# Patient Record
Sex: MALE | Race: WHITE | Employment: OTHER | URBAN - METROPOLITAN AREA
[De-identification: names, ages, dates, MRNs, and addresses within clinical notes are randomized per-mention and may not be internally consistent; named-entity substitution may affect disease eponyms.]

---

## 2017-03-14 ENCOUNTER — APPOINTMENT (OUTPATIENT)
Dept: CT IMAGING | Facility: CLINIC | Age: 77
End: 2017-03-14
Attending: EMERGENCY MEDICINE
Payer: MEDICARE

## 2017-03-14 ENCOUNTER — HOSPITAL ENCOUNTER (EMERGENCY)
Facility: CLINIC | Age: 77
Discharge: HOME | End: 2017-03-14
Attending: EMERGENCY MEDICINE | Admitting: EMERGENCY MEDICINE
Payer: MEDICARE

## 2017-03-14 VITALS
HEIGHT: 70 IN | BODY MASS INDEX: 23.62 KG/M2 | DIASTOLIC BLOOD PRESSURE: 78 MMHG | RESPIRATION RATE: 16 BRPM | HEART RATE: 66 BPM | TEMPERATURE: 98.6 F | OXYGEN SATURATION: 97 % | SYSTOLIC BLOOD PRESSURE: 127 MMHG | WEIGHT: 165 LBS

## 2017-03-14 DIAGNOSIS — K63.89 EPIPLOIC APPENDAGITIS: ICD-10-CM

## 2017-03-14 LAB
ALBUMIN SERPL-MCNC: 4.1 G/DL (ref 3.4–5)
ALBUMIN UR-MCNC: NEGATIVE MG/DL
ALP SERPL-CCNC: 39 U/L (ref 40–150)
ALT SERPL W P-5'-P-CCNC: 25 U/L (ref 0–70)
ANION GAP SERPL CALCULATED.3IONS-SCNC: 9 MMOL/L (ref 3–14)
APPEARANCE UR: CLEAR
AST SERPL W P-5'-P-CCNC: 18 U/L (ref 0–45)
BASOPHILS # BLD AUTO: 0 10E9/L (ref 0–0.2)
BASOPHILS NFR BLD AUTO: 0.2 %
BILIRUB SERPL-MCNC: 0.4 MG/DL (ref 0.2–1.3)
BILIRUB UR QL STRIP: NEGATIVE
BUN SERPL-MCNC: 14 MG/DL (ref 7–30)
CALCIUM SERPL-MCNC: 8.7 MG/DL (ref 8.5–10.1)
CHLORIDE SERPL-SCNC: 110 MMOL/L (ref 94–109)
CO2 SERPL-SCNC: 27 MMOL/L (ref 20–32)
COLOR UR AUTO: YELLOW
CREAT SERPL-MCNC: 1.11 MG/DL (ref 0.66–1.25)
DIFFERENTIAL METHOD BLD: ABNORMAL
EOSINOPHIL # BLD AUTO: 0.4 10E9/L (ref 0–0.7)
EOSINOPHIL NFR BLD AUTO: 5.5 %
ERYTHROCYTE [DISTWIDTH] IN BLOOD BY AUTOMATED COUNT: 15 % (ref 10–15)
GFR SERPL CREATININE-BSD FRML MDRD: 64 ML/MIN/1.7M2
GLUCOSE SERPL-MCNC: 89 MG/DL (ref 70–99)
GLUCOSE UR STRIP-MCNC: NEGATIVE MG/DL
HCT VFR BLD AUTO: 42 % (ref 40–53)
HGB BLD-MCNC: 14 G/DL (ref 13.3–17.7)
HGB UR QL STRIP: ABNORMAL
IMM GRANULOCYTES # BLD: 0 10E9/L (ref 0–0.4)
IMM GRANULOCYTES NFR BLD: 0.6 %
KETONES UR STRIP-MCNC: NEGATIVE MG/DL
LEUKOCYTE ESTERASE UR QL STRIP: NEGATIVE
LYMPHOCYTES # BLD AUTO: 1.9 10E9/L (ref 0.8–5.3)
LYMPHOCYTES NFR BLD AUTO: 28.2 %
MCH RBC QN AUTO: 32.3 PG (ref 26.5–33)
MCHC RBC AUTO-ENTMCNC: 33.3 G/DL (ref 31.5–36.5)
MCV RBC AUTO: 97 FL (ref 78–100)
MONOCYTES # BLD AUTO: 0.6 10E9/L (ref 0–1.3)
MONOCYTES NFR BLD AUTO: 8.5 %
NEUTROPHILS # BLD AUTO: 3.8 10E9/L (ref 1.6–8.3)
NEUTROPHILS NFR BLD AUTO: 57 %
NITRATE UR QL: NEGATIVE
NRBC # BLD AUTO: 0 10*3/UL
NRBC BLD AUTO-RTO: 0 /100
PH UR STRIP: 6 PH (ref 5–7)
PLATELET # BLD AUTO: 191 10E9/L (ref 150–450)
POTASSIUM SERPL-SCNC: 3.9 MMOL/L (ref 3.4–5.3)
PROT SERPL-MCNC: 7.4 G/DL (ref 6.8–8.8)
RBC # BLD AUTO: 4.34 10E12/L (ref 4.4–5.9)
RBC #/AREA URNS AUTO: NORMAL /HPF (ref 0–2)
SODIUM SERPL-SCNC: 146 MMOL/L (ref 133–144)
SP GR UR STRIP: 1.01 (ref 1–1.03)
URN SPEC COLLECT METH UR: ABNORMAL
UROBILINOGEN UR STRIP-ACNC: 0.2 EU/DL (ref 0.2–1)
WBC # BLD AUTO: 6.6 10E9/L (ref 4–11)
WBC #/AREA URNS AUTO: NORMAL /HPF (ref 0–2)

## 2017-03-14 PROCEDURE — 99285 EMERGENCY DEPT VISIT HI MDM: CPT | Mod: 25

## 2017-03-14 PROCEDURE — 80053 COMPREHEN METABOLIC PANEL: CPT | Performed by: EMERGENCY MEDICINE

## 2017-03-14 PROCEDURE — 25500064 ZZH RX 255 OP 636: Performed by: EMERGENCY MEDICINE

## 2017-03-14 PROCEDURE — 74177 CT ABD & PELVIS W/CONTRAST: CPT

## 2017-03-14 PROCEDURE — 85025 COMPLETE CBC W/AUTO DIFF WBC: CPT | Performed by: EMERGENCY MEDICINE

## 2017-03-14 PROCEDURE — 25000125 ZZHC RX 250: Performed by: EMERGENCY MEDICINE

## 2017-03-14 PROCEDURE — 81001 URINALYSIS AUTO W/SCOPE: CPT | Performed by: EMERGENCY MEDICINE

## 2017-03-14 RX ORDER — IOPAMIDOL 755 MG/ML
83 INJECTION, SOLUTION INTRAVASCULAR ONCE
Status: COMPLETED | OUTPATIENT
Start: 2017-03-14 | End: 2017-03-14

## 2017-03-14 RX ADMIN — SODIUM CHLORIDE 65 ML: 9 INJECTION, SOLUTION INTRAVENOUS at 20:40

## 2017-03-14 RX ADMIN — IOPAMIDOL 83 ML: 755 INJECTION, SOLUTION INTRAVENOUS at 20:41

## 2017-03-14 ASSESSMENT — ENCOUNTER SYMPTOMS
DIARRHEA: 0
ABDOMINAL PAIN: 1
NAUSEA: 0

## 2017-03-14 NOTE — ED AVS SNAPSHOT
Emergency Department    [address] [place] [address]    Phone:  [phone]    Fax:  [phone]                                       Sunil Lyle   MRN: [id number]    Department:   Emergency Department   Date of Visit:  3/14/2017           After Visit Summary Signature Page     I have received my discharge instructions, and my questions have been answered. I have discussed any challenges I see with this plan with the nurse or doctor.    ..........................................................................................................................................  Patient/Patient Representative Signature      ..........................................................................................................................................  Patient Representative Print Name and Relationship to Patient    ..................................................               ................................................  Date                                            Time    ..........................................................................................................................................  Reviewed by Signature/Title    ...................................................              ..............................................  Date                                                            Time

## 2017-03-14 NOTE — ED AVS SNAPSHOT
"  Emergency Department    [address] [facility] [address]    Phone:  [phone]    Fax:  [phone]                                       Sunil Lyle   MRN: [id number]    Department:   Emergency Department   Date of Visit:  3/14/2017           Patient Information     Date Of Birth          1940        Your diagnoses for this visit were:     Epiploic appendagitis        You were seen by Surya Moreno DO.      Follow-up Information     Follow up with Sourav Saavedra MD In 2 days.    Specialty:  Surgery    Contact information:    [facility]  [address]  [facility] [id number] [phone]          Follow up with  Emergency Department.    Specialty:  EMERGENCY MEDICINE    Why:  If symptoms worsen    Contact information:    [address] [facility] [address] [phone]        Discharge Instructions          * ABDOMINAL PAIN.    Based on your visit today, the exact cause of your abdominal (stomach) pain is not certain. However, you do have some of the early signs of appendicitis. Early in an appendix infection the symptoms can be similar to a simple \"stomach ache\" or \"stomach flu\". Therefore, the diagnosis can be hard to make. Since an appendix infection is a serious condition, it is important to know if this is the cause of your symptoms. The CT shows today that it is normal. You do have epiploic appendagitis. This is typically a self resolving condition that does not require surgery. Please return, though, if you have any increasing in pain, fever or concern.  WAITING for more time to pass and repeating the exam is the best way to find out whether you have appendicitis. Within the next 12-24 hours the cause of your stomach pain should become clear. It is important for you to watch for any new symptoms or worsening of your condition. (See below).  HOME CARE:  1. Rest until your next exam. No strenuous activities.  2. Eat a diet low in " fiber (called a low-residue diet). Foods allowed include refined breads, white rice, fruit and vegetable juices without pulp, tender meats. These foods will pass more easily through the intestine.  3. Avoid whole-grain foods, whole fruits and vegetables, meats, seeds and nuts, fried or fatty foods, dairy, alcohol and spicy foods until your symptoms go away.  In some cases, you may be asked not to eat or drink anything until you are re-examined.  4. Return for another exam exactly as directed.  FOLLOW UP with your doctor or this facility as directed.  RETURN PROMPTLY before your next appointment or contact your doctor if any of the following occur:    Pain gets worse or moves to the right lower abdomen    New or worsening vomiting or diarrhea    Swelling of the abdomen    Unable to pass stool for more than three days    New fever over 100.4  F (38  C), or rising fever    Blood in vomit or bowel movements (dark red or black color)    Weakness, dizziness or fainting    8085-7684 [place] [address]. All rights reserved. This information is not intended as a substitute for professional medical care. Always follow your healthcare professional's instructions.      24 Hour Appointment Hotline       To make an appointment at any [facility], call [phone] ([phone]). If you don't have a family doctor or clinic, we will help you find one. [facility] clinics are conveniently located to serve the needs of you and your family.             Review of your medicines      Our records show that you are taking the medicines listed below. If these are incorrect, please call your family doctor or clinic.        Dose / Directions Last dose taken    aspirin 81 MG tablet        1 TABLET DAILY   Refills:  0        cholecalciferol 1000 UNIT tablet   Commonly known as:  vitamin D        2 TABLETS DAILY   Refills:  0        fenofibrate 160 MG tablet   Dose:  160 mg   Quantity:  90 tablet         Take 1 tablet (160 mg) by mouth daily   Refills:  3        FLOMAX 0.4 MG capsule   Dose:  0.4 mg   Generic drug:  tamsulosin        Take 0.4 mg by mouth daily.   Refills:  0        metoprolol 25 MG 24 hr tablet   Commonly known as:  TOPROL-XL   Dose:  25 mg   Quantity:  90 tablet        Take 1 tablet (25 mg) by mouth daily   Refills:  3        Multi-vitamin Tabs tablet   Generic drug:  multivitamin, therapeutic with minerals        one daily   Refills:  0        simvastatin 80 MG tablet   Commonly known as:  ZOCOR   Dose:  80 mg   Quantity:  90 tablet        Take 1 tablet (80 mg) by mouth At Bedtime   Refills:  3                Procedures and tests performed during your visit     *UA reflex to Microscopic (ED Lab POCT Only 3-11)    CBC with platelets differential    CT Abdomen Pelvis w Contrast    Comprehensive metabolic panel    Urine Microscopic      Orders Needing Specimen Collection     None      Pending Results     No orders found from 3/12/2017 to 3/15/2017.            Pending Culture Results     No orders found from 3/12/2017 to 3/15/2017.             Test Results from your hospital stay     3/14/2017  8:14 PM - Interface, [employer] Results      Component Results     Component Value Ref Range & Units Status    WBC 6.6 4.0 - 11.0 10e9/L Final    RBC Count 4.34 (L) 4.4 - 5.9 10e12/L Final    Hemoglobin 14.0 13.3 - 17.7 g/dL Final    Hematocrit 42.0 40.0 - 53.0 % Final    MCV 97 78 - 100 fl Final    MCH 32.3 26.5 - 33.0 pg Final    MCHC 33.3 31.5 - 36.5 g/dL Final    RDW 15.0 10.0 - 15.0 % Final    Platelet Count 191 150 - 450 10e9/L Final    Diff Method Automated Method  Final    % Neutrophils 57.0 % Final    % Lymphocytes 28.2 % Final    % Monocytes 8.5 % Final    % Eosinophils 5.5 % Final    % Basophils 0.2 % Final    % Immature Granulocytes 0.6 % Final    Nucleated RBCs 0 0 /100 Final    Absolute Neutrophil 3.8 1.6 - 8.3 10e9/L Final    Absolute Lymphocytes 1.9 0.8 - 5.3 10e9/L Final    Absolute Monocytes  0.6 0.0 - 1.3 10e9/L Final    Absolute Eosinophils 0.4 0.0 - 0.7 10e9/L Final    Absolute Basophils 0.0 0.0 - 0.2 10e9/L Final    Abs Immature Granulocytes 0.0 0 - 0.4 10e9/L Final    Absolute Nucleated RBC 0.0  Final         3/14/2017  8:33 PM - Interface, [employer] Results      Component Results     Component Value Ref Range & Units Status    Sodium 146 (H) 133 - 144 mmol/L Final    Potassium 3.9 3.4 - 5.3 mmol/L Final    Chloride 110 (H) 94 - 109 mmol/L Final    Carbon Dioxide 27 20 - 32 mmol/L Final    Anion Gap 9 3 - 14 mmol/L Final    Glucose 89 70 - 99 mg/dL Final    Urea Nitrogen 14 7 - 30 mg/dL Final    Creatinine 1.11 0.66 - 1.25 mg/dL Final    GFR Estimate 64 >60 mL/min/1.7m2 Final    Non  GFR Calc    GFR Estimate If Black 78 >60 mL/min/1.7m2 Final    African American GFR Calc    Calcium 8.7 8.5 - 10.1 mg/dL Final    Bilirubin Total 0.4 0.2 - 1.3 mg/dL Final    Albumin 4.1 3.4 - 5.0 g/dL Final    Protein Total 7.4 6.8 - 8.8 g/dL Final    Alkaline Phosphatase 39 (L) 40 - 150 U/L Final    ALT 25 0 - 70 U/L Final    AST 18 0 - 45 U/L Final         3/14/2017  9:14 PM - Interface, [employer] [facility]      Narrative     CT ABDOMEN AND PELVIS WITH CONTRAST 3/14/2017 8:47 PM     HISTORY: RLQ pain for 3 days.     COMPARISON: CT abdomen and pelvis 5/4/2007.    TECHNIQUE: Axial images from the lung bases to the symphysis are  performed with additional coronal reformatted images. 83 mL of Isovue  370 are given intravenously.  Radiation dose for this scan was reduced  using automated exposure control, adjustment of the mA and/or kV  according to patient size, or iterative reconstruction technique.    FINDINGS:     The lung bases are clear. Small hiatal hernia.    Abdomen: Tiny left hepatic lobe liver cysts are stable. Liver is  otherwise unremarkable. The spleen, gallbladder, pancreas and adrenal  glands are within normal limits. A prominent cyst is present within  each kidney but is a simple cyst measuring  water density. No  hydronephrosis or renal calculi. There are no enlarged abdominal lymph  nodes. No bowel obstruction or diverticulitis. Fluid scattered  throughout loops of small bowel with a short segment of small bowel in  the right lower quadrant on images 43 through 49 demonstrating mild  wall thickening and enhancement and adjacent mesenteric inflammation  probably indicating an infectious or inflammatory enteritis. An  adjacent area of fat attenuation surrounded by inflammation is noted  on series 3, image 19 near the affected bowel loop suggesting  additional epiploic appendagitis. Appendix is normal. No  diverticulitis.    Pelvis: The bladder and rectum are unremarkable. Prostate gland is  enlarged. No pelvic lymph node enlargement or free pelvic fluid. Bone  window examination demonstrates mild degenerative spine changes.  Degenerative bilateral hip changes are also present.        Impression     IMPRESSION: Epiploic appendagitis but possible adjacent infectious or  inflammatory enteritis involving the distal ileum. No bowel  obstruction. Appendix is normal.    KRISTEN COLON MD         3/14/2017  9:04 PM - Interface, [employer] Results      Component Results     Component Value Ref Range & Units Status    Color Urine Yellow  Final    Appearance Urine Clear  Final    Glucose Urine Negative NEG mg/dL Final    Bilirubin Urine Negative NEG Final    Ketones Urine Negative NEG mg/dL Final    Specific Gravity Urine 1.015 1.003 - 1.035 Final    Blood Urine Trace (A) NEG Final    pH Urine 6.0 5.0 - 7.0 pH Final    Protein Albumin Urine Negative NEG mg/dL Final    Urobilinogen Urine 0.2 0.2 - 1.0 EU/dL Final    Nitrite Urine Negative NEG Final    Leukocyte Esterase Urine Negative NEG Final    Source Midstream Urine  Final         3/14/2017  9:05 PM - Interface, [employer] Results      Component Results     Component Value Ref Range & Units Status    WBC Urine O - 2 0 - 2 /HPF Final    RBC Urine O - 2 0 - 2 /HPF Final                 Clinical Quality Measure: Blood Pressure Screening     Your blood pressure was checked while you were in the emergency department today. The last reading we obtained was  BP: 127/78 . Please read the guidelines below about what these numbers mean and what you should do about them.  If your systolic blood pressure (the top number) is less than 120 and your diastolic blood pressure (the bottom number) is less than 80, then your blood pressure is normal. There is nothing more that you need to do about it.  If your systolic blood pressure (the top number) is 120-139 or your diastolic blood pressure (the bottom number) is 80-89, your blood pressure may be higher than it should be. You should have your blood pressure rechecked within a year by a primary care provider.  If your systolic blood pressure (the top number) is 140 or greater or your diastolic blood pressure (the bottom number) is 90 or greater, you may have high blood pressure. High blood pressure is treatable, but if left untreated over time it can put you at risk for heart attack, stroke, or kidney failure. You should have your blood pressure rechecked by a primary care provider within the next 4 weeks.  If your provider in the emergency department today gave you specific instructions to follow-up with your doctor or provider even sooner than that, you should follow that instruction and not wait for up to 4 weeks for your follow-up visit.        Thank you for choosing [facility]       Thank you for choosing [facility] for your care. Our goal is always to provide you with excellent care. Hearing back from our patients is one way we can continue to improve our services. Please take a few minutes to complete the written survey that you may receive in the mail after you visit with us. Thank you!        [facility] Information     [employer] gives you secure access to your electronic health record. If you see a primary care provider, you can also send messages to  your care team and make appointments. If you have questions, please call your primary care clinic.  If you do not have a primary care provider, please call [phone] and they will assist you.        Care EveryWhere ID     This is your Care EveryWhere ID. This could be used by other organizations to access your [facility] medical records  [id number]        After Visit Summary       This is your record. Keep this with you and show to your community pharmacist(s) and doctor(s) at your next visit.

## 2017-03-15 NOTE — ED PROVIDER NOTES
"  History     Chief Complaint:  Right lower quadrant abdominal pain    HPI   Sunil Lyle is a 76 year old male, with a history of diverticulitis 25-30 years ago, who presents with abdominal pain located in the right lower quadrant. The pain began about 2 days ago, and worsened on the day of the onset with [employer]. Yesterday, he ate light. Today he ate a BLT for lunch and chicken noodle soup at 1800. He reports periodic diarrhea, but nothing very pertinent today. He has not had any abdominal surgeries. Nausea is denied by the patient. No testicular pain.    Allergies:  Bupropion HCl  Chantix  Penicillins     Medications:    Metoprolol  Zocor  Fenofibrate  Flomax  Vitamin D  Aspirin  Multivitamin     Past Medical History:    CKD  CAD  Emphysema  GERD  Hyperlipidemia  Sebaceus cyst  HTN    Past Surgical History:    T & A  Angioplasty, RCA stent  Colonoscopy    Family History:    MI  Diabetes  Breast cancer    Social History:  Marital Status:   Presents to the ED alone  Tobacco Use: current 0.50 pack/day smoker  Alcohol Use: Yes  PCP: Poli Alberto     Review of Systems   Gastrointestinal: Positive for abdominal pain. Negative for diarrhea and nausea.   All other systems reviewed and are negative.      Physical Exam   First Vitals:  BP: 125/72  Pulse: 65  Temp: 98.9  F (37.2  C)  Resp: 16  Height: 177.8 cm (5' 10\")  Weight: 74.8 kg (165 lb)  SpO2: 98 %     Physical Exam  General:  Sitting on bed. Comfortable appearing.   HENT:  No obvious trauma to head  Right Ear:  External ear normal.   Left Ear:  External ear normal.   Nose:  Nose normal.   Eyes:  Conjunctivae and EOM are normal. Pupils are equal, round, and reactive.   Neck: Normal range of motion. Neck supple. No tracheal deviation present.   CV:  Normal heart sounds. No murmur heard.  Pulm/Chest: Effort normal and breath sounds normal.   Abd: Soft. No distension. There is no tenderness. There is no rigidity, no rebound and no guarding.   M/S: " Normal range of motion.   Neuro: Alert. GCS 15.  Skin: Skin is warm and dry. No rash noted. Not diaphoretic.   Psych: Normal mood and affect. Behavior is normal.       Emergency Department Course     Imaging:  CT Abdomen and Pelvis, with contrast, per radiology:   IMPRESSION: Epiploic appendagitis but possible adjacent infectious or inflammatory enteritis involving the distal ileum. No bowel obstruction. Appendix is normal.    Radiographic findings were communicated with the patient who voiced understanding of the findings.    Laboratory:  UA: Clear yellow urine, trace blood, otherwise WNL  UA, Microscopic: WBC 0-2, RBC 0-2   CBC:  WBC 6.6, HGB 14.0,   CMP:  (H), chloride 110 (H), alkphos 39 (L) otherwise WNL (Creatinine 1.11)    Emergency Department Course:  Nursing notes and vitals reviewed.  I performed an exam of the patient as documented above.  The above workup was undertaken.  2116: I rechecked the patient and discussed results.  Findings and plan explained to the Patient. Patient discharged home, status improved, with instructions regarding supportive care, medications, and reasons to return as well as the importance of close follow-up was reviewed.    Impression & Plan      Medical Decision Making:  This very pleasant 76 year old male presents with right lower quadrant pain. He has had this for the past 3-4 days. The patient did eat today, but had a little bit less appetite. Labs are checked and found unremarkable. He is afebrile and without leukocytosis. CT scan was performed which shows epiploic appendagitis but there is no evidence of appendicitis. Being that he had symptoms for several days now, if this were even early appendicitis I would expect that he would still have leukocytosis and fever at this time. CT does definitively show that it is not appendicitis. I reviewed all of this with the patient. We discussed that the epiploic appendagitis should resolve on its own. We reviewed the  risks and benefits of different pain medications to help control the pain. After this patient desires only OTC meds. I recommended he follow up with a general surgeon for reevaluation in a few days if any concern and to return if he has any worsening pain, fever, loose stools. The patient has no urinary symptoms to suggest kidney stone. There is no hydronephrosis, seen on the contrasted CT. Patient denies any testicular pain to suggest torsion.    The treatment plan was discussed with the patient and they expressed understanding of this plan and consented to the plan.  In addition, the patient will return to the emergency department if their symptoms persist, worsen, if new symptoms arise or if there is any concern as other pathology may be present that is not evident at this time. They also understand the importance of close follow up in the clinic and if unable to do so will return to the emergency department for a reevaluation. All questions were answered.      Diagnosis:    ICD-10-CM    1. Epiploic appendagitis K52.9        Disposition:  Discharged to home      Letha GOODWIN, am serving as a scribe on 3/14/2017 at 7:58 PM to personally document services performed by Dr. Surya Moreno based on my observations and the provider's statements to me.    EMERGENCY DEPARTMENT       Surya Moreno,   03/14/17 [id number]

## 2017-03-15 NOTE — ED NOTES
Abdominal pain started Sunday, Hx of diverticulitis . No fever, no diarrhea.  Last BM 03/14/17  Small, looser than normal

## 2017-03-15 NOTE — DISCHARGE INSTRUCTIONS
"   * ABDOMINAL PAIN.    Based on your visit today, the exact cause of your abdominal (stomach) pain is not certain. However, you do have some of the early signs of appendicitis. Early in an appendix infection the symptoms can be similar to a simple \"stomach ache\" or \"stomach flu\". Therefore, the diagnosis can be hard to make. Since an appendix infection is a serious condition, it is important to know if this is the cause of your symptoms. The CT shows today that it is normal. You do have epiploic appendagitis. This is typically a self resolving condition that does not require surgery. Please return, though, if you have any increasing in pain, fever or concern.  WAITING for more time to pass and repeating the exam is the best way to find out whether you have appendicitis. Within the next 12-24 hours the cause of your stomach pain should become clear. It is important for you to watch for any new symptoms or worsening of your condition. (See below).  HOME CARE:  1. Rest until your next exam. No strenuous activities.  2. Eat a diet low in fiber (called a low-residue diet). Foods allowed include refined breads, white rice, fruit and vegetable juices without pulp, tender meats. These foods will pass more easily through the intestine.  3. Avoid whole-grain foods, whole fruits and vegetables, meats, seeds and nuts, fried or fatty foods, dairy, alcohol and spicy foods until your symptoms go away.  In some cases, you may be asked not to eat or drink anything until you are re-examined.  4. Return for another exam exactly as directed.  FOLLOW UP with your doctor or this facility as directed.  RETURN PROMPTLY before your next appointment or contact your doctor if any of the following occur:    Pain gets worse or moves to the right lower abdomen    New or worsening vomiting or diarrhea    Swelling of the abdomen    Unable to pass stool for more than three days    New fever over 100.4  F (38  C), or rising fever    Blood in vomit " or bowel movements (dark red or black color)    Weakness, dizziness or fainting    [id number] [employer], [address] [place] [address], [place] [address]. All rights reserved. This information is not intended as a substitute for professional medical care. Always follow your healthcare professional's instructions.

## 2017-07-07 DIAGNOSIS — I10 ESSENTIAL HYPERTENSION, BENIGN: ICD-10-CM

## 2017-07-07 RX ORDER — METOPROLOL SUCCINATE 25 MG/1
25 TABLET, EXTENDED RELEASE ORAL DAILY
Qty: 30 TABLET | Refills: 0 | Status: SHIPPED | OUTPATIENT
Start: 2017-07-07 | End: 2017-08-30

## 2017-07-14 ENCOUNTER — HOSPITAL ENCOUNTER (OUTPATIENT)
Dept: CARDIOLOGY | Facility: CLINIC | Age: 77
Discharge: HOME | End: 2017-07-14
Attending: INTERNAL MEDICINE | Admitting: INTERNAL MEDICINE
Payer: MEDICARE

## 2017-07-14 DIAGNOSIS — E78.5 HYPERLIPIDEMIA LDL GOAL <70: ICD-10-CM

## 2017-07-14 DIAGNOSIS — I25.10 CORONARY ARTERY DISEASE INVOLVING NATIVE CORONARY ARTERY OF NATIVE HEART WITHOUT ANGINA PECTORIS: ICD-10-CM

## 2017-07-14 DIAGNOSIS — I10 ESSENTIAL HYPERTENSION, BENIGN: ICD-10-CM

## 2017-07-14 LAB
ALT SERPL W P-5'-P-CCNC: <5 U/L (ref 5–30)
ANION GAP SERPL CALCULATED.3IONS-SCNC: 13.1 MMOL/L (ref 6–17)
BUN SERPL-MCNC: 12 MG/DL (ref 7–30)
CALCIUM SERPL-MCNC: 9.2 MG/DL (ref 8.5–10.5)
CHLORIDE SERPL-SCNC: 105 MMOL/L (ref 98–107)
CHOLEST SERPL-MCNC: 160 MG/DL
CO2 SERPL-SCNC: 27 MMOL/L (ref 23–29)
CREAT SERPL-MCNC: 1.21 MG/DL (ref 0.7–1.3)
GFR SERPL CREATININE-BSD FRML MDRD: 58 ML/MIN/1.7M2
GLUCOSE SERPL-MCNC: 107 MG/DL (ref 70–105)
HDLC SERPL-MCNC: 45 MG/DL
LDLC SERPL CALC-MCNC: 87 MG/DL
NONHDLC SERPL-MCNC: 115 MG/DL
POTASSIUM SERPL-SCNC: 4.1 MMOL/L (ref 3.5–5.1)
SODIUM SERPL-SCNC: 141 MMOL/L (ref 136–145)
TRIGL SERPL-MCNC: 138 MG/DL

## 2017-07-14 PROCEDURE — 93321 DOPPLER ECHO F-UP/LMTD STD: CPT | Mod: 26 | Performed by: INTERNAL MEDICINE

## 2017-07-14 PROCEDURE — 80061 LIPID PANEL: CPT | Performed by: INTERNAL MEDICINE

## 2017-07-14 PROCEDURE — 36415 COLL VENOUS BLD VENIPUNCTURE: CPT | Performed by: INTERNAL MEDICINE

## 2017-07-14 PROCEDURE — 93325 DOPPLER ECHO COLOR FLOW MAPG: CPT | Mod: 26 | Performed by: INTERNAL MEDICINE

## 2017-07-14 PROCEDURE — 93350 STRESS TTE ONLY: CPT | Mod: 26 | Performed by: INTERNAL MEDICINE

## 2017-07-14 PROCEDURE — 93325 DOPPLER ECHO COLOR FLOW MAPG: CPT | Mod: TC

## 2017-07-14 PROCEDURE — 84460 ALANINE AMINO (ALT) (SGPT): CPT | Performed by: INTERNAL MEDICINE

## 2017-07-14 PROCEDURE — 93016 CV STRESS TEST SUPVJ ONLY: CPT | Performed by: INTERNAL MEDICINE

## 2017-07-14 PROCEDURE — 93018 CV STRESS TEST I&R ONLY: CPT | Performed by: INTERNAL MEDICINE

## 2017-07-14 PROCEDURE — 80048 BASIC METABOLIC PNL TOTAL CA: CPT | Performed by: INTERNAL MEDICINE

## 2017-07-20 ENCOUNTER — OFFICE VISIT (OUTPATIENT)
Dept: CARDIOLOGY | Facility: CLINIC | Age: 77
End: 2017-07-20
Attending: INTERNAL MEDICINE
Payer: COMMERCIAL

## 2017-07-20 VITALS
WEIGHT: 161 LBS | SYSTOLIC BLOOD PRESSURE: 120 MMHG | BODY MASS INDEX: 25.27 KG/M2 | DIASTOLIC BLOOD PRESSURE: 72 MMHG | HEIGHT: 67 IN | HEART RATE: 68 BPM

## 2017-07-20 DIAGNOSIS — E78.5 HYPERLIPIDEMIA LDL GOAL <70: ICD-10-CM

## 2017-07-20 DIAGNOSIS — I25.10 CORONARY ARTERY DISEASE INVOLVING NATIVE CORONARY ARTERY OF NATIVE HEART WITHOUT ANGINA PECTORIS: ICD-10-CM

## 2017-07-20 DIAGNOSIS — I10 ESSENTIAL HYPERTENSION, BENIGN: Primary | ICD-10-CM

## 2017-07-20 PROCEDURE — 99213 OFFICE O/P EST LOW 20 MIN: CPT | Performed by: NURSE PRACTITIONER

## 2017-07-20 NOTE — MR AVS SNAPSHOT
After Visit Summary   7/20/2017    Sunil Lyle    MRN: [id number]           Patient Information     Date Of Birth          1940        Visit Information        Provider Department      7/20/2017 3:50 PM Krysta Truong APRN CNP [facility]        Today's Diagnoses     Essential hypertension, benign    -  1    Coronary artery disease involving native coronary artery of native heart without angina pectoris        Hyperlipidemia LDL goal <70           Follow-ups after your visit        Additional Services     Follow-Up with Cardiologist                 Future tests that were ordered for you today     Open Future Orders        Priority Expected Expires Ordered    Lipid Profile Routine 7/20/2018 8/24/2018 7/20/2017    ALT Routine 7/20/2018 8/24/2018 7/20/2017    Follow-Up with Cardiologist Routine 7/20/2018 12/2/2018 7/20/2017            Who to contact     If you have questions or need follow up information about today's clinic visit or your schedule please contact [facility] directly at [phone].  Normal or non-critical lab and imaging results will be communicated to you by "[employer]hart, letter or phone within 4 business days after the clinic has received the results. If you do not hear from us within 7 days, please contact the clinic through "[employer]hart or phone. If you have a critical or abnormal lab result, we will notify you by phone as soon as possible.  Submit refill requests through [email or web address] or call your pharmacy and they will forward the refill request to us. Please allow 3 business days for your refill to be completed.          Additional Information About Your Visit        MyChart Information     [email or web address] gives you secure access to your electronic health record. If you see a primary care provider, you can also send messages to your care team and make appointments. If you have questions, please call your  "primary care clinic.  If you do not have a primary care provider, please call [phone] and they will assist you.        Care EveryWhere ID     This is your Care EveryWhere ID. This could be used by other organizations to access your [facility] medical records  [id number]        Your Vitals Were     Pulse Height BMI (Body Mass Index)             68 1.702 m (5' 7\") 25.22 kg/m2          Blood Pressure from Last 3 Encounters:   07/20/17 120/72   03/14/17 127/78   06/23/16 114/62    Weight from Last 3 Encounters:   07/20/17 73 kg (161 lb)   03/14/17 74.8 kg (165 lb)   06/23/16 76.4 kg (168 lb 6.4 oz)              We Performed the Following     Follow-Up with Cardiac Advanced Practice Provider        Primary Care Provider Office Phone # Fax #    Poli Alberto [phone]       [address] [place] [address] [place] [id number]        Equal Access to Services     AZAEL SAMANO : Hadii aad ku hadasho Soomaali, waaxda luqadaha, qaybta kaalmada adeegyada, waxay idiin hayaan leeanna melo . So [facility] [phone].    ATENCIÓN: Si habla español, tiene a pendleton disposición servicios gratuitos de asistencia lingüística. Llame al [phone].    We comply with applicable federal civil rights laws and [place] laws. We do not discriminate on the basis of race, color, national origin, age, disability sex, sexual orientation or gender identity.            Thank you!     Thank you for choosing [facility]  for your care. Our goal is always to provide you with excellent care. Hearing back from our patients is one way we can continue to improve our services. Please take a few minutes to complete the written survey that you may receive in the mail after your visit with us. Thank you!             Your Updated Medication List - Protect others around you: Learn how to safely use, store and throw away your medicines at www.disposemymeds.org.          This list " is accurate as of: 7/20/17  4:20 PM.  Always use your most recent med list.                   Brand Name Dispense Instructions for use Diagnosis    aspirin 81 MG tablet      1 TABLET DAILY        cholecalciferol 1000 UNIT tablet    vitamin D     2 TABLETS DAILY        fenofibrate 160 MG tablet     90 tablet    Take 1 tablet (160 mg) by mouth daily    Hyperlipidemia LDL goal <70       FLOMAX 0.4 MG capsule   Generic drug:  tamsulosin      Take 0.4 mg by mouth daily.        metoprolol 25 MG 24 hr tablet    TOPROL-XL    30 tablet    Take 1 tablet (25 mg) by mouth daily    Essential hypertension, benign       Multi-vitamin Tabs tablet   Generic drug:  multivitamin, therapeutic with minerals      one daily        simvastatin 80 MG tablet    ZOCOR    90 tablet    Take 1 tablet (80 mg) by mouth At Bedtime    Hyperlipidemia LDL goal <70

## 2017-07-20 NOTE — LETTER
7/20/2017    Poli Alberto MD  [address]   [facility] [address]    RE: Sunil Lyle       Dear Colleague,    I had the pleasure of seeing Sunil Lyle in the [facility].    HPI and Plan:   See dictation  4:16 PM  [id number]    No orders of the defined types were placed in this encounter.      No orders of the defined types were placed in this encounter.      There are no discontinued medications.      Encounter Diagnosis   Name Primary?     Coronary artery disease involving native coronary artery of native heart without angina pectoris        CURRENT MEDICATIONS:  Current Outpatient Prescriptions   Medication Sig Dispense Refill     metoprolol (TOPROL-XL) 25 MG 24 hr tablet Take 1 tablet (25 mg) by mouth daily 30 tablet 0     simvastatin (ZOCOR) 80 MG tablet Take 1 tablet (80 mg) by mouth At Bedtime 90 tablet 3     tamsulosin (FLOMAX) 0.4 MG 24 hr capsule Take 0.4 mg by mouth daily.       VITAMIN D 1000 UNIT OR TABS 2 TABLETS DAILY       ASPIRIN 81 MG OR TABS 1 TABLET DAILY       MULTI-VITAMIN OR TABS one daily       fenofibrate 160 MG tablet Take 1 tablet (160 mg) by mouth daily 90 tablet 3       ALLERGIES     Allergies   Allergen Reactions     Bupropion Hcl      vivid dreams     Chantix [Varenicline] Other (See Comments)     CNS side effects     Penicillins Anaphylaxis       PAST MEDICAL HISTORY:  Past Medical History:   Diagnosis Date     Allergic state      CAD (coronary artery disease) 5/22/2014     GERD (gastroesophageal reflux disease)      Hypercholesterolaemia      Hyperlipidaemia      Other affections of shoulder region, not elsewhere classified     impingement syndrome of right shoulder     Sebaceous cyst     right shoulder area     Tachycardia        PAST SURGICAL HISTORY:  Past Surgical History:   Procedure Laterality Date     COLONOSCOPY       HEART CATH, ANGIOPLASTY  october 2001    RCA stent     TONSILLECTOMY & ADENOIDECTOMY    "    VASCULAR SURGERY         FAMILY HISTORY:  Family History   Problem Relation Age of Onset     Breast Cancer Mother       at age 64     HEART DISEASE Father      heart attack     DIABETES Father        SOCIAL HISTORY:  Social History     Social History     Marital status:      Spouse name: N/A     Number of children: N/A     Years of education: N/A     Occupational History     Sales/Computer Training Retired     Social History Main Topics     Smoking status: Current Every Day Smoker     Packs/day: 0.50     Types: Cigarettes     Smokeless tobacco: Never Used      Comment: off and on since age 17- smokes about 5 cigs/day plus vapor cigarettes      Alcohol use 2.0 oz/week     4 Shots of liquor per week      Comment: varies     Drug use: No     Sexual activity: Not Asked     Other Topics Concern      Service Not Asked     [place] 2658-4657     Blood Transfusions No     Caffeine Concern Yes     Occupational Exposure No     Hobby Hazards No     Sleep Concern No     Stress Concern No     Weight Concern No     Special Diet No     Back Care No     Exercise No     Seat Belt Yes     Social History Narrative    ** Merged History Encounter **            Review of Systems:  Skin:  Negative       Eyes:  Positive for glasses    ENT:  Positive for hearing loss dentures  Respiratory:  Positive for cough;wheezing;dyspnea on exertion intermittently    Cardiovascular:  Negative lightheadedness;dizziness    Gastroenterology: Negative      Genitourinary:  Negative   Flomax user  Musculoskeletal:  Positive for joint pain (finger only on left hand)    Neurologic:  Negative      Psychiatric:  Negative      Heme/Lymph/Imm:  Negative      Endocrine:  Negative        Physical Exam:  Vitals: /72  Pulse 68  Ht 1.702 m (5' 7\")  Wt 73 kg (161 lb)  BMI 25.22 kg/m2    Constitutional:  cooperative, alert and oriented, well developed, well nourished, in no acute distress        Skin:  warm and dry to the touch, no " apparent skin lesions or masses noted        Head:  normocephalic, no masses or lesions        Eyes:  pupils equal and round, conjunctivae and lids unremarkable, sclera white, no xanthalasma, EOMS intact, no nystagmus        ENT:  no pallor or cyanosis, dentition good        Neck:  carotid pulses are full and equal bilaterally;JVP normal;no carotid bruit        Chest:  clear to auscultation          Cardiac: regular rhythm;normal S1 and S2;no S3 or S4 occasional premature beats                Abdomen:  abdomen soft;BS normoactive        Vascular: pulses full and equal                                        Extremities and Back:  no deformities, clubbing, cyanosis, erythema observed;no edema              Neurological:  affect appropriate, oriented to time, person and place;no gross motor deficits              CC  Poli Medina MD   [facility]  [address]  [place] [address]                  CHIEF COMPLAINT:  Annual follow up.     HISTORY OF PRESENT ILLNESS:  The patient is a very pleasant 77-year-old gentleman who follows with Dr. Medina.  He has a history of coronary artery disease, hypertension, dyslipidemia and tobacco abuse.  He underwent stenting to the right coronary artery in 2001.  He had minimal disease elsewhere.  He has done well since that time.  He saw Dr. Medina last in June 2016.  He underwent an exercise stress echocardiogram on the 14th of this month which was negative for ischemia.  We did fasting lipids which showed a total cholesterol of 160, HDL 45, LDL 87, triglycerides 138.  His chemistry showed a sodium of 141, potassium 4.1, BUN 12, creatinine 1.21, GFR 58.      The patient tells me over the last year he has done well.  He has had no complaints of chest pain, shortness of breath, lightheadedness, dizziness, palpitations, orthopnea or paroxysmal nocturnal dyspnea.  He admits that he continues to live an unhealthy lifestyle.  He drinks alcohol, smokes cigarettes and  eats a lot of fried, unhealthy foods.  He does not anticipate any change in his lifestyle any time soon but is rather happy to go on this way.  He tells me he is feeling well and enjoys life.     PHYSICAL EXAM:  He is a well-developed, well-nourished male who appears his stated age.  He is cooperative and appropriate.  Vital signs are stable.  Neurologically, he is intact.  HEENT:  Normocephalic/atraumatic without tenderness or involuntary movements.  Face appears symmetric.  Visual fields are full.  EOMs intact.  Conjunctivae clear.  Oral mucosa is pink and moist.  Neck is supple with full range of motion.  Trachea appears midline.  No JVD, no carotid bruit.  Cardiac exam:  S1 and S2 with regular rate and rhythm.  Occasional premature beat.  Lungs:  Chest expansion appears symmetric.  Breath sounds are clear.  Abdomen is soft.  Bowel sounds present.  Extremities warm and dry without edema, cyanosis or clubbing.        IMPRESSION AND PLAN:    1.  History of coronary artery disease with right coronary artery stenting in 2001.  The patient remains stable.  He is without angina or anginal equivalent symptoms.  He had a normal stress test.  He is appropriately on beta blocker, aspirin and statin therapy.  Lifestyle modification was discussed, but my suspicion is he will continue as he is.  2.  History of hypertension.  Blood pressure adequately controlled on current medication regimen.   3.  History of dyslipidemia.  Cholesterol is controlled, however, his LDL could be closer to 70 given his history of coronary artery disease.  Last year, LDL was at 55.  He admits that for a short period of time, he stopped taking his statin therapy prior to having his labs drawn but is again back on it.  I discussed with him the importance of continuing with the simvastatin.  He is agreeable to doing so.  Will recheck his numbers again in a year.      Thank you for allowing me to participate in the care of this patient.          HEATH  YARITZA ALMENDAREZ CNP    D:  07/20/2017 17:19 T:  07/23/2017 12:23  Document:  [id number] LW\AD    Thank you for allowing me to participate in the care of your patient.    Sincerely,     YARITZA Rogers CNP     [facility]

## 2017-07-20 NOTE — PROGRESS NOTES
HPI and Plan:   See dictation  4:16 PM  [id number]    No orders of the defined types were placed in this encounter.      No orders of the defined types were placed in this encounter.      There are no discontinued medications.      Encounter Diagnosis   Name Primary?     Coronary artery disease involving native coronary artery of native heart without angina pectoris        CURRENT MEDICATIONS:  Current Outpatient Prescriptions   Medication Sig Dispense Refill     metoprolol (TOPROL-XL) 25 MG 24 hr tablet Take 1 tablet (25 mg) by mouth daily 30 tablet 0     simvastatin (ZOCOR) 80 MG tablet Take 1 tablet (80 mg) by mouth At Bedtime 90 tablet 3     tamsulosin (FLOMAX) 0.4 MG 24 hr capsule Take 0.4 mg by mouth daily.       VITAMIN D 1000 UNIT OR TABS 2 TABLETS DAILY       ASPIRIN 81 MG OR TABS 1 TABLET DAILY       MULTI-VITAMIN OR TABS one daily       fenofibrate 160 MG tablet Take 1 tablet (160 mg) by mouth daily 90 tablet 3       ALLERGIES     Allergies   Allergen Reactions     Bupropion Hcl      vivid dreams     Chantix [Varenicline] Other (See Comments)     CNS side effects     Penicillins Anaphylaxis       PAST MEDICAL HISTORY:  Past Medical History:   Diagnosis Date     Allergic state      CAD (coronary artery disease) 2014     GERD (gastroesophageal reflux disease)      Hypercholesterolaemia      Hyperlipidaemia      Other affections of shoulder region, not elsewhere classified     impingement syndrome of right shoulder     Sebaceous cyst     right shoulder area     Tachycardia        PAST SURGICAL HISTORY:  Past Surgical History:   Procedure Laterality Date     COLONOSCOPY       HEART CATH, ANGIOPLASTY  2001    RCA stent     TONSILLECTOMY & ADENOIDECTOMY       VASCULAR SURGERY         FAMILY HISTORY:  Family History   Problem Relation Age of Onset     Breast Cancer Mother       at age 64     HEART DISEASE Father      heart attack     DIABETES Father        SOCIAL HISTORY:  Social History     Social  "History     Marital status:      Spouse name: N/A     Number of children: N/A     Years of education: N/A     Occupational History     Sales/Computer Training Retired     Social History Main Topics     Smoking status: Current Every Day Smoker     Packs/day: 0.50     Types: Cigarettes     Smokeless tobacco: Never Used      Comment: off and on since age 17- smokes about 5 cigs/day plus vapor cigarettes      Alcohol use 2.0 oz/week     4 Shots of liquor per week      Comment: varies     Drug use: No     Sexual activity: Not Asked     Other Topics Concern      Service Not Asked     [place] 1732-0564     Blood Transfusions No     Caffeine Concern Yes     Occupational Exposure No     Hobby Hazards No     Sleep Concern No     Stress Concern No     Weight Concern No     Special Diet No     Back Care No     Exercise No     Seat Belt Yes     Social History Narrative    ** Merged History Encounter **            Review of Systems:  Skin:  Negative       Eyes:  Positive for glasses    ENT:  Positive for hearing loss dentures  Respiratory:  Positive for cough;wheezing;dyspnea on exertion intermittently    Cardiovascular:  Negative lightheadedness;dizziness    Gastroenterology: Negative      Genitourinary:  Negative   Flomax user  Musculoskeletal:  Positive for joint pain (finger only on left hand)    Neurologic:  Negative      Psychiatric:  Negative      Heme/Lymph/Imm:  Negative      Endocrine:  Negative        Physical Exam:  Vitals: /72  Pulse 68  Ht 1.702 m (5' 7\")  Wt 73 kg (161 lb)  BMI 25.22 kg/m2    Constitutional:  cooperative, alert and oriented, well developed, well nourished, in no acute distress        Skin:  warm and dry to the touch, no apparent skin lesions or masses noted        Head:  normocephalic, no masses or lesions        Eyes:  pupils equal and round, conjunctivae and lids unremarkable, sclera white, no xanthalasma, EOMS intact, no nystagmus        ENT:  no pallor or cyanosis, " dentition good        Neck:  carotid pulses are full and equal bilaterally;JVP normal;no carotid bruit        Chest:  clear to auscultation          Cardiac: regular rhythm;normal S1 and S2;no S3 or S4 occasional premature beats                Abdomen:  abdomen soft;BS normoactive        Vascular: pulses full and equal                                        Extremities and Back:  no deformities, clubbing, cyanosis, erythema observed;no edema              Neurological:  affect appropriate, oriented to time, person and place;no gross motor deficits              CC  Poli Medina MD   [facility]  [address]  [place] [address]

## 2017-07-24 NOTE — PROGRESS NOTES
CHIEF COMPLAINT:  Annual follow up.     HISTORY OF PRESENT ILLNESS:  The patient is a very pleasant 77-year-old gentleman who follows with Dr. Medina.  He has a history of coronary artery disease, hypertension, dyslipidemia and tobacco abuse.  He underwent stenting to the right coronary artery in 2001.  He had minimal disease elsewhere.  He has done well since that time.  He saw Dr. Medina last in June 2016.  He underwent an exercise stress echocardiogram on the 14th of this month which was negative for ischemia.  We did fasting lipids which showed a total cholesterol of 160, HDL 45, LDL 87, triglycerides 138.  His chemistry showed a sodium of 141, potassium 4.1, BUN 12, creatinine 1.21, GFR 58.      The patient tells me over the last year he has done well.  He has had no complaints of chest pain, shortness of breath, lightheadedness, dizziness, palpitations, orthopnea or paroxysmal nocturnal dyspnea.  He admits that he continues to live an unhealthy lifestyle.  He drinks alcohol, smokes cigarettes and eats a lot of fried, unhealthy foods.  He does not anticipate any change in his lifestyle any time soon but is rather happy to go on this way.  He tells me he is feeling well and enjoys life.     PHYSICAL EXAM:  He is a well-developed, well-nourished male who appears his stated age.  He is cooperative and appropriate.  Vital signs are stable.  Neurologically, he is intact.  HEENT:  Normocephalic/atraumatic without tenderness or involuntary movements.  Face appears symmetric.  Visual fields are full.  EOMs intact.  Conjunctivae clear.  Oral mucosa is pink and moist.  Neck is supple with full range of motion.  Trachea appears midline.  No JVD, no carotid bruit.  Cardiac exam:  S1 and S2 with regular rate and rhythm.  Occasional premature beat.  Lungs:  Chest expansion appears symmetric.  Breath sounds are clear.  Abdomen is soft.  Bowel sounds present.  Extremities warm and dry without edema, cyanosis or clubbing.         IMPRESSION AND PLAN:    1.  History of coronary artery disease with right coronary artery stenting in 2001.  The patient remains stable.  He is without angina or anginal equivalent symptoms.  He had a normal stress test.  He is appropriately on beta blocker, aspirin and statin therapy.  Lifestyle modification was discussed, but my suspicion is he will continue as he is.  2.  History of hypertension.  Blood pressure adequately controlled on current medication regimen.   3.  History of dyslipidemia.  Cholesterol is controlled, however, his LDL could be closer to 70 given his history of coronary artery disease.  Last year, LDL was at 55.  He admits that for a short period of time, he stopped taking his statin therapy prior to having his labs drawn but is again back on it.  I discussed with him the importance of continuing with the simvastatin.  He is agreeable to doing so.  Will recheck his numbers again in a year.      Thank you for allowing me to participate in the care of this patient.          YARITZA CEDILLO CNP    D:  07/20/2017 17:19 T:  07/23/2017 12:23  Document:  [id number] LW\AD

## 2017-08-30 DIAGNOSIS — I10 ESSENTIAL HYPERTENSION, BENIGN: ICD-10-CM

## 2017-08-30 DIAGNOSIS — E78.5 HYPERLIPIDEMIA LDL GOAL <70: ICD-10-CM

## 2017-08-30 RX ORDER — METOPROLOL SUCCINATE 25 MG/1
25 TABLET, EXTENDED RELEASE ORAL DAILY
Qty: 90 TABLET | Refills: 3 | Status: SHIPPED | OUTPATIENT
Start: 2017-08-30 | End: 2018-06-27

## 2017-08-30 RX ORDER — SIMVASTATIN 80 MG
80 TABLET ORAL AT BEDTIME
Qty: 90 TABLET | Refills: 3 | Status: SHIPPED | OUTPATIENT
Start: 2017-08-30 | End: 2018-06-27

## 2017-08-30 RX ORDER — FENOFIBRATE 160 MG/1
160 TABLET ORAL DAILY
Qty: 90 TABLET | Refills: 3 | Status: SHIPPED | OUTPATIENT
Start: 2017-08-30 | End: 2018-06-27

## 2017-11-17 ENCOUNTER — HOSPITAL ENCOUNTER (OUTPATIENT)
Dept: CT IMAGING | Facility: CLINIC | Age: 77
Discharge: HOME | End: 2017-11-17
Attending: INTERNAL MEDICINE | Admitting: INTERNAL MEDICINE
Payer: MEDICARE

## 2017-11-17 DIAGNOSIS — R06.00 DYSPNEA, UNSPECIFIED: ICD-10-CM

## 2017-11-17 DIAGNOSIS — R05.9 COUGH: ICD-10-CM

## 2017-11-17 LAB
CREAT BLD-MCNC: 1.3 MG/DL (ref 0.66–1.25)
GFR SERPL CREATININE-BSD FRML MDRD: 53 ML/MIN/1.7M2

## 2017-11-17 PROCEDURE — 25000128 H RX IP 250 OP 636: Performed by: INTERNAL MEDICINE

## 2017-11-17 PROCEDURE — 82565 ASSAY OF CREATININE: CPT

## 2017-11-17 PROCEDURE — 25000125 ZZHC RX 250: Performed by: INTERNAL MEDICINE

## 2017-11-17 PROCEDURE — 71260 CT THORAX DX C+: CPT

## 2017-11-17 RX ORDER — IOPAMIDOL 755 MG/ML
75 INJECTION, SOLUTION INTRAVASCULAR ONCE
Status: COMPLETED | OUTPATIENT
Start: 2017-11-17 | End: 2017-11-17

## 2017-11-17 RX ADMIN — SODIUM CHLORIDE 70 ML: 9 INJECTION, SOLUTION INTRAVENOUS at 10:51

## 2017-11-17 RX ADMIN — IOPAMIDOL 75 ML: 755 INJECTION, SOLUTION INTRAVENOUS at 10:51

## 2018-01-05 ENCOUNTER — TELEPHONE (OUTPATIENT)
Dept: CARDIOLOGY | Facility: CLINIC | Age: 78
End: 2018-01-05

## 2018-01-05 NOTE — TELEPHONE ENCOUNTER
Received call from pt wanting to know information about his stent from 2001. Pt explained that he does not have a stent information card and needs specific info for an upcoming MRI. This writer called cath lab and explained the situation, they took down pt's info and will call back when they can. Left voicemail for pt explaining that we are working on getting the information, but would like to know when the MRI is scheduled. Awaiting call back from pt.

## 2018-06-27 DIAGNOSIS — I10 ESSENTIAL HYPERTENSION, BENIGN: ICD-10-CM

## 2018-06-27 DIAGNOSIS — E78.5 HYPERLIPIDEMIA LDL GOAL <70: ICD-10-CM

## 2018-06-27 RX ORDER — SIMVASTATIN 80 MG
80 TABLET ORAL AT BEDTIME
Qty: 90 TABLET | Refills: 0 | Status: SHIPPED | OUTPATIENT
Start: 2018-06-27 | End: 2019-04-29

## 2018-06-27 RX ORDER — METOPROLOL SUCCINATE 25 MG/1
25 TABLET, EXTENDED RELEASE ORAL DAILY
Qty: 90 TABLET | Refills: 0 | Status: SHIPPED | OUTPATIENT
Start: 2018-06-27 | End: 2018-08-29

## 2018-06-27 RX ORDER — SIMVASTATIN 80 MG
80 TABLET ORAL AT BEDTIME
Qty: 90 TABLET | Refills: 3 | Status: SHIPPED | OUTPATIENT
Start: 2018-06-27 | End: 2018-06-27

## 2018-06-27 RX ORDER — FENOFIBRATE 160 MG/1
160 TABLET ORAL DAILY
Qty: 90 TABLET | Refills: 0 | Status: SHIPPED | OUTPATIENT
Start: 2018-06-27 | End: 2018-08-29

## 2018-08-29 DIAGNOSIS — I10 ESSENTIAL HYPERTENSION, BENIGN: ICD-10-CM

## 2018-08-29 DIAGNOSIS — E78.5 HYPERLIPIDEMIA LDL GOAL <70: ICD-10-CM

## 2018-08-29 RX ORDER — FENOFIBRATE 160 MG/1
160 TABLET ORAL DAILY
Qty: 90 TABLET | Refills: 0 | Status: SHIPPED | OUTPATIENT
Start: 2018-08-29 | End: 2019-04-29

## 2018-08-29 RX ORDER — METOPROLOL SUCCINATE 25 MG/1
25 TABLET, EXTENDED RELEASE ORAL DAILY
Qty: 90 TABLET | Refills: 0 | Status: SHIPPED | OUTPATIENT
Start: 2018-08-29 | End: 2019-04-29

## 2018-10-30 ENCOUNTER — OFFICE VISIT (OUTPATIENT)
Dept: CARDIOLOGY | Facility: CLINIC | Age: 78
End: 2018-10-30
Payer: COMMERCIAL

## 2018-10-30 VITALS
BODY MASS INDEX: 27.31 KG/M2 | DIASTOLIC BLOOD PRESSURE: 64 MMHG | HEART RATE: 68 BPM | HEIGHT: 67 IN | SYSTOLIC BLOOD PRESSURE: 110 MMHG | WEIGHT: 174 LBS

## 2018-10-30 DIAGNOSIS — E78.5 HYPERLIPIDEMIA LDL GOAL <70: ICD-10-CM

## 2018-10-30 DIAGNOSIS — I10 ESSENTIAL HYPERTENSION, BENIGN: ICD-10-CM

## 2018-10-30 DIAGNOSIS — I25.10 CORONARY ARTERY DISEASE INVOLVING NATIVE CORONARY ARTERY OF NATIVE HEART WITHOUT ANGINA PECTORIS: Primary | ICD-10-CM

## 2018-10-30 PROCEDURE — 99214 OFFICE O/P EST MOD 30 MIN: CPT | Performed by: INTERNAL MEDICINE

## 2018-10-30 NOTE — LETTER
10/30/2018    Poli Alberto MD  [address]   [facility] [address]    RE: Sunil Lyle       Dear Colleague,    I had the pleasure of seeing Sunil Lyle in the [facility].    HPI and Plan:   This 78-year-old gentleman is seen in followup of his coronary artery disease, hypertension history, dyslipidemia and tobacco use.  In 2001 he had stenting of a severe right coronary artery lesion.  He had minimal disease elsewhere.     He states this year he remained stable.  One year ago he was convinced to stop smoking and has been abstinent for a year now.  This came after he was told he developed COPD.  He has a modest activity level and has no symptoms when he is doing this. He states he feels better when he does regular brisk walking.  He has stopped using his treadmill as much, and notes now that he can go as far before he stops because of shortness of breath when he does use his treadmill.  It is unclear if this is just deconditioning or for some other reason.    He is without resting shortness of breath, orthopnea, PND, edema,, arrhythmia, chest pain, or other ischemic symptoms.  He denies myalgias or muscle weakness, claudication, focal neurologic symptoms, or syncope or near-syncope.    Last stress study in 2014 showed no evidence of ischemia and he had normal ejection fraction and wall motion.     Today blood pressure is well controlled at 110/64 cardiac exam is within normal limits. Peripheral vascular exam is normal.  Lungs are clear although there is some diminished air movement bilaterally.    Laboratory studies are reviewed from care everywhere done with Dr. Alberto.  Earlier this year he was at goal with an LDL of 65, triglycerides 147, non-HDL cholesterol 94 and HDL 43.  Electrolytes were normal.  Creatinine was 1.3 which is around what his baseline is been since at least 2014.        IMPRESSION/PLAN       1-coronary artery disease .   Currently without ischemic heart failure or arrhythmia symptoms.  Normal ejection fraction at last evaluation.I will have him continue his current regimen.       2-dyslipidemia. At goal.     On statin therapy    3-tobacco abuse history.  No abstinent one year.  I have encouraged this excellent change in his habits.    4-history of hypertension.  Currently well controlled.    5-dyspnea on exertion at a lower level than he used to be.  I think this is more likely deconditioning.  I have asked him to go back onto his treadmill more regularly and work the next 2 months on improving his exertional tolerance.  If it does improve I will have him continue exercising.  However if there is no improvement with an adequate intervention on exercise program, or gets worse, we will need to do a stress study to make sure this is not an ischemic symptom    Otherwise he will continue his current regimen.  He will follow-up in a year.        Orders Placed This Encounter   Procedures     Follow-Up with Cardiac Advanced Practice Provider       No orders of the defined types were placed in this encounter.      There are no discontinued medications.      Encounter Diagnoses   Name Primary?     Coronary artery disease involving native coronary artery of native heart without angina pectoris Yes     Essential hypertension, benign      Hyperlipidemia LDL goal <70        CURRENT MEDICATIONS:  Current Outpatient Prescriptions   Medication Sig Dispense Refill     ASPIRIN 81 MG OR TABS 1 TABLET DAILY       fenofibrate 160 MG tablet Take 1 tablet (160 mg) by mouth daily 90 tablet 0     metoprolol succinate (TOPROL-XL) 25 MG 24 hr tablet Take 1 tablet (25 mg) by mouth daily 90 tablet 0     MULTI-VITAMIN OR TABS one daily       simvastatin (ZOCOR) 80 MG tablet Take 1 tablet (80 mg) by mouth At Bedtime 90 tablet 0     tamsulosin (FLOMAX) 0.4 MG 24 hr capsule Take 0.4 mg by mouth daily.       VITAMIN D 1000 UNIT OR TABS 2 TABLETS DAILY          ALLERGIES     Allergies   Allergen Reactions     Bupropion Hcl      vivid dreams     Chantix [Varenicline] Other (See Comments)     CNS side effects     Penicillins Anaphylaxis       PAST MEDICAL HISTORY:  Past Medical History:   Diagnosis Date     Allergic state      CAD (coronary artery disease) 2014     GERD (gastroesophageal reflux disease)      Hypercholesterolaemia      Hyperlipidaemia      Other affections of shoulder region, not elsewhere classified     impingement syndrome of right shoulder     Sebaceous cyst     right shoulder area     Tachycardia        PAST SURGICAL HISTORY:  Past Surgical History:   Procedure Laterality Date     COLONOSCOPY       HEART CATH, ANGIOPLASTY  2001    RCA stent     TONSILLECTOMY & ADENOIDECTOMY       VASCULAR SURGERY         FAMILY HISTORY:  Family History   Problem Relation Age of Onset     Breast Cancer Mother       at age 64     HEART DISEASE Father      heart attack     Diabetes Father        SOCIAL HISTORY:  Social History     Social History     Marital status:      Spouse name: N/A     Number of children: N/A     Years of education: N/A     Occupational History     Sales/Computer Training Retired     Social History Main Topics     Smoking status: Former Smoker     Packs/day: 0.50     Years: 33.50     Types: Cigarettes     Start date:      Quit date: 2017     Smokeless tobacco: Never Used      Comment: off and on since age 17- smokes about 5 cigs/day plus vapor cigarettes      Alcohol use 2.0 oz/week     4 Shots of liquor per week      Comment: varies     Drug use: No     Sexual activity: Not Asked     Other Topics Concern      Service Not Asked     [place] [id number]     Blood Transfusions No     Caffeine Concern Yes     Occupational Exposure No     Hobby Hazards No     Sleep Concern No     Stress Concern No     Weight Concern No     Special Diet No     Back Care No     Exercise No     Seat Belt Yes     Social History  "Narrative    ** Merged History Encounter **            Review of Systems:  Skin:  Negative       Eyes:  Positive for glasses    ENT:  Positive for hearing loss dentures  Respiratory:  Positive for wheezing;dyspnea on exertion SOB - established care with pulmonology   Cardiovascular:  Negative for;chest pain;edema lightheadedness;dizziness;palpitations rare chest pains; lightheaded every few days  Gastroenterology: Negative heartburn    Genitourinary:  Negative   Flomax user  Musculoskeletal:  Positive for joint pain (finger only on left hand)    Neurologic:  Negative      Psychiatric:  Negative      Heme/Lymph/Imm:  Negative      Endocrine:  Negative        Physical Exam:  Vitals: /64  Pulse 68  Ht 1.702 m (5' 7\")  Wt 78.9 kg (174 lb)  BMI 27.25 kg/m2    Constitutional:  cooperative, alert and oriented, well developed, well nourished, in no acute distress        Skin:  warm and dry to the touch, no apparent skin lesions or masses noted          Head:  normocephalic, no masses or lesions        Eyes:  pupils equal and round, conjunctivae and lids unremarkable, sclera white, no xanthalasma, EOMS intact, no nystagmus        Lymph:      ENT:  no pallor or cyanosis, dentition good        Neck:  carotid pulses are full and equal bilaterally;JVP normal;no carotid bruit        Respiratory:  clear to auscultation;normal respiratory excursion diminished breath sounds bilaterally       Cardiac: regular rhythm;normal S1 and S2;no S3 or S4;no murmurs, gallops or rubs detected occasional premature beats              pulses full and equal                                        GI:  abdomen soft;BS normoactive;no bruits obese      Extremities and Muscular Skeletal:  no deformities, clubbing, cyanosis, erythema observed;no edema              Neurological:  no gross motor deficits        Psych:  affect appropriate, oriented to time, person and place          Thank you for allowing me to participate in the care of your " patient.    Sincerely,     Poli Medina MD     [facility]

## 2018-10-30 NOTE — PROGRESS NOTES
HPI and Plan:   This 78-year-old gentleman is seen in followup of his coronary artery disease, hypertension history, dyslipidemia and tobacco use.  In 2001 he had stenting of a severe right coronary artery lesion.  He had minimal disease elsewhere.     He states this year he remained stable.  One year ago he was convinced to stop smoking and has been abstinent for a year now.  This came after he was told he developed COPD.  He has a modest activity level and has no symptoms when he is doing this. He states he feels better when he does regular brisk walking.  He has stopped using his treadmill as much, and notes now that he can go as far before he stops because of shortness of breath when he does use his treadmill.  It is unclear if this is just deconditioning or for some other reason.    He is without resting shortness of breath, orthopnea, PND, edema,, arrhythmia, chest pain, or other ischemic symptoms.  He denies myalgias or muscle weakness, claudication, focal neurologic symptoms, or syncope or near-syncope.    Last stress study in 2014 showed no evidence of ischemia and he had normal ejection fraction and wall motion.     Today blood pressure is well controlled at 110/64 cardiac exam is within normal limits. Peripheral vascular exam is normal.  Lungs are clear although there is some diminished air movement bilaterally.    Laboratory studies are reviewed from care everywhere done with Dr. Alberto.  Earlier this year he was at goal with an LDL of 65, triglycerides 147, non-HDL cholesterol 94 and HDL 43.  Electrolytes were normal.  Creatinine was 1.3 which is around what his baseline is been since at least 2014.        IMPRESSION/PLAN       1-coronary artery disease .  Currently without ischemic heart failure or arrhythmia symptoms.  Normal ejection fraction at last evaluation.I will have him continue his current regimen.       2-dyslipidemia. At goal.    On statin therapy    3-tobacco abuse history.  No abstinent  one year.  I have encouraged this excellent change in his habits.    4-history of hypertension.  Currently well controlled.    5-dyspnea on exertion at a lower level than he used to be.  I think this is more likely deconditioning.  I have asked him to go back onto his treadmill more regularly and work the next 2 months on improving his exertional tolerance.  If it does improve I will have him continue exercising.  However if there is no improvement with an adequate intervention on exercise program, or gets worse, we will need to do a stress study to make sure this is not an ischemic symptom    Otherwise he will continue his current regimen.  He will follow-up in a year.        Orders Placed This Encounter   Procedures     Follow-Up with Cardiac Advanced Practice Provider       No orders of the defined types were placed in this encounter.      There are no discontinued medications.      Encounter Diagnoses   Name Primary?     Coronary artery disease involving native coronary artery of native heart without angina pectoris Yes     Essential hypertension, benign      Hyperlipidemia LDL goal <70        CURRENT MEDICATIONS:  Current Outpatient Prescriptions   Medication Sig Dispense Refill     ASPIRIN 81 MG OR TABS 1 TABLET DAILY       fenofibrate 160 MG tablet Take 1 tablet (160 mg) by mouth daily 90 tablet 0     metoprolol succinate (TOPROL-XL) 25 MG 24 hr tablet Take 1 tablet (25 mg) by mouth daily 90 tablet 0     MULTI-VITAMIN OR TABS one daily       simvastatin (ZOCOR) 80 MG tablet Take 1 tablet (80 mg) by mouth At Bedtime 90 tablet 0     tamsulosin (FLOMAX) 0.4 MG 24 hr capsule Take 0.4 mg by mouth daily.       VITAMIN D 1000 UNIT OR TABS 2 TABLETS DAILY         ALLERGIES     Allergies   Allergen Reactions     Bupropion Hcl      vivid dreams     Chantix [Varenicline] Other (See Comments)     CNS side effects     Penicillins Anaphylaxis       PAST MEDICAL HISTORY:  Past Medical History:   Diagnosis Date     Allergic  state      CAD (coronary artery disease) 2014     GERD (gastroesophageal reflux disease)      Hypercholesterolaemia      Hyperlipidaemia      Other affections of shoulder region, not elsewhere classified     impingement syndrome of right shoulder     Sebaceous cyst     right shoulder area     Tachycardia        PAST SURGICAL HISTORY:  Past Surgical History:   Procedure Laterality Date     COLONOSCOPY       HEART CATH, ANGIOPLASTY  2001    RCA stent     TONSILLECTOMY & ADENOIDECTOMY       VASCULAR SURGERY         FAMILY HISTORY:  Family History   Problem Relation Age of Onset     Breast Cancer Mother       at age 64     HEART DISEASE Father      heart attack     Diabetes Father        SOCIAL HISTORY:  Social History     Social History     Marital status:      Spouse name: N/A     Number of children: N/A     Years of education: N/A     Occupational History     Sales/Computer Training Retired     Social History Main Topics     Smoking status: Former Smoker     Packs/day: 0.50     Years: 33.50     Types: Cigarettes     Start date:      Quit date: 2017     Smokeless tobacco: Never Used      Comment: off and on since age 17- smokes about 5 cigs/day plus vapor cigarettes      Alcohol use 2.0 oz/week     4 Shots of liquor per week      Comment: varies     Drug use: No     Sexual activity: Not Asked     Other Topics Concern      Service Not Asked     [place] [id number]     Blood Transfusions No     Caffeine Concern Yes     Occupational Exposure No     Hobby Hazards No     Sleep Concern No     Stress Concern No     Weight Concern No     Special Diet No     Back Care No     Exercise No     Seat Belt Yes     Social History Narrative    ** Merged History Encounter **            Review of Systems:  Skin:  Negative       Eyes:  Positive for glasses    ENT:  Positive for hearing loss dentures  Respiratory:  Positive for wheezing;dyspnea on exertion SOB - established care with pulmonology  "  Cardiovascular:  Negative for;chest pain;edema lightheadedness;dizziness;palpitations rare chest pains; lightheaded every few days  Gastroenterology: Negative heartburn    Genitourinary:  Negative   Flomax user  Musculoskeletal:  Positive for joint pain (finger only on left hand)    Neurologic:  Negative      Psychiatric:  Negative      Heme/Lymph/Imm:  Negative      Endocrine:  Negative        Physical Exam:  Vitals: /64  Pulse 68  Ht 1.702 m (5' 7\")  Wt 78.9 kg (174 lb)  BMI 27.25 kg/m2    Constitutional:  cooperative, alert and oriented, well developed, well nourished, in no acute distress        Skin:  warm and dry to the touch, no apparent skin lesions or masses noted          Head:  normocephalic, no masses or lesions        Eyes:  pupils equal and round, conjunctivae and lids unremarkable, sclera white, no xanthalasma, EOMS intact, no nystagmus        Lymph:      ENT:  no pallor or cyanosis, dentition good        Neck:  carotid pulses are full and equal bilaterally;JVP normal;no carotid bruit        Respiratory:  clear to auscultation;normal respiratory excursion diminished breath sounds bilaterally       Cardiac: regular rhythm;normal S1 and S2;no S3 or S4;no murmurs, gallops or rubs detected occasional premature beats              pulses full and equal                                        GI:  abdomen soft;BS normoactive;no bruits obese      Extremities and Muscular Skeletal:  no deformities, clubbing, cyanosis, erythema observed;no edema              Neurological:  no gross motor deficits        Psych:  affect appropriate, oriented to time, person and place        CC  Poli Alberto MD  [address] [place] [place], [place] [id number]              "

## 2018-10-30 NOTE — MR AVS SNAPSHOT
After Visit Summary   10/30/2018    Sunil Lyle    MRN: [id number]           Patient Information     Date Of Birth          1940        Visit Information        Provider Department      10/30/2018 4:15 PM Poli Medina MD [facility]        Today's Diagnoses     Coronary artery disease involving native coronary artery of native heart without angina pectoris    -  1    Essential hypertension, benign        Hyperlipidemia LDL goal <70           Follow-ups after your visit        Additional Services     Follow-Up with Cardiac Advanced Practice Provider                 Future tests that were ordered for you today     Open Future Orders        Priority Expected Expires Ordered    Follow-Up with Cardiac Advanced Practice Provider Routine 10/30/2019 10/31/2019 10/30/2018            Who to contact     If you have questions or need follow up information about today's clinic visit or your schedule please contact [facility]   [facility] directly at [phone].  Normal or non-critical lab and imaging results will be communicated to you by MyChart, letter or phone within 4 business days after the clinic has received the results. If you do not hear from us within 7 days, please contact the clinic through [employer] or phone. If you have a critical or abnormal lab result, we will notify you by phone as soon as possible.  Submit refill requests through [employer] or call your pharmacy and they will forward the refill request to us. Please allow 3 business days for your refill to be completed.          Additional Information About Your Visit        MyChart Information     [employer] gives you secure access to your electronic health record. If you see a primary care provider, you can also send messages to your care team and make appointments. If you have questions, please call your primary care clinic.  If you do not have a primary care  "provider, please call [phone] and they will assist you.        Care EveryWhere ID     This is your Care EveryWhere ID. This could be used by other organizations to access your [place] medical records  [id number]        Your Vitals Were     Pulse Height BMI (Body Mass Index)             68 1.702 m (5' 7\") 27.25 kg/m2          Blood Pressure from Last 3 Encounters:   10/30/18 110/64   07/20/17 120/72   03/14/17 127/78    Weight from Last 3 Encounters:   10/30/18 78.9 kg (174 lb)   07/20/17 73 kg (161 lb)   03/14/17 74.8 kg (165 lb)               Primary Care Provider Office Phone # Fax #    Poli Alberto [phone]       [address] [place] [address] [place] [address]        Equal Access to Services     [facility]: Hadii aad ku hadasho Soomaali, waaxda luqadaha, qaybta kaalmada adeegyada, waxay idiin hayaan leeanna melo . So [facility] [phone].    ATENCIÓN: Si habla español, tiene a pendleton disposición servicios gratuitos de asistencia lingüística. Joseph al [phone].    We comply with applicable federal civil rights laws and [place] laws. We do not discriminate on the basis of race, color, national origin, age, disability, sex, sexual orientation, or gender identity.            Thank you!     Thank you for choosing [facility]  for your care. Our goal is always to provide you with excellent care. Hearing back from our patients is one way we can continue to improve our services. Please take a few minutes to complete the written survey that you may receive in the mail after your visit with us. Thank you!             Your Updated Medication List - Protect others around you: Learn how to safely use, store and throw away your medicines at www.disposemymeds.org.          This list is accurate as of 10/30/18  4:32 PM.  Always use your most recent med list.                   Brand Name Dispense Instructions for use Diagnosis    " aspirin 81 MG tablet      1 TABLET DAILY        cholecalciferol 1000 UNIT tablet    vitamin D3     2 TABLETS DAILY        fenofibrate 160 MG tablet     90 tablet    Take 1 tablet (160 mg) by mouth daily    Hyperlipidemia LDL goal <70       FLOMAX 0.4 MG capsule   Generic drug:  tamsulosin      Take 0.4 mg by mouth daily.        metoprolol succinate 25 MG 24 hr tablet    TOPROL-XL    90 tablet    Take 1 tablet (25 mg) by mouth daily    Essential hypertension, benign       Multi-vitamin Tabs tablet   Generic drug:  multivitamin, therapeutic with minerals      one daily        simvastatin 80 MG tablet    ZOCOR    90 tablet    Take 1 tablet (80 mg) by mouth At Bedtime    Hyperlipidemia LDL goal <70

## 2019-04-29 DIAGNOSIS — I10 ESSENTIAL HYPERTENSION, BENIGN: ICD-10-CM

## 2019-04-29 DIAGNOSIS — E78.5 HYPERLIPIDEMIA LDL GOAL <70: ICD-10-CM

## 2019-04-29 RX ORDER — FENOFIBRATE 160 MG/1
160 TABLET ORAL DAILY
Qty: 90 TABLET | Refills: 3 | Status: SHIPPED | OUTPATIENT
Start: 2019-04-29 | End: 2020-01-21

## 2019-04-29 RX ORDER — SIMVASTATIN 80 MG
80 TABLET ORAL AT BEDTIME
Qty: 90 TABLET | Refills: 3 | Status: SHIPPED | OUTPATIENT
Start: 2019-04-29 | End: 2020-01-21

## 2019-04-29 RX ORDER — METOPROLOL SUCCINATE 25 MG/1
25 TABLET, EXTENDED RELEASE ORAL DAILY
Qty: 90 TABLET | Refills: 3 | Status: SHIPPED | OUTPATIENT
Start: 2019-04-29 | End: 2020-07-20

## 2019-05-03 ENCOUNTER — HEALTH MAINTENANCE LETTER (OUTPATIENT)
Age: 79
End: 2019-05-03

## 2019-10-03 ENCOUNTER — HEALTH MAINTENANCE LETTER (OUTPATIENT)
Age: 79
End: 2019-10-03

## 2019-12-02 ENCOUNTER — OFFICE VISIT (OUTPATIENT)
Dept: CARDIOLOGY | Facility: CLINIC | Age: 79
End: 2019-12-02
Payer: MEDICARE

## 2019-12-02 VITALS
OXYGEN SATURATION: 96 % | DIASTOLIC BLOOD PRESSURE: 62 MMHG | HEART RATE: 67 BPM | HEIGHT: 67 IN | WEIGHT: 172.4 LBS | SYSTOLIC BLOOD PRESSURE: 128 MMHG | BODY MASS INDEX: 27.06 KG/M2

## 2019-12-02 DIAGNOSIS — I25.10 CORONARY ARTERY DISEASE INVOLVING NATIVE CORONARY ARTERY OF NATIVE HEART WITHOUT ANGINA PECTORIS: ICD-10-CM

## 2019-12-02 DIAGNOSIS — I10 ESSENTIAL HYPERTENSION, BENIGN: Primary | ICD-10-CM

## 2019-12-02 PROCEDURE — 99214 OFFICE O/P EST MOD 30 MIN: CPT | Performed by: PHYSICIAN ASSISTANT

## 2019-12-02 ASSESSMENT — MIFFLIN-ST. JEOR: SCORE: 1450.63

## 2019-12-02 NOTE — PROGRESS NOTES
Cardiology Progress Note    Date of Service: 12/02/2019      Reason for visit: Annual follow up, coronary artery disease, hypertension, dyslipidemia.    Primary cardiologist: Dr. Poli Medina      HPI:  Mr. Lyle is a pleasant 79 year old male with a PMhx including hypertension, dyslipidemia, previous tobacco use, and GERD. He also has coronary artery disease and underwent stenting of a severe RCA lesion in 2001. At that time, he had minimal disease elsewhere. He has been following with us annually over the last several years, and his most recent ischemic evaluation was a stress echo in 2017. At that time he had no evidence of stress induced ischemia, and his EF was preserved at 55-60%.     He was seen last in our office by Dr. Medina in Oct 2018 at which time he continued to do well. He had been diagnosed with COPD the previous year and stopped smoking at that time. At that visit, no changes were made. Today he's back for our planned annual follow up.     He tells me that over the last year he has done well from a cardiac standpoint. He denies any new exertional chest, arm, or jaw discomfort. He does note he gets winded with exertion at times, but attributes this to his COPD. Fortunately, he remains abstinent from smoking, but admits he doesn't do much for exercise. He walks on his treadmill for only about 5 minutes once a week or so, and says he gets a bit winded and bored, so he stopped. He denies any associated chest tightness (which was his presenting symptom back in 2001), palpitations, or dizziness. He has not had any lower extremity edema or orthopnea, and weight is down 2 lbs from last visit. His main issues currently revolve around some hemorrhoids and an anal fissure, but otherwise he is pleased with how he's been feeling.    His BP today was up a bit from his baseline at 135/68, and on recheck came down to 128/62. He admits to me today that he went out to eat just before came, and ate a large  bernardino, along with a salad, which he added salt to. Unfortunately, no labs were performed prior to our visit today, and his last lipid panel was in the summer of 2018 via his primary provider.       ASSESSMENT/PLAN:    1. Coronary artery disease.   --History of stenting of a severe RCA lesion in 2001. Last ischemic evaluation in 2017 at which time no ischemia was identified. His EF historically has remained preserved.  He does have some SENA, but attributes this to COPD (and says not similar to prior ischemic symptoms). He was prescribed an inhaler but he chose not to have this filled. If this persists, we can consider a repeat stress study.    --Continue ASA 81mg daily.    --For now, continue his simvastatin and fenofibrate without change, though he is due for a repeat fasting lipid profile which he can have performed next week at his convenience.    --Continue metoprolol. His BP is up a bit from his baseline, but he just ate a salty meal. Historically his BP has been under good control.    --He remains abstinent from smoking which I congratulated him on.   --I encouraged him to try to remain active and start walking more on his treadmill if possible.     Follow up plan:  Return in 1 year for annual visit with Dr. Medina with repeat fasting labs.       Orders this Visit:  Orders Placed This Encounter   Procedures     Lipid panel reflex to direct LDL Fasting     Lipid Profile     ALT     Follow-Up with Cardiologist     No orders of the defined types were placed in this encounter.    There are no discontinued medications.        CURRENT MEDICATIONS:  Current Outpatient Medications   Medication Sig Dispense Refill     ASPIRIN 81 MG OR TABS 1 TABLET DAILY       fenofibrate (TRIGLIDE/LOFIBRA) 160 MG tablet Take 1 tablet (160 mg) by mouth daily 90 tablet 3     metoprolol succinate ER (TOPROL-XL) 25 MG 24 hr tablet Take 1 tablet (25 mg) by mouth daily 90 tablet 3     MULTI-VITAMIN OR TABS one daily       simvastatin (ZOCOR)  80 MG tablet Take 1 tablet (80 mg) by mouth At Bedtime 90 tablet 3     tamsulosin (FLOMAX) 0.4 MG 24 hr capsule Take 0.4 mg by mouth daily.       VITAMIN D 1000 UNIT OR TABS Take 5,000 Units by mouth daily          ALLERGIES     Allergies   Allergen Reactions     Bupropion Hcl      vivid dreams     Chantix [Varenicline] Other (See Comments)     CNS side effects     Penicillins Anaphylaxis       PAST MEDICAL HISTORY:  Past Medical History:   Diagnosis Date     Allergic state      CAD (coronary artery disease) 2014     GERD (gastroesophageal reflux disease)      Hypercholesterolaemia      Hyperlipidaemia      Other affections of shoulder region, not elsewhere classified     impingement syndrome of right shoulder     Sebaceous cyst     right shoulder area     Tachycardia        PAST SURGICAL HISTORY:  Past Surgical History:   Procedure Laterality Date     COLONOSCOPY       HEART CATH, ANGIOPLASTY  2001    RCA stent     TONSILLECTOMY & ADENOIDECTOMY       VASCULAR SURGERY         FAMILY HISTORY:  Family History   Problem Relation Age of Onset     Breast Cancer Mother          at age 64     Heart Disease Father         heart attack     Diabetes Father        SOCIAL HISTORY:  Social History     Socioeconomic History     Marital status:      Spouse name: None     Number of children: None     Years of education: None     Highest education level: None   Occupational History     Occupation: Sales/Computer Training     Employer: RETIRED   Social Needs     Financial resource strain: None     Food insecurity:     Worry: None     Inability: None     Transportation needs:     Medical: None     Non-medical: None   Tobacco Use     Smoking status: Former Smoker     Packs/day: 0.50     Years: 33.50     Pack years: 16.75     Types: Cigarettes     Start date:      Last attempt to quit: 2017     Years since quittin.0     Smokeless tobacco: Never Used     Tobacco comment: off and on since age 17- smokes  about 5 cigs/day plus vapor cigarettes    Substance and Sexual Activity     Alcohol use: Yes     Alcohol/week: 3.3 standard drinks     Types: 4 Shots of liquor per week     Comment: varies     Drug use: No     Sexual activity: None   Lifestyle     Physical activity:     Days per week: None     Minutes per session: None     Stress: None   Relationships     Social connections:     Talks on phone: None     Gets together: None     Attends Adventism service: None     Active member of club or organization: None     Attends meetings of clubs or organizations: None     Relationship status: None     Intimate partner violence:     Fear of current or ex partner: None     Emotionally abused: None     Physically abused: None     Forced sexual activity: None   Other Topics Concern      Service Not Asked     Comment: [place] [id number]     Blood Transfusions No     Caffeine Concern Yes     Occupational Exposure No     Hobby Hazards No     Sleep Concern No     Stress Concern No     Weight Concern No     Special Diet No     Back Care No     Exercise No     Bike Helmet Not Asked     Seat Belt Yes     Self-Exams Not Asked     Parent/sibling w/ CABG, MI or angioplasty before 65F 55M? Not Asked   Social History Narrative    ** Merged History Encounter **            Review of Systems:  Cardiovascular: negative for chest pain, palpitations, orthopnea, LE edema  Constitutional: negative for chills, sweats, fevers   Resp: Negative for dyspnea at rest, pos dyspnea on exertion, neg cough, wheezing, pos known chronic lung disease  HEENT: Negative for new visual changes, frequent headaches  Gastrointestinal: negative for abdominal pain, pos occasional diarrhea, hemorrhoids. Neg nausea, vomiting  Hematologic/lymphatic: negative for current systemic anticoagulation, hx of blood clots  Musculoskeletal: negative for new back pain, joint pain  Neurological: negative for focal weakness, LOC, seizures, syncope/presyncope/dizziness.  "      Physical Exam:  Vitals: /62   Pulse 67   Ht 1.702 m (5' 7\")   Wt 78.2 kg (172 lb 6.4 oz)   SpO2 96%   BMI 27.00 kg/m     Wt Readings from Last 4 Encounters:   12/02/19 78.2 kg (172 lb 6.4 oz)   10/30/18 78.9 kg (174 lb)   07/20/17 73 kg (161 lb)   03/14/17 74.8 kg (165 lb)       GEN:  In general, this is a well nourished male in no acute distress on room air.  Patient ambulatory, unaccompanied today.   HEENT:  Pupils equal, round. Sclerae nonicteric.   NECK: Supple, no masses appreciated. Trachea midline. No JVD.   C/V:  Regular rate and rhythm, no murmur, rub or gallop.   RESP: Respirations are unlabored. No use of accessory muscles. Clear to auscultation bilaterally without wheezing, rales, or rhonchi.  GI: Abdomen soft, nontender, nondistended. No HSM appreciated.   EXTREM: No LE edema. No cyanosis or clubbing.  NEURO: Alert and oriented, cooperative. Gait not formally assessed. No obvious focal deficits.   PSYCH: Normal affect.  SKIN: Warm and dry. No rashes or petechiae appreciated.       Recent Lab Results:       Jeannie Vasquez PA-C  [facility]  Pager [phone]    "

## 2019-12-02 NOTE — LETTER
12/2/2019    Poli Alberto MD  [address]   [facility] [address]    RE: Sunil Lyle       Dear Colleague,    I had the pleasure of seeing Sunil Lyle in the [facility].      Cardiology Progress Note    Date of Service: 12/02/2019      Reason for visit: Annual follow up, coronary artery disease, hypertension, dyslipidemia.    Primary cardiologist: Dr. Poli Medina      HPI:  Mr. Lyle is a pleasant 79 year old male with a PMhx including hypertension, dyslipidemia, previous tobacco use, and GERD. He also has coronary artery disease and underwent stenting of a severe RCA lesion in 2001. At that time, he had minimal disease elsewhere. He has been following with us annually over the last several years, and his most recent ischemic evaluation was a stress echo in 2017. At that time he had no evidence of stress induced ischemia, and his EF was preserved at 55-60%.     He was seen last in our office by Dr. Medina in Oct 2018 at which time he continued to do well. He had been diagnosed with COPD the previous year and stopped smoking at that time. At that visit, no changes were made. Today he's back for our planned annual follow up.     He tells me that over the last year he has done well from a cardiac standpoint. He denies any new exertional chest, arm, or jaw discomfort. He does note he gets winded with exertion at times, but attributes this to his COPD. Fortunately, he remains abstinent from smoking, but admits he doesn't do much for exercise. He walks on his treadmill for only about 5 minutes once a week or so, and says he gets a bit winded and bored, so he stopped. He denies any associated chest tightness (which was his presenting symptom back in 2001), palpitations, or dizziness. He has not had any lower extremity edema or orthopnea, and weight is down 2 lbs from last visit. His main issues currently revolve around some hemorrhoids and an  anal fissure, but otherwise he is pleased with how he's been feeling.    His BP today was up a bit from his baseline at 135/68, and on recheck came down to 128/62. He admits to me today that he went out to eat just before came, and ate a large pizza, along with a salad, which he added salt to. Unfortunately, no labs were performed prior to our visit today, and his last lipid panel was in the summer of 2018 via his primary provider.       ASSESSMENT/PLAN:    1. Coronary artery disease.   --History of stenting of a severe RCA lesion in 2001. Last ischemic evaluation in 2017 at which time no ischemia was identified. His EF historically has remained preserved.  He does have some SENA, but attributes this to COPD (and says not similar to prior ischemic symptoms). He was prescribed an inhaler but he chose not to have this filled. If this persists, we can consider a repeat stress study.    --Continue ASA 81mg daily.    --For now, continue his simvastatin and fenofibrate without change, though he is due for a repeat fasting lipid profile which he can have performed next week at his convenience.    --Continue metoprolol. His BP is up a bit from his baseline, but he just ate a salty meal. Historically his BP has been under good control.    --He remains abstinent from smoking which I congratulated him on.   --I encouraged him to try to remain active and start walking more on his treadmill if possible.     Follow up plan:  Return in 1 year for annual visit with Dr. Medina with repeat fasting labs.       Orders this Visit:  Orders Placed This Encounter   Procedures     Lipid panel reflex to direct LDL Fasting     Lipid Profile     ALT     Follow-Up with Cardiologist     No orders of the defined types were placed in this encounter.    There are no discontinued medications.        CURRENT MEDICATIONS:  Current Outpatient Medications   Medication Sig Dispense Refill     ASPIRIN 81 MG OR TABS 1 TABLET DAILY       fenofibrate  (TRIGLIDE/LOFIBRA) 160 MG tablet Take 1 tablet (160 mg) by mouth daily 90 tablet 3     metoprolol succinate ER (TOPROL-XL) 25 MG 24 hr tablet Take 1 tablet (25 mg) by mouth daily 90 tablet 3     MULTI-VITAMIN OR TABS one daily       simvastatin (ZOCOR) 80 MG tablet Take 1 tablet (80 mg) by mouth At Bedtime 90 tablet 3     tamsulosin (FLOMAX) 0.4 MG 24 hr capsule Take 0.4 mg by mouth daily.       VITAMIN D 1000 UNIT OR TABS Take 5,000 Units by mouth daily          ALLERGIES     Allergies   Allergen Reactions     Bupropion Hcl      vivid dreams     Chantix [Varenicline] Other (See Comments)     CNS side effects     Penicillins Anaphylaxis       PAST MEDICAL HISTORY:  Past Medical History:   Diagnosis Date     Allergic state      CAD (coronary artery disease) 2014     GERD (gastroesophageal reflux disease)      Hypercholesterolaemia      Hyperlipidaemia      Other affections of shoulder region, not elsewhere classified     impingement syndrome of right shoulder     Sebaceous cyst     right shoulder area     Tachycardia        PAST SURGICAL HISTORY:  Past Surgical History:   Procedure Laterality Date     COLONOSCOPY       HEART CATH, ANGIOPLASTY  2001    RCA stent     TONSILLECTOMY & ADENOIDECTOMY       VASCULAR SURGERY         FAMILY HISTORY:  Family History   Problem Relation Age of Onset     Breast Cancer Mother          at age 64     Heart Disease Father         heart attack     Diabetes Father        SOCIAL HISTORY:  Social History     Socioeconomic History     Marital status:      Spouse name: None     Number of children: None     Years of education: None     Highest education level: None   Occupational History     Occupation: Sales/Computer Training     Employer: RETIRED   Social Needs     Financial resource strain: None     Food insecurity:     Worry: None     Inability: None     Transportation needs:     Medical: None     Non-medical: None   Tobacco Use     Smoking status: Former Smoker      Packs/day: 0.50     Years: 33.50     Pack years: 16.75     Types: Cigarettes     Start date:      Last attempt to quit: 2017     Years since quittin.0     Smokeless tobacco: Never Used     Tobacco comment: off and on since age 17- smokes about 5 cigs/day plus vapor cigarettes    Substance and Sexual Activity     Alcohol use: Yes     Alcohol/week: 3.3 standard drinks     Types: 4 Shots of liquor per week     Comment: varies     Drug use: No     Sexual activity: None   Lifestyle     Physical activity:     Days per week: None     Minutes per session: None     Stress: None   Relationships     Social connections:     Talks on phone: None     Gets together: None     Attends Holiness service: None     Active member of club or organization: None     Attends meetings of clubs or organizations: None     Relationship status: None     Intimate partner violence:     Fear of current or ex partner: None     Emotionally abused: None     Physically abused: None     Forced sexual activity: None   Other Topics Concern      Service Not Asked     Comment: [place] [id number]     Blood Transfusions No     Caffeine Concern Yes     Occupational Exposure No     Hobby Hazards No     Sleep Concern No     Stress Concern No     Weight Concern No     Special Diet No     Back Care No     Exercise No     Bike Helmet Not Asked     Seat Belt Yes     Self-Exams Not Asked     Parent/sibling w/ CABG, MI or angioplasty before 65F 55M? Not Asked   Social History Narrative    ** Merged History Encounter **            Review of Systems:  Cardiovascular: negative for chest pain, palpitations, orthopnea, LE edema  Constitutional: negative for chills, sweats, fevers   Resp: Negative for dyspnea at rest, pos dyspnea on exertion, neg cough, wheezing, pos known chronic lung disease  HEENT: Negative for new visual changes, frequent headaches  Gastrointestinal: negative for abdominal pain, pos occasional diarrhea, hemorrhoids. Neg nausea,  "vomiting  Hematologic/lymphatic: negative for current systemic anticoagulation, hx of blood clots  Musculoskeletal: negative for new back pain, joint pain  Neurological: negative for focal weakness, LOC, seizures, syncope/presyncope/dizziness.       Physical Exam:  Vitals: /62   Pulse 67   Ht 1.702 m (5' 7\")   Wt 78.2 kg (172 lb 6.4 oz)   SpO2 96%   BMI 27.00 kg/m      Wt Readings from Last 4 Encounters:   12/02/19 78.2 kg (172 lb 6.4 oz)   10/30/18 78.9 kg (174 lb)   07/20/17 73 kg (161 lb)   03/14/17 74.8 kg (165 lb)       GEN:  In general, this is a well nourished male in no acute distress on room air.  Patient ambulatory, unaccompanied today.   HEENT:  Pupils equal, round. Sclerae nonicteric.   NECK: Supple, no masses appreciated. Trachea midline. No JVD.   C/V:  Regular rate and rhythm, no murmur, rub or gallop.   RESP: Respirations are unlabored. No use of accessory muscles. Clear to auscultation bilaterally without wheezing, rales, or rhonchi.  GI: Abdomen soft, nontender, nondistended. No HSM appreciated.   EXTREM: No LE edema. No cyanosis or clubbing.  NEURO: Alert and oriented, cooperative. Gait not formally assessed. No obvious focal deficits.   PSYCH: Normal affect.  SKIN: Warm and dry. No rashes or petechiae appreciated.       Recent Lab Results:       Jeannie Vasquez PA-C  [facility]  Pager [phone]      Thank you for allowing me to participate in the care of your patient.    Sincerely,     PADMINI Burnette     [facility]  "

## 2019-12-02 NOTE — PATIENT INSTRUCTIONS
Visit Summary:    Today we discussed:   We will recheck your fasting cholesterol labs at your convenience.     Medication changes:    NONE    Follow up:   With Dr. Medina in ~1 year, or sooner if needed.     Please call my nurse Stephanie at [phone] with any questions or concerns.

## 2019-12-13 DIAGNOSIS — I10 ESSENTIAL HYPERTENSION, BENIGN: ICD-10-CM

## 2019-12-13 DIAGNOSIS — I25.10 CORONARY ARTERY DISEASE INVOLVING NATIVE CORONARY ARTERY OF NATIVE HEART WITHOUT ANGINA PECTORIS: ICD-10-CM

## 2019-12-13 LAB
CHOLEST SERPL-MCNC: 138 MG/DL
HDLC SERPL-MCNC: 39 MG/DL
LDLC SERPL CALC-MCNC: 67 MG/DL
NONHDLC SERPL-MCNC: 99 MG/DL
TRIGL SERPL-MCNC: 160 MG/DL

## 2019-12-13 PROCEDURE — 80061 LIPID PANEL: CPT | Performed by: PHYSICIAN ASSISTANT

## 2019-12-13 PROCEDURE — 36415 COLL VENOUS BLD VENIPUNCTURE: CPT | Performed by: PHYSICIAN ASSISTANT

## 2019-12-23 ENCOUNTER — TELEPHONE (OUTPATIENT)
Dept: CARDIOLOGY | Facility: CLINIC | Age: 79
End: 2019-12-23

## 2019-12-23 NOTE — TELEPHONE ENCOUNTER
"Called pt per request of PADMINI Dennis and relayed message below. Pt verbalized understanding, no questions at this time.      ----- Message from PADMINI Burnette sent at 12/20/2019  4:13 PM CST -----  Regarding: lab results  I sent this message to this patient awhile back and just got a notification it was never read.Can you please call and relay the messge?  Thx        Just wanted to let you know that overall your cholesterol panel looks good. Your LDL or \"bad cholesterol\" is within goal. Your triglycerides are up slightly from last year. This may be a function of carbohydrates or sugars in your diet. No medication changes needed for now.     You can follow up with Dr. Medina next year as planned.     Thanks,   Jeannie Vasquez PA-C       "

## 2020-01-21 DIAGNOSIS — E78.5 HYPERLIPIDEMIA LDL GOAL <70: ICD-10-CM

## 2020-01-21 RX ORDER — SIMVASTATIN 80 MG
80 TABLET ORAL AT BEDTIME
Qty: 90 TABLET | Refills: 3 | Status: SHIPPED | OUTPATIENT
Start: 2020-01-21 | End: 2020-11-05

## 2020-01-21 RX ORDER — FENOFIBRATE 160 MG/1
160 TABLET ORAL DAILY
Qty: 90 TABLET | Refills: 3 | Status: SHIPPED | OUTPATIENT
Start: 2020-01-21 | End: 2020-07-22

## 2020-02-08 ENCOUNTER — HEALTH MAINTENANCE LETTER (OUTPATIENT)
Age: 80
End: 2020-02-08

## 2020-07-20 DIAGNOSIS — I10 ESSENTIAL HYPERTENSION, BENIGN: ICD-10-CM

## 2020-07-20 RX ORDER — METOPROLOL SUCCINATE 25 MG/1
25 TABLET, EXTENDED RELEASE ORAL DAILY
Qty: 90 TABLET | Refills: 0 | Status: SHIPPED | OUTPATIENT
Start: 2020-07-20 | End: 2021-01-20

## 2020-07-22 DIAGNOSIS — E78.5 HYPERLIPIDEMIA LDL GOAL <70: ICD-10-CM

## 2020-07-22 RX ORDER — FENOFIBRATE 160 MG/1
160 TABLET ORAL DAILY
Qty: 90 TABLET | Refills: 0 | Status: SHIPPED | OUTPATIENT
Start: 2020-07-22 | End: 2021-03-24

## 2020-09-04 ENCOUNTER — TELEPHONE (OUTPATIENT)
Dept: CARDIOLOGY | Facility: CLINIC | Age: 80
End: 2020-09-04

## 2020-09-04 NOTE — TELEPHONE ENCOUNTER
Received voicemail from pt saying he has been having chest discomfort/anxiety and he is wondering if he should have a stress test.   Pt last had stress test in 2017.   Pt due to see Dr. Medina this December.   Looks like pt's wife spoke with triage with pt's PCP clinic this morning and pt was refusing to be seen in the ER.     Left detailed message for pt regarding his symptoms and if he is having any SOB or dizziness associated then he needs to go to the ER.     Left team 1's call back number for pt.

## 2020-09-04 NOTE — TELEPHONE ENCOUNTER
Pt called back and left another voicemail saying he does not think he needs to go to the ER but he would like to have some testing completed.     Unable to reach pt on phone, left another detailed voicemail saying this will be reviewed with Dr. Medina next week and see what he recommends for testing. Informed pt that if his symptoms worsen throughout the weekend or if he starts developing SOB, dizziness, or feeling like he may pass out then he needs to go to the ER.

## 2020-09-08 NOTE — TELEPHONE ENCOUNTER
Received response from Dr. Medina:     Poli Medina MD Haley, Leandra K RN    Caller: Unspecified (4 days ago,  1:19 PM)               Can he get a virtual visit or an office visit with a JENN this week.  I really cannot sort anything out without him being seen in talks to.      Called pt, no answer. Left detailed VM that Dr. Medina recommends pt be seen either virtually or in clinic with an JENN this week to evaluate his symptoms. Left call back numbers for scheduling and Team 1. Message sent to scheduling as well.

## 2020-09-09 DIAGNOSIS — I25.10 CORONARY ARTERY DISEASE INVOLVING NATIVE CORONARY ARTERY OF NATIVE HEART WITHOUT ANGINA PECTORIS: Primary | ICD-10-CM

## 2020-09-30 ENCOUNTER — VIRTUAL VISIT (OUTPATIENT)
Dept: CARDIOLOGY | Facility: CLINIC | Age: 80
End: 2020-09-30
Attending: INTERNAL MEDICINE
Payer: MEDICARE

## 2020-09-30 DIAGNOSIS — I25.10 CORONARY ARTERY DISEASE INVOLVING NATIVE CORONARY ARTERY OF NATIVE HEART WITHOUT ANGINA PECTORIS: ICD-10-CM

## 2020-09-30 PROCEDURE — 99213 OFFICE O/P EST LOW 20 MIN: CPT | Mod: 95 | Performed by: PHYSICIAN ASSISTANT

## 2020-09-30 NOTE — LETTER
"9/30/2020    Poli Alberto MD  [address]   [facility] [address]    RE: Sunil Lyle       Dear Colleague,    I had the pleasure of seeing Sunil Lyle in the [facility].    Sunil Lyle is a 80 year old male who is being evaluated via a billable video visit.      The patient has been notified of following:     \"This video visit will be conducted via a call between you and your physician/provider. We have found that certain health care needs can be provided without the need for an in-person physical exam.  This service lets us provide the care you need with a video conversation.  If a prescription is necessary we can send it directly to your pharmacy.  If lab work is needed we can place an order for that and you can then stop by our lab to have the test done at a later time.    Video visits are billed at different rates depending on your insurance coverage.  Please reach out to your insurance provider with any questions.    If during the course of the call the physician/provider feels a video visit is not appropriate, you will not be charged for this service.\"    [phone]  Pt reports 104/65, p-69 , wt170  YASMINE Phelps      Feeling much better        Patient has given verbal consent for Video visit? Yes  How would you like to obtain your AVS? MyChart  If you are dropped from the video visit, the video invite should be resent to: Send to e-mail at: [email or web address]  Will anyone else be joining your video visit? No      Provider notes:  Sunil Lyle is a very pleasant 80 year old male with a history of coronary artery disease, hypertension, dyslipidemia, tobacco use, and COPD.     In 2001, he underwent stenting of a severe RCA lesion. Reported, he had minimal coronary disease elsewhere at that time. It seems he has done relatively well from a cardiac perspective since then. He had a stress echo in 2017 which was " "negative for inducible ischemia There was some concern about some view of the apex on stress imaging but these were ultimately felt to be normal and his imaging quality was good. A nuclear stress test was suggested in the future if he needed another ischemic evaluation.    He last had an annual visit with Jeannie Vasquez PA-C in December of 2019. He was diagnosed with COPD that year.  He was a former smoker but quit about 3 years ago. He had some ongoing shortness of breath but no other concerning symptoms or chest pain.     He recently called the clinic reporting some chest discomfort/anxiety symptoms and thus an appointment was made with me to discuss.  I spoke with him today.  He tells me that about 2 to 3 weeks ago he had an episode where he had some \"queasiness\" across his chest.  He felt it was perhaps somewhat reminiscent of his symptoms prior to his stenting in 2001.  His wife urged him to call the clinic to discuss.  Since that time in the last 2 or 3 weeks he has not had any further symptoms of chest discomfort.  He continues to walk a mile 3-4 times a week and has no exertional symptoms.  He has some random episodes of feeling a bit more short of breath at times but overall nothing significant or consistent.  Overall, he tells me he has been feeling quite good the last couple weeks and in fact almost canceled this appointment.    General:  no apparent distress, normal body habitus, sitting upright. He appears younger than his stated age.  ENT/Mouth:  membranes moist, no nasal discharge.  Normal head shape, no apparent injury or laceration.  Eyes: normal conjunctivae.  No observed jaundice.  Chest/Lungs:  No breathing difficulty while speaking.  No audible wheezing.  No cough during conversation.  Skin:  no xanthelasma.  No facial lacerations.  Neurologic:  Normal arm motion bilateral, no tremors.    Psychiatric:  Alert and oriented x3, calm demeanor    Assessment/plan:  Sunil Lyle is a very pleasant " 80-year-old male with a history of coronary artery disease and stenting of the RCA in 2001.  He also has a history of hypertension, dyslipidemia, former tobacco use, and COPD.  He fortunately has done quite well from a cardiac perspective since his stenting almost 20 years ago.  His last ischemic work-up as noted above was a stress echocardiogram in 2017.    Today, we discussed his recent symptoms.  He had a single episode of some discomfort in his chest but has not had any symptoms in the last few weeks since that time.  He denies any recent exercise intolerance and has not had any exertional symptoms.  At this point, as he has been feeling well the last few weeks and has not had any recurrent symptoms he tells me he preferred to monitor things going forward.  We did discuss the option of doing a stress test but for now he like to hold off on that which I think is reasonable.  I did ask him to let us know if he is having any recurrent chest discomfort symptoms or worsening shortness of breath and if that were to be the case further work-up certainly seems appropriate.  As mentioned above, if he needs a repeat stress test a nuclear stress test should be done per the stress echo report a few years ago.    He remains on appropriate therapy with aspirin, statin, and beta-blocker.  His risk factors that seem to be controlled.  His last LDL in December 2019 was 67.  His blood pressure is excellent today per his report.  As above, he quit smoking 3 years ago and has not had a cigarette since then.    He is due for annual follow-up with Dr. Medina sometime this winter.  I suggested he follow-up in 3 to 6 months with labs at that time.  He will contact us sooner if he has any recurrent symptoms.    Video start time: 9:58  Video end time: 10:07    Provider location: home  Patient location: home  [address] [employer]        Thank you for allowing me to participate in the care of your patient.    Sincerely,     Christine Dickson PA-C      [facility]

## 2020-09-30 NOTE — PATIENT INSTRUCTIONS
Thank you for your [facility] visit today. Your provider has recommended the following:    Medication Changes:  No medication changes  Recommendations:  Please call us if you have any recurrent chest discomfort, worsening shortness of breath, or any other concerns.  Follow-up:  See Dr. Medina or one of his partners for cardiology follow up in 3 -6  Months for your annual follow up. Labs before that appointment to check your cholesterol.     Reminder:  Please bring in all current medications, over the counter supplements and vitamin bottles to your next appointment.            [facility]

## 2020-09-30 NOTE — PROGRESS NOTES
"Sunil Lyle is a 80 year old male who is being evaluated via a billable video visit.      The patient has been notified of following:     \"This video visit will be conducted via a call between you and your physician/provider. We have found that certain health care needs can be provided without the need for an in-person physical exam.  This service lets us provide the care you need with a video conversation.  If a prescription is necessary we can send it directly to your pharmacy.  If lab work is needed we can place an order for that and you can then stop by our lab to have the test done at a later time.    Video visits are billed at different rates depending on your insurance coverage.  Please reach out to your insurance provider with any questions.    If during the course of the call the physician/provider feels a video visit is not appropriate, you will not be charged for this service.\"    [phone]  Pt reports 104/65, p-69 , wt170  YASMINE Phelps      Feeling much better        Patient has given verbal consent for Video visit? Yes  How would you like to obtain your AVS? MyChart  If you are dropped from the video visit, the video invite should be resent to: Send to e-mail at: [email or web address]  Will anyone else be joining your video visit? No      Provider notes:  Sunil Lyle is a very pleasant 80 year old male with a history of coronary artery disease, hypertension, dyslipidemia, tobacco use, and COPD.     In 2001, he underwent stenting of a severe RCA lesion. Reported, he had minimal coronary disease elsewhere at that time. It seems he has done relatively well from a cardiac perspective since then. He had a stress echo in 2017 which was negative for inducible ischemia There was some concern about some view of the apex on stress imaging but these were ultimately felt to be normal and his imaging quality was good. A nuclear stress test was suggested in the future if he needed another " "ischemic evaluation.    He last had an annual visit with Jeannie Vasquez PA-C in December of 2019. He was diagnosed with COPD that year.  He was a former smoker but quit about 3 years ago. He had some ongoing shortness of breath but no other concerning symptoms or chest pain.     He recently called the clinic reporting some chest discomfort/anxiety symptoms and thus an appointment was made with me to discuss.  I spoke with him today.  He tells me that about 2 to 3 weeks ago he had an episode where he had some \"queasiness\" across his chest.  He felt it was perhaps somewhat reminiscent of his symptoms prior to his stenting in 2001.  His wife urged him to call the clinic to discuss.  Since that time in the last 2 or 3 weeks he has not had any further symptoms of chest discomfort.  He continues to walk a mile 3-4 times a week and has no exertional symptoms.  He has some random episodes of feeling a bit more short of breath at times but overall nothing significant or consistent.  Overall, he tells me he has been feeling quite good the last couple weeks and in fact almost canceled this appointment.    General:  no apparent distress, normal body habitus, sitting upright. He appears younger than his stated age.  ENT/Mouth:  membranes moist, no nasal discharge.  Normal head shape, no apparent injury or laceration.  Eyes: normal conjunctivae.  No observed jaundice.  Chest/Lungs:  No breathing difficulty while speaking.  No audible wheezing.  No cough during conversation.  Skin:  no xanthelasma.  No facial lacerations.  Neurologic:  Normal arm motion bilateral, no tremors.    Psychiatric:  Alert and oriented x3, calm demeanor    Assessment/plan:  Sunil Lyle is a very pleasant 80-year-old male with a history of coronary artery disease and stenting of the RCA in 2001.  He also has a history of hypertension, dyslipidemia, former tobacco use, and COPD.  He fortunately has done quite well from a cardiac perspective since his " stenting almost 20 years ago.  His last ischemic work-up as noted above was a stress echocardiogram in 2017.    Today, we discussed his recent symptoms.  He had a single episode of some discomfort in his chest but has not had any symptoms in the last few weeks since that time.  He denies any recent exercise intolerance and has not had any exertional symptoms.  At this point, as he has been feeling well the last few weeks and has not had any recurrent symptoms he tells me he preferred to monitor things going forward.  We did discuss the option of doing a stress test but for now he like to hold off on that which I think is reasonable.  I did ask him to let us know if he is having any recurrent chest discomfort symptoms or worsening shortness of breath and if that were to be the case further work-up certainly seems appropriate.  As mentioned above, if he needs a repeat stress test a nuclear stress test should be done per the stress echo report a few years ago.    He remains on appropriate therapy with aspirin, statin, and beta-blocker.  His risk factors that seem to be controlled.  His last LDL in December 2019 was 67.  His blood pressure is excellent today per his report.  As above, he quit smoking 3 years ago and has not had a cigarette since then.    He is due for annual follow-up with Dr. Medina sometime this winter.  I suggested he follow-up in 3 to 6 months with labs at that time.  He will contact us sooner if he has any recurrent symptoms.    Video start time: 9:58  Video end time: 10:07    Provider location: home  Patient location: home  [facility]    Christineelizabeth Dickson PA-C

## 2020-11-05 DIAGNOSIS — E78.5 HYPERLIPIDEMIA LDL GOAL <70: ICD-10-CM

## 2020-11-05 RX ORDER — SIMVASTATIN 80 MG
80 TABLET ORAL AT BEDTIME
Qty: 90 TABLET | Refills: 3 | Status: SHIPPED | OUTPATIENT
Start: 2020-11-05 | End: 2021-12-31

## 2020-11-07 ENCOUNTER — HEALTH MAINTENANCE LETTER (OUTPATIENT)
Age: 80
End: 2020-11-07

## 2021-01-20 DIAGNOSIS — I10 ESSENTIAL HYPERTENSION, BENIGN: ICD-10-CM

## 2021-01-20 RX ORDER — METOPROLOL SUCCINATE 25 MG/1
25 TABLET, EXTENDED RELEASE ORAL DAILY
Qty: 90 TABLET | Refills: 3 | Status: SHIPPED | OUTPATIENT
Start: 2021-01-20 | End: 2021-04-01

## 2021-03-19 DIAGNOSIS — E78.5 HYPERLIPIDEMIA LDL GOAL <70: Primary | ICD-10-CM

## 2021-03-19 DIAGNOSIS — I25.10 CORONARY ARTERY DISEASE INVOLVING NATIVE CORONARY ARTERY OF NATIVE HEART WITHOUT ANGINA PECTORIS: ICD-10-CM

## 2021-03-19 NOTE — PROGRESS NOTES
Lab orders for ALT and FLP , new order placed. Pt is scheduled for labs on 3/26/21 and follow up with Dr. Landers on 21.

## 2021-03-24 DIAGNOSIS — E78.5 HYPERLIPIDEMIA LDL GOAL <70: ICD-10-CM

## 2021-03-24 RX ORDER — FENOFIBRATE 160 MG/1
160 TABLET ORAL DAILY
Qty: 90 TABLET | Refills: 0 | Status: SHIPPED | OUTPATIENT
Start: 2021-03-24 | End: 2021-07-27

## 2021-03-26 DIAGNOSIS — E78.5 HYPERLIPIDEMIA LDL GOAL <70: ICD-10-CM

## 2021-03-26 DIAGNOSIS — I25.10 CORONARY ARTERY DISEASE INVOLVING NATIVE CORONARY ARTERY OF NATIVE HEART WITHOUT ANGINA PECTORIS: ICD-10-CM

## 2021-03-26 LAB
ALT SERPL W P-5'-P-CCNC: 26 U/L (ref 0–70)
CHOLEST SERPL-MCNC: 130 MG/DL
HDLC SERPL-MCNC: 33 MG/DL
LDLC SERPL CALC-MCNC: 60 MG/DL
NONHDLC SERPL-MCNC: 97 MG/DL
TRIGL SERPL-MCNC: 186 MG/DL

## 2021-03-26 PROCEDURE — 84460 ALANINE AMINO (ALT) (SGPT): CPT | Performed by: PHYSICIAN ASSISTANT

## 2021-03-26 PROCEDURE — 36415 COLL VENOUS BLD VENIPUNCTURE: CPT | Performed by: PHYSICIAN ASSISTANT

## 2021-03-26 PROCEDURE — 80061 LIPID PANEL: CPT | Performed by: PHYSICIAN ASSISTANT

## 2021-03-27 ENCOUNTER — HEALTH MAINTENANCE LETTER (OUTPATIENT)
Age: 81
End: 2021-03-27

## 2021-04-01 ENCOUNTER — OFFICE VISIT (OUTPATIENT)
Dept: CARDIOLOGY | Facility: CLINIC | Age: 81
End: 2021-04-01
Payer: MEDICARE

## 2021-04-01 VITALS
HEART RATE: 46 BPM | HEIGHT: 70 IN | BODY MASS INDEX: 25.48 KG/M2 | WEIGHT: 178 LBS | SYSTOLIC BLOOD PRESSURE: 129 MMHG | OXYGEN SATURATION: 96 % | DIASTOLIC BLOOD PRESSURE: 51 MMHG

## 2021-04-01 DIAGNOSIS — N18.31 STAGE 3A CHRONIC KIDNEY DISEASE (H): ICD-10-CM

## 2021-04-01 DIAGNOSIS — I25.10 CORONARY ARTERY DISEASE INVOLVING NATIVE CORONARY ARTERY OF NATIVE HEART WITHOUT ANGINA PECTORIS: Primary | ICD-10-CM

## 2021-04-01 DIAGNOSIS — R00.1 BRADYCARDIA: ICD-10-CM

## 2021-04-01 DIAGNOSIS — J43.8 OTHER EMPHYSEMA (H): ICD-10-CM

## 2021-04-01 DIAGNOSIS — E78.5 HYPERLIPIDEMIA LDL GOAL <70: ICD-10-CM

## 2021-04-01 DIAGNOSIS — I10 ESSENTIAL HYPERTENSION, BENIGN: ICD-10-CM

## 2021-04-01 PROCEDURE — 99214 OFFICE O/P EST MOD 30 MIN: CPT | Performed by: INTERNAL MEDICINE

## 2021-04-01 PROCEDURE — 93000 ELECTROCARDIOGRAM COMPLETE: CPT | Performed by: INTERNAL MEDICINE

## 2021-04-01 ASSESSMENT — MIFFLIN-ST. JEOR: SCORE: 1518.65

## 2021-04-01 NOTE — LETTER
"4/1/2021    Poli Alberto MD  [address]   [facility] [address]    RE: Sunil Lyle       Dear Colleague,    I had the pleasure of seeing Sunil Lyle in the [facility].    CARDIOLOGY CLINIC FOLLOW-UP NOTE      REASON FOR VISIT:   Follow-up CAD    PRIMARY CARE PHYSICIAN:  Poli Alberto        History of Present Illness   Sunil Lyle is an extremely pleasant 80 year old male, previously a patient of Dr. Medina who was last seen by Christine Dickson PA-C on 9/3/2020, here for routine follow-up.  He has a history of CAD for which she underwent stenting of the right coronary artery in 2001, with reportedly minimal CAD elsewhere at that time.  He also has a history of CAD, hypertension, hyperlipidemia, COPD, and former tobacco abuse (quit in 2018).  He last had an ischemic evaluation in 2017 which was an exercise stress echocardiogram.  The report of this study is quite lengthy, but briefly he was told to wait for evaluation by the performing echo technician due to a concern about significant abnormality, but when this was evaluated by Dr. Horowitz, this was felt to likely be a normal exercise stress echocardiogram.  Since that time he has done well other than a single episode of \"queasiness\" across his chest in September 2020.  He preferred to monitor this without repeat ischemic evaluation at that time, and his symptoms did not recur.      Since his last visit, he reports that he has been doing well overall.  His only complaint is of general, overall fatigue and shortness of breath with exertion.  He attributes this to \"laziness.\"  He states that he has always been extremely athletic, but over the past 5 years has simply not been as interested in exercise.  As result, he has seen his fitness level slowly decreased, to the point that he is now only able to walk for 5 minutes or so on the treadmill before he gets tired. " " He does not have any exertional chest pain, and has had no recurrence of the \"queasiness\" that he felt in September and which was similar to his prestenting symptoms.  He does wear an apple watch and notes that he is occasionally reminded by his watch that his heart rate is below the \"lower limit.\"  However, he denies any dizziness, lightheadedness, or syncope.    His most recent labs are a cholesterol panel from 3/26/2021, and show a total cholesterol of 130, HDL of 33, LDL of 60, triglycerides of 186, with an ALT of 26.  His most recent renal function evaluation was from 2017 when he had a creatinine of 1.21 and estimated GFR 58.  ECG was performed in the office today and showed severe junctional bradycardia with a heart rate of 40 bpm.  This was significantly changed from his most recent resting EKG on 9/2/2014 which showed normal sinus rhythm with first-degree AV block.      Assessment & Plan     1. CAD s/p RCA PCI in 2001  2. 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 COPD  5. Hypertension  6. Hyperlipidemia, well controlled  7. Former tobacco abuse (quit 2018)      Overall, Gerard is doing well.  We did discuss the potential causes of his fatigue and decreased exercise tolerance.  I acknowledged that it is possible, and perhaps likely, that the explanation is entirely related to his decreased exercise sessions and resultant deconditioning.  That said, I did tell him that with his history of coronary disease, it is also possible that his decreased exercise tolerance and exertional dyspnea is due to progression of his coronary disease.  We did discuss doing a repeat ischemic evaluation, but at this time Gerard prefers to avoid repeat testing if at all possible and would like to attempt to build his stamina back up \"naturally.\"  I did explain that if he does happen to have severe, extensive coronary disease at this point, there could be a risk of adverse events including arrhythmia and even " potentially death with strenuous exercise.  Overall, this risk is likely quite low as he does not have any exertional chest pain, exertional lightheadedness, or other high risk symptoms, but it is something for him to consider.  At this time, he prefers to try his efforts at building his stamina first, and if he develops exertional chest pain with this or is unable to improve his exercise tolerance over the next few weeks, he will let us know and we will plan to perform an ischemic evaluation at that time.  Lastly, I also discussed that he could be having a drop in his exercise tolerance due to chronotropic incompetence.  His ECG today shows severe sick sinus syndrome with a junctional escape rhythm around 40 bpm.  Thankfully, he does not have any dizzy spells or other concerning symptoms suggestive of prolonged pauses, but we should evaluate this with a Zio patch, and I have asked him to exercise at times with this as well to see if he is able to mount an appropriate chronotropic response.  We will also plan to stop his metoprolol as this is likely further worsening his bradycardia and there is not a strong indication for this at the present time.      -Stop Toprol-XL 25 mg  -7-day Zio patch  -Patient to work on increasing exercise frequency and duration, and will let us know if anginal symptoms develop or exercise capacity does not improve, at which time we will consider an ischemic evaluation.      Follow-up: 3 months with JENN        Ross Landers MD  Interventional Cardiology  April 1, 2021        Medications   Current Outpatient Medications   Medication     ASPIRIN 81 MG OR TABS     fenofibrate (TRIGLIDE/LOFIBRA) 160 MG tablet     MULTI-VITAMIN OR TABS     simvastatin (ZOCOR) 80 MG tablet     tamsulosin (FLOMAX) 0.4 MG 24 hr capsule     VITAMIN D 1000 UNIT OR TABS     No current facility-administered medications for this visit.      Allergies   Allergies   Allergen Reactions     Bupropion Hcl      marcelina  dreams     Chantix [Varenicline] Other (See Comments)     CNS side effects     Penicillins Anaphylaxis         Physical Exam       BP: 129/51 Pulse: (!) 46     SpO2: 96 %      Vital Signs with Ranges  Pulse:  [46] 46  BP: (129)/(51) 129/51  SpO2:  [96 %] 96 %  178 lbs 0 oz    Constitutional: Well-appearing, no acute distress  Respiratory: Normal respiratory effort, CTAB  Cardiovascular: Bradycardic but regular, no m/r/g.  JVP < 7 cm H2O.  There is no LE edema.  Normal carotid upstrokes, no carotid bruits.    Thank you for allowing me to participate in the care of your patient.      Sincerely,     Ross Landers MD     [facility]    cc:   No referring provider defined for this encounter.

## 2021-04-01 NOTE — PROGRESS NOTES
"CARDIOLOGY CLINIC FOLLOW-UP NOTE      REASON FOR VISIT:   Follow-up CAD    PRIMARY CARE PHYSICIAN:  Poli Alberto        History of Present Illness   Sunil Lyle is an extremely pleasant 80 year old male, previously a patient of Dr. Medina who was last seen by Christine Dickson PA-C on 9/3/2020, here for routine follow-up.  He has a history of CAD for which she underwent stenting of the right coronary artery in 2001, with reportedly minimal CAD elsewhere at that time.  He also has a history of CAD, hypertension, hyperlipidemia, COPD, and former tobacco abuse (quit in 2018).  He last had an ischemic evaluation in 2017 which was an exercise stress echocardiogram.  The report of this study is quite lengthy, but briefly he was told to wait for evaluation by the performing echo technician due to a concern about significant abnormality, but when this was evaluated by Dr. Horowitz, this was felt to likely be a normal exercise stress echocardiogram.  Since that time he has done well other than a single episode of \"queasiness\" across his chest in September 2020.  He preferred to monitor this without repeat ischemic evaluation at that time, and his symptoms did not recur.      Since his last visit, he reports that he has been doing well overall.  His only complaint is of general, overall fatigue and shortness of breath with exertion.  He attributes this to \"laziness.\"  He states that he has always been extremely athletic, but over the past 5 years has simply not been as interested in exercise.  As result, he has seen his fitness level slowly decreased, to the point that he is now only able to walk for 5 minutes or so on the treadmill before he gets tired.  He does not have any exertional chest pain, and has had no recurrence of the \"queasiness\" that he felt in September and which was similar to his prestenting symptoms.  He does wear an apple watch and notes that he is occasionally reminded by his watch that his heart rate " "is below the \"lower limit.\"  However, he denies any dizziness, lightheadedness, or syncope.    His most recent labs are a cholesterol panel from 3/26/2021, and show a total cholesterol of 130, HDL of 33, LDL of 60, triglycerides of 186, with an ALT of 26.  His most recent renal function evaluation was from 2017 when he had a creatinine of 1.21 and estimated GFR 58.  ECG was performed in the office today and showed severe junctional bradycardia with a heart rate of 40 bpm.  This was significantly changed from his most recent resting EKG on 9/2/2014 which showed normal sinus rhythm with first-degree AV block.      Assessment & Plan     1. CAD s/p RCA PCI in 2001  2. 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 COPD  5. Hypertension  6. Hyperlipidemia, well controlled  7. Former tobacco abuse (quit 2018)      Overall, Gerard is doing well.  We did discuss the potential causes of his fatigue and decreased exercise tolerance.  I acknowledged that it is possible, and perhaps likely, that the explanation is entirely related to his decreased exercise sessions and resultant deconditioning.  That said, I did tell him that with his history of coronary disease, it is also possible that his decreased exercise tolerance and exertional dyspnea is due to progression of his coronary disease.  We did discuss doing a repeat ischemic evaluation, but at this time Gerard prefers to avoid repeat testing if at all possible and would like to attempt to build his stamina back up \"naturally.\"  I did explain that if he does happen to have severe, extensive coronary disease at this point, there could be a risk of adverse events including arrhythmia and even potentially death with strenuous exercise.  Overall, this risk is likely quite low as he does not have any exertional chest pain, exertional lightheadedness, or other high risk symptoms, but it is something for him to consider.  At this time, he prefers to try his efforts at " building his stamina first, and if he develops exertional chest pain with this or is unable to improve his exercise tolerance over the next few weeks, he will let us know and we will plan to perform an ischemic evaluation at that time.  Lastly, I also discussed that he could be having a drop in his exercise tolerance due to chronotropic incompetence.  His ECG today shows severe sick sinus syndrome with a junctional escape rhythm around 40 bpm.  Thankfully, he does not have any dizzy spells or other concerning symptoms suggestive of prolonged pauses, but we should evaluate this with a Zio patch, and I have asked him to exercise at times with this as well to see if he is able to mount an appropriate chronotropic response.  We will also plan to stop his metoprolol as this is likely further worsening his bradycardia and there is not a strong indication for this at the present time.      -Stop Toprol-XL 25 mg  -7-day Zio patch  -Patient to work on increasing exercise frequency and duration, and will let us know if anginal symptoms develop or exercise capacity does not improve, at which time we will consider an ischemic evaluation.      Follow-up: 3 months with JENN        Ross Landers MD  Interventional Cardiology  April 1, 2021        Medications   Current Outpatient Medications   Medication     ASPIRIN 81 MG OR TABS     fenofibrate (TRIGLIDE/LOFIBRA) 160 MG tablet     MULTI-VITAMIN OR TABS     simvastatin (ZOCOR) 80 MG tablet     tamsulosin (FLOMAX) 0.4 MG 24 hr capsule     VITAMIN D 1000 UNIT OR TABS     No current facility-administered medications for this visit.      Allergies   Allergies   Allergen Reactions     Bupropion Hcl      vivid dreams     Chantix [Varenicline] Other (See Comments)     CNS side effects     Penicillins Anaphylaxis         Physical Exam       BP: 129/51 Pulse: (!) 46     SpO2: 96 %      Vital Signs with Ranges  Pulse:  [46] 46  BP: (129)/(51) 129/51  SpO2:  [96 %] 96 %  178 lbs 0  oz    Constitutional: Well-appearing, no acute distress  Respiratory: Normal respiratory effort, CTAB  Cardiovascular: Bradycardic but regular, no m/r/g.  JVP < 7 cm H2O.  There is no LE edema.  Normal carotid upstrokes, no carotid bruits.

## 2021-04-08 ENCOUNTER — HOSPITAL ENCOUNTER (OUTPATIENT)
Dept: CARDIOLOGY | Facility: CLINIC | Age: 81
Discharge: HOME | End: 2021-04-08
Attending: INTERNAL MEDICINE | Admitting: INTERNAL MEDICINE
Payer: MEDICARE

## 2021-04-08 DIAGNOSIS — R00.1 BRADYCARDIA: ICD-10-CM

## 2021-04-08 PROCEDURE — 93242 EXT ECG>48HR<7D RECORDING: CPT

## 2021-04-08 PROCEDURE — 93244 EXT ECG>48HR<7D REV&INTERPJ: CPT | Performed by: INTERNAL MEDICINE

## 2021-07-27 ENCOUNTER — OFFICE VISIT (OUTPATIENT)
Dept: CARDIOLOGY | Facility: CLINIC | Age: 81
End: 2021-07-27
Attending: INTERNAL MEDICINE
Payer: MEDICARE

## 2021-07-27 VITALS
HEART RATE: 64 BPM | DIASTOLIC BLOOD PRESSURE: 76 MMHG | OXYGEN SATURATION: 95 % | WEIGHT: 174.3 LBS | HEIGHT: 70 IN | SYSTOLIC BLOOD PRESSURE: 129 MMHG | BODY MASS INDEX: 24.95 KG/M2

## 2021-07-27 DIAGNOSIS — I10 ESSENTIAL HYPERTENSION, BENIGN: ICD-10-CM

## 2021-07-27 DIAGNOSIS — R00.1 BRADYCARDIA: ICD-10-CM

## 2021-07-27 DIAGNOSIS — E78.5 HYPERLIPIDEMIA LDL GOAL <70: ICD-10-CM

## 2021-07-27 DIAGNOSIS — N18.32 STAGE 3B CHRONIC KIDNEY DISEASE (H): ICD-10-CM

## 2021-07-27 DIAGNOSIS — I25.10 CORONARY ARTERY DISEASE INVOLVING NATIVE CORONARY ARTERY OF NATIVE HEART WITHOUT ANGINA PECTORIS: Primary | ICD-10-CM

## 2021-07-27 PROCEDURE — 99215 OFFICE O/P EST HI 40 MIN: CPT | Performed by: NURSE PRACTITIONER

## 2021-07-27 RX ORDER — FENOFIBRATE 54 MG/1
108 TABLET ORAL DAILY
Qty: 180 TABLET | Refills: 1 | Status: SHIPPED | OUTPATIENT
Start: 2021-07-27 | End: 2021-12-31

## 2021-07-27 ASSESSMENT — MIFFLIN-ST. JEOR: SCORE: 1496.87

## 2021-07-27 NOTE — PROGRESS NOTES
Cardiology Clinic Progress Note  Sunil Lyle MRN# [id number]   YOB: 1940 Age: 81 year old   Primary Cardiologist: Dr. Landers Reason for visit: Cardiology follow up             Assessment and Plan:   Sunil Lyle is a very pleasant 81 year old male with a history of coronary artery disease, hypertension, hyperlipidemia, CKD, COPD and former tobacco use (quit in 2018).       1. Coronary artery disease s/p RCA stent in 2001  2. Hyperlipidemia - Lipid panel in 3/2021 showed total cholesterol 130, HDL 3, LDL 60 and triglycerides 186.  3. Hypertension - controlled.   4. CKD  5. Bradycardia - asymptomatic, improved with discontinuation of metoprolol  6. COPD  7. Former tobacco use (quit 2018)    I saw Sunil today for cardiology follow up. He is doing well from a cardiovascular standpoint. He continues to note some general fatigue/exertional dyspnea/decreased exercise capacity. He continues to wear his apple watch and notes he no longer gets alerts for low heart rates. He has had no change in symptoms and notes he has not started an exercise routine, continuing to note he is lazy. Again reviewed that symptoms could be related to deconditioning vs. worsening coronary artery disease. He is consistent with his wishes to remain conservative, not interested in additional testing at this time and wishes to focus on getting into an exercise routine. His biggest concern today was his difficulty swallowing his fenofibrate since changes in size due to  change. He has currently been taking 1/4 tablet daily 40mg. Plan to change to 54mg tablet, he wishes to try 2 tablets daily and if triglycerides remain elevated on lipid panel will increase to 3 tablets daily. Advised to notify clinic of any changes in symptoms. Plan for follow up in 6 months with lipid panel prior.       Changes today: change fenofibrate 160mg tablet to fenofibrate 54mg tablet due to difficulty swallowing the 160mg tablet. He  prefers to try 2 tablets, will recheck lipid panel at f/u and increase to 3 tablets if triglycerides remain elevated.     Follow up plan:     Follow up in 6 months with lipid panel prior, sooner if needed or changes in symptoms.         History of Presenting Illness:    Sunil Lyle is a very pleasant 81 year old male with a history of coronary artery disease, hypertension, hyperlipidemia, COPD and former tobacco use (quit in 2018).     He previously followed with Dr. Medina and has now established care with Dr. Landers. In 2001 she underwent stenting of RCA with reportedly minimal CAD elsewhere at this time. In 2017 he underwent a stress echocardiogram. He exercised for 6 minutes, complaints of dyspnea during test but no chest pain. No evidence of stress induced ischemia. EF 55-60% at baseline.     He saw Dr. Landers in April 2021 at which time is was doing well overall, some general complaints of fatigue and exertional dyspnea/decreased exercise capacity. Reviewed possible etiologies for symptoms including deconditioning vs. Ischemia vs. chronotropic incompetence. Gerard declined further ischemic evaluation and he preferred to avoid additional testing. He preferred to work on getting back into an exercise routine. EKG was completed which showed severe junctional bradycardia with a heart rate of 40bpm. His metoprolol was stopped and a ZIO monitor was placed which showed avg heart rate 71, there was one 3.8 second pause for which he was asymptomatic. He was able to get his heart rates in to the 100-110s at times which makes chronotropic incompetence unlikely the primary  of his fatigue. Recommended continued monitoring.    Patient is here today for cardiology follow up.     Patient reports feeling good. Biggest concern today is difficulty swallowing fenofibrate due to increased size with different . He has since been cutting 1/4 to be able to swallow. Wears an apple watch and states he no longer  "gets alerts for low heart rates. Denies shortenss of breath at rest. Exertional dyspnea with stairs or strenuous activity. Notes he continues to be lazy \"don't feel like doing it\" and hasn't tried to get back into an exercise routine. Denies chest pain or chest tightness. Denies dizziness, lightheadedness or other presyncopal symptoms. Denies tachycardia or palpitations. Taking medications daily, except for difficult with fenofibrate as noted above.     Lipid panel in 3/2021 showed total cholesterol 130, HDL 3, LDL 60 and triglycerides 186. Blood pressure 129/76 and HR 64 in clinic today.    Appetite good. Eating most meals at home. No set exercise routine, notes he has a treadmill at home but no motivation to get into an exercise routine. States he used to be athletic but notes over the past years he has not continued with any exercise routine. Occasional alcohol use, 5-7 drinks weekly. Denies tobacco use (quit 2018).         Social History      Social History     Socioeconomic History     Marital status:      Spouse name: Not on file     Number of children: Not on file     Years of education: Not on file     Highest education level: Not on file   Occupational History     Occupation: Sales/Computer Training     Employer: RETIRED   Tobacco Use     Smoking status: Former Smoker     Packs/day: 0.50     Years: 33.50     Pack years: 16.75     Types: Cigarettes     Start date: 1950     Quit date: 11/2017     Years since quitting: 3.7     Smokeless tobacco: Never Used     Tobacco comment: off and on since age 17- smokes about 5 cigs/day plus vapor cigarettes    Substance and Sexual Activity     Alcohol use: Yes     Alcohol/week: 3.3 standard drinks     Types: 4 Shots of liquor per week     Comment: 5-6 drink per week     Drug use: No     Sexual activity: Not on file   Other Topics Concern      Service Not Asked     Comment: [place] [id number]     Blood Transfusions No     Caffeine Concern Yes     " "Occupational Exposure No     Hobby Hazards No     Sleep Concern No     Stress Concern No     Weight Concern No     Special Diet No     Back Care No     Exercise No     Bike Helmet Not Asked     Seat Belt Yes     Self-Exams Not Asked     Parent/sibling w/ CABG, MI or angioplasty before 65F 55M? Not Asked   Social History Narrative    ** Merged History Encounter **          Social Determinants of Health     Financial Resource Strain:      Difficulty of Paying Living Expenses:    Food Insecurity:      Worried About Running Out of Food in the Last Year:      Ran Out of Food in the Last Year:    Transportation Needs:      Lack of Transportation (Medical):      Lack of Transportation (Non-Medical):    Physical Activity:      Days of Exercise per Week:      Minutes of Exercise per Session:    Stress:      Feeling of Stress :    Social Connections:      Frequency of Communication with Friends and Family:      Frequency of Social Gatherings with Friends and Family:      Attends Sikh Services:      Active Member of Clubs or Organizations:      Attends Club or Organization Meetings:      Marital Status:    Intimate Partner Violence:      Fear of Current or Ex-Partner:      Emotionally Abused:      Physically Abused:      Sexually Abused:             Review of Systems:   Skin:  Negative     Eyes:  Negative glasses  ENT:  Negative    Respiratory:  Positive for dyspnea on exertion;shortness of breath  Cardiovascular:  Negative for;palpitations;chest pain;edema;syncope or near-syncope;exercise intolerance;fatigue;dizziness Positive for;lightheadedness  Gastroenterology: Positive for heartburn  Genitourinary:  Positive for prostate problem  Musculoskeletal:  Negative for joint pain (finger only on left hand)  Neurologic:  Negative    Psychiatric:  not assessed    Heme/Lymph/Imm:  Negative    Endocrine:  not assessed           Physical Exam:   Vitals: /76   Pulse 64   Ht 1.778 m (5' 10\")   Wt 79.1 kg (174 lb 4.8 oz)   " SpO2 95%   BMI 25.01 kg/m     Wt Readings from Last 4 Encounters:   07/27/21 79.1 kg (174 lb 4.8 oz)   04/01/21 80.7 kg (178 lb)   12/02/19 78.2 kg (172 lb 6.4 oz)   10/30/18 78.9 kg (174 lb)     GEN: well nourished, in no acute distress.  HEENT:  Pupils equal, round. Sclerae nonicteric.   NECK: Supple, no masses appreciated.  C/V:  Regular rate and rhythm, no murmur, rub or gallop.    RESP: Respirations are unlabored. Clear to auscultation bilaterally without wheezing, rales, or rhonchi.  GI: Abdomen soft, obese, nontender.  EXTREM: No LE edema.  NEURO: Alert and oriented, cooperative.  SKIN: Warm and dry.        Data:       LIPID RESULTS:  Lab Results   Component Value Date    CHOL 130 03/26/2021    HDL 33 (L) 03/26/2021    LDL 60 03/26/2021    TRIG 186 (H) 03/26/2021    CHOLHDLRATIO 3.2 06/18/2015     LIVER ENZYME RESULTS:  Lab Results   Component Value Date    AST 18 03/14/2017    ALT 26 03/26/2021     CBC RESULTS:  Lab Results   Component Value Date    WBC 6.6 03/14/2017    RBC 4.34 (L) 03/14/2017    HGB 14.0 03/14/2017    HCT 42.0 03/14/2017    MCV 97 03/14/2017    MCH 32.3 03/14/2017    MCHC 33.3 03/14/2017    RDW 15.0 03/14/2017     03/14/2017     BMP RESULTS:  Lab Results   Component Value Date     07/14/2017    POTASSIUM 4.1 07/14/2017    CHLORIDE 105 07/14/2017    CO2 27 07/14/2017    ANIONGAP 13.1 07/14/2017     (H) 07/14/2017    BUN 12 07/14/2017    CR 1.21 07/14/2017    GFRESTIMATED 53 (L) 11/17/2017    GFRESTBLACK 65 11/17/2017    PAIGE 9.2 07/14/2017      INR RESULTS:  Lab Results   Component Value Date    INR 1.03 03/28/2009            Medications     Current Outpatient Medications   Medication Sig Dispense Refill     ASPIRIN 81 MG OR TABS 1 TABLET DAILY       fenofibrate (LOFIBRA) 54 MG tablet Take 2 tablets (108 mg) by mouth daily 180 tablet 1     MULTI-VITAMIN OR TABS one daily       simvastatin (ZOCOR) 80 MG tablet Take 1 tablet (80 mg) by mouth At Bedtime 90 tablet 3      tamsulosin (FLOMAX) 0.4 MG 24 hr capsule Take 0.4 mg by mouth daily.       VITAMIN D 1000 UNIT OR TABS Take 5,000 Units by mouth daily             Past Medical History     Past Medical History:   Diagnosis Date     Allergic state      CAD (coronary artery disease) 2014     s/p stent to RCA ()     GERD (gastroesophageal reflux disease)      Hypercholesterolaemia      Hyperlipidaemia      Other affections of shoulder region, not elsewhere classified     impingement syndrome of right shoulder     Sebaceous cyst     right shoulder area     Tachycardia      Tobacco use disorder 3/5/2008     Past Surgical History:   Procedure Laterality Date     COLONOSCOPY       HEART CATH, ANGIOPLASTY  2001    RCA stent     TONSILLECTOMY & ADENOIDECTOMY       VASCULAR SURGERY       Family History   Problem Relation Age of Onset     Breast Cancer Mother          at age 64     Heart Disease Father         heart attack     Diabetes Father             Allergies   Bupropion hcl, Chantix [varenicline], and Pcn [penicillins]    40 minutes spent on the date of the encounter doing chart review, history and exam, documentation and further activities as noted above      YARITZA Vargas [facility]  Pager: [phone]

## 2021-07-27 NOTE — LETTER
7/27/2021    Poli Alberto MD  [address]   [facility] [address]    RE: Sunil Lyle       Dear Colleague,    I had the pleasure of seeing Sunil Lyle in the [facility].    Cardiology Clinic Progress Note  Sunil Lyle MRN# [id number]   YOB: 1940 Age: 81 year old   Primary Cardiologist: Dr. Landers Reason for visit: Cardiology follow up             Assessment and Plan:   Sunil Lyle is a very pleasant 81 year old male with a history of coronary artery disease, hypertension, hyperlipidemia, CKD, COPD and former tobacco use (quit in 2018).       1. Coronary artery disease s/p RCA stent in 2001  2. Hyperlipidemia - Lipid panel in 3/2021 showed total cholesterol 130, HDL 3, LDL 60 and triglycerides 186.  3. Hypertension - controlled.   4. CKD  5. Bradycardia - asymptomatic, improved with discontinuation of metoprolol  6. COPD  7. Former tobacco use (quit 2018)    I saw Sunil today for cardiology follow up. He is doing well from a cardiovascular standpoint. He continues to note some general fatigue/exertional dyspnea/decreased exercise capacity. He continues to wear his apple watch and notes he no longer gets alerts for low heart rates. He has had no change in symptoms and notes he has not started an exercise routine, continuing to note he is lazy. Again reviewed that symptoms could be related to deconditioning vs. worsening coronary artery disease. He is consistent with his wishes to remain conservative, not interested in additional testing at this time and wishes to focus on getting into an exercise routine. His biggest concern today was his difficulty swallowing his fenofibrate since changes in size due to  change. He has currently been taking 1/4 tablet daily 40mg. Plan to change to 54mg tablet, he wishes to try 2 tablets daily and if triglycerides remain elevated on lipid panel  will increase to 3 tablets daily. Advised to notify clinic of any changes in symptoms. Plan for follow up in 6 months with lipid panel prior.       Changes today: change fenofibrate 160mg tablet to fenofibrate 54mg tablet due to difficulty swallowing the 160mg tablet. He prefers to try 2 tablets, will recheck lipid panel at f/u and increase to 3 tablets if triglycerides remain elevated.     Follow up plan:     Follow up in 6 months with lipid panel prior, sooner if needed or changes in symptoms.         History of Presenting Illness:    Sunil Lyle is a very pleasant 81 year old male with a history of coronary artery disease, hypertension, hyperlipidemia, COPD and former tobacco use (quit in 2018).     He previously followed with Dr. Medina and has now established care with Dr. Landers. In 2001 she underwent stenting of RCA with reportedly minimal CAD elsewhere at this time. In 2017 he underwent a stress echocardiogram. He exercised for 6 minutes, complaints of dyspnea during test but no chest pain. No evidence of stress induced ischemia. EF 55-60% at baseline.     He saw Dr. Landers in April 2021 at which time is was doing well overall, some general complaints of fatigue and exertional dyspnea/decreased exercise capacity. Reviewed possible etiologies for symptoms including deconditioning vs. Ischemia vs. chronotropic incompetence. Gerard declined further ischemic evaluation and he preferred to avoid additional testing. He preferred to work on getting back into an exercise routine. EKG was completed which showed severe junctional bradycardia with a heart rate of 40bpm. His metoprolol was stopped and a ZIO monitor was placed which showed avg heart rate 71, there was one 3.8 second pause for which he was asymptomatic. He was able to get his heart rates in to the 100-110s at times which makes chronotropic incompetence unlikely the primary  of his fatigue. Recommended continued monitoring.    Patient is here  "today for cardiology follow up.     Patient reports feeling good. Biggest concern today is difficulty swallowing fenofibrate due to increased size with different . He has since been cutting 1/4 to be able to swallow. Wears an apple watch and states he no longer gets alerts for low heart rates. Denies shortenss of breath at rest. Exertional dyspnea with stairs or strenuous activity. Notes he continues to be lazy \"don't feel like doing it\" and hasn't tried to get back into an exercise routine. Denies chest pain or chest tightness. Denies dizziness, lightheadedness or other presyncopal symptoms. Denies tachycardia or palpitations. Taking medications daily, except for difficult with fenofibrate as noted above.     Lipid panel in 3/2021 showed total cholesterol 130, HDL 3, LDL 60 and triglycerides 186. Blood pressure 129/76 and HR 64 in clinic today.    Appetite good. Eating most meals at home. No set exercise routine, notes he has a treadmill at home but no motivation to get into an exercise routine. States he used to be athletic but notes over the past years he has not continued with any exercise routine. Occasional alcohol use, 5-7 drinks weekly. Denies tobacco use (quit 2018).         Social History      Social History     Socioeconomic History     Marital status:      Spouse name: Not on file     Number of children: Not on file     Years of education: Not on file     Highest education level: Not on file   Occupational History     Occupation: Sales/Computer Training     Employer: RETIRED   Tobacco Use     Smoking status: Former Smoker     Packs/day: 0.50     Years: 33.50     Pack years: 16.75     Types: Cigarettes     Start date: 1950     Quit date: 11/2017     Years since quitting: 3.7     Smokeless tobacco: Never Used     Tobacco comment: off and on since age 17- smokes about 5 cigs/day plus vapor cigarettes    Substance and Sexual Activity     Alcohol use: Yes     Alcohol/week: 3.3 standard " drinks     Types: 4 Shots of liquor per week     Comment: 5-6 drink per week     Drug use: No     Sexual activity: Not on file   Other Topics Concern      Service Not Asked     Comment: [place] [id number]     Blood Transfusions No     Caffeine Concern Yes     Occupational Exposure No     Hobby Hazards No     Sleep Concern No     Stress Concern No     Weight Concern No     Special Diet No     Back Care No     Exercise No     Bike Helmet Not Asked     Seat Belt Yes     Self-Exams Not Asked     Parent/sibling w/ CABG, MI or angioplasty before 65F 55M? Not Asked   Social History Narrative    ** Merged History Encounter **          Social Determinants of Health     Financial Resource Strain:      Difficulty of Paying Living Expenses:    Food Insecurity:      Worried About Running Out of Food in the Last Year:      Ran Out of Food in the Last Year:    Transportation Needs:      Lack of Transportation (Medical):      Lack of Transportation (Non-Medical):    Physical Activity:      Days of Exercise per Week:      Minutes of Exercise per Session:    Stress:      Feeling of Stress :    Social Connections:      Frequency of Communication with Friends and Family:      Frequency of Social Gatherings with Friends and Family:      Attends Protestant Services:      Active Member of Clubs or Organizations:      Attends Club or Organization Meetings:      Marital Status:    Intimate Partner Violence:      Fear of Current or Ex-Partner:      Emotionally Abused:      Physically Abused:      Sexually Abused:             Review of Systems:   Skin:  Negative     Eyes:  Negative glasses  ENT:  Negative    Respiratory:  Positive for dyspnea on exertion;shortness of breath  Cardiovascular:  Negative for;palpitations;chest pain;edema;syncope or near-syncope;exercise intolerance;fatigue;dizziness Positive for;lightheadedness  Gastroenterology: Positive for heartburn  Genitourinary:  Positive for prostate problem  Musculoskeletal:   "Negative for joint pain (finger only on left hand)  Neurologic:  Negative    Psychiatric:  not assessed    Heme/Lymph/Imm:  Negative    Endocrine:  not assessed           Physical Exam:   Vitals: /76   Pulse 64   Ht 1.778 m (5' 10\")   Wt 79.1 kg (174 lb 4.8 oz)   SpO2 95%   BMI 25.01 kg/m     Wt Readings from Last 4 Encounters:   07/27/21 79.1 kg (174 lb 4.8 oz)   04/01/21 80.7 kg (178 lb)   12/02/19 78.2 kg (172 lb 6.4 oz)   10/30/18 78.9 kg (174 lb)     GEN: well nourished, in no acute distress.  HEENT:  Pupils equal, round. Sclerae nonicteric.   NECK: Supple, no masses appreciated.  C/V:  Regular rate and rhythm, no murmur, rub or gallop.    RESP: Respirations are unlabored. Clear to auscultation bilaterally without wheezing, rales, or rhonchi.  GI: Abdomen soft, obese, nontender.  EXTREM: No LE edema.  NEURO: Alert and oriented, cooperative.  SKIN: Warm and dry.        Data:       LIPID RESULTS:  Lab Results   Component Value Date    CHOL 130 03/26/2021    HDL 33 (L) 03/26/2021    LDL 60 03/26/2021    TRIG 186 (H) 03/26/2021    CHOLHDLRATIO 3.2 06/18/2015     LIVER ENZYME RESULTS:  Lab Results   Component Value Date    AST 18 03/14/2017    ALT 26 03/26/2021     CBC RESULTS:  Lab Results   Component Value Date    WBC 6.6 03/14/2017    RBC 4.34 (L) 03/14/2017    HGB 14.0 03/14/2017    HCT 42.0 03/14/2017    MCV 97 03/14/2017    MCH 32.3 03/14/2017    MCHC 33.3 03/14/2017    RDW 15.0 03/14/2017     03/14/2017     BMP RESULTS:  Lab Results   Component Value Date     07/14/2017    POTASSIUM 4.1 07/14/2017    CHLORIDE 105 07/14/2017    CO2 27 07/14/2017    ANIONGAP 13.1 07/14/2017     (H) 07/14/2017    BUN 12 07/14/2017    CR 1.21 07/14/2017    GFRESTIMATED 53 (L) 11/17/2017    GFRESTBLACK 65 11/17/2017    PAIGE 9.2 07/14/2017      INR RESULTS:  Lab Results   Component Value Date    INR 1.03 03/28/2009            Medications     Current Outpatient Medications   Medication Sig Dispense " Refill     ASPIRIN 81 MG OR TABS 1 TABLET DAILY       fenofibrate (LOFIBRA) 54 MG tablet Take 2 tablets (108 mg) by mouth daily 180 tablet 1     MULTI-VITAMIN OR TABS one daily       simvastatin (ZOCOR) 80 MG tablet Take 1 tablet (80 mg) by mouth At Bedtime 90 tablet 3     tamsulosin (FLOMAX) 0.4 MG 24 hr capsule Take 0.4 mg by mouth daily.       VITAMIN D 1000 UNIT OR TABS Take 5,000 Units by mouth daily             Past Medical History     Past Medical History:   Diagnosis Date     Allergic state      CAD (coronary artery disease) 2014     s/p stent to RCA ()     GERD (gastroesophageal reflux disease)      Hypercholesterolaemia      Hyperlipidaemia      Other affections of shoulder region, not elsewhere classified     impingement syndrome of right shoulder     Sebaceous cyst     right shoulder area     Tachycardia      Tobacco use disorder 3/5/2008     Past Surgical History:   Procedure Laterality Date     COLONOSCOPY       HEART CATH, ANGIOPLASTY  2001    RCA stent     TONSILLECTOMY & ADENOIDECTOMY       VASCULAR SURGERY       Family History   Problem Relation Age of Onset     Breast Cancer Mother          at age 64     Heart Disease Father         heart attack     Diabetes Father             Allergies   Bupropion hcl, Chantix [varenicline], and Pcn [penicillins]    40 minutes spent on the date of the encounter doing chart review, history and exam, documentation and further activities as noted above      YARITZA Vargas CNP  [facility]  Pager: [phone]        Thank you for allowing me to participate in the care of your patient.      Sincerely,     YARITZA Vargas CNP     [facility]  cc:   Ross Landers MD  [address] [address]

## 2021-07-27 NOTE — PATIENT INSTRUCTIONS
1. Change to 54mg fenofibrate tablet, take 2 tablets daily  2. Start walking on the treadmill   3. Please call with any questions/concerns [phone]

## 2021-09-05 ENCOUNTER — HEALTH MAINTENANCE LETTER (OUTPATIENT)
Age: 81
End: 2021-09-05

## 2021-12-31 DIAGNOSIS — E78.5 HYPERLIPIDEMIA LDL GOAL <70: ICD-10-CM

## 2021-12-31 RX ORDER — SIMVASTATIN 80 MG
80 TABLET ORAL AT BEDTIME
Qty: 90 TABLET | Refills: 3 | Status: SHIPPED | OUTPATIENT
Start: 2021-12-31 | End: 2022-07-19

## 2021-12-31 RX ORDER — FENOFIBRATE 54 MG/1
108 TABLET ORAL DAILY
Qty: 180 TABLET | Refills: 3 | Status: SHIPPED | OUTPATIENT
Start: 2021-12-31 | End: 2022-05-31

## 2022-03-18 PROCEDURE — 82040 ASSAY OF SERUM ALBUMIN: CPT | Performed by: EMERGENCY MEDICINE

## 2022-03-18 PROCEDURE — 99291 CRITICAL CARE FIRST HOUR: CPT | Mod: 25

## 2022-03-18 PROCEDURE — 83880 ASSAY OF NATRIURETIC PEPTIDE: CPT | Performed by: EMERGENCY MEDICINE

## 2022-03-18 PROCEDURE — 80053 COMPREHEN METABOLIC PANEL: CPT | Performed by: EMERGENCY MEDICINE

## 2022-03-18 PROCEDURE — 83735 ASSAY OF MAGNESIUM: CPT | Performed by: EMERGENCY MEDICINE

## 2022-03-18 PROCEDURE — 36415 COLL VENOUS BLD VENIPUNCTURE: CPT | Performed by: EMERGENCY MEDICINE

## 2022-03-18 PROCEDURE — 85025 COMPLETE CBC W/AUTO DIFF WBC: CPT | Performed by: EMERGENCY MEDICINE

## 2022-03-18 PROCEDURE — 83690 ASSAY OF LIPASE: CPT | Performed by: EMERGENCY MEDICINE

## 2022-03-18 PROCEDURE — 93005 ELECTROCARDIOGRAM TRACING: CPT

## 2022-03-18 PROCEDURE — 84484 ASSAY OF TROPONIN QUANT: CPT | Performed by: EMERGENCY MEDICINE

## 2022-03-18 PROCEDURE — 99292 CRITICAL CARE ADDL 30 MIN: CPT

## 2022-03-19 ENCOUNTER — ANESTHESIA (OUTPATIENT)
Dept: SURGERY | Facility: CLINIC | Age: 82
DRG: 242 | End: 2022-03-19
Payer: COMMERCIAL

## 2022-03-19 ENCOUNTER — HOSPITAL ENCOUNTER (INPATIENT)
Facility: CLINIC | Age: 82
LOS: 7 days | Discharge: HOME | DRG: 242 | End: 2022-03-26
Attending: EMERGENCY MEDICINE | Admitting: STUDENT IN AN ORGANIZED HEALTH CARE EDUCATION/TRAINING PROGRAM
Payer: COMMERCIAL

## 2022-03-19 ENCOUNTER — APPOINTMENT (OUTPATIENT)
Dept: CT IMAGING | Facility: CLINIC | Age: 82
DRG: 242 | End: 2022-03-19
Attending: EMERGENCY MEDICINE
Payer: COMMERCIAL

## 2022-03-19 ENCOUNTER — ANESTHESIA EVENT (OUTPATIENT)
Dept: SURGERY | Facility: CLINIC | Age: 82
DRG: 242 | End: 2022-03-19
Payer: COMMERCIAL

## 2022-03-19 ENCOUNTER — APPOINTMENT (OUTPATIENT)
Dept: CARDIOLOGY | Facility: CLINIC | Age: 82
DRG: 242 | End: 2022-03-19
Attending: HOSPITALIST
Payer: COMMERCIAL

## 2022-03-19 DIAGNOSIS — G47.01 INSOMNIA DUE TO MEDICAL CONDITION: ICD-10-CM

## 2022-03-19 DIAGNOSIS — R19.7 DIARRHEA, UNSPECIFIED TYPE: ICD-10-CM

## 2022-03-19 DIAGNOSIS — N30.01 ACUTE CYSTITIS WITH HEMATURIA: ICD-10-CM

## 2022-03-19 DIAGNOSIS — I44.1 AV BLOCK, MOBITZ 2: Primary | ICD-10-CM

## 2022-03-19 DIAGNOSIS — Z98.890 S/P EXPLORATORY LAPAROTOMY: ICD-10-CM

## 2022-03-19 DIAGNOSIS — A41.9 SEVERE SEPSIS (H): ICD-10-CM

## 2022-03-19 DIAGNOSIS — K55.069 SUPERIOR MESENTERIC ARTERY THROMBOSIS (H): ICD-10-CM

## 2022-03-19 DIAGNOSIS — R65.20 SEVERE SEPSIS (H): ICD-10-CM

## 2022-03-19 DIAGNOSIS — R31.0 GROSS HEMATURIA: ICD-10-CM

## 2022-03-19 LAB
ABO/RH(D): NORMAL
ALBUMIN SERPL-MCNC: 3.8 G/DL (ref 3.4–5)
ALBUMIN UR-MCNC: 300 MG/DL
ALP SERPL-CCNC: 44 U/L (ref 40–150)
ALT SERPL W P-5'-P-CCNC: 27 U/L (ref 0–70)
ANION GAP SERPL CALCULATED.3IONS-SCNC: 7 MMOL/L (ref 3–14)
ANION GAP SERPL CALCULATED.3IONS-SCNC: 8 MMOL/L (ref 3–14)
ANION GAP SERPL CALCULATED.3IONS-SCNC: 8 MMOL/L (ref 3–14)
ANTIBODY SCREEN: NEGATIVE
APPEARANCE UR: ABNORMAL
AST SERPL W P-5'-P-CCNC: 20 U/L (ref 0–45)
ATRIAL RATE - MUSE: 49 BPM
BASOPHILS # BLD AUTO: 0 10E3/UL (ref 0–0.2)
BASOPHILS NFR BLD AUTO: 0 %
BILIRUB SERPL-MCNC: 0.7 MG/DL (ref 0.2–1.3)
BILIRUB UR QL STRIP: NEGATIVE
BUN SERPL-MCNC: 21 MG/DL (ref 7–30)
BUN SERPL-MCNC: 25 MG/DL (ref 7–30)
BUN SERPL-MCNC: 25 MG/DL (ref 7–30)
CALCIUM SERPL-MCNC: 8 MG/DL (ref 8.5–10.1)
CALCIUM SERPL-MCNC: 8.1 MG/DL (ref 8.5–10.1)
CALCIUM SERPL-MCNC: 8.8 MG/DL (ref 8.5–10.1)
CHLORIDE BLD-SCNC: 109 MMOL/L (ref 94–109)
CHLORIDE BLD-SCNC: 111 MMOL/L (ref 94–109)
CHLORIDE BLD-SCNC: 112 MMOL/L (ref 94–109)
CO2 SERPL-SCNC: 21 MMOL/L (ref 20–32)
CO2 SERPL-SCNC: 22 MMOL/L (ref 20–32)
CO2 SERPL-SCNC: 23 MMOL/L (ref 20–32)
COLOR UR AUTO: ABNORMAL
CREAT SERPL-MCNC: 1.3 MG/DL (ref 0.66–1.25)
CREAT SERPL-MCNC: 1.33 MG/DL (ref 0.66–1.25)
CREAT SERPL-MCNC: 1.37 MG/DL (ref 0.66–1.25)
DIASTOLIC BLOOD PRESSURE - MUSE: NORMAL MMHG
EOSINOPHIL # BLD AUTO: 0.2 10E3/UL (ref 0–0.7)
EOSINOPHIL NFR BLD AUTO: 2 %
ERYTHROCYTE [DISTWIDTH] IN BLOOD BY AUTOMATED COUNT: 16.9 % (ref 10–15)
ERYTHROCYTE [DISTWIDTH] IN BLOOD BY AUTOMATED COUNT: 16.9 % (ref 10–15)
GFR SERPL CREATININE-BSD FRML MDRD: 52 ML/MIN/1.73M2
GFR SERPL CREATININE-BSD FRML MDRD: 53 ML/MIN/1.73M2
GFR SERPL CREATININE-BSD FRML MDRD: 55 ML/MIN/1.73M2
GLUCOSE BLD-MCNC: 121 MG/DL (ref 70–99)
GLUCOSE BLD-MCNC: 144 MG/DL (ref 70–99)
GLUCOSE BLD-MCNC: 144 MG/DL (ref 70–99)
GLUCOSE BLDC GLUCOMTR-MCNC: 110 MG/DL (ref 70–99)
GLUCOSE BLDC GLUCOMTR-MCNC: 126 MG/DL (ref 70–99)
GLUCOSE UR STRIP-MCNC: NEGATIVE MG/DL
HCO3 BLDV-SCNC: 21 MMOL/L (ref 21–28)
HCT VFR BLD AUTO: 33.5 % (ref 40–53)
HCT VFR BLD AUTO: 36.3 % (ref 40–53)
HGB BLD-MCNC: 10.7 G/DL (ref 13.3–17.7)
HGB BLD-MCNC: 11.9 G/DL (ref 13.3–17.7)
HGB UR QL STRIP: ABNORMAL
HOLD SPECIMEN: NORMAL
IMM GRANULOCYTES # BLD: 0.2 10E3/UL
IMM GRANULOCYTES NFR BLD: 1 %
INR PPP: 1.21 (ref 0.85–1.15)
INTERPRETATION ECG - MUSE: NORMAL
KETONES UR STRIP-MCNC: NEGATIVE MG/DL
LACTATE BLD-SCNC: 2.3 MMOL/L
LACTATE SERPL-SCNC: 2.1 MMOL/L (ref 0.7–2)
LACTATE SERPL-SCNC: 2.2 MMOL/L (ref 0.7–2)
LACTATE SERPL-SCNC: 2.4 MMOL/L (ref 0.7–2)
LACTATE SERPL-SCNC: 5.8 MMOL/L (ref 0.7–2)
LEUKOCYTE ESTERASE UR QL STRIP: ABNORMAL
LIPASE SERPL-CCNC: 208 U/L (ref 73–393)
LVEF ECHO: NORMAL
LYMPHOCYTES # BLD AUTO: 0.6 10E3/UL (ref 0.8–5.3)
LYMPHOCYTES NFR BLD AUTO: 5 %
MAGNESIUM SERPL-MCNC: 1.5 MG/DL (ref 1.6–2.3)
MAGNESIUM SERPL-MCNC: 2.4 MG/DL (ref 1.6–2.3)
MCH RBC QN AUTO: 33.9 PG (ref 26.5–33)
MCH RBC QN AUTO: 34.1 PG (ref 26.5–33)
MCHC RBC AUTO-ENTMCNC: 31.9 G/DL (ref 31.5–36.5)
MCHC RBC AUTO-ENTMCNC: 32.8 G/DL (ref 31.5–36.5)
MCV RBC AUTO: 104 FL (ref 78–100)
MCV RBC AUTO: 106 FL (ref 78–100)
MONOCYTES # BLD AUTO: 0.8 10E3/UL (ref 0–1.3)
MONOCYTES NFR BLD AUTO: 7 %
MUCOUS THREADS #/AREA URNS LPF: PRESENT /LPF
NEUTROPHILS # BLD AUTO: 10 10E3/UL (ref 1.6–8.3)
NEUTROPHILS NFR BLD AUTO: 85 %
NITRATE UR QL: NEGATIVE
NRBC # BLD AUTO: 0 10E3/UL
NRBC BLD AUTO-RTO: 0 /100
NT-PROBNP SERPL-MCNC: 1318 PG/ML (ref 0–1800)
NT-PROBNP SERPL-MCNC: 312 PG/ML (ref 0–1800)
P AXIS - MUSE: 63 DEGREES
PCO2 BLDV: 34 MM HG (ref 40–50)
PH BLDV: 7.41 [PH] (ref 7.32–7.43)
PH UR STRIP: 6.5 [PH] (ref 5–7)
PLATELET # BLD AUTO: 155 10E3/UL (ref 150–450)
PLATELET # BLD AUTO: 182 10E3/UL (ref 150–450)
PO2 BLDV: 29 MM HG (ref 25–47)
POTASSIUM BLD-SCNC: 3.9 MMOL/L (ref 3.4–5.3)
PR INTERVAL - MUSE: 252 MS
PROT SERPL-MCNC: 7.4 G/DL (ref 6.8–8.8)
QRS DURATION - MUSE: 140 MS
QT - MUSE: 514 MS
QTC - MUSE: 464 MS
R AXIS - MUSE: 268 DEGREES
RADIOLOGIST FLAGS: ABNORMAL
RBC # BLD AUTO: 3.16 10E6/UL (ref 4.4–5.9)
RBC # BLD AUTO: 3.49 10E6/UL (ref 4.4–5.9)
RBC URINE: >182 /HPF
SAO2 % BLDV: 56 % (ref 94–100)
SARS-COV-2 RNA RESP QL NAA+PROBE: NEGATIVE
SODIUM SERPL-SCNC: 140 MMOL/L (ref 133–144)
SODIUM SERPL-SCNC: 140 MMOL/L (ref 133–144)
SODIUM SERPL-SCNC: 141 MMOL/L (ref 133–144)
SP GR UR STRIP: 1.02 (ref 1–1.03)
SPECIMEN EXPIRATION DATE: NORMAL
SYSTOLIC BLOOD PRESSURE - MUSE: NORMAL MMHG
T AXIS - MUSE: 40 DEGREES
TROPONIN I SERPL HS-MCNC: 14 NG/L
TROPONIN I SERPL HS-MCNC: 27 NG/L
TSH SERPL DL<=0.005 MIU/L-ACNC: 0.48 MU/L (ref 0.4–4)
UFH PPP CHRO-ACNC: 0.29 IU/ML
UFH PPP CHRO-ACNC: 0.48 IU/ML
UROBILINOGEN UR STRIP-MCNC: NORMAL MG/DL
VENTRICULAR RATE- MUSE: 49 BPM
WBC # BLD AUTO: 11.8 10E3/UL (ref 4–11)
WBC # BLD AUTO: 14.2 10E3/UL (ref 4–11)
WBC URINE: >182 /HPF

## 2022-03-19 PROCEDURE — 250N000009 HC RX 250: Performed by: SURGERY

## 2022-03-19 PROCEDURE — 85520 HEPARIN ASSAY: CPT | Performed by: HOSPITALIST

## 2022-03-19 PROCEDURE — 99222 1ST HOSP IP/OBS MODERATE 55: CPT | Mod: 57 | Performed by: SURGERY

## 2022-03-19 PROCEDURE — 258N000003 HC RX IP 258 OP 636: Performed by: STUDENT IN AN ORGANIZED HEALTH CARE EDUCATION/TRAINING PROGRAM

## 2022-03-19 PROCEDURE — 83605 ASSAY OF LACTIC ACID: CPT | Performed by: HOSPITALIST

## 2022-03-19 PROCEDURE — 74177 CT ABD & PELVIS W/CONTRAST: CPT

## 2022-03-19 PROCEDURE — 83605 ASSAY OF LACTIC ACID: CPT | Performed by: EMERGENCY MEDICINE

## 2022-03-19 PROCEDURE — 250N000025 HC SEVOFLURANE, PER MIN: Performed by: SURGERY

## 2022-03-19 PROCEDURE — 258N000003 HC RX IP 258 OP 636: Performed by: HOSPITALIST

## 2022-03-19 PROCEDURE — 84484 ASSAY OF TROPONIN QUANT: CPT | Performed by: SURGERY

## 2022-03-19 PROCEDURE — 83605 ASSAY OF LACTIC ACID: CPT | Performed by: SURGERY

## 2022-03-19 PROCEDURE — 85027 COMPLETE CBC AUTOMATED: CPT | Performed by: STUDENT IN AN ORGANIZED HEALTH CARE EDUCATION/TRAINING PROGRAM

## 2022-03-19 PROCEDURE — 96367 TX/PROPH/DG ADDL SEQ IV INF: CPT

## 2022-03-19 PROCEDURE — 99232 SBSQ HOSP IP/OBS MODERATE 35: CPT | Performed by: HOSPITALIST

## 2022-03-19 PROCEDURE — 49000 EXPLORATION OF ABDOMEN: CPT | Performed by: SURGERY

## 2022-03-19 PROCEDURE — 250N000011 HC RX IP 250 OP 636: Performed by: EMERGENCY MEDICINE

## 2022-03-19 PROCEDURE — 250N000011 HC RX IP 250 OP 636: Performed by: NURSE ANESTHETIST, CERTIFIED REGISTERED

## 2022-03-19 PROCEDURE — 83735 ASSAY OF MAGNESIUM: CPT | Performed by: HOSPITALIST

## 2022-03-19 PROCEDURE — 84443 ASSAY THYROID STIM HORMONE: CPT | Performed by: INTERNAL MEDICINE

## 2022-03-19 PROCEDURE — 96365 THER/PROPH/DIAG IV INF INIT: CPT

## 2022-03-19 PROCEDURE — 86923 COMPATIBILITY TEST ELECTRIC: CPT | Performed by: HOSPITALIST

## 2022-03-19 PROCEDURE — 250N000009 HC RX 250: Performed by: EMERGENCY MEDICINE

## 2022-03-19 PROCEDURE — 250N000009 HC RX 250: Performed by: INTERNAL MEDICINE

## 2022-03-19 PROCEDURE — 370N000017 HC ANESTHESIA TECHNICAL FEE, PER MIN: Performed by: SURGERY

## 2022-03-19 PROCEDURE — U0005 INFEC AGEN DETEC AMPLI PROBE: HCPCS | Performed by: EMERGENCY MEDICINE

## 2022-03-19 PROCEDURE — 360N000076 HC SURGERY LEVEL 3, PER MIN: Performed by: SURGERY

## 2022-03-19 PROCEDURE — 87086 URINE CULTURE/COLONY COUNT: CPT | Performed by: EMERGENCY MEDICINE

## 2022-03-19 PROCEDURE — 250N000011 HC RX IP 250 OP 636

## 2022-03-19 PROCEDURE — 36415 COLL VENOUS BLD VENIPUNCTURE: CPT | Performed by: STUDENT IN AN ORGANIZED HEALTH CARE EDUCATION/TRAINING PROGRAM

## 2022-03-19 PROCEDURE — 85610 PROTHROMBIN TIME: CPT | Performed by: EMERGENCY MEDICINE

## 2022-03-19 PROCEDURE — 99223 1ST HOSP IP/OBS HIGH 75: CPT | Mod: AI | Performed by: STUDENT IN AN ORGANIZED HEALTH CARE EDUCATION/TRAINING PROGRAM

## 2022-03-19 PROCEDURE — C1757 CATH, THROMBECTOMY/EMBOLECT: HCPCS | Performed by: SURGERY

## 2022-03-19 PROCEDURE — 82803 BLOOD GASES ANY COMBINATION: CPT

## 2022-03-19 PROCEDURE — 83605 ASSAY OF LACTIC ACID: CPT

## 2022-03-19 PROCEDURE — 93005 ELECTROCARDIOGRAM TRACING: CPT

## 2022-03-19 PROCEDURE — 258N000003 HC RX IP 258 OP 636: Performed by: NURSE ANESTHETIST, CERTIFIED REGISTERED

## 2022-03-19 PROCEDURE — 83880 ASSAY OF NATRIURETIC PEPTIDE: CPT | Performed by: SURGERY

## 2022-03-19 PROCEDURE — 3E043XZ INTRODUCTION OF VASOPRESSOR INTO CENTRAL VEIN, PERCUTANEOUS APPROACH: ICD-10-PCS | Performed by: INTERNAL MEDICINE

## 2022-03-19 PROCEDURE — 250N000009 HC RX 250: Performed by: NURSE ANESTHETIST, CERTIFIED REGISTERED

## 2022-03-19 PROCEDURE — 85520 HEPARIN ASSAY: CPT | Performed by: STUDENT IN AN ORGANIZED HEALTH CARE EDUCATION/TRAINING PROGRAM

## 2022-03-19 PROCEDURE — 81001 URINALYSIS AUTO W/SCOPE: CPT | Performed by: EMERGENCY MEDICINE

## 2022-03-19 PROCEDURE — 99222 1ST HOSP IP/OBS MODERATE 55: CPT | Mod: 25 | Performed by: INTERNAL MEDICINE

## 2022-03-19 PROCEDURE — 93306 TTE W/DOPPLER COMPLETE: CPT | Mod: 26 | Performed by: INTERNAL MEDICINE

## 2022-03-19 PROCEDURE — 87149 DNA/RNA DIRECT PROBE: CPT | Performed by: EMERGENCY MEDICINE

## 2022-03-19 PROCEDURE — 36415 COLL VENOUS BLD VENIPUNCTURE: CPT | Performed by: SURGERY

## 2022-03-19 PROCEDURE — 999N000141 HC STATISTIC PRE-PROCEDURE NURSING ASSESSMENT: Performed by: SURGERY

## 2022-03-19 PROCEDURE — 272N000001 HC OR GENERAL SUPPLY STERILE: Performed by: SURGERY

## 2022-03-19 PROCEDURE — 86850 RBC ANTIBODY SCREEN: CPT | Performed by: EMERGENCY MEDICINE

## 2022-03-19 PROCEDURE — 250N000011 HC RX IP 250 OP 636: Performed by: HOSPITALIST

## 2022-03-19 PROCEDURE — 258N000003 HC RX IP 258 OP 636: Performed by: EMERGENCY MEDICINE

## 2022-03-19 PROCEDURE — 250N000013 HC RX MED GY IP 250 OP 250 PS 637: Performed by: STUDENT IN AN ORGANIZED HEALTH CARE EDUCATION/TRAINING PROGRAM

## 2022-03-19 PROCEDURE — 255N000002 HC RX 255 OP 636: Performed by: STUDENT IN AN ORGANIZED HEALTH CARE EDUCATION/TRAINING PROGRAM

## 2022-03-19 PROCEDURE — C9803 HOPD COVID-19 SPEC COLLECT: HCPCS

## 2022-03-19 PROCEDURE — 250N000011 HC RX IP 250 OP 636: Performed by: SURGERY

## 2022-03-19 PROCEDURE — 0WJG0ZZ INSPECTION OF PERITONEAL CAVITY, OPEN APPROACH: ICD-10-PCS | Performed by: SURGERY

## 2022-03-19 PROCEDURE — 36415 COLL VENOUS BLD VENIPUNCTURE: CPT | Performed by: HOSPITALIST

## 2022-03-19 PROCEDURE — 250N000013 HC RX MED GY IP 250 OP 250 PS 637: Performed by: EMERGENCY MEDICINE

## 2022-03-19 PROCEDURE — 200N000001 HC R&B ICU

## 2022-03-19 PROCEDURE — 250N000011 HC RX IP 250 OP 636: Performed by: STUDENT IN AN ORGANIZED HEALTH CARE EDUCATION/TRAINING PROGRAM

## 2022-03-19 PROCEDURE — 82310 ASSAY OF CALCIUM: CPT | Performed by: SURGERY

## 2022-03-19 PROCEDURE — 80048 BASIC METABOLIC PNL TOTAL CA: CPT | Performed by: STUDENT IN AN ORGANIZED HEALTH CARE EDUCATION/TRAINING PROGRAM

## 2022-03-19 PROCEDURE — 87077 CULTURE AEROBIC IDENTIFY: CPT | Performed by: EMERGENCY MEDICINE

## 2022-03-19 PROCEDURE — 999N000208 ECHOCARDIOGRAM COMPLETE

## 2022-03-19 PROCEDURE — 36415 COLL VENOUS BLD VENIPUNCTURE: CPT | Performed by: EMERGENCY MEDICINE

## 2022-03-19 RX ORDER — FUROSEMIDE 10 MG/ML
20 INJECTION INTRAMUSCULAR; INTRAVENOUS ONCE
Status: COMPLETED | OUTPATIENT
Start: 2022-03-19 | End: 2022-03-19

## 2022-03-19 RX ORDER — FENTANYL CITRATE 50 UG/ML
50 INJECTION, SOLUTION INTRAMUSCULAR; INTRAVENOUS EVERY 5 MIN PRN
Status: DISCONTINUED | OUTPATIENT
Start: 2022-03-19 | End: 2022-03-19

## 2022-03-19 RX ORDER — MAGNESIUM HYDROXIDE 1200 MG/15ML
LIQUID ORAL PRN
Status: DISCONTINUED | OUTPATIENT
Start: 2022-03-19 | End: 2022-03-19 | Stop reason: HOSPADM

## 2022-03-19 RX ORDER — ACETAMINOPHEN 650 MG/1
650 SUPPOSITORY RECTAL EVERY 6 HOURS PRN
Status: DISCONTINUED | OUTPATIENT
Start: 2022-03-19 | End: 2022-03-26 | Stop reason: HOSPADM

## 2022-03-19 RX ORDER — FENTANYL CITRATE 0.05 MG/ML
50 INJECTION, SOLUTION INTRAMUSCULAR; INTRAVENOUS
Status: DISCONTINUED | OUTPATIENT
Start: 2022-03-19 | End: 2022-03-19 | Stop reason: HOSPADM

## 2022-03-19 RX ORDER — ACETAMINOPHEN 500 MG
500 TABLET ORAL ONCE
Status: COMPLETED | OUTPATIENT
Start: 2022-03-19 | End: 2022-03-19

## 2022-03-19 RX ORDER — DEXAMETHASONE SODIUM PHOSPHATE 4 MG/ML
INJECTION, SOLUTION INTRA-ARTICULAR; INTRALESIONAL; INTRAMUSCULAR; INTRAVENOUS; SOFT TISSUE PRN
Status: DISCONTINUED | OUTPATIENT
Start: 2022-03-19 | End: 2022-03-19

## 2022-03-19 RX ORDER — DOPAMINE HYDROCHLORIDE 160 MG/100ML
INJECTION, SOLUTION INTRAVENOUS
Status: DISCONTINUED
Start: 2022-03-19 | End: 2022-03-20 | Stop reason: HOSPADM

## 2022-03-19 RX ORDER — ALBUTEROL SULFATE 0.83 MG/ML
2.5 SOLUTION RESPIRATORY (INHALATION) EVERY 4 HOURS PRN
Status: DISCONTINUED | OUTPATIENT
Start: 2022-03-19 | End: 2022-03-19

## 2022-03-19 RX ORDER — EPHEDRINE SULFATE 50 MG/ML
INJECTION, SOLUTION INTRAMUSCULAR; INTRAVENOUS; SUBCUTANEOUS PRN
Status: DISCONTINUED | OUTPATIENT
Start: 2022-03-19 | End: 2022-03-19

## 2022-03-19 RX ORDER — LIDOCAINE 40 MG/G
CREAM TOPICAL
Status: DISCONTINUED | OUTPATIENT
Start: 2022-03-19 | End: 2022-03-19

## 2022-03-19 RX ORDER — SODIUM CHLORIDE 9 MG/ML
INJECTION, SOLUTION INTRAVENOUS CONTINUOUS PRN
Status: DISCONTINUED | OUTPATIENT
Start: 2022-03-19 | End: 2022-03-19

## 2022-03-19 RX ORDER — PROPOFOL 10 MG/ML
INJECTION, EMULSION INTRAVENOUS PRN
Status: DISCONTINUED | OUTPATIENT
Start: 2022-03-19 | End: 2022-03-19

## 2022-03-19 RX ORDER — MAGNESIUM SULFATE HEPTAHYDRATE 40 MG/ML
2 INJECTION, SOLUTION INTRAVENOUS ONCE
Status: COMPLETED | OUTPATIENT
Start: 2022-03-19 | End: 2022-03-19

## 2022-03-19 RX ORDER — ATROPINE SULFATE 0.1 MG/ML
INJECTION INTRAVENOUS
Status: DISCONTINUED
Start: 2022-03-19 | End: 2022-03-20 | Stop reason: HOSPADM

## 2022-03-19 RX ORDER — ONDANSETRON 2 MG/ML
INJECTION INTRAMUSCULAR; INTRAVENOUS PRN
Status: DISCONTINUED | OUTPATIENT
Start: 2022-03-19 | End: 2022-03-19

## 2022-03-19 RX ORDER — SODIUM CHLORIDE, SODIUM LACTATE, POTASSIUM CHLORIDE, CALCIUM CHLORIDE 600; 310; 30; 20 MG/100ML; MG/100ML; MG/100ML; MG/100ML
INJECTION, SOLUTION INTRAVENOUS CONTINUOUS
Status: DISCONTINUED | OUTPATIENT
Start: 2022-03-19 | End: 2022-03-24

## 2022-03-19 RX ORDER — IOPAMIDOL 755 MG/ML
88 INJECTION, SOLUTION INTRAVASCULAR ONCE
Status: COMPLETED | OUTPATIENT
Start: 2022-03-19 | End: 2022-03-19

## 2022-03-19 RX ORDER — LIDOCAINE HYDROCHLORIDE 20 MG/ML
INJECTION, SOLUTION INFILTRATION; PERINEURAL PRN
Status: DISCONTINUED | OUTPATIENT
Start: 2022-03-19 | End: 2022-03-19

## 2022-03-19 RX ORDER — FENTANYL CITRATE 50 UG/ML
INJECTION, SOLUTION INTRAMUSCULAR; INTRAVENOUS PRN
Status: DISCONTINUED | OUTPATIENT
Start: 2022-03-19 | End: 2022-03-19

## 2022-03-19 RX ORDER — CLINDAMYCIN PHOSPHATE 600 MG/50ML
INJECTION, SOLUTION INTRAVENOUS PRN
Status: DISCONTINUED | OUTPATIENT
Start: 2022-03-19 | End: 2022-03-19

## 2022-03-19 RX ORDER — CEFTRIAXONE 1 G/1
1 INJECTION, POWDER, FOR SOLUTION INTRAMUSCULAR; INTRAVENOUS ONCE
Status: COMPLETED | OUTPATIENT
Start: 2022-03-19 | End: 2022-03-19

## 2022-03-19 RX ORDER — ONDANSETRON 4 MG/1
4 TABLET, ORALLY DISINTEGRATING ORAL EVERY 30 MIN PRN
Status: DISCONTINUED | OUTPATIENT
Start: 2022-03-19 | End: 2022-03-19

## 2022-03-19 RX ORDER — SODIUM CHLORIDE, SODIUM LACTATE, POTASSIUM CHLORIDE, CALCIUM CHLORIDE 600; 310; 30; 20 MG/100ML; MG/100ML; MG/100ML; MG/100ML
INJECTION, SOLUTION INTRAVENOUS CONTINUOUS PRN
Status: DISCONTINUED | OUTPATIENT
Start: 2022-03-19 | End: 2022-03-19

## 2022-03-19 RX ORDER — ONDANSETRON 2 MG/ML
4 INJECTION INTRAMUSCULAR; INTRAVENOUS
Status: DISCONTINUED | OUTPATIENT
Start: 2022-03-19 | End: 2022-03-19 | Stop reason: HOSPADM

## 2022-03-19 RX ORDER — HEPARIN SODIUM 10000 [USP'U]/100ML
0-5000 INJECTION, SOLUTION INTRAVENOUS CONTINUOUS
Status: DISCONTINUED | OUTPATIENT
Start: 2022-03-19 | End: 2022-03-19

## 2022-03-19 RX ORDER — SODIUM CHLORIDE, SODIUM LACTATE, POTASSIUM CHLORIDE, CALCIUM CHLORIDE 600; 310; 30; 20 MG/100ML; MG/100ML; MG/100ML; MG/100ML
INJECTION, SOLUTION INTRAVENOUS CONTINUOUS
Status: DISCONTINUED | OUTPATIENT
Start: 2022-03-19 | End: 2022-03-19

## 2022-03-19 RX ORDER — ROPIVACAINE IN 0.9% SOD CHL/PF 0.1 %
.03-.125 PLASTIC BAG, INJECTION (ML) EPIDURAL CONTINUOUS
Status: DISCONTINUED | OUTPATIENT
Start: 2022-03-19 | End: 2022-03-21

## 2022-03-19 RX ORDER — GLYCOPYRROLATE 0.2 MG/ML
INJECTION, SOLUTION INTRAMUSCULAR; INTRAVENOUS PRN
Status: DISCONTINUED | OUTPATIENT
Start: 2022-03-19 | End: 2022-03-19

## 2022-03-19 RX ORDER — NEOSTIGMINE METHYLSULFATE 1 MG/ML
VIAL (ML) INJECTION PRN
Status: DISCONTINUED | OUTPATIENT
Start: 2022-03-19 | End: 2022-03-19

## 2022-03-19 RX ORDER — SODIUM CHLORIDE 9 MG/ML
INJECTION, SOLUTION INTRAVENOUS CONTINUOUS
Status: DISCONTINUED | OUTPATIENT
Start: 2022-03-19 | End: 2022-03-19

## 2022-03-19 RX ORDER — ACETAMINOPHEN 325 MG/1
650 TABLET ORAL EVERY 6 HOURS PRN
Status: DISCONTINUED | OUTPATIENT
Start: 2022-03-19 | End: 2022-03-19

## 2022-03-19 RX ORDER — ONDANSETRON 4 MG/1
4 TABLET, ORALLY DISINTEGRATING ORAL EVERY 6 HOURS PRN
Status: DISCONTINUED | OUTPATIENT
Start: 2022-03-19 | End: 2022-03-26 | Stop reason: HOSPADM

## 2022-03-19 RX ORDER — HEPARIN SODIUM 10000 [USP'U]/100ML
0-5000 INJECTION, SOLUTION INTRAVENOUS CONTINUOUS
Status: DISCONTINUED | OUTPATIENT
Start: 2022-03-19 | End: 2022-03-21

## 2022-03-19 RX ORDER — OXYCODONE HYDROCHLORIDE 5 MG/1
5 TABLET ORAL EVERY 4 HOURS PRN
Status: DISCONTINUED | OUTPATIENT
Start: 2022-03-19 | End: 2022-03-19

## 2022-03-19 RX ORDER — HYDROMORPHONE HCL IN WATER/PF 6 MG/30 ML
0.4 PATIENT CONTROLLED ANALGESIA SYRINGE INTRAVENOUS EVERY 5 MIN PRN
Status: DISCONTINUED | OUTPATIENT
Start: 2022-03-19 | End: 2022-03-19

## 2022-03-19 RX ORDER — HEPARIN SODIUM 1000 [USP'U]/ML
INJECTION, SOLUTION INTRAVENOUS; SUBCUTANEOUS PRN
Status: DISCONTINUED | OUTPATIENT
Start: 2022-03-19 | End: 2022-03-19

## 2022-03-19 RX ORDER — CEFTRIAXONE 1 G/1
1 INJECTION, POWDER, FOR SOLUTION INTRAMUSCULAR; INTRAVENOUS EVERY 24 HOURS
Status: DISCONTINUED | OUTPATIENT
Start: 2022-03-20 | End: 2022-03-20

## 2022-03-19 RX ORDER — ATROPINE SULFATE 0.1 MG/ML
1 INJECTION INTRAVENOUS
Status: DISCONTINUED | OUTPATIENT
Start: 2022-03-19 | End: 2022-03-26 | Stop reason: HOSPADM

## 2022-03-19 RX ORDER — ONDANSETRON 2 MG/ML
4 INJECTION INTRAMUSCULAR; INTRAVENOUS EVERY 6 HOURS PRN
Status: DISCONTINUED | OUTPATIENT
Start: 2022-03-19 | End: 2022-03-26 | Stop reason: HOSPADM

## 2022-03-19 RX ORDER — ALBUTEROL SULFATE 0.83 MG/ML
SOLUTION RESPIRATORY (INHALATION)
Status: DISCONTINUED
Start: 2022-03-19 | End: 2022-03-19 | Stop reason: WASHOUT

## 2022-03-19 RX ORDER — ONDANSETRON 2 MG/ML
4 INJECTION INTRAMUSCULAR; INTRAVENOUS EVERY 30 MIN PRN
Status: DISCONTINUED | OUTPATIENT
Start: 2022-03-19 | End: 2022-03-19

## 2022-03-19 RX ORDER — MEPERIDINE HYDROCHLORIDE 25 MG/ML
12.5 INJECTION INTRAMUSCULAR; INTRAVENOUS; SUBCUTANEOUS EVERY 5 MIN PRN
Status: DISCONTINUED | OUTPATIENT
Start: 2022-03-19 | End: 2022-03-19

## 2022-03-19 RX ORDER — NITROGLYCERIN 0.4 MG/1
0.4 TABLET SUBLINGUAL EVERY 5 MIN PRN
Status: DISCONTINUED | OUTPATIENT
Start: 2022-03-19 | End: 2022-03-25

## 2022-03-19 RX ORDER — SODIUM CHLORIDE, SODIUM LACTATE, POTASSIUM CHLORIDE, CALCIUM CHLORIDE 600; 310; 30; 20 MG/100ML; MG/100ML; MG/100ML; MG/100ML
INJECTION, SOLUTION INTRAVENOUS CONTINUOUS
Status: DISCONTINUED | OUTPATIENT
Start: 2022-03-19 | End: 2022-03-19 | Stop reason: HOSPADM

## 2022-03-19 RX ADMIN — FENTANYL CITRATE 50 MCG: 50 INJECTION, SOLUTION INTRAMUSCULAR; INTRAVENOUS at 13:36

## 2022-03-19 RX ADMIN — SODIUM CHLORIDE: 9 INJECTION, SOLUTION INTRAVENOUS at 13:10

## 2022-03-19 RX ADMIN — SODIUM CHLORIDE, POTASSIUM CHLORIDE, SODIUM LACTATE AND CALCIUM CHLORIDE: 600; 310; 30; 20 INJECTION, SOLUTION INTRAVENOUS at 13:05

## 2022-03-19 RX ADMIN — FENTANYL CITRATE 100 MCG: 50 INJECTION, SOLUTION INTRAMUSCULAR; INTRAVENOUS at 13:14

## 2022-03-19 RX ADMIN — HEPARIN SODIUM 1450 UNITS/HR: 10000 INJECTION, SOLUTION INTRAVENOUS at 03:36

## 2022-03-19 RX ADMIN — SODIUM CHLORIDE: 9 INJECTION, SOLUTION INTRAVENOUS at 05:39

## 2022-03-19 RX ADMIN — SODIUM CHLORIDE: 9 INJECTION, SOLUTION INTRAVENOUS at 04:59

## 2022-03-19 RX ADMIN — HEPARIN SODIUM 5000 UNITS: 1000 INJECTION INTRAVENOUS; SUBCUTANEOUS at 14:29

## 2022-03-19 RX ADMIN — HEPARIN SODIUM 1450 UNITS/HR: 1000 INJECTION INTRAVENOUS; SUBCUTANEOUS at 23:55

## 2022-03-19 RX ADMIN — Medication 0.03 MCG/KG/MIN: at 18:16

## 2022-03-19 RX ADMIN — FENTANYL CITRATE 25 MCG: 50 INJECTION, SOLUTION INTRAMUSCULAR; INTRAVENOUS at 15:20

## 2022-03-19 RX ADMIN — Medication 5 MG: at 13:16

## 2022-03-19 RX ADMIN — PHENYLEPHRINE HYDROCHLORIDE 50 MCG: 10 INJECTION INTRAVENOUS at 13:19

## 2022-03-19 RX ADMIN — CEFTRIAXONE SODIUM 1 G: 1 INJECTION, POWDER, FOR SOLUTION INTRAMUSCULAR; INTRAVENOUS at 23:50

## 2022-03-19 RX ADMIN — DEXAMETHASONE SODIUM PHOSPHATE 4 MG: 4 INJECTION, SOLUTION INTRA-ARTICULAR; INTRALESIONAL; INTRAMUSCULAR; INTRAVENOUS; SOFT TISSUE at 13:30

## 2022-03-19 RX ADMIN — Medication 5 MG: at 15:22

## 2022-03-19 RX ADMIN — LIDOCAINE HYDROCHLORIDE 100 MG: 20 INJECTION, SOLUTION INFILTRATION; PERINEURAL at 13:14

## 2022-03-19 RX ADMIN — GLYCOPYRROLATE 0.2 MG: 0.2 INJECTION, SOLUTION INTRAMUSCULAR; INTRAVENOUS at 14:47

## 2022-03-19 RX ADMIN — CLINDAMYCIN PHOSPHATE 600 MG: 12 INJECTION, SOLUTION INTRAVENOUS at 13:25

## 2022-03-19 RX ADMIN — PROPOFOL 150 MG: 10 INJECTION, EMULSION INTRAVENOUS at 13:14

## 2022-03-19 RX ADMIN — ACETAMINOPHEN 650 MG: 325 TABLET, FILM COATED ORAL at 08:57

## 2022-03-19 RX ADMIN — NEOSTIGMINE METHYLSULFATE 4 MG: 1 INJECTION, SOLUTION INTRAVENOUS at 15:14

## 2022-03-19 RX ADMIN — HEPARIN SODIUM 1450 ML: 1000 INJECTION INTRAVENOUS; SUBCUTANEOUS at 15:02

## 2022-03-19 RX ADMIN — GLYCOPYRROLATE 0.1 MG: 0.2 INJECTION, SOLUTION INTRAMUSCULAR; INTRAVENOUS at 13:14

## 2022-03-19 RX ADMIN — FUROSEMIDE 20 MG: 10 INJECTION, SOLUTION INTRAVENOUS at 10:03

## 2022-03-19 RX ADMIN — Medication 5 MG: at 14:39

## 2022-03-19 RX ADMIN — HUMAN ALBUMIN MICROSPHERES AND PERFLUTREN 9 ML: 10; .22 INJECTION, SOLUTION INTRAVENOUS at 11:11

## 2022-03-19 RX ADMIN — SODIUM CHLORIDE 500 ML: 9 INJECTION, SOLUTION INTRAVENOUS at 00:30

## 2022-03-19 RX ADMIN — ROCURONIUM BROMIDE 50 MG: 50 INJECTION, SOLUTION INTRAVENOUS at 13:15

## 2022-03-19 RX ADMIN — Medication 5 MG: at 14:16

## 2022-03-19 RX ADMIN — FENTANYL CITRATE 25 MCG: 50 INJECTION, SOLUTION INTRAMUSCULAR; INTRAVENOUS at 15:02

## 2022-03-19 RX ADMIN — SODIUM CHLORIDE 500 ML: 9 INJECTION, SOLUTION INTRAVENOUS at 02:01

## 2022-03-19 RX ADMIN — SODIUM CHLORIDE 91 ML: 900 INJECTION INTRAVENOUS at 01:09

## 2022-03-19 RX ADMIN — GLYCOPYRROLATE 0.7 MG: 0.2 INJECTION, SOLUTION INTRAMUSCULAR; INTRAVENOUS at 15:14

## 2022-03-19 RX ADMIN — SODIUM CHLORIDE 500 ML: 9 INJECTION, SOLUTION INTRAVENOUS at 04:02

## 2022-03-19 RX ADMIN — ACETAMINOPHEN 500 MG: 500 TABLET, FILM COATED ORAL at 03:29

## 2022-03-19 RX ADMIN — HEPARIN SODIUM 1450 UNITS/HR: 1000 INJECTION INTRAVENOUS; SUBCUTANEOUS at 05:37

## 2022-03-19 RX ADMIN — PHENYLEPHRINE HYDROCHLORIDE 50 MCG: 10 INJECTION INTRAVENOUS at 14:33

## 2022-03-19 RX ADMIN — HEPARIN SODIUM 1450 UNITS/HR: 10000 INJECTION, SOLUTION INTRAVENOUS at 04:52

## 2022-03-19 RX ADMIN — Medication 5 MG: at 14:34

## 2022-03-19 RX ADMIN — ONDANSETRON 4 MG: 2 INJECTION INTRAMUSCULAR; INTRAVENOUS at 10:04

## 2022-03-19 RX ADMIN — IOPAMIDOL 88 ML: 755 INJECTION, SOLUTION INTRAVENOUS at 01:09

## 2022-03-19 RX ADMIN — CEFTRIAXONE SODIUM 1 G: 1 INJECTION, POWDER, FOR SOLUTION INTRAMUSCULAR; INTRAVENOUS at 00:54

## 2022-03-19 RX ADMIN — MAGNESIUM SULFATE HEPTAHYDRATE 2 G: 40 INJECTION, SOLUTION INTRAVENOUS at 02:00

## 2022-03-19 RX ADMIN — ONDANSETRON 4 MG: 2 INJECTION INTRAMUSCULAR; INTRAVENOUS at 15:14

## 2022-03-19 RX ADMIN — ROCURONIUM BROMIDE 20 MG: 50 INJECTION, SOLUTION INTRAVENOUS at 13:51

## 2022-03-19 RX ADMIN — SODIUM CHLORIDE, POTASSIUM CHLORIDE, SODIUM LACTATE AND CALCIUM CHLORIDE: 600; 310; 30; 20 INJECTION, SOLUTION INTRAVENOUS at 16:31

## 2022-03-19 ASSESSMENT — ENCOUNTER SYMPTOMS
ABDOMINAL PAIN: 1
FEVER: 0
COUGH: 1
HEMATURIA: 1
SEIZURES: 0
SHORTNESS OF BREATH: 1
NAUSEA: 1
DIARRHEA: 1
DYSURIA: 1
DYSRHYTHMIAS: 1

## 2022-03-19 ASSESSMENT — ACTIVITIES OF DAILY LIVING (ADL)
ADLS_ACUITY_SCORE: 5
ADLS_ACUITY_SCORE: 9
ADLS_ACUITY_SCORE: 9
ADLS_ACUITY_SCORE: 5
ADLS_ACUITY_SCORE: 7
ADLS_ACUITY_SCORE: 5
ADLS_ACUITY_SCORE: 5
ADLS_ACUITY_SCORE: 7
ADLS_ACUITY_SCORE: 7
ADLS_ACUITY_SCORE: 5
ADLS_ACUITY_SCORE: 5
ADLS_ACUITY_SCORE: 7
ADLS_ACUITY_SCORE: 9
ADLS_ACUITY_SCORE: 9
ADLS_ACUITY_SCORE: 7
ADLS_ACUITY_SCORE: 9
ADLS_ACUITY_SCORE: 5

## 2022-03-19 ASSESSMENT — LIFESTYLE VARIABLES: TOBACCO_USE: 1

## 2022-03-19 ASSESSMENT — COPD QUESTIONNAIRES: COPD: 1

## 2022-03-19 NOTE — OP NOTE
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 Exploratory laparotomy  2. Superior mesenteric artery exploration, attempted thromboembolectomy (no clot returned)     SURGEON:  Smita Garsia MD     ASSISTANT:  Milka Alfaro MD     ANESTHESIA:  General Endotracheal     ESTIMATED BLOOD LOSS:  100 mL    SPECIMENS: None      INDICATIONS:  Mr. Lyle is an 81 year old male with history of CAD s/p stenting with bradycardia; COPD; HTN; DL; stage 2-3 CKD; former tobacco abuse; who presents with worsening shortness of breath, hernan hematuria, and semi-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 He was taken for emergent exploratory laparotomy to evaluate for suspected mesenteric ischemia.     INTRAOPERATIVE FINDINGS:    Non-distended, pink, peristaltic, globally viable and normal-appearing small bowel and visualized portions of the colon; no thrombus returned (? Thrombosed segmental dissection plane vs chronic plaque as the etiology of the hypodensity found on CT imaging). Palpable SMA pulse before and after attempted embolectomy. Normal appearance of the omentum, stomach, liver, appendix; mildly distended gallbladder with pale blue-green coloration (no gangrene or necrosis).      DESCRIPTION OF TECHNIQUE:   Mr. Lyle was brought into the operating room and transferred to the operating table in the supine position. After uneventful endotracheal intubation, general anesthesia was initiated. Antibiotics were administered. The anterior abdomen was prepped and draped in standard sterile fashion. Timeout was performed and the entire surgical team was in agreement with the planned procedure and correctly marked side.      An upper midline laparotomy incision was created sharply and deepened through the subcutaneous tissues with bovie electrocautery. The fascia  was incised. The peritoneum was opened.     The bowel appeared grossly normal on initial investigation, with very mildly dilated loops of small bowel that were pink, peristaltic, and reactive. The transverse colon was elevated and the mid-point of the transverse mesocolon was explored to the level of the ligament of Treitz, with the middle colic artery and vein identified. The mesocolon was opened over the middle colic vein and dissection continued until the proximal branches of the middle colic artery were identified and encircled with vessel loops; dissection continued proximally and the artery was encircled with an additional vessel loop. Additional systemic heparinization was administered and the vessel was elevated and occluded with vessel loops. A transverse arteriotomy was created sharply and extended with Chawla scissors. Pulsatile bleeding was noted at the inflow. A 3Fr sandoval catheter was passed retrograde to the SMA and antegrade into the major branch and retracted, with no return of thrombus. Pulsatile inflow and brisk backflow remained. The arteriotomy was closed with interrupted 7-0 prolene sutures, after flushing the lumen with heparinized saline. The arteriotomy closure was noted to be hemostatic.     The abdomen was irrigated and hemostasis confirmed. The subcutaneous tissues were closed with interrupted absorbable sutures, followed by skin closure in a running subcuticular fashion with polysorb suture and staples. The skin was cleaned and dried and a sterile bandage applied.     At the end of the case all needle, sponge and instrument counts were correct.  Mr. Lyle was then extubated in stable condition and was taken to the postoperative care unit.      Smita Garsia MD

## 2022-03-19 NOTE — PROGRESS NOTES
RECEIVING UNIT ED HANDOFF REVIEW    ED Nurse Handoff Report was reviewed by: Severiano Comer RN on March 19, 2022 at 3:00 AM

## 2022-03-19 NOTE — ANESTHESIA CARE TRANSFER NOTE
Patient: Sunil Lyle    Procedure: Procedure(s):  EXPLORATORY LAPAROTOMY, SUPERIOR MESENTERIC ARTERY EXPLORATION       Diagnosis: Diagnosis unknown [R69]  Diagnosis Additional Information: No value filed.    Anesthesia Type:   General     Note:    Oropharynx: oropharynx clear of all foreign objects and spontaneously breathing  Level of Consciousness: awake  Oxygen Supplementation: face mask  Level of Supplemental Oxygen (L/min / FiO2): 6  Independent Airway: airway patency satisfactory and stable  Dentition: dentition unchanged  Vital Signs Stable: post-procedure vital signs reviewed and stable  Report to RN Given: handoff report given  Patient transferred to: ICU  Comments: Neuromuscular blockade reversed after TOF 4/4, spontaneous respirations, adequate tidal volumes, followed commands to voice, oropharynx suctioned with soft flexible catheter, extubated atraumatically, extubated with suction, airway patent after extubation.  Oxygen via facemask at 6 liters per minute to PACU. Oxygen tubing connected to wall O2 in PACU, SpO2, NiBP, and EKG monitors and alarms on and functioning,report on patient's clinical status given to ICU RN, RN questions answered.     ICU Handoff: Call for PAUSE to initiate/utilize ICU HANDOFF, Identified Patient, Identified Responsible Provider, Reviewed the Pertinent Medical History, Discussed Surgical Course, Reviewed Intra-OP Anesthesia Management and Issues during Anesthesia, Set Expectations for Post Procedure Period and Allowed Opportunity for Questions and Acknowledgement of Understanding      Vitals:  Vitals Value Taken Time   /47 03/19/22 1550   Temp     Pulse 49 03/19/22 1557   Resp 18 03/19/22 1557   SpO2 99 % 03/19/22 1557   Vitals shown include unvalidated device data.    Electronically Signed By: YARITZA Martin CRNA  March 19, 2022  3:58 PM

## 2022-03-19 NOTE — PROGRESS NOTES
Update    Lactic returned and up to 5.8.  Patient now with some nausea and vomiting.  I immediately called vascular surgeon who will be assessing patient asap.      Discussed with House NP, vascular surgeon, and relayed to [facility] to inform charge RN and bedside RN regarding the updated plan.     Also will obtain stat echo.

## 2022-03-19 NOTE — H&P
[facility]    History and Physical - Hospitalist Service       Date of Admission:  3/19/2022    Assessment & Plan      Sunil Lyle is a 81 year old male with past medical history significant for CAD s/p stenting, HLD, GERD  admitted on 3/19/2022 with multiple complaints including shortness of breath, hematuria and bradycardia.     Hematuria  Fever, Possible UTI   PT febrile here to 101.8. UA notable for large LE, >182 RBCs and >182 WBCs. Urine is grossly bloody. On CT scan, there is moderate enlargement and heterogeneity of the prostate. There is thickening of the posterior bladder wall which is somewhat nodular in appearance.   - Continue Ceftriaxone for treatment of possible UTI  - Follow-up urine cultures and blood cultures   - Urology consultation   - Monitor hgb    Bradycardia   1st Degree AV block   Heart rates have been in the 40s. He is dyspneic with ecertion but otherwise has normal blood pressures. He was evaluated by cardiology in April 2021 for fatigue and was found to have junctional bradycardia at that time with rates in the 40s. His metoprolol was stopped and he was evaluated with a zio patch which only revealed a single 3.8s pause. It was opted to continue monitoring without intervention at that time.   EKG here shows sinus bradycardia with 1st degree AV block and RBBB   - Cardiac monitoring   - TTE   - Cardiology consultation     Dyspnea   Hx of COPD, former smoker   The patient's chief complaint today is actually shortness of breath. The shortness of breath has been on-going and getting worse over the past two weeks. This has been predominantly on exertion. Of note, review of clinic notes from April and July of 2021, suggest that he has been having dyspnea on exertion at that time as well. Cardiology discussed ischemic evaluation, but the patient declined. CT here showed Centrilobular emphysema without evidence of pneumonia. No evidence of pulmonary embolism. O2  "saturations are within normal limits.   Unclear etiology, could be progression of emphysema vs cardiac related (new CHF, ischemia i.e. anginal equivalent, bradycardia, etc.).   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 The SMA trunk is patent distal to this.   In terms of symptoms, the patient has noted increased frequency of diarrhea recently, but no abdominal pain after eating, no bloody or black stools.   - Started on heparin drip in the ED, will continue cautiously in the setting of hematuria.   - Will consult Vascular Surgery    CAD s/p RCA stent in 2001   Hyperlipidemia  Hypertension   - Continue ASA, simvastatin and fenofibrate when verified     Probable CKD III  Creatinine is elevated to 1.3, this seems to be near his baseline of 1.2-1.3.   - Monitor renal function     Coagulation Defect: INR = 1.21 (Ref range: 0.85 - 1.15) and/or PTT = N/A on admission, will monitor for bleeding    Hypomagnesemia: Mg = 1.5 mg/dL (Ref range: 1.6 - 2.3 mg/dL) on admission, will replace as needed    Overweight: Estimated body mass index is 25.11 kg/m  as calculated from the following:    Height as of this encounter: 1.778 m (5' 10\").    Weight as of this encounter: 79.4 kg (175 lb).      Diet: NPO per Anesthesia Guidelines for Procedure/Surgery Except for: Meds  DVT Prophylaxis: Heparin  Blanca Catheter: Not present  Central Lines: None  Cardiac Monitoring: None  Code Status: Full Code      Disposition Plan   Expected Discharge: TBD  Anticipated discharge location:  Awaiting care coordination huddle       The patient's care was discussed with the Bedside Nurse and Patient.    lEina StewartMer[facility] Columbusist Service  [facility]  Securely message with the [employer] Mobile Web Console (learn more here)  Text page via [facility] Paging/Directory         ______________________________________________________________________    Chief Complaint   Dyspnea on exertion, bradycardia, and hematuria     History is obtained from the patient    History of Present Illness   Sunil Lyle is a 81 year old male who presents with multiple issues.     The patient reports that he has been having frequent episodes of low heart rate. He wears an Apple watch and this alerts him every time his heart rate is low. He states that recently it has been frequently alarming him that is heart rate is below 40. He is pretty asymptomatic with this. He denies light-headedness/dizziness, etc. He has noted increasingly severe dyspnea on exertion over th past several weeks. Of note he has been evaluated for SENA by his Cardiologist earlier in 2021, but it seems like over the course of the last 2-4 weeks things have been getting more severe. He can now barely walk up a flight of stairs without having to stop to catch his breath. He denies CP. He denies orthopnea or lower extremity swelling.     Lastly, yesterday he noted bloody urine and this is what actually prompted him to come to the hospital. He also notes associated dysuria.   He has had hematuria once in the past associated with UTI. He has had low grade fevers at home with one true fever here in the ED.     In terms of abdominal symptoms he notes increased frequency of lose stools especially after drinking coffee, but he denies abdominal pain after eating. He doesn't eat as much as he used to, but is not losing weight.     He is a former, long-time smoker, but has not smoked in several years.     Review of Systems    The 10 point Review of Systems is negative other than noted in the HPI or here.     Past Medical History    I have reviewed this patient's medical history and updated it with pertinent information if needed.    Past Medical History:   Diagnosis Date     Allergic state      CAD (coronary artery disease) 2014     s/p stent to RCA ()     GERD (gastroesophageal reflux disease)      Hypercholesterolaemia      Hyperlipidaemia      Other affections of shoulder region, not elsewhere classified     impingement syndrome of right shoulder     Sebaceous cyst     right shoulder area     Tachycardia      Tobacco use disorder 3/5/2008       Past Surgical History   I have reviewed this patient's surgical history and updated it with pertinent information if needed.  Past Surgical History:   Procedure Laterality Date     COLONOSCOPY       HEART CATH, ANGIOPLASTY  2001    RCA stent     TONSILLECTOMY & ADENOIDECTOMY       VASCULAR SURGERY         Social History   I have reviewed this patient's social history and updated it with pertinent information if needed.  Social History     Tobacco Use     Smoking status: Former Smoker     Packs/day: 0.50     Years: 33.50     Pack years: 16.75     Types: Cigarettes     Start date:      Quit date: 2017     Years since quittin.3     Smokeless tobacco: Never Used     Tobacco comment: off and on since age 17- smokes about 5 cigs/day plus vapor cigarettes    Substance Use Topics     Alcohol use: Yes     Alcohol/week: 3.3 standard drinks     Types: 4 Shots of liquor per week     Comment: 5-6 drink per week     Drug use: No       Family History   I have reviewed this patient's family history and updated it with pertinent information if needed.  Family History   Problem Relation Age of Onset     Breast Cancer Mother          at age 64     Heart Disease Father         heart attack     Diabetes Father        Prior to Admission Medications   Prior to Admission Medications   Prescriptions Last Dose Informant Patient Reported? Taking?   ASPIRIN 81 MG OR TABS   Yes No   Si TABLET DAILY   MULTI-VITAMIN OR TABS   Yes No   Sig: one daily   VITAMIN D 1000 UNIT OR TABS   Yes No    Sig: Take 5,000 Units by mouth daily    fenofibrate (LOFIBRA) 54 MG tablet   No No   Sig: Take 2 tablets (108 mg) by mouth daily   simvastatin (ZOCOR) 80 MG tablet   No No   Sig: Take 1 tablet (80 mg) by mouth At Bedtime   tamsulosin (FLOMAX) 0.4 MG 24 hr capsule   Yes No   Sig: Take 0.4 mg by mouth daily.      Facility-Administered Medications: None     Allergies   Allergies   Allergen Reactions     Bupropion Hcl      vivid dreams     Chantix [Varenicline] Other (See Comments)     CNS side effects     Pcn [Penicillins] Anaphylaxis       Physical Exam   Vital Signs: Temp: 100.2  F (37.9  C) Temp src: Oral BP: 135/44 Pulse: (!) 46   Resp: 30 SpO2: 95 % O2 Device: None (Room air)    Weight: 175 lbs 0 oz    Constitutional: Awake, alert, cooperative, no apparent distress.  Eyes: Conjunctiva and pupils examined and normal.  HEENT: Moist mucous membranes, normal dentition.  Respiratory: Clear to auscultation bilaterally, no crackles or wheezing.  Cardiovascular: Regular rhythm, bradycardic, normal S1 and S2, and no murmur noted.  GI: Soft, non-distended, non-tender, normal bowel sounds.  Skin: No rashes, no cyanosis, no edema.  Musculoskeletal: No joint swelling, erythema or tenderness.  Neurologic: Cranial nerves 2-12 intact, normal strength and sensation.  Psychiatric: Alert, oriented to person, place and time, no obvious anxiety or depression.      Data   Data reviewed today: I reviewed all medications, new labs and imaging results over the last 24 hours. I personally reviewed the EKG tracing showing sinus bradycardia with 1st degree av block and RBBB.    Recent Labs   Lab 03/19/22  0502 03/19/22  0001 03/18/22  2359   WBC 14.2*  --  11.8*   HGB 10.7*  --  11.9*   *  --  104*     --  182   INR  --  1.21*  --      --  140   POTASSIUM 3.9  --  3.9   CHLORIDE 111*  --  109   CO2 21  --  23   BUN 25  --  25   CR 1.37*  --  1.30*   ANIONGAP 8  --  8   PIAGE 8.0*  --  8.8   *  --  121*   ALBUMIN   --   --  3.8   PROTTOTAL  --   --  7.4   BILITOTAL  --   --  0.7   ALKPHOS  --   --  44   ALT  --   --  27   AST  --   --  20   LIPASE  --   --  208     Recent Results (from the past 24 hour(s))   CT Chest (PE) Abdomen Pelvis w Contrast   Result Value    Radiologist flags SMA thrombus (AA)    Narrative    EXAM: CT CHEST PE ABDOMEN PELVIS W CONTRAST  LOCATION: [facility]  DATE/TIME: 3/19/2022 1:21 AM    INDICATION: FEVER  COMPARISON: CT chest from 11/17/2017. CT abdomen pelvis from 03/14/2017.  TECHNIQUE: CT chest pulmonary angiogram and routine CT abdomen pelvis with IV contrast. Arterial phase through the chest and venous phase through the abdomen and pelvis. Multiplanar reformats and MIP reconstructions were performed. Dose reduction   techniques were used.   CONTRAST: 88mL Isovue 370    FINDINGS:  ANGIOGRAM CHEST: Pulmonary arteries are normal caliber and negative for pulmonary emboli. Thoracic aorta is negative for dissection. No CT evidence of right heart strain.     LUNGS AND PLEURA: Centrilobular emphysematous change. Scattered strands of secretions within the lower thoracic trachea. No airspace consolidation. No interstitial edema. No pleural effusion.    MEDIASTINUM/AXILLAE: Heart size at upper limits normal. Small hiatal hernia. Coronary artery disease and stent.    CORONARY ARTERY CALCIFICATION: Previous intervention (stents or CABG).    HEPATOBILIARY: Contracted gallbladder. Liver within normal limits.    PANCREAS: Normal.    SPLEEN: Normal.    ADRENAL GLANDS: Normal.    KIDNEYS/BLADDER: Benign left upper pole renal cortical cyst. Benign right lower pole renal cortical cyst. No hydronephrosis. There is some thickening of the posterior bladder wall which is somewhat nodular in appearance on sagittal image 68.    BOWEL: Normal appendix. Colonic diverticulosis. No bowel obstruction or free air.    LYMPH NODES: Normal.    VASCULATURE: Moderate abdominal aortic atherosclerotic  calcification. There is severe narrowing of the proximal superior mesenteric artery as seen on image 71 of the sagittal series, secondary to a low density focus along the inferior wall of the   proximal SMA trunk. This is noncalcified. The SMA is patent distal to this.    PELVIC ORGANS: Moderately enlarged, heterogeneous prostate.    MUSCULOSKELETAL: Thoracolumbar spine degenerative disc change. Arthritic change of the hips.      Impression    IMPRESSION:  1.  Negative for pulmonary embolism.    2.  Centrilobular emphysema without evidence of pneumonia.    3.  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 The SMA trunk is patent distal to this.    4.  Moderate enlargement and heterogeneity of the prostate. There is thickening of the posterior bladder wall which is somewhat nodular in appearance on the sagittal series, though difficult to ascertain if this is separate from the prostate which   indents on the undersurface of the bladder. Recommend follow-up with urology.      [Critical Result: SMA thrombus]    Finding was identified on 3/19/2022 1:33 AM.     Dr. Hernandez was contacted by me on 3/19/2022 1:49 AM and verbalized understanding of the critical result.

## 2022-03-19 NOTE — PROGRESS NOTES
"Vascular Surgery Progress Note     Date of Admission:  3/19/2022  Date: March 19, 2022     Subjective  Please see full consult note in addition    On my exam, Mr. Lyle says that his abdomen feels \"okay\" without hernan pain. It feels somewhat uncomfortable, without focal discomfort. He thinks he may have some bloating. He has had loose, diarrhea-like bowel movements for several weeks. He has not had nausea or emesis prior to today. He last ate lunch yesterday, and has not had food since then; he does not have an appetite, denies hunger, and does not want to eat food now. He does not recall having any trouble eating his hamburger yesterday.       Physical Exam   Temp: 98.3  F (36.8  C) Temp src: Oral BP: (!) 158/77 Pulse: 64   Resp: 22 SpO2: 96 % O2 Device: None (Room air)    Vital Signs with Ranges  Temp:  [98.3  F (36.8  C)-101.8  F (38.8  C)] 98.3  F (36.8  C)  Pulse:  [42-64] 64  Resp:  [14-30] 22  BP: (105-158)/() 158/77  SpO2:  [92 %-96 %] 96 %  178 lbs 6.4 oz    Constitutional: cooperative, no apparent distress  Vascular: bilateral DP pulses strongly palpable  Abdomen: softly distended, tympanitic. He has tenderness to percussion and to palpation, without rebound tenderness. He does have involuntary guarding.   Musculoskeletal: grossly normal and symmetric ROM and strength in BL extremities   Neurologic: Awake, alert, oriented to name, place, time, and to some degree situation    Data   Most Recent 3 CBCs:  Recent Labs   Lab Test 03/19/22  0502 03/18/22  2359 03/14/17  1950   WBC 14.2* 11.8* 6.6   HGB 10.7* 11.9* 14.0   * 104* 97    182 191       Most Recent 3 BMPs:  Recent Labs   Lab Test 03/19/22  1114 03/19/22  0927 03/19/22  0502 03/18/22  2359 07/14/17  0741     --  140 140 141   POTASSIUM 3.9  --  3.9 3.9 4.1   CHLORIDE 112*  --  111* 109 105   CO2  --   --  21 23 27   BUN  --   --  25 25 12   CR  --   --  1.37* 1.30* 1.21   ANIONGAP  --   --  8 8 13.1   PAIGE  --   --  8.0* " 8.8 9.2   GLC  --  110* 144* 121* 107*       Most Recent 3 INRs:  Recent Labs   Lab Test 03/19/22  0001   INR 1.21*                Assessment & Plan   Sunil Lyle is an 81 year old male with history of CAD s/p stenting with bradycardia; COPD; HTN; DL; stage 2-3 CKD; former tobacco abuse; who presents with worsening shortness of breath, hernan hematuria, and semi-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     He has a constellation of symptoms without a single unifying diagnosis yet made.       He does not have clear PNA or florid CHF on the imaging available. I do not have other obvious etiologies for his leukocytosis and lactic acidosis, and with a concerning abdominal exam in the setting of SMA thrombus, I am concerned that further observation could lead to overt bowel ischemia and necrosis. In the setting of otherwise patent vasculature, I am concerned that he is not able to tolerate the stenosis of the SMA from this partially occlusive thrombus. I believe this is embolic thrombus based on my review of the imaging.    I have discussed with his family that the story is atypical for mesenteric ischemia and this may be a negative laparotomy or excess surgical risk for a condition that may resolve with non-operative management.     I have discussed the potential risks of cardiac or respiratory failure, death, infection, worsening urinary hemorrhage from anticoagulation, need for transfusions, need for bowel resections, prolonged recuperation and rehabilitation, need for temporary abdominal closure and second-look laparotomy, injury to the blood vessels or intestine.     Discussed risks and benefits of surgical intervention with his family: wife - Lili - and two sons via telephone. Mr. Lyle is able to partially participate, but is distracted and says he hasn't completely followed the discussion.    Wife and sons are clear that Mr. Lyle values quality of life over quantity of life. They  believe the surgery is worthwhile if it will potentially improve QoL, but they believe he would not want aggressive measures to prolong quantity of life in the absence of good QoL (ie, tracheostomy and feeding tubes would not be in line with his stated desires).      Plan for exploratory laparotomy and superior mesenteric artery thromboembolectomy. Will take him emergently to OR.       Smita Garsia

## 2022-03-19 NOTE — Clinical Note
Potential access sites were evaluated for patency using ultrasound.   The left subclavian vein was selected. Access was obtained under with Sonosite guidance using a micropuncture 21 gauge needle with direct visualization of needle entry.

## 2022-03-19 NOTE — ANESTHESIA POSTPROCEDURE EVALUATION
Patient: Sunil Lyle    Procedure: Procedure(s):  EXPLORATORY LAPAROTOMY, SUPERIOR MESENTERIC ARTERY EXPLORATION       Anesthesia Type:  General    Note:  Disposition: Inpatient; ICU            ICU Sign Out: Anesthesiologist/ICU physician sign out WAS performed   Postop Pain Control: Uneventful            Sign Out: Well controlled pain   PONV: No   Neuro/Psych: Uneventful            Sign Out: Acceptable/Baseline neuro status   Airway/Respiratory: Uneventful            Sign Out: Acceptable/Baseline resp. status   CV/Hemodynamics: Uneventful            Sign Out: Acceptable CV status   Other NRE: NONE   DID A NON-ROUTINE EVENT OCCUR? No    Event details/Postop Comments:  Stable and doing well prior to transfer to floor.               Last vitals:  Vitals Value Taken Time   /47 03/19/22 1611   Temp     Pulse 44 03/19/22 1651   Resp 26 03/19/22 1651   SpO2 93 % 03/19/22 1651   Vitals shown include unvalidated device data.    Electronically Signed By: Isai Burr MD  March 19, 2022  4:52 PM

## 2022-03-19 NOTE — ED TRIAGE NOTES
Patient presents with multiple complaints including hematuria that started today, shortness of breath that began about a week and a half ago worsening today, and bradycardia for the past two week, specifically in the evening as low as the 40's or lower.

## 2022-03-19 NOTE — PLAN OF CARE
Pt admitted from ED. A&Ox4, VSS on RA except bradycardia down into high 30s at times. Tele 1st degree AV Block with BBB. Denies pain except during urination.  Denies nausea. Reports diarrhea over past few days, no stool this shift.  SBA.  NPO pending procedure. Bright red urine. CT shows thrombosis of superior mesenteric artery.  R PIV infusing heparin @1450 units/hr. L PIV infusing Ns@75 ml/hr. Consults with vascular, urology, cardiology. Discharge pending workup, improvement.

## 2022-03-19 NOTE — PROGRESS NOTES
[facility]    Medicine Progress Note - Hospitalist Service    Date of Admission:  3/19/2022    Assessment & Plan        Sunil Lyle is a 81 year old male with past medical history significant for CAD s/p stenting, HLD, GERD  admitted on 3/19/2022 with multiple complaints including shortness of breath, hematuria and bradycardia.      Hematuria  Fever, Possible UTI   PT febrile here to 101.8. UA notable for large LE, >182 RBCs and >182 WBCs. Urine is grossly bloody. On CT scan, there is moderate enlargement and heterogeneity of the prostate. There is thickening of the posterior bladder wall which is somewhat nodular in appearance.   - Continue Ceftriaxone for treatment of possible UTI  - Follow-up urine cultures and blood cultures   - Urology consultation appreciated  - Monitor hgb     Bradycardia   1st Degree AV block   Heart rates have been in the 40s. He is dyspneic with ecertion but otherwise has normal blood pressures. He was evaluated by cardiology in April 2021 for fatigue and was found to have junctional bradycardia at that time with rates in the 40s. His metoprolol was stopped and he was evaluated with a zio patch which only revealed a single 3.8s pause. It was opted to continue monitoring without intervention at that time.   EKG here shows sinus bradycardia with 1st degree AV block and RBBB   - Cardiac monitoring   - TTE   - Cardiology consultation   - tele with pauses noted - discussed with RN to have transcut pacers at bedside.      Dyspnea   Hx of COPD, former smoker   The patient's chief complaint today is actually shortness of breath. The shortness of breath has been on-going and getting worse over the past two weeks. This has been predominantly on exertion. Of note, review of clinic notes from April and July of 2021, suggest that he has been having dyspnea on exertion at that time as well. Cardiology discussed ischemic evaluation, but the patient declined. CT here showed  Centrilobular emphysema without evidence of pneumonia. No evidence of pulmonary embolism. O2 saturations are within normal limits.   Unclear etiology, could be progression of emphysema vs cardiac related (new CHF, ischemia i.e. anginal equivalent, bradycardia, etc.).   - TTE as noted above   - Monitor O2 saturations   - Cardiology consultation  - Could also consider pulm consultation if dyspnea persists   -- seen 930am for increasing SOB, no pain/pressure/tightness.  I sat the patient up and he had notable improvement with breathing.  Suspect may be some aspect of fluid overload given the ~2L + he has received. No recent echo, stress echo in 2017 normal. Echo today as above.  TKO fluids and stat IV Lasix 20mg.  Will continue to watch closely.      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 The SMA trunk is patent distal to this.   In terms of symptoms, the patient has noted increased frequency of diarrhea recently, but no abdominal pain after eating, no bloody or black stools.   - Started on heparin drip in the ED, will continue cautiously in the setting of hematuria.   - Will consult Vascular Surgery      ---- discussed with surgeon just before 9am - obvious concern of urgent surgery needed if in fact SMA thrombosis - I see lactic acids mildly elevated upon adm.  Subsequently, examined him as above - he is SOB but is not having any pain.   - stat lactic acid ordered    CAD s/p RCA stent in 2001   Hyperlipidemia  Hypertension   - Continue ASA, simvastatin and fenofibrate when verified      Probable CKD III  Creatinine is elevated to 1.3, this seems to be near his baseline of 1.2-1.3.   - Monitor renal function     Coagulation Defect  INR = 1.21 (Ref range: 0.85 - 1.15) and/or PTT = N/A on admission, will monitor for bleeding     Hypomagnesemia  Mg = 1.5 mg/dL (Ref range: 1.6 - 2.3  "mg/dL) on admission, will replace as needed     Overweight  Estimated body mass index is 25.11 kg/m  as calculated from the following:    Height as of this encounter: 1.778 m (5' 10\").    Weight as of this encounter: 79.4 kg (175 lb).        Diet: NPO per Anesthesia Guidelines for Procedure/Surgery Except for: Meds    DVT Prophylaxis: Heparin SQ  Blanca Catheter: Not present  Central Lines: None  Cardiac Monitoring: ACTIVE order. Indication: Bradycardia  Code Status: Full Code      Disposition Plan   Expected Discharge: 03/21/2022     Anticipated discharge location:  Awaiting care coordination huddle         The patient's care was discussed with the Bedside Nurse, Patient, Patient's Family and vascular surgery and urology Consultant.    Sean Bray MD  Hospitalist Service  [facility]  Securely message with the Vocera Web Console (learn more here)  Text page via [employer] Paging/Directory         Clinically Significant Risk Factors Present on Admission          # Hypocalcemia: Ca = 8.0 mg/dL (Ref range: 8.5 - 10.1 mg/dL) and/or iCa = N/A on admission, will replace as needed     # Coagulation Defect: INR = 1.21 (Ref range: 0.85 - 1.15) and/or PTT = N/A on admission, will monitor for bleeding  # Platelet Defect: home medication list includes an antiplatelet medication   # Overweight: Estimated body mass index is 25.6 kg/m  as calculated from the following:    Height as of this encounter: 1.778 m (5' 10\").    Weight as of this encounter: 80.9 kg (178 lb 6.4 oz).      ______________________________________________________________________    Interval History   Pt seen and examined promptly this AM as he was having increasing SOB. No pain anywhere. Sat him up with some improvement in breathing. Some rigors at times but no fever currently.     Data reviewed today: I reviewed all medications, new labs and imaging results over the last 24 hours. I personally reviewed no images or EKG's " today.    Physical Exam   Vital Signs: Temp: 98.3  F (36.8  C) Temp src: Oral BP: (!) 144/122 Pulse: 64   Resp: 22 SpO2: 95 % O2 Device: None (Room air)    Weight: 178 lbs 6.4 oz      Gen: NAD, pleasant  HEENT: EOMI, MMM  Resp: diminished in bases but no crackles,  no wheezes, no increased work of resp  CV: S1S2 heard, reg rhythm, sydnie rate, with some pauses noted  Abdo: soft, nontender, full, no rebound, bowel sounds present  Ext: calves nontender, well perfused  Neuro: aaox3, CN grossly intact, no facial asymmetry      Data   Recent Labs   Lab 03/19/22  0927 03/19/22  0502 03/19/22  0001 03/18/22  2359   WBC  --  14.2*  --  11.8*   HGB  --  10.7*  --  11.9*   MCV  --  106*  --  104*   PLT  --  155  --  182   INR  --   --  1.21*  --    NA  --  140  --  140   POTASSIUM  --  3.9  --  3.9   CHLORIDE  --  111*  --  109   CO2  --  21  --  23   BUN  --  25  --  25   CR  --  1.37*  --  1.30*   ANIONGAP  --  8  --  8   PAIGE  --  8.0*  --  8.8   * 144*  --  121*   ALBUMIN  --   --   --  3.8   PROTTOTAL  --   --   --  7.4   BILITOTAL  --   --   --  0.7   ALKPHOS  --   --   --  44   ALT  --   --   --  27   AST  --   --   --  20   LIPASE  --   --   --  208     Recent Results (from the past 24 hour(s))   CT Chest (PE) Abdomen Pelvis w Contrast   Result Value    Radiologist flags SMA thrombus (AA)    Narrative    EXAM: CT CHEST PE ABDOMEN PELVIS W CONTRAST  LOCATION: [facility]  DATE/TIME: 3/19/2022 1:21 AM    INDICATION: FEVER  COMPARISON: CT chest from 11/17/2017. CT abdomen pelvis from 03/14/2017.  TECHNIQUE: CT chest pulmonary angiogram and routine CT abdomen pelvis with IV contrast. Arterial phase through the chest and venous phase through the abdomen and pelvis. Multiplanar reformats and MIP reconstructions were performed. Dose reduction   techniques were used.   CONTRAST: 88mL Isovue 370    FINDINGS:  ANGIOGRAM CHEST: Pulmonary arteries are normal caliber and negative for pulmonary emboli.  Thoracic aorta is negative for dissection. No CT evidence of right heart strain.     LUNGS AND PLEURA: Centrilobular emphysematous change. Scattered strands of secretions within the lower thoracic trachea. No airspace consolidation. No interstitial edema. No pleural effusion.    MEDIASTINUM/AXILLAE: Heart size at upper limits normal. Small hiatal hernia. Coronary artery disease and stent.    CORONARY ARTERY CALCIFICATION: Previous intervention (stents or CABG).    HEPATOBILIARY: Contracted gallbladder. Liver within normal limits.    PANCREAS: Normal.    SPLEEN: Normal.    ADRENAL GLANDS: Normal.    KIDNEYS/BLADDER: Benign left upper pole renal cortical cyst. Benign right lower pole renal cortical cyst. No hydronephrosis. There is some thickening of the posterior bladder wall which is somewhat nodular in appearance on sagittal image 68.    BOWEL: Normal appendix. Colonic diverticulosis. No bowel obstruction or free air.    LYMPH NODES: Normal.    VASCULATURE: Moderate abdominal aortic atherosclerotic calcification. There is severe narrowing of the proximal superior mesenteric artery as seen on image 71 of the sagittal series, secondary to a low density focus along the inferior wall of the   proximal SMA trunk. This is noncalcified. The SMA is patent distal to this.    PELVIC ORGANS: Moderately enlarged, heterogeneous prostate.    MUSCULOSKELETAL: Thoracolumbar spine degenerative disc change. Arthritic change of the hips.      Impression    IMPRESSION:  1.  Negative for pulmonary embolism.    2.  Centrilobular emphysema without evidence of pneumonia.    3.  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 The SMA trunk is patent distal to this.    4.  Moderate enlargement and heterogeneity of the prostate. There is thickening of the posterior bladder wall which is somewhat nodular in appearance on the sagittal  series, though difficult to ascertain if this is separate from the prostate which   indents on the undersurface of the bladder. Recommend follow-up with urology.      [Critical Result: SMA thrombus]    Finding was identified on 3/19/2022 1:33 AM.     Dr. Hernandez was contacted by me on 3/19/2022 1:49 AM and verbalized understanding of the critical result.

## 2022-03-19 NOTE — ANESTHESIA PROCEDURE NOTES
Airway       Patient location during procedure: OR       Procedure Start/Stop Times: 3/19/2022 1:18 PM  Staff -        Anesthesiologist:  Isai Burr MD       CRNA: Dorothy Hoskins APRN CRNA       Performed By: CRNA  Consent for Airway        Urgency: elective  Indications and Patient Condition       Indications for airway management: alex-procedural       Induction type:intravenous       Mask difficulty assessment: 2 - vent by mask + OA or adjuvant +/- NMBA (two handed)    Final Airway Details       Final airway type: endotracheal airway       Successful airway: ETT - single  Endotracheal Airway Details        ETT size (mm): 8.0       Cuffed: yes       Successful intubation technique: video laryngoscopy       VL Blade Size: Monreal 4       Grade View of Cords: 1       Adjucts: stylet       Position: Right       Measured from: gums/teeth       Secured at (cm): 24       Bite block used: None    Post intubation assessment        Placement verified by: capnometry, equal breath sounds and chest rise        Number of attempts at approach: 1       Number of other approaches attempted: 0       Secured with: silk tape       Ease of procedure: easy       Dentition: Unchanged

## 2022-03-19 NOTE — CONSULTS
[facility]    Cardiology Consultation     Date of Admission:  3/19/2022    Assessment & Plan   Sunil Lyle is a 81 year old male who was admitted on 3/19/2022. I was asked to see the patient for bradycardia.    1. Bradycardia, possible intermittent higher grade AV block   2. Exertional dyspnea: Query heart failure with preserved ejection fraction versus sinus node dysfunction  3. History of coronary artery disease status post RCA stent in 2001  4. Status post exploratory laparotomy due to concern for SMA thrombosis  5. Sepsis with concern for UTI  6. Hyperlipidemia  7. Hypertension  8. CKD stage III    Thoracic echocardiogram reveals normal left ventricular ejection fraction with mild concentric hypertrophy.  No regional wall motion abnormalities noted.  ECG notes bradycardia with sinus rhythm and first-degree AV block.  Patient's noted to have previous history of bradycardia has largely been asymptomatic per report.  Metoprolol had been discontinued as an outpatient.    Despite discontinuing metoprolol over 1 year ago.  He has been noting more low heart rate alarms on his apple watch over the past 1 month.  He likely has had degeneration of his conduction system and on telemetry during my evaluation was having a bit more irregularity that may be suggestive of high-grade conduction block.  Denies any chest pain and troponins were negative on admission.     Repeat ECG is ordered.  In either case, if he remains hemodynamically stable currently low diastolic pressures but normal systolic pressures (possibly related to sedation versus infection) we can manage medically.    I briefly discussed the likelihood of proceeding with a dual-chamber pacemaker, but we would need to have the infectious concerns addressed first.    Plan:    Repeat ECG,     Continue close telemetry monitoring.  Electrolytes are within normal limits    Avoid AV keila blocking agents    For low diastolic pressure, may be  related to sedation versus sepsis.  Can use norepinephrine and vasopressin if needed (favor this over dopamine) overnight if maps are below 65 (this was discussed with the hospital medicine team).     Cardiology will continue to monitor for any urgent pacing indication.  Please page overnight for any concerning issues    Will likely require dual-chamber pacemaker implantation on Monday    Addendum 1800:   Repeat ECG notes high-grade 2-1 AV block.  He has been fluctuating with his heart rates from 40s to 60s.      Blood pressures remain borderline with maps in the high 50s primarily related to low diastolic blood pressures.  He does have clinical concern for sepsis related to UTI.  I will start norepinephrine and continue to monitor his hemodynamics.    If MAPS remain low despite norepinephrine and continues to have issues with bradycardia we will proceed with urgent temporary pacemaker implantation this evening.      Minesh Morrell MD    Code Status    Full Code    Reason for Consult   Reason for consult: Bradycardia    Primary Care Physician   Poli Alberto    Chief Complaint   Shortness of breath    History is obtained from the patient and the electronic medical record system    History of Present Illness   Sunil Lyle is a 81 year old male who presents as a past medical history as outlined above.  History obtained from electronic medical record review from admitting physician.  Initially not available for consultation as he was in the OR for an exploratory laparotomy.  He was admitted with multiple complaints including shortness of breath, hematuria and bradycardia.  Was noted to be febrile to 101.8 with concern for possible UTI.  Severe narrowing with concern for thrombus was noted on his CT abdomen pelvis prompting vascular surgical evaluation.    The patient gives me a history of approximately 1 to 2 months of progressively worsening dyspnea with exertion.  He also notes frequent low heart rate alarms  on his apple watch in that same timeframe.  He does not recall what his ambulatory heart rates recently.  Over the past 2 to 3 days he is noted more fatigue, chills, generalized weakness along with the dyspnea with exertion.  On initial presentation concern for infectious etiology versus heart failure exacerbation.  He was given a dose of IV Lasix with some improvement with his respiratory status.  Given the rising lactate CT abdomen pelvis was ordered -this noted the concern for thrombus in the SMA and he subsequently underwent exploratory laparotomy.  Hospital medicine team is also evaluating for other etiologies for infection.    Past Medical History   I have reviewed this patient's medical history and updated it with pertinent information if needed.   Past Medical History:   Diagnosis Date     Allergic state      CAD (coronary artery disease) 2014     s/p stent to RCA ()     GERD (gastroesophageal reflux disease)      Hypercholesterolaemia      Hyperlipidaemia      Other affections of shoulder region, not elsewhere classified     impingement syndrome of right shoulder     Sebaceous cyst     right shoulder area     Tachycardia      Tobacco use disorder 3/5/2008       Past Surgical History   I have reviewed this patient's surgical history and updated it with pertinent information if needed.  Past Surgical History:   Procedure Laterality Date     COLONOSCOPY       HEART CATH, ANGIOPLASTY  2001    RCA stent     TONSILLECTOMY & ADENOIDECTOMY       VASCULAR SURGERY         Prior to Admission Medications   Prior to Admission Medications   Prescriptions Last Dose Informant Patient Reported? Taking?   ASPIRIN 81 MG OR TABS   Yes Yes   Si TABLET DAILY   MULTI-VITAMIN OR TABS   Yes Yes   Sig: Take 1 tablet by mouth daily    VITAMIN D 1000 UNIT OR TABS   Yes Yes   Sig: Take 5,000 Units by mouth daily    fenofibrate (LOFIBRA) 54 MG tablet   No Yes   Sig: Take 2 tablets (108 mg) by mouth daily    simvastatin (ZOCOR) 80 MG tablet   No Yes   Sig: Take 1 tablet (80 mg) by mouth At Bedtime   tamsulosin (FLOMAX) 0.4 MG 24 hr capsule   Yes Yes   Sig: Take 0.4-0.8 mg by mouth daily       Facility-Administered Medications: None     Allergies   Allergies   Allergen Reactions     Bupropion Hcl      vivid dreams     Chantix [Varenicline] Other (See Comments)     CNS side effects     Pcn [Penicillins] Anaphylaxis       Social History   I have reviewed this patient's social history and updated it with pertinent information if needed. Sunil Lyle  reports that he quit smoking about 4 years ago. His smoking use included cigarettes. He started smoking about 72 years ago. He has a 16.75 pack-year smoking history. He has never used smokeless tobacco. He reports current alcohol use of about 3.3 standard drinks of alcohol per week. He reports that he does not use drugs.    Family History   I have reviewed this patient's family history and updated it with pertinent information if needed.   Family History   Problem Relation Age of Onset     Breast Cancer Mother          at age 64     Heart Disease Father         heart attack     Diabetes Father        Review of Systems   Review of systems not obtained due to patient factors - intubation    Physical Exam   Temp: (!) 101.1  F (38.4  C) Temp src: Temporal BP: 137/50 Pulse: (!) 42   Resp: 22 SpO2: 99 % O2 Device: Nasal cannula Oxygen Delivery: 2 LPM  Vital Signs with Ranges  Temp:  [98.3  F (36.8  C)-102.6  F (39.2  C)] 101.1  F (38.4  C)  Pulse:  [42-64] 42  Resp:  [14-30] 22  BP: (105-158)/() 137/50  SpO2:  [92 %-99 %] 99 %  178 lbs 6.4 oz    Oriented x3, sleepy at the time of my evaluation as he had just returned from the operating room  Non elevated JVP on examination  Slightly irregular rhythm, no murmurs rubs or gallops  Nasogastric tube in place  Nonlabored breathing.  Mildly diminished breath sounds bilaterally  Abdominal soft with surgical dressing in  place  Lower extremities are warm and well perfused without edema    Data      ECG: Sinus rhythm, 49 bpm, first-degree AV block specific ST-T wave changes    Most Recent 3 CBC's:Recent Labs   Lab Test 03/19/22  0502 03/18/22  2359 03/14/17  1950   WBC 14.2* 11.8* 6.6   HGB 10.7* 11.9* 14.0   * 104* 97    182 191     Most Recent 3 BMP's:Recent Labs   Lab Test 03/19/22  1551 03/19/22  1114 03/19/22  0927 03/19/22  0502 03/18/22  2359   NA  --  141  --  140 140   POTASSIUM  --  3.9  --  3.9 3.9   CHLORIDE  --  112*  --  111* 109   CO2  --  22  --  21 23   BUN  --  21  --  25 25   CR  --  1.33*  --  1.37* 1.30*   ANIONGAP  --  7  --  8 8   PAIGE  --  8.1*  --  8.0* 8.8   * 144* 110* 144* 121*     Most Recent 2 LFT's:Recent Labs   Lab Test 03/18/22  2359 03/26/21  0847 07/14/17  0741 03/14/17  1950   AST 20  --   --  18   ALT 27 26   < > 25   ALKPHOS 44  --   --  39*   BILITOTAL 0.7  --   --  0.4    < > = values in this interval not displayed.     Most Recent 3 Troponin's:Recent Labs   Lab Test 09/02/14  0100   TROPI <0.015  The 99th percentile for upper reference range is 0.045 ug/L.  Troponin values in   the range of 0.045 - 0.120 ug/L may be associated with risks of adverse   clinical events.   Effective 7/30/2014, the reference range for this assay has changed to reflect   new instrumentation/methodology.       Most Recent 3 BNP's:Recent Labs   Lab Test 03/19/22  1114 03/18/22  2359   NTBNPI 1,318 312     Most Recent Cholesterol Panel:Recent Labs   Lab Test 03/26/21  0847   CHOL 130   LDL 60   HDL 33*   TRIG 186*     Most Recent TSH and T4:No lab results found.

## 2022-03-19 NOTE — ED PROVIDER NOTES
History   Chief Complaint:  Shortness of Breath, Hematuria, and Bradycardia       The history is provided by the patient and the spouse.      Sunil Lyle is a 81 year old male with history of COPD, hypertension and hyperlipidemia who presents with shortness of breath, hematuria, and bradycardia. Shortness of breath and bradycardia has been ongoing for 2 weeks, but for the past 2 days shortness of breath has been predominately upon exertion. Additionally, bright red hematuria began today. Had dysuria today as well. Associated symptoms include diarrhea, a mild cough, chills, intermittent nausea, and mild abdominal pain. No chest pain, fever, or leg swelling. This episode of shortness of breath does not feel similar to his COPD. He denies a past medical history of blood clots. He takes baby aspirin, but he no longer takes metoprolol. He had COVID-19 in October 2021.     Review of Systems   Constitutional: Negative for fever.   Respiratory: Positive for cough and shortness of breath.    Cardiovascular: Negative for chest pain and leg swelling.        Bradycardia   Gastrointestinal: Positive for abdominal pain, diarrhea and nausea.   Genitourinary: Positive for dysuria and hematuria.   All other systems reviewed and are negative.    Allergies:  Bupropion Hcl  Chantix [Varenicline]  Pcn [Penicillins] (Anaphylaxis)    Medications:  Aspirin   fenofibrate   simvastatin   tamsulosin     Past Medical History:     Allergic state   CAD (coronary artery disease)   GERD (gastroesophageal reflux disease)   Hypercholesterolaemia   Hyperlipidaemia   Other affections of shoulder region   Sebaceous cyst   Tobacco use disorder   Emphysema   Hyperlipidemia   CKD (chronic kidney disease) stage 3, GFR 30-59 ml/min  Hypertension   COPD     Past Surgical History:    Heart Cath, angioplasty   Tonsillectomy and Adenoidectomy   Vascular surgery     Family History:    Mother: breast cancer   Father: heart disease, diabetes mellitus  "    Social History:  Presents with his wife.   PCP: Poli Alberto      Physical Exam     Patient Vitals for the past 24 hrs:   BP Temp Temp src Pulse Resp SpO2 Height Weight   03/19/22 0200 135/44 -- -- (!) 46 30 95 % -- --   03/19/22 0145 -- -- -- (!) 45 14 95 % -- --   03/19/22 0130 (!) 140/47 -- -- (!) 47 15 94 % -- --   03/19/22 0100 136/51 -- -- (!) 44 14 94 % -- --   03/19/22 0045 -- -- -- (!) 45 20 -- -- --   03/19/22 0030 (!) 145/44 -- -- 56 18 -- -- --   03/19/22 0015 (!) 152/68 -- -- (!) 45 20 96 % -- --   03/18/22 2351 134/52 100.2  F (37.9  C) Oral (!) 49 16 95 % 1.778 m (5' 10\") 79.4 kg (175 lb)       Physical Exam  General: Sitting up in bed  Eyes:  The pupils are equal and round    Conjunctivae and sclerae are normal  ENT:    Wearing a mask  Neck:  Normal range of motion  CV:  Bradycardic rate, regular rhythm    Skin warm and well perfused   Resp:  Non labored breathing on room air    No tachypnea    No cough heard    Lungs clear bilaterally  GI:  Abdomen is soft, there is no rigidity    No distension    No rebound tenderness     Mild diffuse abdominal tenderness  MS:  Normal muscular tone  Skin:  No rash or acute skin lesions noted  Neuro:   Awake, alert.      Speech is normal and fluent.    Face is symmetric.     Moves all extremities equally  Psych: Normal affect.  Appropriate interactions.    Emergency Department Course   ECG:  ECG taken at 2353  Sinus bradycardia with 1st degree AV block   Right bundle branch block   T wave abnormality, consider lateral ischemia  Abnormal ECG  Significant change when compared to EKG dated 9/2/14. Now sinus bradycardia, T wave changes lateral leads  Rate 49 bpm. IL interval 252 ms. QRS duration 140 ms. QT/QTc 514/464 ms. P-R-T axes 63 268 40.    Imaging:  CT Chest (PE) Abdomen Pelvis w Contrast   Final Result   Abnormal   IMPRESSION:   1.  Negative for pulmonary embolism.      2.  Centrilobular emphysema without evidence of pneumonia.      3.  Severe narrowing " of the proximal superior mesenteric artery, appearance favoring presence of intra-arterial thrombus rather than atherosclerotic plaque. The SMA trunk is patent distal to this.      4.  Moderate enlargement and heterogeneity of the prostate. There is thickening of the posterior bladder wall which is somewhat nodular in appearance on the sagittal series, though difficult to ascertain if this is separate from the prostate which    indents on the undersurface of the bladder. Recommend follow-up with urology.         [Critical Result: SMA thrombus]      Finding was identified on 3/19/2022 1:33 AM.       Dr. Hernandez was contacted by me on 3/19/2022 1:49 AM and verbalized understanding of the critical result.         Report per radiology    Laboratory:  Labs Ordered and Resulted from Time of ED Arrival to Time of ED Departure   COMPREHENSIVE METABOLIC PANEL - Abnormal       Result Value    Sodium 140      Potassium 3.9      Chloride 109      Carbon Dioxide (CO2) 23      Anion Gap 8      Urea Nitrogen 25      Creatinine 1.30 (*)     Calcium 8.8      Glucose 121 (*)     Alkaline Phosphatase 44      AST 20      ALT 27      Protein Total 7.4      Albumin 3.8      Bilirubin Total 0.7      GFR Estimate 55 (*)    ROUTINE UA WITH MICROSCOPIC REFLEX TO CULTURE - Abnormal    Color Urine Red (*)     Appearance Urine Bloody (*)     Glucose Urine Negative      Bilirubin Urine Negative      Ketones Urine Negative      Specific Gravity Urine 1.020      Blood Urine Large (*)     pH Urine 6.5      Protein Albumin Urine 300  (*)     Urobilinogen Urine Normal      Nitrite Urine Negative      Leukocyte Esterase Urine Large (*)     Mucus Urine Present (*)     RBC Urine >182 (*)     WBC Urine >182 (*)    CBC WITH PLATELETS AND DIFFERENTIAL - Abnormal    WBC Count 11.8 (*)     RBC Count 3.49 (*)     Hemoglobin 11.9 (*)     Hematocrit 36.3 (*)      (*)     MCH 34.1 (*)     MCHC 32.8      RDW 16.9 (*)     Platelet Count 182      %  Neutrophils 85      % Lymphocytes 5      % Monocytes 7      % Eosinophils 2      % Basophils 0      % Immature Granulocytes 1      NRBCs per 100 WBC 0      Absolute Neutrophils 10.0 (*)     Absolute Lymphocytes 0.6 (*)     Absolute Monocytes 0.8      Absolute Eosinophils 0.2      Absolute Basophils 0.0      Absolute Immature Granulocytes 0.2      Absolute NRBCs 0.0     ISTAT GASES LACTATE VENOUS POCT - Abnormal    Lactic Acid POCT 2.3 (*)     Bicarbonate Venous POCT 21      O2 Sat, Venous POCT 56 (*)     pCO2V Venous POCT 34 (*)     pH Venous POCT 7.41      pO2 Venous POCT 29     INR - Abnormal    INR 1.21 (*)    MAGNESIUM - Abnormal    Magnesium 1.5 (*)    TROPONIN I - Normal    Troponin I High Sensitivity 14     LIPASE - Normal    Lipase 208     NT PROBNP INPATIENT - Normal    N terminal Pro BNP Inpatient 312     COVID-19 VIRUS (CORONAVIRUS) BY PCR   LACTIC ACID WHOLE BLOOD   BLOOD CULTURE   BLOOD CULTURE   URINE CULTURE        Emergency Department Course:    Reviewed:  I reviewed nursing notes, vitals, past medical history and Care Everywhere    Assessments:  0011 I obtained history and examined the patient as noted above.      Consults:  0222 : I spoke with Dr. Shelley of the Hospitalist service from [facility] regarding patient's presentation, findings, and plan of care.    Interventions:  0030 NS, 500 mL, IV  0054 Rocephin 1g IV   0200 Magnesium sulfate 2g IV   0201 NS, 500 mL, IV    Disposition:  The patient was admitted to the hospital under the care of Dr. Shelley.     Impression & Plan     CMS Diagnoses:   The patient has signs of Severe Sepsis  The patient has signs of Severe Sepsis as evidenced by:    1. 2 SIRS criteria, AND  2. Suspected infection, AND   3. Organ dysfunction: Lactic Acidosis with value >2.0    Time severe sepsis diagnosis confirmed: 0018  03/19/22 as this was the time when Lactate resulted, and the level was > 2.0    3 Hour Severe Sepsis Bundle Completion:    1. Initial Lactic  Acid Result:   Recent Labs   Lab Test 03/19/22  0336 03/19/22  0015   LACT 2.1* 2.3*     2. Blood Cultures before Antibiotics: Yes  3. Broad Spectrum Antibiotics Administered:  yes       Anti-infectives (From admission through now)    Start     Dose/Rate Route Frequency Ordered Stop    03/19/22 0035  cefTRIAXone (ROCEPHIN) 1 g vial to attach to  mL bag for ADULTS or NS 50 mL bag for PEDS         1 g  over 15-30 Minutes Intravenous ONCE 03/19/22 0030 03/19/22 0200          4. Fluid volume administered in ED: 1500              Severe Sepsis reassessment:  1. Repeat Lactic Acid Level: 2.1  2. MAP>65 after initial IVF bolus, will continue to monitor fluid status and vital signs    I attest to having performed a repeat sepsis exam and assessment of perfusion at 0245 and the results demonstrate improved perfusion. and     Medical Decision Making:  Sunil Lyle is a 81-year-old male who presented to the emergency department with multiple concerns.  He is concerned about his shortness of breath and bradycardia.  He has had bradycardia for about 2 weeks in the 40s.  He knows this because he has an apple watch.  His EKG shows sinus bradycardia.  His troponin is normal.  Has mildly low magnesium that was replaced.  He denies any chest pain.  Does have a history of emphysema so possible that this could be contributing to shortness of breath though denies any worsening or change of his cough and there is no wheezing on exam.  Also complaining of hematuria and he does have elevated temperature here in the emergency department as well as elevated lactate.  Urine analysis was abnormal and his urine appeared bloody.  Gave antibiotics and concern for urinary tract infection.  He overall looks well.  CT chest abdomen pelvis done that shows no lung pathology.  Does have possible bladder mass that will need urology evaluation.  Also has SMA thrombus.  The bowel looks normal on CT per radiology and he does not seem to have much  tenderness on exam so I doubt acute ischemia.  Discussed with the patient the need to start anticoagulation and the risk of bleeding with this.  He does have hematuria which will have to be watched closely.  Discussed with hospitalist for admission.      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 Gross hematuria  R31.0    3. Acute cystitis with hematuria  N30.01    4. Severe sepsis (H)  A41.9     R65.20      Scribe Disclosure:  BUDDY, Juve Camargo, am serving as a scribe at 12:08 AM on 3/19/2022 to document services personally performed by Gunjan Hernandez MD based on my observations and the provider's statements to me.        Gunjan Hernandez MD  03/19/22 0402

## 2022-03-19 NOTE — CONSULTS
VASCULAR SURGERY INPATIENT CONSULTATION     LOCATION: [facility]    Sunil Lyle  Medical Record #:  [id number]  YOB: 1940  Age:  81 year old     Date of Service: 3/18/2022    PRIMARY CARE PROVIDER: Poli Alberto      Reason for consult:  SMA thrombosis    ASSESSMENT AND PLAN:  Mr. Lyle is an 80yo male with incidental imaging finding of SMA thrombosis without overt imaging evidence of bowel ischemia. However, given new nausea and uptrending lactate, will take to OR emergently for exploratory laparotomy with SMA embolectomy    HPI:  Sunil Lyle is a 81 year old male admitted with a weeks long history worsening shortness of breath and a days long history of gross hematuria. He had a CT scan overnight that demonstrated SMA thrombosis at the origin with reconstitution distally and no evidence of bowel ischemia. He denies abdominal pain. He has had nonbloody diarrhea for about two weeks. No n/v prior to admission. He was started on ceftriaxone for a UTI as well as a heparin gtt.    He does not smoke. He does not take blood thinners. He takes aspirin and simvastatin at home. He is not diabetic.    He has never had abdominal surgery.    Echocardiogram pending.    REVIEW OF SYSTEMS:    A 12 point ROS was reviewed and is negative except for subjective fevers last night.     PHH:    Past Medical History:   Diagnosis Date     Allergic state      CAD (coronary artery disease) 05/22/2014     s/p stent to RCA (2001)     GERD (gastroesophageal reflux disease)      Hypercholesterolaemia      Hyperlipidaemia      Other affections of shoulder region, not elsewhere classified     impingement syndrome of right shoulder     Sebaceous cyst     right shoulder area     Tachycardia      Tobacco use disorder 3/5/2008          Past Surgical History:   Procedure Laterality Date     COLONOSCOPY       HEART CATH, ANGIOPLASTY  october 2001    RCA stent     TONSILLECTOMY & ADENOIDECTOMY        VASCULAR SURGERY         ALLERGIES:  Bupropion hcl, Chantix [varenicline], and Pcn [penicillins]    MEDS:    Current Facility-Administered Medications:      acetaminophen (TYLENOL) tablet 650 mg, 650 mg, Oral, Q6H PRN, 650 mg at 03/19/22 0857 **OR** acetaminophen (TYLENOL) Suppository 650 mg, 650 mg, Rectal, Q6H PRN, Anup Shelleyssalyn A     [START ON 3/20/2022] cefTRIAXone (ROCEPHIN) 1 g vial to attach to  mL bag for ADULTS or NS 50 mL bag for PEDS, 1 g, Intravenous, Q24H, Kamala Shelleyalyn A     heparin infusion 25,000 units in D5W 250 mL ANTICOAGULANT, 0-5,000 Units/hr, Intravenous, Continuous, Kamala Shelleyalyn A, Last Rate: 14.5 mL/hr at 03/19/22 0537, 1,450 Units/hr at 03/19/22 0537     lidocaine (LMX4) cream, , Topical, Q1H PRN, Sean Bray MD     lidocaine 1 % 0.1-1 mL, 0.1-1 mL, Other, Q1H PRN, Sean Bray MD     melatonin tablet 1 mg, 1 mg, Oral, At Bedtime PRN, Elina Shelley     nitroGLYcerin (NITROSTAT) sublingual tablet 0.4 mg, 0.4 mg, Sublingual, Q5 Min PRN, Sean Bray MD     ondansetron (ZOFRAN-ODT) ODT tab 4 mg, 4 mg, Oral, Q6H PRN **OR** ondansetron (ZOFRAN) injection 4 mg, 4 mg, Intravenous, Q6H PRN, Kamala Shelleyalyn A, 4 mg at 03/19/22 1004     Patient is already receiving anticoagulation with heparin, enoxaparin (LOVENOX), warfarin (COUMADIN)  or other anticoagulant medication, , Does not apply, Continuous PRN, Kamala Shelleyalyn A     perflutren diluted 1mL to 2mL with saline (OPTISON) diluted injection 9 mL, 9 mL, Intravenous, Once, Elina Shelley A     sodium chloride (PF) 0.9% PF flush 10 mL, 10 mL, Intravenous, Once, Elina Shelley     sodium chloride (PF) 0.9% PF flush 3 mL, 3 mL, Intracatheter, Q8H, Elina Shelley, 3 mL at 03/19/22 0452     sodium chloride (PF) 0.9% PF flush 3 mL, 3 mL, Intracatheter, Q8H, Sean Bray MD     sodium chloride (PF) 0.9% PF flush 3 mL, 3 mL, Intracatheter, q1 min prn, Sean Bray MD     [Held by  "provider] sodium chloride 0.9% infusion, , Intravenous, Continuous, Elina Shelley, Last Rate: 75 mL/hr at 22, New Bag at 22    SOCIAL HABITS:    History   Smoking Status     Former Smoker     Packs/day: 0.50     Years: 33.50     Types: Cigarettes     Start date:      Quit date: 2017   Smokeless Tobacco     Never Used     Comment: off and on since age 17- smokes about 5 cigs/day plus vapor cigarettes      Social History    Substance and Sexual Activity      Alcohol use: Yes        Alcohol/week: 3.3 standard drinks        Types: 4 Shots of liquor per week        Comment: 5-6 drink per week      History   Drug Use No       FAMILY HISTORY:    Family History   Problem Relation Age of Onset     Breast Cancer Mother          at age 64     Heart Disease Father         heart attack     Diabetes Father        PE:  BP (!) 158/77   Pulse 64   Temp 98.3  F (36.8  C) (Oral)   Resp 22   Ht 1.778 m (5' 10\")   Wt 80.9 kg (178 lb 6.4 oz)   SpO2 96%   BMI 25.60 kg/m    Wt Readings from Last 1 Encounters:   22 80.9 kg (178 lb 6.4 oz)     Body mass index is 25.6 kg/m .    EXAM:  GENERAL: This is a well-developed 81 year old male who appears his stated age, resting in bed in no distress  EYES: Grossly normal.  MOUTH: Buccal mucosa normal   CARDIAC: pulses regular  CHEST/LUNG: unlabored on supplemental oxygen  GASTROINTESTINAL: soft, obese, mild tenderness to deep palpation in the suprapubic region, otherwise nontender to palpation, no rebound or guarding  MUSCULOSKELETAL: Grossly normal and both lower extremities are intact.  HEME/LYMPH: No lymphedema  NEUROLOGIC: Focally intact, alert and oriented x 3.   PSYCH: appropriate affect  INTEGUMENT: No open lesions or ulcers  Pulse Exam: 2+ radial, femoral, DP pulses bilaterally      DIAGNOSTIC STUDIES:     Images:  CT Chest (PE) Abdomen Pelvis w Contrast    Result Date: 3/19/2022  EXAM: CT CHEST PE ABDOMEN PELVIS W CONTRAST LOCATION: [facility] DATE/TIME: 3/19/2022 1:21 AM INDICATION: FEVER COMPARISON: CT chest from 11/17/2017. CT abdomen pelvis from 03/14/2017. TECHNIQUE: CT chest pulmonary angiogram and routine CT abdomen pelvis with IV contrast. Arterial phase through the chest and venous phase through the abdomen and pelvis. Multiplanar reformats and MIP reconstructions were performed. Dose reduction techniques were used. CONTRAST: 88mL Isovue 370 FINDINGS: ANGIOGRAM CHEST: Pulmonary arteries are normal caliber and negative for pulmonary emboli. Thoracic aorta is negative for dissection. No CT evidence of right heart strain. LUNGS AND PLEURA: Centrilobular emphysematous change. Scattered strands of secretions within the lower thoracic trachea. No airspace consolidation. No interstitial edema. No pleural effusion. MEDIASTINUM/AXILLAE: Heart size at upper limits normal. Small hiatal hernia. Coronary artery disease and stent. CORONARY ARTERY CALCIFICATION: Previous intervention (stents or CABG). HEPATOBILIARY: Contracted gallbladder. Liver within normal limits. PANCREAS: Normal. SPLEEN: Normal. ADRENAL GLANDS: Normal. KIDNEYS/BLADDER: Benign left upper pole renal cortical cyst. Benign right lower pole renal cortical cyst. No hydronephrosis. There is some thickening of the posterior bladder wall which is somewhat nodular in appearance on sagittal image 68. BOWEL: Normal appendix. Colonic diverticulosis. No bowel obstruction or free air. LYMPH NODES: Normal. VASCULATURE: Moderate abdominal aortic atherosclerotic calcification. There is severe narrowing of the proximal superior mesenteric artery as seen on image 71 of the sagittal series, secondary to a low density focus along the inferior wall of the proximal SMA trunk. This is noncalcified. The SMA is patent distal to this. PELVIC ORGANS: Moderately enlarged, heterogeneous prostate. MUSCULOSKELETAL: Thoracolumbar spine degenerative disc change. Arthritic change of the hips.      IMPRESSION: 1.  Negative for pulmonary embolism. 2.  Centrilobular emphysema without evidence of pneumonia. 3.  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 The SMA trunk is patent distal to this. 4.  Moderate enlargement and heterogeneity of the prostate. There is thickening of the posterior bladder wall which is somewhat nodular in appearance on the sagittal series, though difficult to ascertain if this is separate from the prostate which indents on the undersurface of the bladder. Recommend follow-up with urology. [Critical Result: SMA thrombus] Finding was identified on 3/19/2022 1:33 AM. Dr. Hernandez was contacted by me on 3/19/2022 1:49 AM and verbalized understanding of the critical result.       I personally reviewed the images and my interpretation is focal thrombus at the SMA origin with distal perfusion, no evidence of bowel ischemia.    LABS:      Sodium   Date Value Ref Range Status   03/19/2022 140 133 - 144 mmol/L Final   03/18/2022 140 133 - 144 mmol/L Final   07/14/2017 141 136 - 145 mmol/L Final   03/14/2017 146 (H) 133 - 144 mmol/L Final   06/18/2015 141 136 - 145 mmol/L Final     Urea Nitrogen   Date Value Ref Range Status   03/19/2022 25 7 - 30 mg/dL Final   03/18/2022 25 7 - 30 mg/dL Final   07/14/2017 12 7 - 30 mg/dL Final   03/14/2017 14 7 - 30 mg/dL Final   06/18/2015 21 7 - 30 mg/dL Final     Hemoglobin   Date Value Ref Range Status   03/19/2022 10.7 (L) 13.3 - 17.7 g/dL Final   03/18/2022 11.9 (L) 13.3 - 17.7 g/dL Final   03/14/2017 14.0 13.3 - 17.7 g/dL Final   09/02/2014 13.2 (L) 13.3 - 17.7 g/dL Final   08/25/2013 13.6 13.3 - 17.7 g/dL Final     Platelet Count   Date Value Ref Range Status   03/19/2022 155 150 - 450 10e3/uL Final   03/18/2022 182 150 - 450 10e3/uL Final   03/14/2017 191 150 - 450 10e9/L Final   09/02/2014 183 150 - 450 10e9/L Final   08/25/2013 198 150 - 450  10e9/L Final     INR   Date Value Ref Range Status   03/19/2022 1.21 (H) 0.85 - 1.15 Final   03/28/2009 1.03 0.86 - 1.14 Final         Milka Alfaro MD  VASCULAR SURGERY FELLOW

## 2022-03-19 NOTE — ANESTHESIA PROCEDURE NOTES
Arterial Line Procedure Note    Pre-Procedure   Staff -        Anesthesiologist:  Isai Burr MD       Performed By: anesthesiologist       Location: OR       Pre-Anesthestic Checklist: patient identified, IV checked, risks and benefits discussed, informed consent, monitors and equipment checked and pre-op evaluation  Timeout:       Correct Patient: Yes        Correct Procedure: Yes        Correct Site: Yes        Correct Position: Yes   Line Placement:   This line was placed Post Induction  Procedure   Procedure: arterial line       Laterality: right       Insertion Site: radial.  Sterile Prep        All elements of maximal sterile barrier technique followed       Patient Prep/Sterile Barriers: hand hygiene, sterile gloves, hat, mask, draped, draped       Skin prep: Chloraprep  Insertion/Injection        Technique: ultrasound guided        1. Ultrasound was used to evaluate the access site.       2. Artery evaluated via ultrasound for patency/adequacy.       3. Using real-time ultrasound the needle/catheter was observed entering the artery/vein.       4. Permanent image was captured and entered into the patient's record.       5. The visualized structures were anatomically normal.       6. There were no apparent abnormal pathologic findings.       Catheter Type/Size: 20 gauge, 1.75 in/4.5 cm quick cath (integral wire)  Narrative         Secured by: other       Tegaderm dressing used.       Complications: None apparent,        Arterial waveform: Yes        IBP within 10% of NIBP: Yes

## 2022-03-19 NOTE — PROGRESS NOTES
Brief ICU note    81-year-old man with elevated lactate, fever, concern for SMA issues taken to the operating room for exploratory laparotomy.  Was contacted by the vascular surgery team.  He is currently being extubated but they would like him monitored in the intensive care unit.    His lactate came down prior to surgery.    He can continue under hospitalist primary in the ICU.  We are available for consult as needed.    Ross Maynard MD

## 2022-03-19 NOTE — PROGRESS NOTES
Pt was observed to have increased SOB and work of breathing at approximately 0850, as well on new onset rigors and intermittent episodes of bradycardia into 30-40s. O2 sats 96% on RA, however supplemental O2 started due to observed SOB. MD notified and came to floor to evaluate, determined pt should be transferred to [facility]. Pt then reported new onset nausea, had one episode of emesis. Lactic 5.8. MD notified, vascular surgery came to floor to evaluate and advised pt be taken to OR emergently for exploratory laparotomy with SMA embolectomy. Report called to pre-op and pt transferred off floor.

## 2022-03-19 NOTE — ANESTHESIA PREPROCEDURE EVALUATION
Anesthesia Pre-Procedure Evaluation    Patient: Sunil Lyle   MRN: [id number] : 1940        Procedure : Procedure(s):  REPAIR, ABDOMINAL AORTIC ANEURYSM, WITH INTRAOPERATIVE BLOOD SALVAGE          Past Medical History:   Diagnosis Date     Allergic state      CAD (coronary artery disease) 2014     s/p stent to RCA ()     GERD (gastroesophageal reflux disease)      Hypercholesterolaemia      Hyperlipidaemia      Other affections of shoulder region, not elsewhere classified     impingement syndrome of right shoulder     Sebaceous cyst     right shoulder area     Tachycardia      Tobacco use disorder 3/5/2008      Past Surgical History:   Procedure Laterality Date     COLONOSCOPY       HEART CATH, ANGIOPLASTY  2001    RCA stent     TONSILLECTOMY & ADENOIDECTOMY       VASCULAR SURGERY        Allergies   Allergen Reactions     Bupropion Hcl      vivid dreams     Chantix [Varenicline] Other (See Comments)     CNS side effects     Pcn [Penicillins] Anaphylaxis      Social History     Tobacco Use     Smoking status: Former Smoker     Packs/day: 0.50     Years: 33.50     Pack years: 16.75     Types: Cigarettes     Start date:      Quit date: 2017     Years since quittin.3     Smokeless tobacco: Never Used     Tobacco comment: off and on since age 17- smokes about 5 cigs/day plus vapor cigarettes    Substance Use Topics     Alcohol use: Yes     Alcohol/week: 3.3 standard drinks     Types: 4 Shots of liquor per week     Comment: 5-6 drink per week      Wt Readings from Last 1 Encounters:   22 80.9 kg (178 lb 6.4 oz)        Prior to Admission medications    Medication Sig Start Date End Date Taking? Authorizing Provider   ASPIRIN 81 MG OR TABS 1 TABLET DAILY    Reported, Patient   fenofibrate (LOFIBRA) 54 MG tablet Take 2 tablets (108 mg) by mouth daily 21   Ross Landers MD   MULTI-VITAMIN OR TABS one daily    Poli Alberto MD   simvastatin (ZOCOR) 80 MG  tablet Take 1 tablet (80 mg) by mouth At Bedtime 12/31/21   Ross Landers MD   tamsulosin (FLOMAX) 0.4 MG 24 hr capsule Take 0.4 mg by mouth daily.    Reported, Patient   VITAMIN D 1000 UNIT OR TABS Take 5,000 Units by mouth daily     Reported, Patient     Current Facility-Administered Medications Ordered in Epic   Medication Dose Route Frequency Last Rate Last Admin     acetaminophen (TYLENOL) tablet 650 mg  650 mg Oral Q6H PRN   650 mg at 03/19/22 0857    Or     acetaminophen (TYLENOL) Suppository 650 mg  650 mg Rectal Q6H PRN         [START ON 3/20/2022] cefTRIAXone (ROCEPHIN) 1 g vial to attach to  mL bag for ADULTS or NS 50 mL bag for PEDS  1 g Intravenous Q24H         heparin infusion 25,000 units in D5W 250 mL ANTICOAGULANT  0-5,000 Units/hr Intravenous Continuous 14.5 mL/hr at 03/19/22 0537 1,450 Units/hr at 03/19/22 0537     lidocaine (LMX4) cream   Topical Q1H PRN         lidocaine 1 % 0.1-1 mL  0.1-1 mL Other Q1H PRN         melatonin tablet 1 mg  1 mg Oral At Bedtime PRN         nitroGLYcerin (NITROSTAT) sublingual tablet 0.4 mg  0.4 mg Sublingual Q5 Min PRN         ondansetron (ZOFRAN-ODT) ODT tab 4 mg  4 mg Oral Q6H PRN        Or     ondansetron (ZOFRAN) injection 4 mg  4 mg Intravenous Q6H PRN   4 mg at 03/19/22 1004     Patient is already receiving anticoagulation with heparin, enoxaparin (LOVENOX), warfarin (COUMADIN)  or other anticoagulant medication   Does not apply Continuous PRN         sodium chloride (PF) 0.9% PF flush 3 mL  3 mL Intracatheter Q8H   3 mL at 03/19/22 0452     sodium chloride (PF) 0.9% PF flush 3 mL  3 mL Intracatheter Q8H         sodium chloride (PF) 0.9% PF flush 3 mL  3 mL Intracatheter q1 min prn         [Held by provider] sodium chloride 0.9% infusion   Intravenous Continuous 75 mL/hr at 03/19/22 0539 New Bag at 03/19/22 0539     No current [facility]-ordered outpatient medications on file.       heparin 1,450 Units/hr (03/19/22 0537)     - MEDICATION  INSTRUCTIONS -       [Held by provider] sodium chloride 75 mL/hr at 03/19/22 0539     Recent Results (from the past 744 hour(s))   CT Chest (PE) Abdomen Pelvis w Contrast   Result Value    Radiologist flags SMA thrombus (AA)    Narrative    EXAM: CT CHEST PE ABDOMEN PELVIS W CONTRAST  LOCATION: [facility]  DATE/TIME: 3/19/2022 1:21 AM    INDICATION: FEVER  COMPARISON: CT chest from 11/17/2017. CT abdomen pelvis from 03/14/2017.  TECHNIQUE: CT chest pulmonary angiogram and routine CT abdomen pelvis with IV contrast. Arterial phase through the chest and venous phase through the abdomen and pelvis. Multiplanar reformats and MIP reconstructions were performed. Dose reduction   techniques were used.   CONTRAST: 88mL Isovue 370    FINDINGS:  ANGIOGRAM CHEST: Pulmonary arteries are normal caliber and negative for pulmonary emboli. Thoracic aorta is negative for dissection. No CT evidence of right heart strain.     LUNGS AND PLEURA: Centrilobular emphysematous change. Scattered strands of secretions within the lower thoracic trachea. No airspace consolidation. No interstitial edema. No pleural effusion.    MEDIASTINUM/AXILLAE: Heart size at upper limits normal. Small hiatal hernia. Coronary artery disease and stent.    CORONARY ARTERY CALCIFICATION: Previous intervention (stents or CABG).    HEPATOBILIARY: Contracted gallbladder. Liver within normal limits.    PANCREAS: Normal.    SPLEEN: Normal.    ADRENAL GLANDS: Normal.    KIDNEYS/BLADDER: Benign left upper pole renal cortical cyst. Benign right lower pole renal cortical cyst. No hydronephrosis. There is some thickening of the posterior bladder wall which is somewhat nodular in appearance on sagittal image 68.    BOWEL: Normal appendix. Colonic diverticulosis. No bowel obstruction or free air.    LYMPH NODES: Normal.    VASCULATURE: Moderate abdominal aortic atherosclerotic calcification. There is severe narrowing of the proximal superior  mesenteric artery as seen on image 71 of the sagittal series, secondary to a low density focus along the inferior wall of the   proximal SMA trunk. This is noncalcified. The SMA is patent distal to this.    PELVIC ORGANS: Moderately enlarged, heterogeneous prostate.    MUSCULOSKELETAL: Thoracolumbar spine degenerative disc change. Arthritic change of the hips.      Impression    IMPRESSION:  1.  Negative for pulmonary embolism.    2.  Centrilobular emphysema without evidence of pneumonia.    3.  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 The SMA trunk is patent distal to this.    4.  Moderate enlargement and heterogeneity of the prostate. There is thickening of the posterior bladder wall which is somewhat nodular in appearance on the sagittal series, though difficult to ascertain if this is separate from the prostate which   indents on the undersurface of the bladder. Recommend follow-up with urology.      [Critical Result: SMA thrombus]    Finding was identified on 3/19/2022 1:33 AM.     Dr. Hernandez was contacted by me on 3/19/2022 1:49 AM and verbalized understanding of the critical result.        Anesthesia Evaluation   Pt has had prior anesthetic. Type: General.    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  Left ventricular systolic function is normal.  There is mild to moderate concentric left ventricular hypertrophy.  The left ventricle is normal in size.  The right ventricle is normal in structure,  function and size.  Right ventricular systolic pressure is elevated, consistent with moderate  pulmonary hypertension.  Doppler interrogation does not demonstrate signficant stenosis or  insufficiency involivng cardiac valves  Stress Test: Date: Results:    ECG Reviewed: Date: Results:  Sinus sydnie, 1st degree AVB, RBBB, diffuse T wave abnormalities  Cath: Date: Results:   (-) murmur and wheezes   METS/Exercise Tolerance:     Hematologic:     (+) anemia,     Musculoskeletal:       GI/Hepatic:     (+) GERD,     Renal/Genitourinary:     (+) renal disease, type: CRI, BPH,     Endo:       Psychiatric/Substance Use:       Infectious Disease:       Malignancy:       Other:            Physical Exam    Airway        Mallampati: II   TM distance: > 3 FB   Neck ROM: full   Mouth opening: > 3 cm    Respiratory Devices and Support         Dental       (+) upper dentures and lower dentures      Cardiovascular          Rhythm and rate: irregular and normal (-) no murmur    Pulmonary           breath sounds clear to auscultation   (-) no rhonchi and no wheezes        OUTSIDE LABS:  CBC:   Lab Results   Component Value Date    WBC 14.2 (H) 03/19/2022    WBC 11.8 (H) 03/18/2022    HGB 10.7 (L) 03/19/2022    HGB 11.9 (L) 03/18/2022    HCT 33.5 (L) 03/19/2022    HCT 36.3 (L) 03/18/2022     03/19/2022     03/18/2022     BMP:   Lab Results   Component Value Date     03/19/2022     03/19/2022    POTASSIUM 3.9 03/19/2022    POTASSIUM 3.9 03/19/2022    CHLORIDE 112 (H) 03/19/2022    CHLORIDE 111 (H) 03/19/2022    CO2 22 03/19/2022    CO2 21 03/19/2022    BUN 21 03/19/2022    BUN 25 03/19/2022    CR 1.33 (H) 03/19/2022    CR 1.37 (H) 03/19/2022     (H) 03/19/2022     (H) 03/19/2022     COAGS:   Lab Results   Component Value Date    INR 1.21 (H) 03/19/2022     POC: No results found for: BGM, HCG, HCGS  HEPATIC:   Lab Results   Component Value Date    ALBUMIN 3.8 03/18/2022    PROTTOTAL 7.4 03/18/2022     ALT 27 03/18/2022    AST 20 03/18/2022    ALKPHOS 44 03/18/2022    BILITOTAL 0.7 03/18/2022     OTHER:   Lab Results   Component Value Date    PH 7.41 03/19/2022    LACT 2.4 (H) 03/19/2022    PAIGE 8.1 (L) 03/19/2022    MAG 2.4 (H) 03/19/2022    LIPASE 208 03/18/2022    TSH 0.91 06/15/2011    SED 61 (H) 09/26/2003       Anesthesia Plan    ASA Status:  3, emergent    NPO Status:  NPO Appropriate    Anesthesia Type: General.     - Airway: ETT   Induction: Propofol, Intravenous, RSI.   Maintenance: Balanced.   Techniques and Equipment:     - Lines/Monitors: Arterial Line, 2nd IV     Consents    Anesthesia Plan(s) and associated risks, benefits, and realistic alternatives discussed. Questions answered and patient/representative(s) expressed understanding.    - Discussed:     - Discussed with:  Patient         Postoperative Care    Pain management: Multi-modal analgesia.   PONV prophylaxis: Ondansetron (or other 5HT-3), Background Propofol Infusion     Comments:                Isai Burr MD

## 2022-03-19 NOTE — CONSULTS
UROLOGY CONSULTATION:    PATIENT: Sunil SWARTZ (1940)  AGE: 81 year old year old male    Requesting Provider: Elina Shelley  Primary Physician: Poli Alberto    REASON FOR CONSULTATION:  Gross Hematuria, abnormality of bladder/prostate on CT Scan    HPI:   Mr. Lyle is a very pleasant 81 yoM who has previously followed with my partner, Dr. Helton, for history of BPH with lower urinary tract symptoms.    He is currently admitted for SOB, Bradycardia, and hematuria. He underwent a CT C/A/P PE rule out which noted an abnormality along his bladder base. He does feel as though he is voiding similar to his baseline and reports no clot passage. 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    He reports that he has long been maintained on tamsulosin but has not seen Dr. Helton for a number of years.    Of note, during our interview patient had worsening SOB and rigors so rapid response was called. Our interview was cut short to let the rapid response team evaluate the patient.    Past Medical History:   Diagnosis Date     Allergic state      CAD (coronary artery disease) 2014     s/p stent to RCA ()     GERD (gastroesophageal reflux disease)      Hypercholesterolaemia      Hyperlipidaemia      Other affections of shoulder region, not elsewhere classified     impingement syndrome of right shoulder     Sebaceous cyst     right shoulder area     Tachycardia      Tobacco use disorder 3/5/2008     Past Surgical History:   Procedure Laterality Date     COLONOSCOPY       HEART CATH, ANGIOPLASTY  2001    RCA stent     TONSILLECTOMY & ADENOIDECTOMY       VASCULAR SURGERY       PAST SOCIAL HISTORY  Social History     Social History Narrative    ** Merged History Encounter **          Social History     Tobacco Use     Smoking status: Former Smoker     Packs/day: 0.50     Years: 33.50     Pack years: 16.75     Types: Cigarettes     Start date:   "    Quit date: 2017     Years since quittin.3     Smokeless tobacco: Never Used     Tobacco comment: off and on since age 17- smokes about 5 cigs/day plus vapor cigarettes    Substance Use Topics     Alcohol use: Yes     Alcohol/week: 3.3 standard drinks     Types: 4 Shots of liquor per week     Comment: 5-6 drink per week       FAMILY HISTORY  family history includes Breast Cancer in his mother; Diabetes in his father; Heart Disease in his father.    REVIEW OF SYSTEMS:  A comprehensive review of systems was reviewed with the patient with all findings across 10 systems negative except as per dictated in HPI.    There are no exam notes on file for this visit.             No current outpatient medications on file.                Allergies   Allergen Reactions     Bupropion Hcl      vivid dreams     Chantix [Varenicline] Other (See Comments)     CNS side effects     Pcn [Penicillins] Anaphylaxis       PHYSICAL EXAM:          BP (!) 158/77   Pulse 64   Temp 98.3  F (36.8  C) (Oral)   Resp 22   Ht 1.778 m (5' 10\")   Wt 80.9 kg (178 lb 6.4 oz)   SpO2 96%   BMI 25.60 kg/m           General appearance shows no deformaties and good grooming.  EYES: no icterus  HEAD, EARS, NOSE, MOUTH, AND THROAT: atraumatic, normocephalic  NECK: symmetric  CHEST WALL: symmetric  CARDIAC: skin well perfused, pulses regular  RESPIRATORY: breathing labored on nasal cannula  ABDOMEN: soft, nontender, non distended, no rebound, guarding or peritoneal signs, no palpable bladder  BACK/FLANKS: no CVA tenderness bilaterally  RECTAL: will perform at subsequent interview  SKIN/HAIR/NAILS: no visible rashes  NEUROLOGIC: no focal deficits  PSYCHIATRIC: Alert and oriented x3. Speech: normal Mood: normal Affect: normal    LABS:            Color Urine (no units)   Date Value   2022 Red (A)   2017 Yellow     Appearance Urine (no units)   Date Value   2022 Bloody (A)   2017 Clear     Glucose Urine (mg/dL)   Date Value "   03/19/2022 Negative   03/14/2017 Negative     Bilirubin Urine (no units)   Date Value   03/19/2022 Negative   03/14/2017 Negative     Ketones Urine (mg/dL)   Date Value   03/19/2022 Negative   03/14/2017 Negative     Specific Gravity Urine (no units)   Date Value   03/19/2022 1.020   03/14/2017 1.015     pH Urine   Date Value   03/19/2022 6.5   03/14/2017 6.0 pH     Protein Albumin Urine (mg/dL)   Date Value   03/19/2022 300  (A)   03/14/2017 Negative     Urobilinogen Urine (EU/dL)   Date Value   03/14/2017 0.2     Nitrite Urine (no units)   Date Value   03/19/2022 Negative   03/14/2017 Negative     Leukocyte Esterase Urine (no units)   Date Value   03/19/2022 Large (A)   03/14/2017 Negative                PSA   Date Value Ref Range Status   05/23/2006 1.98 0 - 4 ug/L Final               Hemoglobin   Date Value Ref Range Status   03/19/2022 10.7 (L) 13.3 - 17.7 g/dL Final   03/14/2017 14.0 13.3 - 17.7 g/dL Final              Lab Results   Component Value Date    CR 1.37 03/19/2022    CR 1.21 07/14/2017         IMAGING:   I personally reviewed and interpreted the images from the patient's CT A/P dated 3/19/2022: Bilateral kidneys without calculus or obvious mass. No hydronephrosis bilaterally and ureters are without dilation. Large simple renal cyst on the right renal unit.  Prostate is quite enlarged with a large intravesical median lobe visible within the lumen. The bladder is somewhat thickened particularly close to the bladder base.      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 Gross Hematuria  - Most likely of prostatic origin but would recommend outpatient cystoscopy to evaluate the bladder given the bladder wall thickening  - For now may benefit from burrell catheter decompression and PRN bladder irrigation.  - More acute issues of SMA thrombosis, sepsis, and SOB take  precedent.  - Will plan for SHANIQUA at next encounter given rapid response.    2. Incomplete bladder emptying  - As above    3. Prostatomegaly  - As above    Ross Navarro MD  [facility]  Pager: [phone]  Office: [phone]  Surgical [phone]  **page on-call urologist on AMION for [facility] patient questions

## 2022-03-19 NOTE — ED NOTES
[facility]  ED Nurse Handoff Report    ED Chief complaint: Shortness of Breath, Hematuria, and Bradycardia      ED Diagnosis:   Final diagnoses:   Superior mesenteric artery thrombosis (H)   Gross hematuria   Acute cystitis with hematuria       Code Status: as per hospitalist    Allergies:   Allergies   Allergen Reactions     Bupropion Hcl      vivid dreams     Chantix [Varenicline] Other (See Comments)     CNS side effects     Pcn [Penicillins] Anaphylaxis       Patient Story: patient presents with shortness of breath, hematuria, and bradycardia. Shortness of breath began today and is predominately upon exertion. Bright red hematuria began today with dysuria, diarrhea, and mild cough, chills, intermittent nausea, and mild abdominal discomfort and distension. Hx. Of COPD, HLD, HTN    Focused Assessment:  Ambulatory, A&Ox4, respirations labored, skin dry, hot to touch, color wnl, abdomen is distended, bradycardic.   Results for orders placed or performed during the hospital encounter of 03/19/22   CT Chest (PE) Abdomen Pelvis w Contrast     Status: Abnormal   Result Value Ref Range    Radiologist flags SMA thrombus (AA)     Narrative    EXAM: CT CHEST PE ABDOMEN PELVIS W CONTRAST  LOCATION: [facility]  DATE/TIME: 3/19/2022 1:21 AM    INDICATION: FEVER  COMPARISON: CT chest from 11/17/2017. CT abdomen pelvis from 03/14/2017.  TECHNIQUE: CT chest pulmonary angiogram and routine CT abdomen pelvis with IV contrast. Arterial phase through the chest and venous phase through the abdomen and pelvis. Multiplanar reformats and MIP reconstructions were performed. Dose reduction   techniques were used.   CONTRAST: 88mL Isovue 370    FINDINGS:  ANGIOGRAM CHEST: Pulmonary arteries are normal caliber and negative for pulmonary emboli. Thoracic aorta is negative for dissection. No CT evidence of right heart strain.     LUNGS AND PLEURA: Centrilobular emphysematous change. Scattered strands  of secretions within the lower thoracic trachea. No airspace consolidation. No interstitial edema. No pleural effusion.    MEDIASTINUM/AXILLAE: Heart size at upper limits normal. Small hiatal hernia. Coronary artery disease and stent.    CORONARY ARTERY CALCIFICATION: Previous intervention (stents or CABG).    HEPATOBILIARY: Contracted gallbladder. Liver within normal limits.    PANCREAS: Normal.    SPLEEN: Normal.    ADRENAL GLANDS: Normal.    KIDNEYS/BLADDER: Benign left upper pole renal cortical cyst. Benign right lower pole renal cortical cyst. No hydronephrosis. There is some thickening of the posterior bladder wall which is somewhat nodular in appearance on sagittal image 68.    BOWEL: Normal appendix. Colonic diverticulosis. No bowel obstruction or free air.    LYMPH NODES: Normal.    VASCULATURE: Moderate abdominal aortic atherosclerotic calcification. There is severe narrowing of the proximal superior mesenteric artery as seen on image 71 of the sagittal series, secondary to a low density focus along the inferior wall of the   proximal SMA trunk. This is noncalcified. The SMA is patent distal to this.    PELVIC ORGANS: Moderately enlarged, heterogeneous prostate.    MUSCULOSKELETAL: Thoracolumbar spine degenerative disc change. Arthritic change of the hips.      Impression    IMPRESSION:  1.  Negative for pulmonary embolism.    2.  Centrilobular emphysema without evidence of pneumonia.    3.  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 The SMA trunk is patent distal to this.    4.  Moderate enlargement and heterogeneity of the prostate. There is thickening of the posterior bladder wall which is somewhat nodular in appearance on the sagittal series, though difficult to ascertain if this is separate from the prostate which   indents on the undersurface of the bladder. Recommend follow-up with  urology.      [Critical Result: SMA thrombus]    Finding was identified on 3/19/2022 1:33 AM.     Dr. Hernandez was contacted by me on 3/19/2022 1:49 AM and verbalized understanding of the critical result.    Comprehensive metabolic panel     Status: Abnormal   Result Value Ref Range    Sodium 140 133 - 144 mmol/L    Potassium 3.9 3.4 - 5.3 mmol/L    Chloride 109 94 - 109 mmol/L    Carbon Dioxide (CO2) 23 20 - 32 mmol/L    Anion Gap 8 3 - 14 mmol/L    Urea Nitrogen 25 7 - 30 mg/dL    Creatinine 1.30 (H) 0.66 - 1.25 mg/dL    Calcium 8.8 8.5 - 10.1 mg/dL    Glucose 121 (H) 70 - 99 mg/dL    Alkaline Phosphatase 44 40 - 150 U/L    AST 20 0 - 45 U/L    ALT 27 0 - 70 U/L    Protein Total 7.4 6.8 - 8.8 g/dL    Albumin 3.8 3.4 - 5.0 g/dL    Bilirubin Total 0.7 0.2 - 1.3 mg/dL    GFR Estimate 55 (L) >60 mL/min/1.73m2   Troponin I     Status: Normal   Result Value Ref Range    Troponin I High Sensitivity 14 <79 ng/L   UA with Microscopic reflex to Culture     Status: Abnormal    Specimen: Urine, Midstream   Result Value Ref Range    Color Urine Red (A) Colorless, Straw, Light Yellow, Yellow    Appearance Urine Bloody (A) Clear    Glucose Urine Negative Negative mg/dL    Bilirubin Urine Negative Negative    Ketones Urine Negative Negative mg/dL    Specific Gravity Urine 1.020 1.003 - 1.035    Blood Urine Large (A) Negative    pH Urine 6.5 5.0 - 7.0    Protein Albumin Urine 300  (A) Negative mg/dL    Urobilinogen Urine Normal Normal, 2.0 mg/dL    Nitrite Urine Negative Negative    Leukocyte Esterase Urine Large (A) Negative    Mucus Urine Present (A) None Seen /LPF    RBC Urine >182 (H) <=2 /HPF    WBC Urine >182 (H) <=5 /HPF    Narrative    Urine Culture ordered based on laboratory criteria   CBC with platelets and differential     Status: Abnormal   Result Value Ref Range    WBC Count 11.8 (H) 4.0 - 11.0 10e3/uL    RBC Count 3.49 (L) 4.40 - 5.90 10e6/uL    Hemoglobin 11.9 (L) 13.3 - 17.7 g/dL    Hematocrit 36.3 (L) 40.0 - 53.0 %      (H) 78 - 100 fL    MCH 34.1 (H) 26.5 - 33.0 pg    MCHC 32.8 31.5 - 36.5 g/dL    RDW 16.9 (H) 10.0 - 15.0 %    Platelet Count 182 150 - 450 10e3/uL    % Neutrophils 85 %    % Lymphocytes 5 %    % Monocytes 7 %    % Eosinophils 2 %    % Basophils 0 %    % Immature Granulocytes 1 %    NRBCs per 100 WBC 0 <1 /100    Absolute Neutrophils 10.0 (H) 1.6 - 8.3 10e3/uL    Absolute Lymphocytes 0.6 (L) 0.8 - 5.3 10e3/uL    Absolute Monocytes 0.8 0.0 - 1.3 10e3/uL    Absolute Eosinophils 0.2 0.0 - 0.7 10e3/uL    Absolute Basophils 0.0 0.0 - 0.2 10e3/uL    Absolute Immature Granulocytes 0.2 <=0.4 10e3/uL    Absolute NRBCs 0.0 10e3/uL   [place] Draw     Status: None    Narrative    The following orders were created for panel order [place] Draw.  Procedure                               Abnormality         Status                     ---------                               -----------         ------                     Extra Blue Top Tube[[id number]]                              Final result               Extra Red Top Tube[[id number]]                               Final result               Extra Blood Bank Purple ...[[id number]]                      Final result                 Please view results for these tests on the individual orders.   Extra Blue Top Tube     Status: None   Result Value Ref Range    Hold Specimen JI    Extra Red Top Tube     Status: None   Result Value Ref Range    Hold Specimen [facility]    Extra Blood Bank Purple Top Tube     Status: None   Result Value Ref Range    Hold Specimen JI    iStat Gases (lactate) venous, POCT     Status: Abnormal   Result Value Ref Range    Lactic Acid POCT 2.3 (H) <=2.0 mmol/L    Bicarbonate Venous POCT 21 21 - 28 mmol/L    O2 Sat, Venous POCT 56 (L) 94 - 100 %    pCO2V Venous POCT 34 (L) 40 - 50 mm Hg    pH Venous POCT 7.41 7.32 - 7.43    pO2 Venous POCT 29 25 - 47 mm Hg   INR     Status: Abnormal   Result Value Ref Range    INR 1.21 (H) 0.85 - 1.15   Lipase     Status:  Normal   Result Value Ref Range    Lipase 208 73 - 393 U/L   Nt probnp inpatient (BNP)     Status: Normal   Result Value Ref Range    N terminal Pro BNP Inpatient 312 0-1,800 pg/mL   Magnesium     Status: Abnormal   Result Value Ref Range    Magnesium 1.5 (L) 1.6 - 2.3 mg/dL   CBC with platelets differential     Status: Abnormal    Narrative    The following orders were created for panel order CBC with platelets differential.  Procedure                               Abnormality         Status                     ---------                               -----------         ------                     CBC with platelets and d...[[id number]]  Abnormal            Final result                 Please view results for these tests on the individual orders.       Treatments and/or interventions provided: IVF, IV abx, Mg replacement.   Patient's response to treatments and/or interventions: VSS, comfortably resting in stretcher.     To be done/followed up on inpatient unit:  continue with POC     Does this patient have any cognitive concerns?: n/a    Activity level - Baseline/Home:  Independent  Activity Level - Current:   Independent    Patient's Preferred language: English   Needed?: No    Isolation: None  Infection: Not Applicable  Patient tested for COVID 19 prior to admission: YES  Bariatric?: No    Vital Signs:   Vitals:    03/19/22 0100 03/19/22 0130 03/19/22 0145 03/19/22 0200   BP: 136/51 (!) 140/47  135/44   Pulse: (!) 44 (!) 47 (!) 45 (!) 46   Resp: 14 15 14 30   Temp:       TempSrc:       SpO2: 94% 94% 95% 95%   Weight:       Height:           Cardiac Rhythm:Cardiac Rhythm: Sinus bradycardia    Was the PSS-3 completed:   Yes  What interventions are required if any?      Family Comments: pt family at bedside while in ED  OBS brochure/video discussed/provided to patient/family: N/A             For the majority of the shift this patient's behavior was Green.   Behavioral interventions performed were n/a.    ED  NURSE PHONE NUMBER: [phone]

## 2022-03-19 NOTE — PROGRESS NOTES
Cardiology note:    Briefly this is a 81-year-old female which cardiology was consulted for bradycardia without evidence of high-grade conduction abnormalities.  She is now in the operating room for a vascular surgical procedure and not available for the consultation    Cardiology will evaluate the patient tomorrow.  Please call overnight if there is any acute or urgent issues.

## 2022-03-20 LAB
ANION GAP SERPL CALCULATED.3IONS-SCNC: 7 MMOL/L (ref 3–14)
BACTERIA UR CULT: NORMAL
BUN SERPL-MCNC: 21 MG/DL (ref 7–30)
CALCIUM SERPL-MCNC: 7.7 MG/DL (ref 8.5–10.1)
CHLORIDE BLD-SCNC: 112 MMOL/L (ref 94–109)
CO2 SERPL-SCNC: 23 MMOL/L (ref 20–32)
CREAT SERPL-MCNC: 1.22 MG/DL (ref 0.66–1.25)
ENTEROCOCCUS FAECALIS: NOT DETECTED
ENTEROCOCCUS FAECIUM: NOT DETECTED
ERYTHROCYTE [DISTWIDTH] IN BLOOD BY AUTOMATED COUNT: 17.5 % (ref 10–15)
GFR SERPL CREATININE-BSD FRML MDRD: 59 ML/MIN/1.73M2
GLUCOSE BLD-MCNC: 181 MG/DL (ref 70–99)
GLUCOSE BLDC GLUCOMTR-MCNC: 130 MG/DL (ref 70–99)
GLUCOSE BLDC GLUCOMTR-MCNC: 137 MG/DL (ref 70–99)
GLUCOSE BLDC GLUCOMTR-MCNC: 145 MG/DL (ref 70–99)
HBA1C MFR BLD: 5.4 % (ref 0–5.6)
HCT VFR BLD AUTO: 32 % (ref 40–53)
HGB BLD-MCNC: 10.6 G/DL (ref 13.3–17.7)
LACTATE SERPL-SCNC: 1.8 MMOL/L (ref 0.7–2)
LACTATE SERPL-SCNC: 1.9 MMOL/L (ref 0.7–2)
LISTERIA SPECIES (DETECTED/NOT DETECTED): NOT DETECTED
MAGNESIUM SERPL-MCNC: 2.3 MG/DL (ref 1.6–2.3)
MCH RBC QN AUTO: 34 PG (ref 26.5–33)
MCHC RBC AUTO-ENTMCNC: 33.1 G/DL (ref 31.5–36.5)
MCV RBC AUTO: 103 FL (ref 78–100)
PLATELET # BLD AUTO: 158 10E3/UL (ref 150–450)
POTASSIUM BLD-SCNC: 3.8 MMOL/L (ref 3.4–5.3)
RBC # BLD AUTO: 3.12 10E6/UL (ref 4.4–5.9)
SODIUM SERPL-SCNC: 142 MMOL/L (ref 133–144)
STAPHYLOCOCCUS AUREUS: NOT DETECTED
STAPHYLOCOCCUS EPIDERMIDIS: DETECTED
STAPHYLOCOCCUS LUGDUNENSIS: NOT DETECTED
STREPTOCOCCUS AGALACTIAE: NOT DETECTED
STREPTOCOCCUS ANGINOSUS GROUP: NOT DETECTED
STREPTOCOCCUS PNEUMONIAE: NOT DETECTED
STREPTOCOCCUS PYOGENES: NOT DETECTED
STREPTOCOCCUS SPECIES: NOT DETECTED
UFH PPP CHRO-ACNC: 0.39 IU/ML
WBC # BLD AUTO: 22 10E3/UL (ref 4–11)

## 2022-03-20 PROCEDURE — 250N000011 HC RX IP 250 OP 636: Performed by: SURGERY

## 2022-03-20 PROCEDURE — 250N000013 HC RX MED GY IP 250 OP 250 PS 637: Performed by: INTERNAL MEDICINE

## 2022-03-20 PROCEDURE — 80048 BASIC METABOLIC PNL TOTAL CA: CPT | Performed by: SURGERY

## 2022-03-20 PROCEDURE — 250N000011 HC RX IP 250 OP 636: Performed by: INTERNAL MEDICINE

## 2022-03-20 PROCEDURE — 83735 ASSAY OF MAGNESIUM: CPT | Performed by: SURGERY

## 2022-03-20 PROCEDURE — 99233 SBSQ HOSP IP/OBS HIGH 50: CPT | Performed by: INTERNAL MEDICINE

## 2022-03-20 PROCEDURE — 258N000003 HC RX IP 258 OP 636: Performed by: HOSPITALIST

## 2022-03-20 PROCEDURE — 85520 HEPARIN ASSAY: CPT | Performed by: INTERNAL MEDICINE

## 2022-03-20 PROCEDURE — 83036 HEMOGLOBIN GLYCOSYLATED A1C: CPT | Performed by: HOSPITALIST

## 2022-03-20 PROCEDURE — 200N000001 HC R&B ICU

## 2022-03-20 PROCEDURE — 250N000011 HC RX IP 250 OP 636: Performed by: HOSPITALIST

## 2022-03-20 PROCEDURE — 99221 1ST HOSP IP/OBS SF/LOW 40: CPT | Performed by: INTERNAL MEDICINE

## 2022-03-20 PROCEDURE — 87040 BLOOD CULTURE FOR BACTERIA: CPT | Performed by: HOSPITALIST

## 2022-03-20 PROCEDURE — 258N000003 HC RX IP 258 OP 636: Performed by: SURGERY

## 2022-03-20 PROCEDURE — 250N000013 HC RX MED GY IP 250 OP 250 PS 637: Performed by: SURGERY

## 2022-03-20 PROCEDURE — 86618 LYME DISEASE ANTIBODY: CPT | Performed by: INTERNAL MEDICINE

## 2022-03-20 PROCEDURE — 250N000009 HC RX 250: Performed by: HOSPITALIST

## 2022-03-20 PROCEDURE — 85027 COMPLETE CBC AUTOMATED: CPT | Performed by: SURGERY

## 2022-03-20 PROCEDURE — 99233 SBSQ HOSP IP/OBS HIGH 50: CPT | Performed by: HOSPITALIST

## 2022-03-20 PROCEDURE — 83605 ASSAY OF LACTIC ACID: CPT | Performed by: SURGERY

## 2022-03-20 PROCEDURE — 36415 COLL VENOUS BLD VENIPUNCTURE: CPT | Performed by: HOSPITALIST

## 2022-03-20 RX ORDER — NALOXONE HYDROCHLORIDE 0.4 MG/ML
0.4 INJECTION, SOLUTION INTRAMUSCULAR; INTRAVENOUS; SUBCUTANEOUS
Status: DISCONTINUED | OUTPATIENT
Start: 2022-03-20 | End: 2022-03-26 | Stop reason: HOSPADM

## 2022-03-20 RX ORDER — NALOXONE HYDROCHLORIDE 0.4 MG/ML
0.2 INJECTION, SOLUTION INTRAMUSCULAR; INTRAVENOUS; SUBCUTANEOUS
Status: DISCONTINUED | OUTPATIENT
Start: 2022-03-20 | End: 2022-03-26 | Stop reason: HOSPADM

## 2022-03-20 RX ORDER — HYDROMORPHONE HYDROCHLORIDE 1 MG/ML
.3-.5 INJECTION, SOLUTION INTRAMUSCULAR; INTRAVENOUS; SUBCUTANEOUS
Status: DISCONTINUED | OUTPATIENT
Start: 2022-03-20 | End: 2022-03-26 | Stop reason: HOSPADM

## 2022-03-20 RX ORDER — CEFTRIAXONE 2 G/1
2 INJECTION, POWDER, FOR SOLUTION INTRAMUSCULAR; INTRAVENOUS EVERY 24 HOURS
Status: DISCONTINUED | OUTPATIENT
Start: 2022-03-20 | End: 2022-03-26 | Stop reason: HOSPADM

## 2022-03-20 RX ORDER — LIDOCAINE HYDROCHLORIDE 20 MG/ML
JELLY TOPICAL
Status: COMPLETED | OUTPATIENT
Start: 2022-03-20 | End: 2022-03-20

## 2022-03-20 RX ADMIN — HYDROMORPHONE HYDROCHLORIDE 0.5 MG: 1 INJECTION, SOLUTION INTRAMUSCULAR; INTRAVENOUS; SUBCUTANEOUS at 09:29

## 2022-03-20 RX ADMIN — HEPARIN SODIUM 1450 UNITS/HR: 1000 INJECTION INTRAVENOUS; SUBCUTANEOUS at 20:48

## 2022-03-20 RX ADMIN — LIDOCAINE HYDROCHLORIDE: 20 JELLY TOPICAL at 09:29

## 2022-03-20 RX ADMIN — HYDROMORPHONE HYDROCHLORIDE 0.5 MG: 1 INJECTION, SOLUTION INTRAMUSCULAR; INTRAVENOUS; SUBCUTANEOUS at 23:49

## 2022-03-20 RX ADMIN — DOPAMINE HYDROCHLORIDE IN DEXTROSE 5 MCG/KG/MIN: 1.6 INJECTION, SOLUTION INTRAVENOUS at 12:07

## 2022-03-20 RX ADMIN — PHENOL 1 ML: 1.4 SPRAY ORAL at 09:42

## 2022-03-20 RX ADMIN — SODIUM CHLORIDE, POTASSIUM CHLORIDE, SODIUM LACTATE AND CALCIUM CHLORIDE: 600; 310; 30; 20 INJECTION, SOLUTION INTRAVENOUS at 16:47

## 2022-03-20 RX ADMIN — PHENOL 1 ML: 1.4 SPRAY ORAL at 04:33

## 2022-03-20 RX ADMIN — HYDROMORPHONE HYDROCHLORIDE 0.5 MG: 1 INJECTION, SOLUTION INTRAMUSCULAR; INTRAVENOUS; SUBCUTANEOUS at 20:17

## 2022-03-20 RX ADMIN — PHENOL 1 ML: 1.4 SPRAY ORAL at 07:48

## 2022-03-20 RX ADMIN — SODIUM CHLORIDE, POTASSIUM CHLORIDE, SODIUM LACTATE AND CALCIUM CHLORIDE: 600; 310; 30; 20 INJECTION, SOLUTION INTRAVENOUS at 03:13

## 2022-03-20 RX ADMIN — HYDROMORPHONE HYDROCHLORIDE 0.5 MG: 1 INJECTION, SOLUTION INTRAMUSCULAR; INTRAVENOUS; SUBCUTANEOUS at 13:14

## 2022-03-20 RX ADMIN — ACETAMINOPHEN 650 MG: 650 SUPPOSITORY RECTAL at 02:51

## 2022-03-20 RX ADMIN — PHENOL 1 ML: 1.4 SPRAY ORAL at 16:42

## 2022-03-20 RX ADMIN — CEFTRIAXONE SODIUM 2 G: 2 INJECTION, POWDER, FOR SOLUTION INTRAMUSCULAR; INTRAVENOUS at 20:38

## 2022-03-20 ASSESSMENT — ACTIVITIES OF DAILY LIVING (ADL)
ADLS_ACUITY_SCORE: 9

## 2022-03-20 NOTE — PROGRESS NOTES
"Urology Note    Subjective:  22 Fr 3-way burrell placed this morning for hematuria. He reports slow stream and nocturia normally      Objective:    /47 (BP Location: Left arm)   Pulse 72   Temp 98.6  F (37  C) (Oral)   Resp 11   Ht 1.778 m (5' 10\")   Wt 84 kg (185 lb 3 oz)   SpO2 98%   BMI 26.57 kg/m        Intake/Output Summary (Last 24 hours) at 3/20/2022 1419  Last data filed at 3/20/2022 1200  Gross per 24 hour   Intake 4233.45 ml   Output 2425 ml   Net 1808.45 ml       Labs:    ROUTINE IP LABS (Last four results)  BMP  Recent Labs   Lab 03/20/22  0755 03/20/22  0523 03/19/22  1551 03/19/22  1114 03/19/22  0927 03/19/22  0502 03/18/22  2359   NA  --  142  --  141  --  140 140   POTASSIUM  --  3.8  --  3.9  --  3.9 3.9   CHLORIDE  --  112*  --  112*  --  111* 109   PAIGE  --  7.7*  --  8.1*  --  8.0* 8.8   CO2  --  23  --  22  --  21 23   BUN  --  21  --  21  --  25 25   CR  --  1.22  --  1.33*  --  1.37* 1.30*   * 181* 126* 144*   < > 144* 121*    < > = values in this interval not displayed.     CBC  Recent Labs   Lab 03/20/22  0523 03/19/22  0502 03/18/22  2359   WBC 22.0* 14.2* 11.8*   RBC 3.12* 3.16* 3.49*   HGB 10.6* 10.7* 11.9*   HCT 32.0* 33.5* 36.3*   * 106* 104*   MCH 34.0* 33.9* 34.1*   MCHC 33.1 31.9 32.8   RDW 17.5* 16.9* 16.9*    155 182     INR  Recent Labs   Lab 03/19/22  0001   INR 1.21*         Exam:    Gen: A&O x3  Ab: soft, ND, + incisional tenderness  : + burrell catheter (urine clear - red) - slow CBI  Ext: No calve tenderness      Assessment/Plan:     1.  Gross hematuria    - CT scan revealed large prostate (80 gm) and thickening of lower posterior wall (+ smoker)    - on Heparin drip    - continue CBI - titrate to keep urine clear or pink    - UCx - mixed jackelin (on Rocephin)    - will need further evaluation with Cystoscopy in the future      2.  BPH    - continue Flomax    - start Finasteride 5 mg daily (when taking PO)    - will need further evaluation (may " need procedure to help with urination)     3.  2nd degree heart block    - being evaluated for pacemaker    Sourav Garcia MD

## 2022-03-20 NOTE — PROGRESS NOTES
Vascular Surgery Progress Note     Date of Admission:  3/19/2022  Date: March 20, 2022     Subjective  Mr. Lyle reports doing okay with minimal abdominal pain this AM. He does not like the NGT, which he feels is making him cough and exacerbating the abdominal pain.   Overall, he appears much more alert and comfortable than yesterday.       Physical Exam   Temp: 98.6  F (37  C) Temp src: Oral BP: 120/47 Pulse: (!) 45   Resp: 16 SpO2: 97 % O2 Device: Nasal cannula Oxygen Delivery: 2 LPM  Vital Signs with Ranges  Temp:  [98.6  F (37  C)-99.3  F (37.4  C)] 98.6  F (37  C)  Pulse:  [39-78] 45  Resp:  [9-41] 16  BP: (110-129)/(47-54) 120/47  MAP:  [49 mmHg-104 mmHg] 71 mmHg  Arterial Line BP: ()/(27-65) 126/46  SpO2:  [91 %-99 %] 97 %  185 lbs 2.98 oz    Constitutional: cooperative, no apparent distress. Sitting up comfortably in bed. Family at bedside.   Abd: soft, mildly distended, minimal appropriate TTP. Sanguinous drainage on bandage. Clear effluent from NGT.   Musculoskeletal: grossly normal and symmetric ROM and strength in BL extremities   Neurologic: Awake, alert, oriented to name, place, time, and situation    Data   Most Recent 3 CBCs:  Recent Labs   Lab Test 03/20/22  0523 03/19/22  0502 03/18/22  2359   WBC 22.0* 14.2* 11.8*   HGB 10.6* 10.7* 11.9*   * 106* 104*    155 182       Most Recent 3 BMPs:  Recent Labs   Lab Test 03/20/22  0755 03/20/22  0523 03/19/22  1551 03/19/22  1114 03/19/22  0927 03/19/22  0502   NA  --  142  --  141  --  140   POTASSIUM  --  3.8  --  3.9  --  3.9   CHLORIDE  --  112*  --  112*  --  111*   CO2  --  23  --  22  --  21   BUN  --  21  --  21  --  25   CR  --  1.22  --  1.33*  --  1.37*   ANIONGAP  --  7  --  7  --  8   PAIGE  --  7.7*  --  8.1*  --  8.0*   * 181* 126* 144*   < > 144*    < > = values in this interval not displayed.       Most Recent 3 INRs:  Recent Labs   Lab Test 03/19/22  0001   INR 1.21*          Assessment & Plan   Mr. 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 He was taken to the OR on 3/19/22 for exploratory laparotomy, with no SMA thrombus or bowel ischemia found.       Negative laparotomy findings reviewed with Mr and Mrs Lyle and their son today at the bedside    Would continue therapeutic heparin gtt x 48 hours post-op and then ok to switch to prophylactic dose    Appreciate Medicine, ICU, Cards, Urology, ID care and insights    No obvious intra-abdominal source for the leukocytosis and lactic acidosis yesterday    Long-term, he should likely be on aspirin 81 mg + simvastatin 80 mg (home meds)    NGT decompression until he starts passing flatus; will need to recover from laparotomy    Smita Garsia

## 2022-03-20 NOTE — PROGRESS NOTES
[facility]    Cardiology Progress Note     Assessment & Plan   Sunil Lyle is a 81 year old male who was admitted on 3/19/2022.     1. Bradycardia, high-grade second-degree AV block (intermittent)  2. Exertional dyspnea (likely related to conduction abnormalities)  3. History of coronary artery disease status post RCA stent in 2001  4. Status post exploratory laparotomy due to concern for SMA thrombosis March 19, 2022 (with no thrombus retrieved during the surgery) -on heparin due to concern for small recurrent SMA occlusion  5. Hematuria  6. Sepsis with concern for UTI  7. CKD stage III  8. Hypertension  9. Hyperlipidemia    Now on dopamine with more persistent high-grade AV block this morning.  Maps have been borderline between 60 and 65 primarily related to low diastolic blood pressures.  Continues to make urine and his renal function is stable from presentation.  Lactic acid is within normal limits.    Recommendations:    Continue with dopamine 5 mg/h, can increase if necessary    If he has worsening hemodynamics we can call in the interventional cardiology team to place a temporary pacemaker.    Infectious work-up per hospital medicine    Electrophysiology evaluation tomorrow for permanent pacemaker implantation -preferably will likely need heparin discontinued if permanent pacemaker is implanted in the next 1 to 2 days.  Will defer to hospital medicine and vascular surgery for those recommendations.      Minesh Morrell MD    Interval History   Noted to have continued second-degree AV block overnight.  Stable vital signs on dopamine.    Physical Exam   Temp: 98.6  F (37  C) Temp src: Oral BP: 129/54 Pulse: (!) 43   Resp: 28 SpO2: 96 % O2 Device: Nasal cannula Oxygen Delivery: 2 LPM  Vitals:    03/18/22 2351 03/19/22 0428 03/20/22 0600   Weight: 79.4 kg (175 lb) 80.9 kg (178 lb 6.4 oz) 84 kg (185 lb 3 oz)     Vital Signs with Ranges  Temp:  [98.6  F (37  C)-101.1  F (38.4  C)] 98.6   F (37  C)  Pulse:  [40-78] 43  Resp:  [12-41] 28  BP: (110-137)/(47-54) 129/54  MAP:  [49 mmHg-96 mmHg] 74 mmHg  Arterial Line BP: ()/(27-56) 107/46  SpO2:  [91 %-99 %] 96 %  I/O last 3 completed shifts:  In: 3129.25 [I.V.:3129.25]  Out: 2825 [Urine:2725; Blood:100]  Patient Active Problem List   Diagnosis     Cardiovascular disease     Tobacco use disorder     Other emphysema (H)     Hyperlipidemia LDL goal <70     CKD (chronic kidney disease) stage 3, GFR 30-59 ml/min (H)     Essential hypertension, benign     Coronary artery disease involving native coronary artery of native heart without angina pectoris     Gross hematuria     Superior mesenteric artery thrombosis (H)     Acute cystitis with hematuria     Alert awake and oriented x3, interactive  Nasal gastric tube in place  Nonelevated JVP  Bradycardic, no murmurs rubs or gallops  Abdomen with surgical incision and bandaging in place  Lower extremities are warm and well-perfused without edema.    Medications     DOPamine 5 mcg/kg/min (03/19/22 2000)     heparin 1,450 Units/hr (03/19/22 2355)     lactated ringers 100 mL/hr at 03/20/22 0313     norepinephrine Stopped (03/19/22 2000)     - MEDICATION INSTRUCTIONS -         cefTRIAXone  1 g Intravenous Q24H     sodium chloride (PF)  3 mL Intracatheter Q8H     sodium chloride (PF)  3 mL Intracatheter Q8H       Data   Recent Labs   Lab 03/20/22  0755 03/20/22  0523 03/19/22  1551 03/19/22  1114 03/19/22  0927 03/19/22  0502 03/19/22  0001 03/18/22  2359   WBC  --  22.0*  --   --   --  14.2*  --  11.8*   HGB  --  10.6*  --   --   --  10.7*  --  11.9*   MCV  --  103*  --   --   --  106*  --  104*   PLT  --  158  --   --   --  155  --  182   INR  --   --   --   --   --   --  1.21*  --    NA  --  142  --  141  --  140  --  140   POTASSIUM  --  3.8  --  3.9  --  3.9  --  3.9   CHLORIDE  --  112*  --  112*  --  111*  --  109   CO2  --  23  --  22  --  21  --  23   BUN  --  21  --  21  --  25  --  25   CR  --  1.22   --  1.33*  --  1.37*  --  1.30*   ANIONGAP  --  7  --  7  --  8  --  8   PAIGE  --  7.7*  --  8.1*  --  8.0*  --  8.8   * 181* 126* 144*   < > 144*  --  121*   ALBUMIN  --   --   --   --   --   --   --  3.8   PROTTOTAL  --   --   --   --   --   --   --  7.4   BILITOTAL  --   --   --   --   --   --   --  0.7   ALKPHOS  --   --   --   --   --   --   --  44   ALT  --   --   --   --   --   --   --  27   AST  --   --   --   --   --   --   --  20   LIPASE  --   --   --   --   --   --   --  208    < > = values in this interval not displayed.

## 2022-03-20 NOTE — PLAN OF CARE
Admitted from OR after exploratory laparotomy; Noted to be in second degree AV block type 2; Cardiology notified. Patient started on Levophed for MAP in the 50s and dyastolic BP in 30s. In the last 30 minutes of shift patient more frequently in sinus rhythm, however still intermittently switches to second degree AV block. ICU MD notified. Family at bedside in the PM, updated by MD.

## 2022-03-20 NOTE — CONSULTS
INFECTIOUS DISEASES CONSULT    Assessment:  Patient with PMH BPH, CKD, COPD, junctional bradycardia admitted 3/19 with hematuria, dysuria, dyspnea and fevers - found to have intermittent high grade second degree AV block. Went for ex lap for concern for ischemic bowel in setting of CT suggesting possible SMA narrowing, but bowel appeared healthy. Requiring DA here. Infectious clinical presentation seems c/w likely UTI though it is curious that UC with only mixed jackelin. Agree with ceftriaxone, would follow blood cultures. Plans in place for pacemaker - from an ID perspective would be ok with placement when blood cultures have been negative x 72h     Recommendations:  -Increase ceftriaxone to 2g q24h.   -Follow blood cultures.   -Lyme AB.     Thank you for this consult. ID will continue to follow this patient.   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 He began to feel unwell sometime last month - was in [place] for all of February on vacation. Went for a few walks but didn't spend much time outdoors - went to multiple casinos. At the end of the month both him  and his wife developed diarrhea, and he began to develop shortness of breath with exertion. Then 3 days PTA developed bloody urine with pain with urination, as well as chills and some back pain. No cough, rashes, N/V, abd pain. Lives in [place], hasn't seen any ticks on his body this season. Upon presentation he was febrile to 101.8, WBC 11.8. EKG with sinus bradycardia with 1st degree AVB/RBBB. Hypotensive, required DA. UA inflammatory. CT with SMA narrowing, enlarged prostate and bladder wall nodularity and thickening. He was started on ceftriaxone, went to OR for ex lap given concern for ischemic bowel - bowel appeared healthy. He is currently undergoing bladder irrigation. Hasn't had a BM since he arrived.    Review of Systems:  10 pt ROS negative except where noted above.    Past Medical History:  Past Medical History:   Diagnosis Date     Allergic state      CAD (coronary artery disease) 2014     s/p stent to RCA ()     GERD (gastroesophageal reflux disease)      Hypercholesterolaemia      Hyperlipidaemia      Other affections of shoulder region, not elsewhere classified     impingement syndrome of right shoulder     Sebaceous cyst     right shoulder area     Tachycardia      Tobacco use disorder 3/5/2008       Allergies:  Allergies   Allergen Reactions     Bupropion Hcl      vivid dreams     Chantix [Varenicline] Other (See Comments)     CNS side effects     Pcn [Penicillins] Anaphylaxis       Family History:  Family History   Problem Relation Age of Onset     Breast Cancer Mother          at age 64     Heart Disease Father         heart attack     Diabetes Father        Social History:  Social History     Socioeconomic History     Marital status:      Spouse name: Not on file     Number of children: Not on file     Years of education: Not on file     Highest education level: Not on file   Occupational History     Occupation: Sales/Computer Training     Employer: RETIRED   Tobacco  "Use     Smoking status: Former Smoker     Packs/day: 0.50     Years: 33.50     Pack years: 16.75     Types: Cigarettes     Start date:      Quit date: 2017     Years since quittin.3     Smokeless tobacco: Never Used     Tobacco comment: off and on since age 17- smokes about 5 cigs/day plus vapor cigarettes    Substance and Sexual Activity     Alcohol use: Yes     Alcohol/week: 3.3 standard drinks     Types: 4 Shots of liquor per week     Comment: 5-6 drink per week     Drug use: No     Sexual activity: Not on file   Other Topics Concern      Service Not Asked     Comment: [place] [phone]     Blood Transfusions No     Caffeine Concern Yes     Occupational Exposure No     Hobby Hazards No     Sleep Concern No     Stress Concern No     Weight Concern No     Special Diet No     Back Care No     Exercise No     Bike Helmet Not Asked     Seat Belt Yes     Self-Exams Not Asked     Parent/sibling w/ CABG, MI or angioplasty before 65F 55M? Not Asked   Social History Narrative    ** Merged History Encounter **          Social Determinants of Health     Financial Resource Strain: Not on file   Food Insecurity: Not on file   Transportation Needs: Not on file   Physical Activity: Not on file   Stress: Not on file   Social Connections: Not on file   Intimate Partner Violence: Not on file   Housing Stability: Not on file       Physical Exam:  /47 (BP Location: Left arm)   Pulse 72   Temp 98.6  F (37  C) (Oral)   Resp 11   Ht 1.778 m (5' 10\")   Wt 84 kg (185 lb 3 oz)   SpO2 98%   BMI 26.57 kg/m       GENERAL:  Well-developed, well-nourished, lying in bed in no acute distress.   ENT:  Head is normocephalic, atraumatic.   EYES:  Eyes have anicteric sclerae.    LUNGS:  Clear to auscultation.  CARDIOVASCULAR:  Regular rate and rhythm with no murmurs, gallops or rubs.  ABDOMEN:  Abd soft, distended  EXT: Extremities warm and without edema.  SKIN:  No acute rashes.    NEUROLOGIC:  Conversational, " CHAKRABORTY    Laboratory Data:  Creatinine   Date Value Ref Range Status   03/20/2022 1.22 0.66 - 1.25 mg/dL Final   03/19/2022 1.33 (H) 0.66 - 1.25 mg/dL Final   03/19/2022 1.37 (H) 0.66 - 1.25 mg/dL Final   03/18/2022 1.30 (H) 0.66 - 1.25 mg/dL Final   07/14/2017 1.21 0.70 - 1.30 mg/dL Final   03/14/2017 1.11 0.66 - 1.25 mg/dL Final   06/18/2015 1.21 0.70 - 1.30 mg/dL Final   09/02/2014 1.24 0.66 - 1.25 mg/dL Final   06/04/2014 1.34 (H) 0.7 - 1.3 mg/dL Final     WBC   Date Value Ref Range Status   03/14/2017 6.6 4.0 - 11.0 10e9/L Final   09/02/2014 7.2 4.0 - 11.0 10e9/L Final   08/25/2013 5.9 4.0 - 11.0 10e9/L Final   03/28/2009 5.9 4.0 - 11.0 10e9/L Final   02/14/2008 6.0 4.0 - 11.0 10e9/L Final     WBC Count   Date Value Ref Range Status   03/20/2022 22.0 (H) 4.0 - 11.0 10e3/uL Final   03/19/2022 14.2 (H) 4.0 - 11.0 10e3/uL Final   03/18/2022 11.8 (H) 4.0 - 11.0 10e3/uL Final     Hemoglobin   Date Value Ref Range Status   03/20/2022 10.6 (L) 13.3 - 17.7 g/dL Final   03/14/2017 14.0 13.3 - 17.7 g/dL Final     Platelet Count   Date Value Ref Range Status   03/20/2022 158 150 - 450 10e3/uL Final   03/14/2017 191 150 - 450 10e9/L Final     Lab Results   Component Value Date     03/20/2022    BUN 21 03/20/2022    CO2 23 03/20/2022     No results found for: CRP     Micro:  No results for input(s): CULT, SDES in the last 168 hours.    Imaging:  Recent Results (from the past 48 hour(s))   CT Chest (PE) Abdomen Pelvis w Contrast   Result Value    Radiologist flags SMA thrombus (AA)    Narrative    EXAM: CT CHEST PE ABDOMEN PELVIS W CONTRAST  LOCATION: [facility]  DATE/TIME: 3/19/2022 1:21 AM    INDICATION: FEVER  COMPARISON: CT chest from 11/17/2017. CT abdomen pelvis from 03/14/2017.  TECHNIQUE: CT chest pulmonary angiogram and routine CT abdomen pelvis with IV contrast. Arterial phase through the chest and venous phase through the abdomen and pelvis. Multiplanar reformats and MIP  reconstructions were performed. Dose reduction   techniques were used.   CONTRAST: 88mL Isovue 370    FINDINGS:  ANGIOGRAM CHEST: Pulmonary arteries are normal caliber and negative for pulmonary emboli. Thoracic aorta is negative for dissection. No CT evidence of right heart strain.     LUNGS AND PLEURA: Centrilobular emphysematous change. Scattered strands of secretions within the lower thoracic trachea. No airspace consolidation. No interstitial edema. No pleural effusion.    MEDIASTINUM/AXILLAE: Heart size at upper limits normal. Small hiatal hernia. Coronary artery disease and stent.    CORONARY ARTERY CALCIFICATION: Previous intervention (stents or CABG).    HEPATOBILIARY: Contracted gallbladder. Liver within normal limits.    PANCREAS: Normal.    SPLEEN: Normal.    ADRENAL GLANDS: Normal.    KIDNEYS/BLADDER: Benign left upper pole renal cortical cyst. Benign right lower pole renal cortical cyst. No hydronephrosis. There is some thickening of the posterior bladder wall which is somewhat nodular in appearance on sagittal image 68.    BOWEL: Normal appendix. Colonic diverticulosis. No bowel obstruction or free air.    LYMPH NODES: Normal.    VASCULATURE: Moderate abdominal aortic atherosclerotic calcification. There is severe narrowing of the proximal superior mesenteric artery as seen on image 71 of the sagittal series, secondary to a low density focus along the inferior wall of the   proximal SMA trunk. This is noncalcified. The SMA is patent distal to this.    PELVIC ORGANS: Moderately enlarged, heterogeneous prostate.    MUSCULOSKELETAL: Thoracolumbar spine degenerative disc change. Arthritic change of the hips.      Impression    IMPRESSION:  1.  Negative for pulmonary embolism.    2.  Centrilobular emphysema without evidence of pneumonia.    3.  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 The SMA  trunk is patent distal to this.    4.  Moderate enlargement and heterogeneity of the prostate. There is thickening of the posterior bladder wall which is somewhat nodular in appearance on the sagittal series, though difficult to ascertain if this is separate from the prostate which   indents on the undersurface of the bladder. Recommend follow-up with urology.      [Critical Result: SMA thrombus]    Finding was identified on 3/19/2022 1:33 AM.     Dr. Hernandez was contacted by me on 3/19/2022 1:49 AM and verbalized understanding of the critical result.    Echocardiogram Complete   Result Value    LVEF  60-65%    Narrative    [id number]  [id number]  [id number]^NEEMA^MERCEDES^DEANA     [facility]  Echocardiography Laboratory  [address] [place] [address]     Name: SPARKLE HERBERT  MRN: [id number]  : 1940  Study Date: 2022 10:24 AM  Age: 81 yrs  Gender: Male  Patient Location: [facility]  Reason For Study: Dyspnea  Ordering Physician: MERCEDES BETHEA  Referring Physician: Poli Alberto  Performed By: Patti Ibanez     BSA: 2.0 m2  Height: 70 in  Weight: 178 lb  BP: 158/77 mmHg  ______________________________________________________________________________  Procedure  Complete Portable Echo Adult. Optison (NDC #[id number]) given intravenously.  ______________________________________________________________________________  Interpretation Summary     The rhythm was sinus with wide QRS.  Left ventricular systolic function is normal.  There is mild to moderate concentric left ventricular hypertrophy.  The left ventricle is normal in size.  The right ventricle is normal in structure, function and size.  Right ventricular systolic pressure is elevated, consistent with moderate  pulmonary hypertension.  Doppler interrogation does not demonstrate signficant stenosis or  insufficiency involivng cardiac valves     As compared with a very limited study performed 2017 there has been  no  significant change.  ______________________________________________________________________________  Left Ventricle  The left ventricle is normal in size. There is mild to moderate concentric  left ventricular hypertrophy. Left ventricular systolic function is normal.  The visual ejection fraction is 60-65%. Grade I or early diastolic  dysfunction. No regional wall motion abnormalities noted. There is no thrombus  seen in the left ventricle.     Right Ventricle  The right ventricle is normal in structure, function and size. There is no  mass or thrombus in the right ventricle.     Atria  Normal left atrial size. Right atrial size is normal. There is no atrial shunt  seen. The left atrial appendage is not well visualized.     Mitral Valve  The mitral valve leaflets appear normal. There is no evidence of stenosis,  fluttering, or prolapse. There is no mitral regurgitation noted. There is no  mitral valve stenosis.     Tricuspid Valve  Normal tricuspid valve. Right ventricular systolic pressure is elevated,  consistent with moderate pulmonary hypertension. The right ventricular  systolic pressure is elevated at 47.3 mmHg. There is mild (1+) tricuspid  regurgitation. There is no tricuspid stenosis.     Aortic Valve  The aortic valve is trileaflet. No aortic regurgitation is present. No aortic  stenosis is present.     Pulmonic Valve  The pulmonic valve is not well visualized.     Vessels  The aortic root is normal size. Normal size ascending aorta. The inferior vena  cava is normal. The pulmonary artery is normal size.     Pericardium  The pericardium appears normal. There is no pleural effusion.     Rhythm  The rhythm was sinus with wide QRS.  ______________________________________________________________________________  MMode/2D Measurements & Calculations     IVSd: 1.5 cm  LVIDd: 4.3 cm  LVIDs: 3.0 cm  LVPWd: 1.3 cm  FS: 29.9 %  LV mass(C)d: 225.0 grams  LV mass(C)dI: 113.3 grams/m2  RWT: 0.59     Doppler  Measurements & Calculations  MV E max yoseph: 94.1 cm/sec  MV A max yoseph: 113.8 cm/sec  MV E/A: 0.83  MV dec slope: 647.7 cm/sec2  MV dec time: 0.15 sec  PA acc time: 0.10 sec  TR max yoseph: 343.9 cm/sec  TR max P.3 mmHg  E/E' av.5  Lateral E/e': 6.1  Medial E/e': 10.9     ______________________________________________________________________________  Report approved by: Dr. Doug Becker 2022 12:09 PM

## 2022-03-20 NOTE — PROGRESS NOTES
Pt sp ex lap and in second degree AV block type 2. VS stable on Dopamine drip 5mcg/kg/min, Heparin drip titrated according to protocol. 2lNC no acute respiratory distress noted. Blanca in with bloody urine output noted, and pt has bph and urology aware., LR for elevated lactate and pt is NPO with NGT to LCWS. No acute events overnight will continue to monitor.

## 2022-03-20 NOTE — PLAN OF CARE
Patient continues to have bloody urine; Continuous bladder irrigation started per urology; Patient's urine somewhat lighter, however still pink with clots occasionally clogging the tubing. Maintains blood pressure WDL though still predominantly in second degree AV block. Complains of incisional pain, drainage on island dressing did not increase during the shift. Treated with PRN medication, reports decrease in pain. On 2L nasal cannula, needed for intermittent desaturation; patient breathing shallow, started on incentive spirometry. Bowel sounds hypoactive, does not pass gas at this time. Spouse and son updated at bedside.     Goal Outcome Evaluation:    Plan of Care Reviewed With: spouse, son     Overall Patient Progress: no change

## 2022-03-20 NOTE — CONSULTS
ICU consult note    Assessment and plan: 81-year-old man with complex presentation of fever, shortness of breath and findings suggestive of SMA occlusion.  Nothing was found on expiratory laparotomy but he has had significant bradycardia with high-grade AV block noted.  Started on dopamine yesterday.    Of note, his apple watch is showed progressively increasing periods of bradycardia over the last few months.    Cardiology: Asymptomatic bradycardia.  High-grade AV block.  Consult with cardiology for consideration for pacemaker placement.  We are waiting for cardiology evaluation can continue on current dose of dopamine but if there is any hypertension we can titrate down.    Of note, based on his history it does not sound like his bradycardia is the result of an acute event but rather progressive degradation of conduction system.    Lactic acidosis: Cause is unclear.  It resolved prior to going to the operating room yesterday.  He is febrile yesterday.  I think is reasonable to continue ceftriaxone and monitor for infection.    Neuro: Pain in the setting of cough from NG tube and recent surgery.  Treat with as needed pain medication.  NG tube management per vascular surgery.    Hematuria: Consult urology.  Follow hemoglobins.  Consider bladder irrigation if there are issues with clots.    We will follow peripherally.  Hospitalist to continue his primary.  Please contact me with any questions.    Chief complaint: Bradycardia on vasopressors    History: 81-year-old man admitted with shortness of breath and hematuria.  Found to have SMA findings on CT concerning for occlusion.  Exploratory laparotomy without evidence of any issues but continued to have bradycardia and a fever.  At this point his primary issue is abdominal pain in setting of cough due to NG tube.  He does note to me that his apple watch showed progressive significant bradycardia increasing length of time over the last few months.  No clear provocative  "or palliative factors.  He was started on dopamine and he continues to heart rates in the 40s.    Social history reviewed.  He has family in town.    Family History   Problem Relation Age of Onset     Breast Cancer Mother          at age 64     Heart Disease Father         heart attack     Diabetes Father      Medications reviewed in epic    On exam  /54 (BP Location: Left arm)   Pulse 67   Temp 98.6  F (37  C) (Oral)   Resp 18   Ht 1.778 m (5' 10\")   Wt 84 kg (185 lb 3 oz)   SpO2 93%   BMI 26.57 kg/m    Resting in bed, little bit uncomfortable  NG in place  Pupils equal reactive no conjunctivitis  Neck supple  No audible wheezes or rhonchi  Bradycardic, regular pulse  Abdomen soft, nontender  Extremities warm  No anxiety, no tremor, normal mental status    CT chest reviewed interpreted by me: Normal-appearing lung parenchyma    Labs reviewed including elevated lactate that resolved.      "

## 2022-03-21 LAB
B BURGDOR IGG+IGM SER QL: 0.04
BLD PROD TYP BPU: NORMAL
BLOOD COMPONENT TYPE: NORMAL
CODING SYSTEM: NORMAL
CROSSMATCH: NORMAL
ERYTHROCYTE [DISTWIDTH] IN BLOOD BY AUTOMATED COUNT: 17.6 % (ref 10–15)
GLUCOSE BLDC GLUCOMTR-MCNC: 113 MG/DL (ref 70–99)
GLUCOSE BLDC GLUCOMTR-MCNC: 114 MG/DL (ref 70–99)
GLUCOSE BLDC GLUCOMTR-MCNC: 121 MG/DL (ref 70–99)
GLUCOSE BLDC GLUCOMTR-MCNC: 129 MG/DL (ref 70–99)
HCT VFR BLD AUTO: 24.5 % (ref 40–53)
HGB BLD-MCNC: 7.2 G/DL (ref 13.3–17.7)
HGB BLD-MCNC: 7.8 G/DL (ref 13.3–17.7)
HGB BLD-MCNC: 8 G/DL (ref 13.3–17.7)
HGB BLD-MCNC: 8.3 G/DL (ref 13.3–17.7)
ISSUE DATE AND TIME: NORMAL
MAGNESIUM SERPL-MCNC: 2.4 MG/DL (ref 1.6–2.3)
MCH RBC QN AUTO: 34.1 PG (ref 26.5–33)
MCHC RBC AUTO-ENTMCNC: 31.8 G/DL (ref 31.5–36.5)
MCV RBC AUTO: 107 FL (ref 78–100)
PLATELET # BLD AUTO: 155 10E3/UL (ref 150–450)
RBC # BLD AUTO: 2.29 10E6/UL (ref 4.4–5.9)
UFH PPP CHRO-ACNC: 0.24 IU/ML
UNIT ABO/RH: NORMAL
UNIT NUMBER: NORMAL
UNIT STATUS: NORMAL
UNIT TYPE ISBT: 6200
WBC # BLD AUTO: 14.2 10E3/UL (ref 4–11)

## 2022-03-21 PROCEDURE — 85018 HEMOGLOBIN: CPT | Performed by: HOSPITALIST

## 2022-03-21 PROCEDURE — 250N000011 HC RX IP 250 OP 636: Performed by: STUDENT IN AN ORGANIZED HEALTH CARE EDUCATION/TRAINING PROGRAM

## 2022-03-21 PROCEDURE — P9016 RBC LEUKOCYTES REDUCED: HCPCS | Performed by: HOSPITALIST

## 2022-03-21 PROCEDURE — 36415 COLL VENOUS BLD VENIPUNCTURE: CPT | Performed by: HOSPITALIST

## 2022-03-21 PROCEDURE — 258N000003 HC RX IP 258 OP 636: Performed by: STUDENT IN AN ORGANIZED HEALTH CARE EDUCATION/TRAINING PROGRAM

## 2022-03-21 PROCEDURE — 99233 SBSQ HOSP IP/OBS HIGH 50: CPT | Performed by: INTERNAL MEDICINE

## 2022-03-21 PROCEDURE — 250N000011 HC RX IP 250 OP 636: Performed by: INTERNAL MEDICINE

## 2022-03-21 PROCEDURE — 83735 ASSAY OF MAGNESIUM: CPT | Performed by: HOSPITALIST

## 2022-03-21 PROCEDURE — 99233 SBSQ HOSP IP/OBS HIGH 50: CPT | Performed by: HOSPITALIST

## 2022-03-21 PROCEDURE — 85520 HEPARIN ASSAY: CPT | Performed by: INTERNAL MEDICINE

## 2022-03-21 PROCEDURE — 200N000001 HC R&B ICU

## 2022-03-21 PROCEDURE — 85014 HEMATOCRIT: CPT | Performed by: HOSPITALIST

## 2022-03-21 PROCEDURE — 93005 ELECTROCARDIOGRAM TRACING: CPT

## 2022-03-21 PROCEDURE — 250N000011 HC RX IP 250 OP 636: Performed by: HOSPITALIST

## 2022-03-21 RX ADMIN — PHENOL 1 ML: 1.4 SPRAY ORAL at 22:10

## 2022-03-21 RX ADMIN — HYDROMORPHONE HYDROCHLORIDE 0.5 MG: 1 INJECTION, SOLUTION INTRAMUSCULAR; INTRAVENOUS; SUBCUTANEOUS at 08:12

## 2022-03-21 RX ADMIN — HYDROMORPHONE HYDROCHLORIDE 0.5 MG: 1 INJECTION, SOLUTION INTRAMUSCULAR; INTRAVENOUS; SUBCUTANEOUS at 20:12

## 2022-03-21 RX ADMIN — PHENOL 1 ML: 1.4 SPRAY ORAL at 18:32

## 2022-03-21 RX ADMIN — HYDROMORPHONE HYDROCHLORIDE 0.5 MG: 1 INJECTION, SOLUTION INTRAMUSCULAR; INTRAVENOUS; SUBCUTANEOUS at 23:14

## 2022-03-21 RX ADMIN — VANCOMYCIN HYDROCHLORIDE 1250 MG: 5 INJECTION, POWDER, LYOPHILIZED, FOR SOLUTION INTRAVENOUS at 23:13

## 2022-03-21 RX ADMIN — CEFTRIAXONE SODIUM 2 G: 2 INJECTION, POWDER, FOR SOLUTION INTRAMUSCULAR; INTRAVENOUS at 20:13

## 2022-03-21 RX ADMIN — PHENOL 1 ML: 1.4 SPRAY ORAL at 16:27

## 2022-03-21 RX ADMIN — VANCOMYCIN HYDROCHLORIDE 1750 MG: 5 INJECTION, POWDER, LYOPHILIZED, FOR SOLUTION INTRAVENOUS at 02:27

## 2022-03-21 RX ADMIN — HYDROMORPHONE HYDROCHLORIDE 0.5 MG: 1 INJECTION, SOLUTION INTRAMUSCULAR; INTRAVENOUS; SUBCUTANEOUS at 17:03

## 2022-03-21 RX ADMIN — DOPAMINE HYDROCHLORIDE IN DEXTROSE 5 MCG/KG/MIN: 1.6 INJECTION, SOLUTION INTRAVENOUS at 04:38

## 2022-03-21 RX ADMIN — DOPAMINE HYDROCHLORIDE IN DEXTROSE 5 MCG/KG/MIN: 1.6 INJECTION, SOLUTION INTRAVENOUS at 21:04

## 2022-03-21 RX ADMIN — PHENOL 1 ML: 1.4 SPRAY ORAL at 08:13

## 2022-03-21 RX ADMIN — HYDROMORPHONE HYDROCHLORIDE 0.5 MG: 1 INJECTION, SOLUTION INTRAMUSCULAR; INTRAVENOUS; SUBCUTANEOUS at 03:40

## 2022-03-21 RX ADMIN — HYDROMORPHONE HYDROCHLORIDE 0.5 MG: 1 INJECTION, SOLUTION INTRAMUSCULAR; INTRAVENOUS; SUBCUTANEOUS at 12:41

## 2022-03-21 ASSESSMENT — ACTIVITIES OF DAILY LIVING (ADL)
ADLS_ACUITY_SCORE: 11
ADLS_ACUITY_SCORE: 11
ADLS_ACUITY_SCORE: 9
ADLS_ACUITY_SCORE: 11
ADLS_ACUITY_SCORE: 9
ADLS_ACUITY_SCORE: 11
ADLS_ACUITY_SCORE: 9
ADLS_ACUITY_SCORE: 11
ADLS_ACUITY_SCORE: 9
ADLS_ACUITY_SCORE: 11
ADLS_ACUITY_SCORE: 9
ADLS_ACUITY_SCORE: 11
ADLS_ACUITY_SCORE: 9
ADLS_ACUITY_SCORE: 9

## 2022-03-21 NOTE — PLAN OF CARE
0700 - 1500 RN Shift Summary    Patient noted to now be in A-flutter at change of shift in the AM; No change in BP. Remains on continuous dopamine infusion, ICU MD denied need for central line due to low dose. Needs to be encouraged to take deep breaths and use incentive spirometer, lung sounds diminished. Complains of incisional pain, treated with PRN medication, abdominal binder placed - reports some relief. Continuous bladder irrigation continued, urine still pink with small clots. Hemoglobin 7.2, one unit of PRBC started. Spouse at bedside for much of the day, updated.     Goal Outcome Evaluation:    Plan of Care Reviewed With: spouse     Overall Patient Progress: no change

## 2022-03-21 NOTE — CONSULTS
CARDIAC ELECTROPHYSIOLOGY CONSULTATION  March 21, 2022    REQUESTING PROVIDER:  Dr. JENNIFER Morrell    REASON FOR CONSULTATION: Second-degree block.      HISTORY OF PRESENT ILLNESS:    Thank you for asking the EP service to evaluate Mr. Lyle for bradycardia.    He is very pleasant 81-year-old male with history of CAD (RCA stenting in 2001) who was admitted on 3/19/2022 with fatigue, weakness, chills and dyspnea on exertion.  He was hypotensive and had elevated white count and lactate level.  He also had gross hematuria.  Abdominal CT nevaeh suspicion for high-grade stenosis in SMA.  Exploratory laparotomy was done which ruled out ischemic bowel.    During the hospitalization he has been having 2:1 AV block with ventricular rates mostly in the mid 40s.  On occasion, high-grade AV block with pauses up to 5 seconds.  Last night the rhythm became atrial flutter and subsequently atrial fibrillation with ventricular rate in the 40s.  The patient is currently in the ICU on iv dopamine.    The patient has had gross hematuria or IV heparin which was held this morning.    Last night, 1 of 2 blood cultures that were drawn in the ER grew gram-positive cocci in clusters.      DIAGNOSTIC STUDIES:  Labs: WBC 14.2, hematocrit 24.5%, sodium 142, potassium 3.8, creatinine 1.22, lactic acid 1.8.  12-lead ECG: ECG this morning shows atrial fibrillation with ventricular rate in the 40s, RBBB.  Previous ECGs show 2:1 AV block with RBBB.  Echocardiogram: Normal EF 60-65%, mild to moderate LVH, normal RV, normal atrial size, estimated RVSP of 47 mmHg.      IMPRESSION:  1. Second-degree AV block (mostly 2:1 AVB, occasional periods of high-grade AV block with pauses up to 5 seconds).  Chronic RBBB.  Unclear when AVB started but we do suspect it to be the cause of patient's SENA during the past several weeks.  The patient will certainly require implantation of a permanent pacemaker, the only question is the timing of the procedure in the setting  "of bacteremia.  2. Atrial flutter/fibrillation.  Initially he had atrial flutter last night, though currently in atrial fibrillation with slow VR due to underlying AV conduction disease.  Not a candidate for anticoagulation at the moment due to gross hematuria.  3. UTI, bacteremia/sepsis.  Blood cultures from 3/19/2022 turned positive last night.  Unless a contaminant, this will delay pacemaker implantation.  4. History of CAD, normal LV function.  5. CKD.    RECOMMENDATIONS:    1. Monitor closely.  No immediate need for a temporary pacemaker.  2. Continue IV dopamine.  3. Low clinical suspicion for endocarditis.  4. He will require a dual-chamber pacemaker before hospital discharge.  Exact timing to be determined based on clinical course and blood culture results.  The ID service is following the patient as well.    I appreciate the opportunity to be part of this patient's care.  Please feel free to call me with any questions (pager #[phone]).      Alisa Gray MD, [facility]        PHYSICAL EXAM:  Vitals: /47 (BP Location: Left arm)   Pulse (!) 44   Temp 98.1  F (36.7  C) (Oral)   Resp 12   Ht 1.778 m (5' 10\")   Wt 84.3 kg (185 lb 13.6 oz)   SpO2 99%   BMI 26.67 kg/m      Intake/Output Summary (Last 24 hours) at 3/21/2022 0916  Last data filed at 3/21/2022 0600  Gross per 24 hour   Intake 3203.4 ml   Output 2550 ml   Net 653.4 ml     Vitals:    03/18/22 2351 03/19/22 0428 03/20/22 0600 03/21/22 0302   Weight: 79.4 kg (175 lb) 80.9 kg (178 lb 6.4 oz) 84 kg (185 lb 3 oz) 84.3 kg (185 lb 13.6 oz)     Constitutional:  Alert and oriented x3.  Pt in some distress.  He has NG tube in place  Head: As above.   Skin:  Normal color and texture.  No rashes, lesions or eruptions.  Eyes:  no jaundice, EOMI.  ENT:  Supple, probably normal JVP.  No lymphadenopathy or apparent thyroid enlargement.  Chest/Lungs:  No rales or wheezing.  Cardiac: Irregular rhythm, normal S1 and S2.  No murmur, rub or gallop.  "   Abdomen: Decreased bowel sounds. .    Extremities:  Radial pulses 2+ bilaterally.  No lower extremity edema is present.   Neurological:  CN 2-12 grossly intact.  Strength in UE is normal & symmetric.    Back:  No CVA tenderness.     REVIEW OF SYSTEMS:  Review of system completed and negative with the exception of what was described in the HPI section.     CURRENT MEDICATIONS:    cefTRIAXone  2 g Intravenous Q24H     sodium chloride (PF)  3 mL Intracatheter Q8H     sodium chloride (PF)  3 mL Intracatheter Q8H     [START ON 3/22/2022] vancomycin  1,250 mg Intravenous Q24H       ALLERGIES     Allergies   Allergen Reactions     Bupropion Hcl      vivid dreams     Chantix [Varenicline] Other (See Comments)     CNS side effects     Pcn [Penicillins] Anaphylaxis       PAST MEDICAL HISTORY:  Past Medical History:   Diagnosis Date     Allergic state      CAD (coronary artery disease) 2014     s/p stent to RCA ()     GERD (gastroesophageal reflux disease)      Hypercholesterolaemia      Hyperlipidaemia      Other affections of shoulder region, not elsewhere classified     impingement syndrome of right shoulder     Sebaceous cyst     right shoulder area     Tachycardia      Tobacco use disorder 3/5/2008       PAST SURGICAL HISTORY:  Past Surgical History:   Procedure Laterality Date     COLONOSCOPY       HEART CATH, ANGIOPLASTY  2001    RCA stent     LAPAROTOMY EXPLORATORY N/A 3/19/2022    Procedure: EXPLORATORY LAPAROTOMY, SUPERIOR MESENTERIC ARTERY EXPLORATION;  Surgeon: Smita Garsia MD;  Location: [facility] OR     TONSILLECTOMY & ADENOIDECTOMY       VASCULAR SURGERY         FAMILY HISTORY:  Family History   Problem Relation Age of Onset     Breast Cancer Mother          at age 64     Heart Disease Father         heart attack     Diabetes Father        SOCIAL HISTORY:  Social History     Socioeconomic History     Marital status:      Spouse name: None     Number of children: None      Years of education: None     Highest education level: None   Occupational History     Occupation: Sales/Computer Training     Employer: RETIRED   Tobacco Use     Smoking status: Former Smoker     Packs/day: 0.50     Years: 33.50     Pack years: 16.75     Types: Cigarettes     Start date:      Quit date: 2017     Years since quittin.3     Smokeless tobacco: Never Used     Tobacco comment: off and on since age 17- smokes about 5 cigs/day plus vapor cigarettes    Substance and Sexual Activity     Alcohol use: Yes     Alcohol/week: 3.3 standard drinks     Types: 4 Shots of liquor per week     Comment: 5-6 drink per week     Drug use: No     Sexual activity: None   Other Topics Concern      Service Not Asked     Comment: [id number]     Blood Transfusions No     Caffeine Concern Yes     Occupational Exposure No     Hobby Hazards No     Sleep Concern No     Stress Concern No     Weight Concern No     Special Diet No     Back Care No     Exercise No     Bike Helmet Not Asked     Seat Belt Yes     Self-Exams Not Asked     Parent/sibling w/ CABG, MI or angioplasty before 65F 55M? Not Asked   Social History Narrative    ** Merged History Encounter **          Social Determinants of Health     Financial Resource Strain: Not on file   Food Insecurity: Not on file   Transportation Needs: Not on file   Physical Activity: Not on file   Stress: Not on file   Social Connections: Not on file   Intimate Partner Violence: Not on file   Housing Stability: Not on file         Recent Lab Results:  Recent Labs   Lab 22  0858 22  0620 22  2051 22  1653 22  0755 22  0523 22  1551 22  1114 22  0927 22  0502 22  0001 22  2359   WBC  --  14.2*  --   --   --  22.0*  --   --   --  14.2*  --  11.8*   HGB  --  7.8*  --   --   --  10.6*  --   --   --  10.7*  --  11.9*   MCV  --  107*  --   --   --  103*  --   --   --  106*  --  104*   PLT  --  155   --   --   --  158  --   --   --  155  --  182   INR  --   --   --   --   --   --   --   --   --   --  1.21*  --    NA  --   --   --   --   --  142  --  141  --  140  --  140   POTASSIUM  --   --   --   --   --  3.8  --  3.9  --  3.9  --  3.9   CHLORIDE  --   --   --   --   --  112*  --  112*  --  111*  --  109   CO2  --   --   --   --   --  23  --  22  --  21  --  23   BUN  --   --   --   --   --  21  --  21  --  25  --  25   CR  --   --   --   --   --  1.22  --  1.33*  --  1.37*  --  1.30*   ANIONGAP  --   --   --   --   --  7  --  7  --  8  --  8   PAIGE  --   --   --   --   --  7.7*  --  8.1*  --  8.0*  --  8.8   *  --  137* 130*   < > 181*   < > 144*   < > 144*  --  121*   ALBUMIN  --   --   --   --   --   --   --   --   --   --   --  3.8   PROTTOTAL  --   --   --   --   --   --   --   --   --   --   --  7.4   BILITOTAL  --   --   --   --   --   --   --   --   --   --   --  0.7   ALKPHOS  --   --   --   --   --   --   --   --   --   --   --  44   ALT  --   --   --   --   --   --   --   --   --   --   --  27   AST  --   --   --   --   --   --   --   --   --   --   --  20   LIPASE  --   --   --   --   --   --   --   --   --   --   --  208    < > = values in this interval not displayed.

## 2022-03-21 NOTE — PROVIDER NOTIFICATION
Blood cultures from 3/19 now positive for GPCs in clusters in 2/2 bottles.     Pt on ceftriaxone. Will add vanco for possible MRSA until cultures are finalized.     Elina Shelley

## 2022-03-21 NOTE — PHARMACY-VANCOMYCIN DOSING SERVICE
Pharmacy Vancomycin Initial Note  Date of Service 2022  Patient's  1940  81 year old, male    Indication: Bacteremia    Current estimated CrCl = Estimated Creatinine Clearance: 55.5 mL/min (based on SCr of 1.22 mg/dL).    Creatinine for last 3 days  3/18/2022: 11:59 PM Creatinine 1.30 mg/dL  3/19/2022:  5:02 AM Creatinine 1.37 mg/dL; 11:14 AM Creatinine 1.33 mg/dL  3/20/2022:  5:23 AM Creatinine 1.22 mg/dL    Recent Vancomycin Level(s) for last 3 days  No results found for requested labs within last 72 hours.      Vancomycin IV Administrations (past 72 hours)      No vancomycin orders with administrations in past 72 hours.                Nephrotoxins and other renal medications (From now, onward)    Start     Dose/Rate Route Frequency Ordered Stop    22 0000  vancomycin 1250 mg in 0.9% NaCl 250 mL intermittent infusion 1,250 mg         1,250 mg  over 90 Minutes Intravenous EVERY 24 HOURS 22 0157      22 0200  vancomycin 1750 mg in 0.9% NaCl 500 ml intermittent infusion 1,750 mg         1,750 mg  over 2 Hours Intravenous ONCE 22 0157      22 1830  norepinephrine (LEVOPHED) 4 mg in  mL PERIPHERAL infusion         0.03-0.125 mcg/kg/min × 80.9 kg  9.1-37.9 mL/hr  Intravenous CONTINUOUS 22 1802            Contrast Orders - past 72 hours (72h ago, onward)            Start     Dose/Rate Route Frequency Ordered Stop    22 1100  perflutren diluted 1mL to 2mL with saline (OPTISON) diluted injection 9 mL        Note to Pharmacy: NDC# [id number]    9 mL Intravenous ONCE 22 1032 22 1111    22 0110  iopamidol (ISOVUE-370) solution 88 mL         88 mL Intravenous ONCE 22 0109 22 0109          [employer] Prediction of Planned Initial Vancomycin Regimen  Loading dose: 1750 mg at 01:55 2022.  Regimen: 1250 mg IV every 24 hours.  Start time: 01:56 on 2022  Exposure target: AUC24 (range)400-600 mg/L.hr   AUC24,ss: 503  mg/L.hr  Probability of AUC24 > 400: 75 %  Ctrough,ss: 15.5 mg/L  Probability of Ctrough,ss > 20: 27 %  Probability of nephrotoxicity (Lodise ELFEGO 2009): 11 %          Plan:  1. Start vancomycin  1250 mg IV q24h.   2. Vancomycin monitoring method: AUC  3. Vancomycin therapeutic monitoring goal: 400-600 mg*h/L  4. Pharmacy will check vancomycin levels as appropriate in 1-3 Days.    5. Serum creatinine levels will be ordered daily for the first week of therapy and at least twice weekly for subsequent weeks.      Poli Wilson, [facility]

## 2022-03-21 NOTE — PLAN OF CARE
Patient alert and oriented. Complains of pain in the back of this throat and abdomen with movement. SR with 2 degree AV block. On 4L NC while asleep, 2L while awake. Hypoactive bowel sounds. NG to LIS, NPO. Continuous Bladder irrigation. Urine dark pink with occasional clots. Will continue to monitor.

## 2022-03-21 NOTE — PROGRESS NOTES
[facility]    Infectious Disease Progress Note    Date of Service (when I saw the patient): 03/21/2022     Assessment & Plan   Sunil Lyle is a 81 year old male who was admitted on 3/19/2022.     Assessment:  Patient with PMH BPH, CKD, COPD, junctional bradycardia admitted 3/19 with hematuria, dysuria, dyspnea and fevers - found to have intermittent high grade second degree AV block. Went for ex lap for concern for ischemic bowel in setting of CT suggesting possible SMA narrowing, but bowel appeared healthy. Requiring DA here. Infectious clinical presentation seems c/w likely UTI though it is curious that UC with only mixed jackelin. Started on  ceftriaxone. Plans in place for pacemaker - from an ID perspective would be ok with placement when blood cultures have been negative x 72h   Lyme negative.   Blood cultures with staph epi ? Contaminant vs real      Recommendations:  Patient has No intervascular devices, no prosthetic hardware, suspect staph epi is a contaminate.   Follow up on the pending follow up cultures if negative will stop vanco   Continue on vanco and ceftriaxone IV for now.       Dionna Brown MD    Interval History   Tolerating antibiotics ok   No new rashes or issues with antibiotics   Labs reviewed   Afebrile   Awake and alert able to answer ques     Physical Exam   Temp: 98.1  F (36.7  C) Temp src: Oral   Pulse: (!) 44   Resp: 12 SpO2: 99 % O2 Device: Nasal cannula Oxygen Delivery: 2 LPM  Vitals:    03/19/22 0428 03/20/22 0600 03/21/22 0302   Weight: 80.9 kg (178 lb 6.4 oz) 84 kg (185 lb 3 oz) 84.3 kg (185 lb 13.6 oz)     Vital Signs with Ranges  Temp:  [98  F (36.7  C)-99  F (37.2  C)] 98.1  F (36.7  C)  Pulse:  [38-75] 44  Resp:  [9-28] 12  MAP:  [51 mmHg-104 mmHg] 58 mmHg  Arterial Line BP: ()/(27-65) 98/38  FiO2 (%):  [4 %] 4 %  SpO2:  [89 %-99 %] 99 %    Constitutional: Awake, alert, cooperative, no apparent distress  Lungs: Clear to auscultation bilaterally,  no crackles or wheezing  Cardiovascular: Regular rate and rhythm, normal S1 and S2, and no murmur noted  Abdomen: Normal bowel sounds, soft, non-distended, non-tender  Skin: No rashes, no cyanosis, no edema  Other:    Medications     DOPamine 5 mcg/kg/min (03/21/22 0438)     heparin Stopped (03/21/22 0902)     lactated ringers 100 mL/hr at 03/20/22 1647     norepinephrine Stopped (03/19/22 2000)     - MEDICATION INSTRUCTIONS -         cefTRIAXone  2 g Intravenous Q24H     sodium chloride (PF)  3 mL Intracatheter Q8H     sodium chloride (PF)  3 mL Intracatheter Q8H     [START ON 3/22/2022] vancomycin  1,250 mg Intravenous Q24H       Data   All microbiology laboratory data reviewed.  Recent Labs   Lab Test 03/21/22  0620 03/20/22  0523 03/19/22  0502   WBC 14.2* 22.0* 14.2*   HGB 7.8* 10.6* 10.7*   HCT 24.5* 32.0* 33.5*   * 103* 106*    158 155     Recent Labs   Lab Test 03/20/22  0523 03/19/22  1114 03/19/22  0502   CR 1.22 1.33* 1.37*     No lab results found.  No lab results found.    Invalid input(s): PHILLIP

## 2022-03-21 NOTE — PROGRESS NOTES
[facility]    Medicine Progress Note - Hospitalist Service    Date of Admission:  3/19/2022    Assessment & Plan        Sunil Lyle is a 81 year old male with past medical history significant for CAD s/p stenting, HLD, GERD  admitted on 3/19/2022 with multiple complaints including shortness of breath, hematuria and bradycardia.        Hypotension  Bradycardia   1st Degree AV block --> hi grade 2nd degree AV block at times  Heart rates have been in the 40s. He is dyspneic with ecertion but otherwise has normal blood pressures. He was evaluated by cardiology in April 2021 for fatigue and was found to have junctional bradycardia at that time with rates in the 40s. His metoprolol was stopped and he was evaluated with a zio patch which only revealed a single 3.8s pause. It was opted to continue monitoring without intervention at that time.   EKG here shows sinus bradycardia with 1st degree AV block and RBBB   - Cardiac monitoring - still bradycardic  - TTE - EF normal, unchanged since 2017  - Cardiology consultation   - tele with pauses noted - discussed with RN to have transcut pacers at bedside.   - with concern of unknown infection, and upcoming need for pacemaker placement, will request ID input   - intensivist consulted as they had ordered dopamine overnight - appreciate assistance  - 3/21 EP planning PM, complicated by now positive blood cultures GPC in clusters - will need 72 hrs clean cultures prior to procedure    Gram positive bacteremia, staph epi  Blood cultures turned positive day 2. He has a coronary stent, but denies other hardware in his body.  GPC in clusters... follow identification, adjust abx. ID following  - ceftriaxone and vancomycin for now  - afebrile 3/21    Hematuria  Fever, Possible UTI - Urine culture mixed jackelin  BPH  PT febrile here to 101.8. UA notable for large LE, >182 RBCs and >182 WBCs. Urine is grossly bloody. On CT scan, there is moderate enlargement and  heterogeneity of the prostate. There is thickening of the posterior bladder wall which is somewhat nodular in appearance.   - ID following and appreciated - ceftriaxone continued  - Follow-up urine cultures and blood cultures - see above  - Urology consultation appreciated - started CBI, will need cystoscopy in future.     Acute anemia, primarily blood loss  Ongoing hematuria and recent surgery (see below).  Dropped 3/21 to 7.8 from 10.6. Was 11.9 on admission 3/18.  - monitor hgb, transfuse for <7  - CBI for hematuria as above  - holding heparin - plan was for 48 hours post op per vascular surgery, we are nearly at that point as of mid AM 3/21  - 3/21 follow up hgb 7.2, will transfuse 1 unit PRBC and maintain conditional for <7.      Dyspnea   Hx of COPD, former smoker   The patient's chief complaint today is actually shortness of breath. The shortness of breath has been on-going and getting worse over the past two weeks. This has been predominantly on exertion. Of note, review of clinic notes from April and July of 2021, suggest that he has been having dyspnea on exertion at that time as well. Cardiology discussed ischemic evaluation, but the patient declined. CT here showed Centrilobular emphysema without evidence of pneumonia. No evidence of pulmonary embolism. O2 saturations are within normal limits.   Unclear etiology, could be progression of emphysema vs cardiac related (new CHF, ischemia i.e. anginal equivalent, bradycardia, etc.).   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 The SMA trunk is patent distal to this.   In terms of symptoms, the patient has noted increased frequency of diarrhea recently,  "but no abdominal pain after eating, no bloody or black stools.   - Started on heparin drip in the ED, will continue cautiously in the setting of hematuria.   - initially stat lactic acid went up to 5.8 from 2 range despite fluids when initially hospitalized.  Some abdominal pain noted at that time as well - discussed with vascular surgery immediately who saw patient and took him to OR emergently.  - no thrombosis found, and no ischemic bowel noted in OR 3/19  - 3/20 -discussed with vascular surgery, patient, and family-we will continue heparin infusion for 48 hours after surgery-this would be midday 3/21  (stopped)     CAD s/p RCA stent in 2001   Hyperlipidemia  Hypertension   - Continue ASA, simvastatin and fenofibrate when verified      Probable CKD III  Creatinine is elevated to 1.3 initially, this seems to be near his baseline of 1.2-1.3.   - fairly creat 1.22  - Monitor renal function     Hyperglycemia  A1c 5.4%. Not diabetic nor prediabetic.     Coagulation Defect  INR = 1.21 (Ref range: 0.85 - 1.15) and/or PTT = N/A on admission, will monitor for bleeding     Hypomagnesemia  Mg = 1.5 mg/dL (Ref range: 1.6 - 2.3 mg/dL) on admission, will replace as needed     Overweight  Estimated body mass index is 25.11 kg/m  as calculated from the following:    Height as of this encounter: 1.778 m (5' 10\").    Weight as of this encounter: 79.4 kg (175 lb).        Diet: NPO per Anesthesia Guidelines for Procedure/Surgery Except for: Meds    DVT Prophylaxis: Pneumatic Compression Devices  Blanca Catheter: PRESENT, indication: Strict 1-2 Hour I&O, Strict 1-2 Hour I&O  Central Lines: None  Cardiac Monitoring: ACTIVE order. Indication: Bradycardia  Code Status: Full Code      Disposition Plan   Expected Discharge: 03/23/2022     Anticipated discharge location: home with family         The patient's care was discussed with the Bedside Nurse, Patient, and ID and ICU Consultant.    Sean Bray MD  Hospitalist [facility] " "[facility]  Securely message with the [employer] Web Console (learn more here)  Text page via Active Storage Paging/Directory         Clinically Significant Risk Factors Present on Admission             # Overweight: Estimated body mass index is 26.67 kg/m  as calculated from the following:    Height as of this encounter: 1.778 m (5' 10\").    Weight as of this encounter: 84.3 kg (185 lb 13.6 oz).      ______________________________________________________________________    Interval History   Patient seen and examined midday.  Sleeping on my arrival but awakened to voice.  Overall nothing really new subjectively however blood cultures did turn positive.  He remains afebrile and WBC is actually downtrending.  Appreciate ID assistance.  Repeat blood cultures pending.  No new shortness of breath or fevers.  Continues on dopamine.  Still no flatus    Data reviewed today: I reviewed all medications, new labs and imaging results over the last 24 hours. I personally reviewed no images or EKG's today.    Physical Exam   Vital Signs: Temp: 98.1  F (36.7  C) Temp src: Oral   Pulse: (!) 44   Resp: 12 SpO2: 99 % O2 Device: Nasal cannula Oxygen Delivery: 2 LPM  Weight: 185 lbs 13.56 oz    Gen: NAD, pleasant  HEENT: EOMI, MMM, NG in place  Resp: no focal crackles,  no wheezes, no increased work of resp  CV: S1S2 heard, bradycardic 40s - 50s  Abdo: soft, nontender, nondistended, bowel sounds present  Ext: calves nontender, well perfused  Neuro: no, CN grossly intact, no facial asymmetry      Data   Recent Labs   Lab 03/21/22  1616 03/21/22  1239 03/21/22  1238 03/21/22  0858 03/21/22  0620 03/20/22  0755 03/20/22  0523 03/19/22  1551 03/19/22  1114 03/19/22  0927 03/19/22  0502 03/19/22  0001 03/18/22  2359   WBC  --   --   --   --  14.2*  --  22.0*  --   --   --  14.2*  --  11.8*   HGB  --   --  7.2*  --  7.8*  --  10.6*  --   --   --  10.7*  --  11.9*   MCV  --   --   --   --  107*  --  103*  --   --   --  106*  --  104* "   PLT  --   --   --   --  155  --  158  --   --   --  155  --  182   INR  --   --   --   --   --   --   --   --   --   --   --  1.21*  --    NA  --   --   --   --   --   --  142  --  141  --  140  --  140   POTASSIUM  --   --   --   --   --   --  3.8  --  3.9  --  3.9  --  3.9   CHLORIDE  --   --   --   --   --   --  112*  --  112*  --  111*  --  109   CO2  --   --   --   --   --   --  23  --  22  --  21  --  23   BUN  --   --   --   --   --   --  21  --  21  --  25  --  25   CR  --   --   --   --   --   --  1.22  --  1.33*  --  1.37*  --  1.30*   ANIONGAP  --   --   --   --   --   --  7  --  7  --  8  --  8   PAIGE  --   --   --   --   --   --  7.7*  --  8.1*  --  8.0*  --  8.8   * 114*  --  129*  --    < > 181*   < > 144*   < > 144*  --  121*   ALBUMIN  --   --   --   --   --   --   --   --   --   --   --   --  3.8   PROTTOTAL  --   --   --   --   --   --   --   --   --   --   --   --  7.4   BILITOTAL  --   --   --   --   --   --   --   --   --   --   --   --  0.7   ALKPHOS  --   --   --   --   --   --   --   --   --   --   --   --  44   ALT  --   --   --   --   --   --   --   --   --   --   --   --  27   AST  --   --   --   --   --   --   --   --   --   --   --   --  20   LIPASE  --   --   --   --   --   --   --   --   --   --   --   --  208    < > = values in this interval not displayed.     No results found for this or any previous visit (from the past 24 hour(s)).

## 2022-03-21 NOTE — PROGRESS NOTES
[facility]    Urology Progress Note  Date of Service: 03/21/2022      Interval History   Continues on CBI, a few clots noted per report.   CBI clamped this am, though return of blood tinged urine, so restarted at slow rate.   Heparin was stopped this morning around 9 am.     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    Follows with Dr. Helton at baseline.      PLAN:    Gross Hematuria  UTI  Most likely of prostatic origin but would recommend outpatient cystoscopy to evaluate the bladder given the bladder wall thickening.  3 way burrell placed morning of 3/20, started on CBI.  Receiving heparin - held this am starting at 9 am.    - Continue CBI and wean/clamp as able.  May disconnect CBI once patient tolerates irrigation being clamped X2 hours and urine is watermelon colored (or lighter) and free of clots.    - Hand irrigate catheter as needed for clots / problems draining.  Use a 60cc catheter tipped syringe and instill 60-120cc of sterile water or NS and aspirate. Only hand irrigate through main catheter port (where the catheter bag tubing connects). Do NOT re-use aspirate from the bladder. Repeat until clear then return catheter to drainage, and re-start CBI.   - Follow hemoglobin. Heparin management per IM/Cards.    - Continue antibiotics. Follow cultures.    - Follow up with Dr. Helton outpatient for cystoscopy, once acute cardiac issues treated. If bleeding persists, may need cystoscopy here, though in setting of second degree heart block, may not be able to perform under anesthesia.     Addendum 2:50 pm:  I seen patient again at this time. Urine mostly clear with a few small clots noted. RN reports that urine has fluctuated between pink and clear. I elected to perform hand irrigations at this time. Using  sterile water, I irrigated the bladder with roughly 200 ml of sterile water, had small return of clot material. Otherwise urine clear. Hooked back to cbi at slow rate with watermelon colored urine.     Prostatomegaly/BPH with LUTs   - Continue Flomax.    - Started on finasteride 5 mg per day.    - Consider TURP in future.     Physical Exam   Temp:  [98  F (36.7  C)-99  F (37.2  C)] 98.1  F (36.7  C)  Pulse:  [38-78] 44  Resp:  [9-28] 12  MAP:  [51 mmHg-104 mmHg] 58 mmHg  Arterial Line BP: ()/(27-65) 98/38  FiO2 (%):  [4 %] 4 %  SpO2:  [89 %-99 %] 99 %    Weights:   Vitals:    03/19/22 0428 03/20/22 0600 03/21/22 0302   Weight: 80.9 kg (178 lb 6.4 oz) 84 kg (185 lb 3 oz) 84.3 kg (185 lb 13.6 oz)    Body mass index is 26.67 kg/m .    Constitutional: Alert. Pleasant. No acute distress.   CV: Perfusing well. Radial pulses strong. No lower extremity edema.   Respiratory: No respiratory distress. Breathing unlabored.   GI: Soft, nontender.  Male : 3 way burrell in place, CBI running. Clear output initially, though then had return of blood tinged output after clamping, so cbi restarted.   Skin: Warm and dry.  Musculoskeletal: Moving all extremities approprietly.    Data   Recent Labs   Lab 03/21/22  0620 03/20/22  2051 03/20/22  1653 03/20/22  0755 03/20/22  0523 03/19/22  1551 03/19/22  1114 03/19/22  0927 03/19/22  0502 03/19/22  0001 03/18/22  2359   WBC 14.2*  --   --   --  22.0*  --   --   --  14.2*  --  11.8*   HGB 7.8*  --   --   --  10.6*  --   --   --  10.7*  --  11.9*   *  --   --   --  103*  --   --   --  106*  --  104*     --   --   --  158  --   --   --  155  --  182   INR  --   --   --   --   --   --   --   --   --  1.21*  --    NA  --   --   --   --  142  --  141  --  140  --  140   POTASSIUM  --   --   --   --  3.8  --  3.9  --  3.9  --  3.9   CHLORIDE  --   --   --   --  112*  --  112*  --  111*  --  109   CO2  --   --   --   --  23  --  22  --  21  --  23   BUN  --   --   --   --  21   --  21  --  25  --  25   CR  --   --   --   --  1.22  --  1.33*  --  1.37*  --  1.30*   ANIONGAP  --   --   --   --  7  --  7  --  8  --  8   PAIGE  --   --   --   --  7.7*  --  8.1*  --  8.0*  --  8.8   GLC  --  137* 130* 145* 181*   < > 144*   < > 144*  --  121*   ALBUMIN  --   --   --   --   --   --   --   --   --   --  3.8   PROTTOTAL  --   --   --   --   --   --   --   --   --   --  7.4   BILITOTAL  --   --   --   --   --   --   --   --   --   --  0.7   ALKPHOS  --   --   --   --   --   --   --   --   --   --  44   ALT  --   --   --   --   --   --   --   --   --   --  27   AST  --   --   --   --   --   --   --   --   --   --  20   LIPASE  --   --   --   --   --   --   --   --   --   --  208    < > = values in this interval not displayed.       Recent Labs   Lab 03/20/22 2051 03/20/22  1653 03/20/22  0755 03/20/22  0523 03/19/22  1551 03/19/22  1114   * 130* 145* 181* 126* 144*        Unresulted Labs Ordered in the Past 30 Days of this Admission     Date and Time Order Name Status Description    3/20/2022 11:34 PM Blood Culture Hand, Right In process     3/20/2022 11:34 PM Blood Culture Hand, Left In process     3/20/2022  3:21 PM Lyme Disease Glenda with reflex to WB Serum In process     3/19/2022 12:22 AM Blood Culture Arm, Right Preliminary     3/19/2022 12:22 AM Blood Culture Line, venous Preliminary          Yessica Pringle PA-C on 3/21/2022 at 8:35 AM   [facility]

## 2022-03-22 LAB
ALBUMIN SERPL-MCNC: 2.6 G/DL (ref 3.4–5)
ALP SERPL-CCNC: 37 U/L (ref 40–150)
ALT SERPL W P-5'-P-CCNC: 24 U/L (ref 0–70)
ANION GAP SERPL CALCULATED.3IONS-SCNC: 4 MMOL/L (ref 3–14)
AST SERPL W P-5'-P-CCNC: 22 U/L (ref 0–45)
ATRIAL RATE - MUSE: 80 BPM
BILIRUB SERPL-MCNC: 0.5 MG/DL (ref 0.2–1.3)
BUN SERPL-MCNC: 26 MG/DL (ref 7–30)
CALCIUM SERPL-MCNC: 8 MG/DL (ref 8.5–10.1)
CHLORIDE BLD-SCNC: 111 MMOL/L (ref 94–109)
CO2 SERPL-SCNC: 30 MMOL/L (ref 20–32)
CREAT SERPL-MCNC: 1.07 MG/DL (ref 0.66–1.25)
DIASTOLIC BLOOD PRESSURE - MUSE: NORMAL MMHG
ERYTHROCYTE [DISTWIDTH] IN BLOOD BY AUTOMATED COUNT: 17.1 % (ref 10–15)
GFR SERPL CREATININE-BSD FRML MDRD: 69 ML/MIN/1.73M2
GLUCOSE BLD-MCNC: 135 MG/DL (ref 70–99)
GLUCOSE BLDC GLUCOMTR-MCNC: 116 MG/DL (ref 70–99)
GLUCOSE BLDC GLUCOMTR-MCNC: 128 MG/DL (ref 70–99)
GLUCOSE BLDC GLUCOMTR-MCNC: 138 MG/DL (ref 70–99)
HCT VFR BLD AUTO: 25.8 % (ref 40–53)
HGB BLD-MCNC: 8.3 G/DL (ref 13.3–17.7)
INTERPRETATION ECG - MUSE: NORMAL
MAGNESIUM SERPL-MCNC: 2.6 MG/DL (ref 1.6–2.3)
MCH RBC QN AUTO: 34.2 PG (ref 26.5–33)
MCHC RBC AUTO-ENTMCNC: 32.2 G/DL (ref 31.5–36.5)
MCV RBC AUTO: 106 FL (ref 78–100)
P AXIS - MUSE: 57 DEGREES
PLATELET # BLD AUTO: 158 10E3/UL (ref 150–450)
POTASSIUM BLD-SCNC: 3.6 MMOL/L (ref 3.4–5.3)
PR INTERVAL - MUSE: 224 MS
PROT SERPL-MCNC: 5.9 G/DL (ref 6.8–8.8)
QRS DURATION - MUSE: 154 MS
QT - MUSE: 502 MS
QTC - MUSE: 409 MS
R AXIS - MUSE: -64 DEGREES
RBC # BLD AUTO: 2.43 10E6/UL (ref 4.4–5.9)
SODIUM SERPL-SCNC: 145 MMOL/L (ref 133–144)
SYSTOLIC BLOOD PRESSURE - MUSE: NORMAL MMHG
T AXIS - MUSE: 42 DEGREES
VENTRICULAR RATE- MUSE: 40 BPM
WBC # BLD AUTO: 10.1 10E3/UL (ref 4–11)

## 2022-03-22 PROCEDURE — 36415 COLL VENOUS BLD VENIPUNCTURE: CPT | Performed by: HOSPITALIST

## 2022-03-22 PROCEDURE — 250N000009 HC RX 250: Performed by: SURGERY

## 2022-03-22 PROCEDURE — 93005 ELECTROCARDIOGRAM TRACING: CPT

## 2022-03-22 PROCEDURE — 258N000003 HC RX IP 258 OP 636: Performed by: STUDENT IN AN ORGANIZED HEALTH CARE EDUCATION/TRAINING PROGRAM

## 2022-03-22 PROCEDURE — 200N000001 HC R&B ICU

## 2022-03-22 PROCEDURE — 80053 COMPREHEN METABOLIC PANEL: CPT | Performed by: HOSPITALIST

## 2022-03-22 PROCEDURE — 94640 AIRWAY INHALATION TREATMENT: CPT

## 2022-03-22 PROCEDURE — 85027 COMPLETE CBC AUTOMATED: CPT | Performed by: HOSPITALIST

## 2022-03-22 PROCEDURE — 250N000011 HC RX IP 250 OP 636: Performed by: STUDENT IN AN ORGANIZED HEALTH CARE EDUCATION/TRAINING PROGRAM

## 2022-03-22 PROCEDURE — 250N000011 HC RX IP 250 OP 636: Performed by: SURGERY

## 2022-03-22 PROCEDURE — 250N000011 HC RX IP 250 OP 636: Performed by: INTERNAL MEDICINE

## 2022-03-22 PROCEDURE — 99232 SBSQ HOSP IP/OBS MODERATE 35: CPT | Mod: 57 | Performed by: INTERNAL MEDICINE

## 2022-03-22 PROCEDURE — 999N000157 HC STATISTIC RCP TIME EA 10 MIN

## 2022-03-22 PROCEDURE — C9113 INJ PANTOPRAZOLE SODIUM, VIA: HCPCS | Performed by: SURGERY

## 2022-03-22 PROCEDURE — 83735 ASSAY OF MAGNESIUM: CPT | Performed by: STUDENT IN AN ORGANIZED HEALTH CARE EDUCATION/TRAINING PROGRAM

## 2022-03-22 PROCEDURE — 94640 AIRWAY INHALATION TREATMENT: CPT | Mod: 76

## 2022-03-22 PROCEDURE — 99233 SBSQ HOSP IP/OBS HIGH 50: CPT | Performed by: HOSPITALIST

## 2022-03-22 PROCEDURE — 250N000009 HC RX 250

## 2022-03-22 PROCEDURE — 250N000011 HC RX IP 250 OP 636: Performed by: HOSPITALIST

## 2022-03-22 RX ORDER — IPRATROPIUM BROMIDE AND ALBUTEROL SULFATE 2.5; .5 MG/3ML; MG/3ML
SOLUTION RESPIRATORY (INHALATION)
Status: COMPLETED
Start: 2022-03-22 | End: 2022-03-22

## 2022-03-22 RX ORDER — ACETYLCYSTEINE 200 MG/ML
2 SOLUTION ORAL; RESPIRATORY (INHALATION) EVERY 4 HOURS
Status: DISPENSED | OUTPATIENT
Start: 2022-03-22 | End: 2022-03-22

## 2022-03-22 RX ADMIN — HYDROMORPHONE HYDROCHLORIDE 0.5 MG: 1 INJECTION, SOLUTION INTRAMUSCULAR; INTRAVENOUS; SUBCUTANEOUS at 13:14

## 2022-03-22 RX ADMIN — HYDROMORPHONE HYDROCHLORIDE 0.3 MG: 1 INJECTION, SOLUTION INTRAMUSCULAR; INTRAVENOUS; SUBCUTANEOUS at 03:41

## 2022-03-22 RX ADMIN — IPRATROPIUM BROMIDE AND ALBUTEROL SULFATE 3 ML: .5; 3 SOLUTION RESPIRATORY (INHALATION) at 07:44

## 2022-03-22 RX ADMIN — PHENOL 1 ML: 1.4 SPRAY ORAL at 08:35

## 2022-03-22 RX ADMIN — HYDROMORPHONE HYDROCHLORIDE 0.5 MG: 1 INJECTION, SOLUTION INTRAMUSCULAR; INTRAVENOUS; SUBCUTANEOUS at 10:08

## 2022-03-22 RX ADMIN — ACETYLCYSTEINE 2 ML: 200 SOLUTION ORAL; RESPIRATORY (INHALATION) at 07:44

## 2022-03-22 RX ADMIN — PANTOPRAZOLE SODIUM 40 MG: 40 INJECTION, POWDER, FOR SOLUTION INTRAVENOUS at 13:14

## 2022-03-22 RX ADMIN — PHENOL 1 ML: 1.4 SPRAY ORAL at 05:52

## 2022-03-22 RX ADMIN — DOPAMINE HYDROCHLORIDE IN DEXTROSE 5 MCG/KG/MIN: 1.6 INJECTION, SOLUTION INTRAVENOUS at 13:20

## 2022-03-22 RX ADMIN — PHENOL 1 ML: 1.4 SPRAY ORAL at 02:43

## 2022-03-22 RX ADMIN — ACETYLCYSTEINE 2 ML: 200 SOLUTION ORAL; RESPIRATORY (INHALATION) at 11:05

## 2022-03-22 RX ADMIN — CEFTRIAXONE SODIUM 2 G: 2 INJECTION, POWDER, FOR SOLUTION INTRAMUSCULAR; INTRAVENOUS at 20:08

## 2022-03-22 RX ADMIN — IPRATROPIUM BROMIDE AND ALBUTEROL SULFATE 3 ML: .5; 3 SOLUTION RESPIRATORY (INHALATION) at 11:05

## 2022-03-22 RX ADMIN — VANCOMYCIN HYDROCHLORIDE 1250 MG: 5 INJECTION, POWDER, LYOPHILIZED, FOR SOLUTION INTRAVENOUS at 23:25

## 2022-03-22 RX ADMIN — PANTOPRAZOLE SODIUM 40 MG: 40 INJECTION, POWDER, FOR SOLUTION INTRAVENOUS at 20:09

## 2022-03-22 RX ADMIN — HYDROMORPHONE HYDROCHLORIDE 0.5 MG: 1 INJECTION, SOLUTION INTRAMUSCULAR; INTRAVENOUS; SUBCUTANEOUS at 20:20

## 2022-03-22 RX ADMIN — HYDROMORPHONE HYDROCHLORIDE 0.5 MG: 1 INJECTION, SOLUTION INTRAMUSCULAR; INTRAVENOUS; SUBCUTANEOUS at 17:09

## 2022-03-22 ASSESSMENT — ACTIVITIES OF DAILY LIVING (ADL)
ADLS_ACUITY_SCORE: 11
ADLS_ACUITY_SCORE: 11
ADLS_ACUITY_SCORE: 13
ADLS_ACUITY_SCORE: 11
ADLS_ACUITY_SCORE: 13
ADLS_ACUITY_SCORE: 11
ADLS_ACUITY_SCORE: 13
ADLS_ACUITY_SCORE: 11
ADLS_ACUITY_SCORE: 11

## 2022-03-22 NOTE — PROGRESS NOTES
[facility]    Infectious Disease Progress Note    Date of Service (when I saw the patient): 03/22/2022     Assessment & Plan   Sunil Lyle is a 81 year old male who was admitted on 3/19/2022.     Assessment:  Patient with PMH BPH, CKD, COPD, junctional bradycardia admitted 3/19 with hematuria, dysuria, dyspnea and fevers - found to have intermittent high grade second degree AV block. Went for ex lap for concern for ischemic bowel in setting of CT suggesting possible SMA narrowing, but bowel appeared healthy. Requiring DA here. Infectious clinical presentation seems c/w likely UTI though it is curious that UC with only mixed jackelin. Started on  ceftriaxone. Plans in place for pacemaker - from an ID perspective would be ok with placement when blood cultures have been negative x 72h   Lyme negative.   Blood cultures with staph epi ? Contaminant vs real      Recommendations:  Patient has No intervascular devices, no prosthetic hardware, suspect staph epi is a contaminate.   Follow up on the pending follow up cultures if negative will stop vanco   Continue on vanco and ceftriaxone IV for now.   Discussed with IM   Wife updated bedside     Dionna Brown MD    Interval History   Tolerating antibiotics ok   No new rashes or issues with antibiotics   Labs reviewed   Afebrile   Awake and alert able to answer ques   Creat improving     Physical Exam   Temp: 99.6  F (37.6  C) Temp src: Oral BP: 133/51 Pulse: (!) 46   Resp: 21 SpO2: 90 % O2 Device: Nasal cannula Oxygen Delivery: 2 LPM  Vitals:    03/21/22 0302 03/22/22 0000 03/22/22 0545   Weight: 84.3 kg (185 lb 13.6 oz) 84.1 kg (185 lb 6.5 oz) 82.5 kg (181 lb 14.1 oz)     Vital Signs with Ranges  Temp:  [98.4  F (36.9  C)-99.6  F (37.6  C)] 99.6  F (37.6  C)  Pulse:  [36-47] 46  Resp:  [0-35] 21  BP: (109-149)/(35-88) 133/51  MAP:  [56 mmHg-133 mmHg] 56 mmHg  Arterial Line BP: ()/() 63/45  SpO2:  [90 %-99 %] 90 %    Constitutional:  Awake, alert, cooperative, no apparent distress  Lungs: Clear to auscultation bilaterally, no crackles or wheezing  Cardiovascular: Regular rate and rhythm, normal S1 and S2, and no murmur noted  Abdomen: Normal bowel sounds, soft, non-distended, non-tender  Skin: No rashes, no cyanosis, no edema  Other:    Medications     DOPamine 5 mcg/kg/min (03/22/22 0804)     lactated ringers 100 mL/hr at 03/21/22 0800     - MEDICATION INSTRUCTIONS -         acetylcysteine  2 mL Nebulization Q4H     cefTRIAXone  2 g Intravenous Q24H     sodium chloride (PF)  3 mL Intracatheter Q8H     sodium chloride (PF)  3 mL Intracatheter Q8H     vancomycin  1,250 mg Intravenous Q24H       Data   All microbiology laboratory data reviewed.  Recent Labs   Lab Test 03/22/22  0510 03/21/22  2347 03/21/22  1855 03/21/22  1238 03/21/22  0620 03/20/22  0523   WBC 10.1  --   --   --  14.2* 22.0*   HGB 8.3* 8.0* 8.3*   < > 7.8* 10.6*   HCT 25.8*  --   --   --  24.5* 32.0*   *  --   --   --  107* 103*     --   --   --  155 158    < > = values in this interval not displayed.     Recent Labs   Lab Test 03/22/22  0510 03/20/22  0523 03/19/22  1114   CR 1.07 1.22 1.33*     No lab results found.  No lab results found.    Invalid input(s):

## 2022-03-22 NOTE — PROGRESS NOTES
[facility]    Medicine Progress Note - Hospitalist Service    Date of Admission:  3/19/2022    Assessment & Plan        Sunil Lyle is a 81 year old male with past medical history significant for CAD s/p stenting, HLD, GERD  admitted on 3/19/2022 with multiple complaints including shortness of breath, hematuria and bradycardia.         Hypotension  Bradycardia   1st Degree AV block --> hi grade 2nd degree AV block at times  Heart rates have been in the 40s. He is dyspneic with ecertion but otherwise has normal blood pressures. He was evaluated by cardiology in April 2021 for fatigue and was found to have junctional bradycardia at that time with rates in the 40s. His metoprolol was stopped and he was evaluated with a zio patch which only revealed a single 3.8s pause. It was opted to continue monitoring without intervention at that time.   EKG here shows sinus bradycardia with 1st degree AV block and RBBB   - Cardiac monitoring - still bradycardic  - TTE - EF normal, unchanged since 2017  - Cardiology consultation   - tele with pauses noted - discussed with RN to have transcut pacers at bedside.   - with concern of unknown infection, and upcoming need for pacemaker placement, will request ID input   - intensivist consulted as they had ordered dopamine overnight - appreciate assistance  - 3/21 EP planning PM, complicated by now positive blood cultures GPC in clusters - will need 72 hrs clean cultures prior to procedure     Gram positive bacteremia, staph epi  Blood cultures turned positive day 2. He has a coronary stent, but denies other hardware in his body.  GPC in clusters... follow identification, adjust abx. ID following  - ceftriaxone and vancomycin for now  - afebrile 3/21 - 3/22     Hematuria  Fever, Possible UTI - Urine culture mixed jackelin  BPH  PT febrile here to 101.8. UA notable for large LE, >182 RBCs and >182 WBCs. Urine is grossly bloody. On CT scan, there is moderate  enlargement and heterogeneity of the prostate. There is thickening of the posterior bladder wall which is somewhat nodular in appearance.   - ID following and appreciated - ceftriaxone continued  - Follow-up urine cultures and blood cultures - see above  - Urology consultation appreciated - started CBI, will need cystoscopy in future.     Acute anemia, primarily blood loss  Ongoing hematuria and recent surgery (see below).  Dropped 3/21 to 7.8 from 10.6. Was 11.9 on admission 3/18.  - monitor hgb, transfuse for <7  - CBI for hematuria as above  - holding heparin - plan was for 48 hours post op per vascular surgery, we are nearly at that point as of mid AM 3/21  - 3/21 follow up hgb 7.2, will transfuse 1 unit PRBC and maintain conditional for <7.    - 3/22 hgb 8.3     Dyspnea   Hx of COPD, former smoker   The patient's chief complaint today is actually shortness of breath. The shortness of breath has been on-going and getting worse over the past two weeks. This has been predominantly on exertion. Of note, review of clinic notes from April and July of 2021, suggest that he has been having dyspnea on exertion at that time as well. Cardiology discussed ischemic evaluation, but the patient declined. CT here showed Centrilobular emphysema without evidence of pneumonia. No evidence of pulmonary embolism. O2 saturations are within normal limits.   Unclear etiology, could be progression of emphysema vs cardiac related (new CHF, ischemia i.e. anginal equivalent, bradycardia, etc.).   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 The SMA trunk is patent distal to this.   In terms of symptoms, the patient has noted increased  "frequency of diarrhea recently, but no abdominal pain after eating, no bloody or black stools.   - Started on heparin drip in the ED, will continue cautiously in the setting of hematuria.   - initially stat lactic acid went up to 5.8 from 2 range despite fluids when initially hospitalized.  Some abdominal pain noted at that time as well - discussed with vascular surgery immediately who saw patient and took him to OR emergently.  - no thrombosis found, and no ischemic bowel noted in OR 3/19  - 3/20 -discussed with vascular surgery, patient, and family-we will continue heparin infusion for 48 hours after surgery-this would be midday 3/21  (stopped)  - 3/22 - encourage OOB, ambulate - no flatus/BM yet, NGT in place     CAD s/p RCA stent in 2001   Hyperlipidemia  Hypertension   - Continue ASA, simvastatin and fenofibrate in longterm, held for now     Probable CKD III  Creatinine is elevated to 1.3 initially, this seems to be near his baseline of 1.2-1.3.   - creat improved to 1.07 3/22  - Monitor renal function     Hyperglycemia  A1c 5.4%. Not diabetic nor prediabetic.     Coagulation Defect  INR = 1.21 (Ref range: 0.85 - 1.15) and/or PTT = N/A on admission, will monitor for bleeding     Hypomagnesemia  Mg = 1.5 mg/dL (Ref range: 1.6 - 2.3 mg/dL) on admission, will replace as needed     Overweight  Estimated body mass index is 25.11 kg/m  as calculated from the following:    Height as of this encounter: 1.778 m (5' 10\").    Weight as of this encounter: 79.4 kg (175 lb).         Diet: NPO per Anesthesia Guidelines for Procedure/Surgery Except for: Meds    DVT Prophylaxis: Pneumatic Compression Devices  Blanca Catheter: PRESENT, indication: Strict 1-2 Hour I&O;Gross Hematuria, Strict 1-2 Hour I&O  Central Lines: None  Cardiac Monitoring: ACTIVE order. Indication: Bradycardia  Code Status: Full Code      Disposition Plan   Expected Discharge: 03/25/2022     Anticipated discharge location: home with family           The " "patient's care was discussed with the Bedside Nurse, Patient and Patient's Family.    Sean Bray MD  Hospitalist Service  [facility]  Securely message with the Vocera Web Console (learn more here)  Text page via [employer] Paging/Directory     Total encounter time 38 minutes with greater than 50% spent in discussion and counseling with patient and spouse, coordination of care with staff, consultants, and RN. Additional time spent in direct patient care and chart review.    Clinically Significant Risk Factors Present on Admission             # Overweight: Estimated body mass index is 26.1 kg/m  as calculated from the following:    Height as of this encounter: 1.778 m (5' 10\").    Weight as of this encounter: 82.5 kg (181 lb 14.1 oz).      ______________________________________________________________________    Interval History   Patient seen and examined this morning.  Spouse at bedside.  Overall not much new.  Patient has unfortunately not passed gas yet.  Will encourage and get out of bed today.  Hemoglobin appears stable and urine is clearing-defer CBI cessation to urology.  Appreciate ID assistance.  He denies worsening shortness of breath, fever/chills, or new pain.    Data reviewed today: I reviewed all medications, new labs and imaging results over the last 24 hours. I personally reviewed no images or EKG's today.    Physical Exam   Vital Signs: Temp: 98.3  F (36.8  C) Temp src: Oral BP: 116/52 Pulse: (!) 41   Resp: 16 SpO2: 96 % O2 Device: Nasal cannula Oxygen Delivery: 2 LPM  Weight: 181 lbs 14.07 oz    Gen: NAD, pleasant  HEENT: EOMI, MMM, NGT in place  Resp: no crackles,  no wheezes, no increased work of resp  CV: S1S2 heard, sydnie rate  Abdo: soft, nontender, nondistended, bowel sounds present, midline lap looks good  Ext: calves nontender, well perfused  Neuro: aaox3, CN grossly intact, no facial asymmetry      Data   Recent Labs   Lab 03/22/22  1325 03/22/22  1020 " 03/22/22  0510 03/21/22  2347 03/21/22  1943 03/21/22  1855 03/21/22  0858 03/21/22  0620 03/20/22  0755 03/20/22  0523 03/19/22  1551 03/19/22  1114 03/19/22  0502 03/19/22  0001 03/18/22  2359   WBC  --   --  10.1  --   --   --   --  14.2*  --  22.0*  --   --    < >  --  11.8*   HGB  --   --  8.3* 8.0*  --  8.3*   < > 7.8*  --  10.6*  --   --    < >  --  11.9*   MCV  --   --  106*  --   --   --   --  107*  --  103*  --   --    < >  --  104*   PLT  --   --  158  --   --   --   --  155  --  158  --   --    < >  --  182   INR  --   --   --   --   --   --   --   --   --   --   --   --   --  1.21*  --    NA  --   --  145*  --   --   --   --   --   --  142  --  141   < >  --  140   POTASSIUM  --   --  3.6  --   --   --   --   --   --  3.8  --  3.9   < >  --  3.9   CHLORIDE  --   --  111*  --   --   --   --   --   --  112*  --  112*   < >  --  109   CO2  --   --  30  --   --   --   --   --   --  23  --  22   < >  --  23   BUN  --   --  26  --   --   --   --   --   --  21  --  21   < >  --  25   CR  --   --  1.07  --   --   --   --   --   --  1.22  --  1.33*   < >  --  1.30*   ANIONGAP  --   --  4  --   --   --   --   --   --  7  --  7   < >  --  8   PAIGE  --   --  8.0*  --   --   --   --   --   --  7.7*  --  8.1*   < >  --  8.8   * 128* 135*  --    < >  --    < >  --    < > 181*   < > 144*   < >  --  121*   ALBUMIN  --   --  2.6*  --   --   --   --   --   --   --   --   --   --   --  3.8   PROTTOTAL  --   --  5.9*  --   --   --   --   --   --   --   --   --   --   --  7.4   BILITOTAL  --   --  0.5  --   --   --   --   --   --   --   --   --   --   --  0.7   ALKPHOS  --   --  37*  --   --   --   --   --   --   --   --   --   --   --  44   ALT  --   --  24  --   --   --   --   --   --   --   --   --   --   --  27   AST  --   --  22  --   --   --   --   --   --   --   --   --   --   --  20   LIPASE  --   --   --   --   --   --   --   --   --   --   --   --   --   --  208    < > = values in this interval not displayed.      No results found for this or any previous visit (from the past 24 hour(s)).

## 2022-03-22 NOTE — PROGRESS NOTES
EP CARDIOLOGY PROGRESS NOTE  Gay Gray MD (pager [phone])    81-year-old male with history of CAD (RCA stent in 2001) admitted with UTI and gross hematuria.  There was initial suspicion for high-grade SMA stenosis and ischemic bowel.  However, exploratory laparotomy did not show ischemic bowel.  He was treated with high-dose IV heparin which aggravated hematuria.    During hospitalization he was noted to have 2:1 AV block.  Ventricular rates typically around 40.  He is on IV dopamine 5 mcg/kg/min.  In the past 24 hours he also had atrial fibrillation/flutter with slow ventricular rate.  This has resolved.  EF 60-65% by echocardiography on 3/19/2022.    The patient remains in the ICU.  He is undergoing bladder irrigation.  Heparin has been stopped.  He has been transfused.  He has an NG tube and remains n.p.o.  Blood cultures from 3/19/2022 became positive for Staph Epi, possibly a contaminant.  ID is following.  -----------------------------------------------------------------    ASSESSMENT:  1. Second-degree AV block, mostly 2:1.  Remains hemodynamically stable, in fact BP has improved though he still on IV dopamine.  No immediate need for a temporary pacemaker.  He will need permanent pacemaker before discharge.  Timing will depend on ID recommendation.  May be as soon as tomorrow.  2. Atrial flutter/fibrillation.  Has resolved.  3. UTI.  4. CKD.  Creatinine has remained stable around 1.3.    PLAN:  1. BP has improved, not sure he still requires dopamine, I will leave to ICU team to decide.  2. Permanent pacemaker implantation. Timing depends on blood culture results.  It would be best to implant the device closer to discharge to minimize the chance of infection.    Total Time: 25 minutes;   10 minutes spent in direct communication with patient / family and care coordination.             Interval History:     He is uncomfortable because of NG tube and continuous bladder irrigation.  No chest pain or other  "cardiac symptoms.          Review of Systems:     CONSTITUTIONAL: NEGATIVE for fever, chills, change in weight  ENT/MOUTH: NEGATIVE for ear, mouth and throat problems  RESP: NEGATIVE for significant cough or SOB  CV: NEGATIVE for chest pain, palpitations or peripheral edema          Physical Exam:      Blood pressure 133/51, pulse (!) 46, temperature 99.6  F (37.6  C), temperature source Oral, resp. rate 21, height 1.778 m (5' 10\"), weight 82.5 kg (181 lb 14.1 oz), SpO2 90 %.  Vitals:    03/21/22 0302 03/22/22 0000 03/22/22 0545   Weight: 84.3 kg (185 lb 13.6 oz) 84.1 kg (185 lb 6.5 oz) 82.5 kg (181 lb 14.1 oz)     Vital Signs with Ranges  Temp:  [98.4  F (36.9  C)-99.6  F (37.6  C)] 99.6  F (37.6  C)  Pulse:  [36-47] 46  Resp:  [0-35] 21  BP: (109-149)/(35-88) 133/51  MAP:  [55 mmHg-133 mmHg] 56 mmHg  Arterial Line BP: ()/() 63/45  SpO2:  [90 %-99 %] 90 %  I/O's Last 24 hours  I/O last 3 completed shifts:  In: 1964.23 [P.O.:105; I.V.:1559.23]  Out: 4925 [Urine:3175; Emesis/NG output:1750]    GENERAL: Alert, oriented, NG tube in place probably  HEENT:  normocephalic, atraumatic.  NECK:  normal JVP.  LUNGS: Mildly decreased breath sounds bilaterally, no wheezes  CARDIOVASCULAR:  bradycardic and regular rhythm, no gallop, murmur or rub  ABDOMEN: soft, no apparent hepatosplenomegaly  EXTREMITIES: Trace edema  VASCULAR:  2+ carotids, 2+ radial pulses           Medications:          acetylcysteine  2 mL Nebulization Q4H     cefTRIAXone  2 g Intravenous Q24H     sodium chloride (PF)  3 mL Intracatheter Q8H     sodium chloride (PF)  3 mL Intracatheter Q8H     vancomycin  1,250 mg Intravenous Q24H            Data:      All new lab and imaging data was reviewed.   Recent Labs   Lab Test 03/22/22  0510 03/21/22  2347 03/21/22  1855 03/21/22  1238 03/21/22  0620 03/20/22  0523 03/19/22  0502 03/19/22  0001   WBC 10.1  --   --   --  14.2* 22.0*   < >  --    HGB 8.3* 8.0* 8.3*   < > 7.8* 10.6*   < >  --    *  " --   --   --  107* 103*   < >  --      --   --   --  155 158   < >  --    INR  --   --   --   --   --   --   --  1.21*    < > = values in this interval not displayed.      Recent Labs   Lab Test 03/22/22  0510 03/21/22  1943 03/21/22  1616 03/20/22  0755 03/20/22  0523 03/19/22  1551 03/19/22  1114   *  --   --   --  142  --  141   POTASSIUM 3.6  --   --   --  3.8  --  3.9   CHLORIDE 111*  --   --   --  112*  --  112*   CO2 30  --   --   --  23  --  22   BUN 26  --   --   --  21  --  21   CR 1.07  --   --   --  1.22  --  1.33*   ANIONGAP 4  --   --   --  7  --  7   PAIGE 8.0*  --   --   --  7.7*  --  8.1*   * 113* 121*   < > 181*   < > 144*    < > = values in this interval not displayed.     Recent Labs   Lab Test 09/02/14  0100   TROPI <0.015  The 99th percentile for upper reference range is 0.045 ug/L.  Troponin values in   the range of 0.045 - 0.120 ug/L may be associated with risks of adverse   clinical events.   Effective 7/30/2014, the reference range for this assay has changed to reflect   new instrumentation/methodology.

## 2022-03-22 NOTE — PLAN OF CARE
Shift 3066-7846: VSS ex. bradycardic. Neuro: A/O x 4, but forgetful. Pulm: Lungs: clear throughout, on O2 NC. GI/: BS hypo, no BM; Blanca in place: WDL output. Diet: NPO ex. meds. Access: PIV's. Pain managed w/ PRN IV Dilaudid. Skin: Staples to lap incision, KAVEH, no drainage. Family updated at bedside throughout day.    AM shift:  - CBI stopped following clamping trial  - Dopamine gtt stopped  - Rhythm change towards change of shift, Cardiology MD notified--please see my provider notification for details

## 2022-03-22 NOTE — PROGRESS NOTES
Vascular Surgery Progress Note     Date of Admission:  3/19/2022  Date: 03/22/22     Subjective  NAEON. Pain adequately controlled. Has not ambulated since surgery. No flatus, no BM.       Physical Exam   Temp: 98.3  F (36.8  C) Temp src: Oral BP: 128/43 Pulse: (!) 41   Resp: 17 SpO2: 97 % O2 Device: Nasal cannula Oxygen Delivery: 2 LPM  Vital Signs with Ranges  Temp:  [98.3  F (36.8  C)-99.6  F (37.6  C)] 98.3  F (36.8  C)  Pulse:  [36-47] 41  Resp:  [0-35] 17  BP: (113-151)/(35-88) 128/43  MAP:  [56 mmHg-133 mmHg] 56 mmHg  Arterial Line BP: ()/() 63/45  FiO2 (%):  [3 %] 3 %  SpO2:  [90 %-99 %] 97 %  181 lbs 14.07 oz    Constitutional: cooperative, no apparent distress. Sitting up comfortably in bed.  Abd: soft, mildly distended, minimal appropriate TTP. Staples in place, abdominal binder in place  Neurologic: Awake, alert, oriented to name, place, time, and situation    Data   Most Recent 3 CBCs:  Recent Labs   Lab Test 03/22/22  0510 03/21/22  2347 03/21/22  1855 03/21/22  1238 03/21/22  0620 03/20/22  0523   WBC 10.1  --   --   --  14.2* 22.0*   HGB 8.3* 8.0* 8.3*   < > 7.8* 10.6*   *  --   --   --  107* 103*     --   --   --  155 158    < > = values in this interval not displayed.       Most Recent 3 BMPs:  Recent Labs   Lab Test 03/22/22  1020 03/22/22  0510 03/21/22  1943 03/20/22  0755 03/20/22  0523 03/19/22  1551 03/19/22  1114   NA  --  145*  --   --  142  --  141   POTASSIUM  --  3.6  --   --  3.8  --  3.9   CHLORIDE  --  111*  --   --  112*  --  112*   CO2  --  30  --   --  23  --  22   BUN  --  26  --   --  21  --  21   CR  --  1.07  --   --  1.22  --  1.33*   ANIONGAP  --  4  --   --  7  --  7   PAIGE  --  8.0*  --   --  7.7*  --  8.1*   * 135* 113*   < > 181*   < > 144*    < > = values in this interval not displayed.       Most Recent 3 INRs:  Recent Labs   Lab Test 03/19/22  0001   INR 1.21*          Assessment & Plan   Mr. Lyle is an 81 year old male with history  of CAD s/p stenting with bradycardia; COPD; HTN; DL; stage 2-3 CKD; former tobacco abuse; who presents with worsening shortness of breath, hernan hematuria, and semi-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 He was taken to the OR on 3/19/22 for exploratory laparotomy, with no SMA thrombus or bowel ischemia found.       Appreciate Medicine, ICU, Cards, Urology, ID care and insights    Long-term, he should likely be on aspirin 81 mg + simvastatin 80 mg (home meds)    NGT decompression until he starts passing flatus; will need to recover from laparotomy    Milka Alfaro

## 2022-03-22 NOTE — PROGRESS NOTES
"UROLOGY PROGRESS NOTE    March 22, 2022     HPI/SUBJECTIVE:   Upon my arrival, CBI running at moderate drip with clear blood-tinged outputs and single small clot in tubing. Hand irrigated with no clots returned. CBI resumed at moderate to slow drip. Reports bladder irritation with CBI, very sensitive on hand irrigations.    Pacemaker implantation delayed given presence of gram positive bactermia.     Bradycardic, otherwise VSS  Cr 1.07  Hgb 8.0 --> 8.3    OBJECTIVE:          /51   Pulse (!) 46   Temp 99.6  F (37.6  C) (Oral)   Resp 21   Ht 1.778 m (5' 10\")   Wt 82.5 kg (181 lb 14.1 oz)   SpO2 90%   BMI 26.10 kg/m      Intake/Output Summary (Last 24 hours) at 3/22/2022 0906  Last data filed at 3/22/2022 0800  Gross per 24 hour   Intake 1750.2 ml   Output 4600 ml   Net -2849.8 ml       GENERAL: Awake alert, NAD. Lying recumbent in bed.  HEAD: atraumatic, normocephalic  CARDIAC: skin well perfused  RESPIRATORY: breathing unlabored  ABDOMEN: soft, moderately tender mid-abdomen, non distended, no rebound or guarding  : 3-way Blanca draining clear/pink-tinged urine. +SP tenderness. Hand irrigations performed with 240 ml sterile water with no clots returned. CBI running at slow to moderate rate.  SKIN/HAIR/NAILS: no visible rashes  NEUROLOGIC: no focal deficits  PSYCHIATRIC: Speech: normal Mood: normal Affect: normal      IMAGING:     EXAM: CT CHEST PE ABDOMEN PELVIS W CONTRAST  LOCATION: [facility]  DATE/TIME: 3/19/2022                                                             IMPRESSION:  1.  Negative for pulmonary embolism.  2.  Centrilobular emphysema without evidence of pneumonia.  3.  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 The SMA trunk is patent distal to this.  4.  Moderate enlargement and heterogeneity of the prostate. There is thickening of the posterior " bladder wall which is somewhat nodular in appearance on the sagittal series, though difficult to ascertain if this is separate from the prostate which   indents on the undersurface of the bladder. Recommend follow-up with urology.       ASSESSMENT:  81 year old yo male admitted with SMA thrombosis and heart block. Urology following for gross hematuria requiring CBI.  CT scan revealed large prostate (80 gm) and thickening of lower posterior wall (+ smoker). UA suspicious for UTI; UCx with mixed jackelin. Blood cultures growing GPC in clusters.      PLAN:      1. Gross hematuria  - Etiology likely prostatic in origin. CT scan revealed large prostate (80 gm) and thickening of lower posterior wall (+ smoker)  - UCx with mixed jackelin.    - Continue CBI and wean/clamp as able.  May disconnect CBI once pt tolerates irrigation being clamped X2 hours and urine is watermelon colored (or lighter) and free of clots.   - You may hand irrigate catheter as needed for clots / problems draining.  Use a 60cc catheter tipped syringe and instill 40-60cc of sterile water or NS and aspirate. Only hand irrigate through main catheter port (where the catheter bag tubing connects). Do NOT re-use aspirate from the bladder. Repeat until clear then return catheter to drainage, and re-start CBI.  - Recommend cystoscopy as outpatient    2. BPH  -Continue flomax and finasteride  -Will need further evaluation as outpatient (may need procedure to help with urination)    3. 1st/2nd degree heart block  - Planning for permanent pacemaker implant prior to discharge     ADDENDUM 1230: I re-assessed patient and outputs remained clear at slower rate. CBI clamped 1220. Plan discussed with NABILA Hope. As above, may disconnect CBI tubing once pt tolerates irrigation being clamped X2 hours and urine is watermelon colored (or lighter) and free of clots. If outputs are cherry red or darker, resume CBI and titrate to clear/watermelon outputs.     Yessica Pringle  COMPA will be rounding for our team tomorrow.    Shannon Garcia PA-C  [facility]   [email or web address]  Text Page (7:30am to 4:30pm)

## 2022-03-22 NOTE — PROVIDER NOTIFICATION
18:02: Contacted on-call Cardiology MD regarding a rhythm change indicating a-flutter (confirmed w/ EKG)    18:22: No call back, I'm calling back answering service now    Approx 18:27: Answering service re-paged cardiology MD    Approx 18:30: MD call back. We discussed pt and I mentioned that pt is asymptomatic. No new orders at this time. Although new for this shift, the rhythm is not new for this pt (was in this rhythm yesterday per MD).

## 2022-03-23 ENCOUNTER — APPOINTMENT (OUTPATIENT)
Dept: GENERAL RADIOLOGY | Facility: CLINIC | Age: 82
DRG: 242 | End: 2022-03-23
Attending: INTERNAL MEDICINE
Payer: COMMERCIAL

## 2022-03-23 LAB
ANION GAP SERPL CALCULATED.3IONS-SCNC: 5 MMOL/L (ref 3–14)
ATRIAL RATE - MUSE: 277 BPM
ATRIAL RATE - MUSE: 34 BPM
BACTERIA BLD CULT: ABNORMAL
BACTERIA BLD CULT: ABNORMAL
BUN SERPL-MCNC: 31 MG/DL (ref 7–30)
CALCIUM SERPL-MCNC: 8.4 MG/DL (ref 8.5–10.1)
CHLORIDE BLD-SCNC: 113 MMOL/L (ref 94–109)
CO2 SERPL-SCNC: 28 MMOL/L (ref 20–32)
CREAT SERPL-MCNC: 1.05 MG/DL (ref 0.66–1.25)
DIASTOLIC BLOOD PRESSURE - MUSE: NORMAL MMHG
DIASTOLIC BLOOD PRESSURE - MUSE: NORMAL MMHG
ERYTHROCYTE [DISTWIDTH] IN BLOOD BY AUTOMATED COUNT: 17.2 % (ref 10–15)
GFR SERPL CREATININE-BSD FRML MDRD: 71 ML/MIN/1.73M2
GLUCOSE BLD-MCNC: 127 MG/DL (ref 70–99)
GLUCOSE BLDC GLUCOMTR-MCNC: 114 MG/DL (ref 70–99)
HCT VFR BLD AUTO: 23.3 % (ref 40–53)
HGB BLD-MCNC: 7.4 G/DL (ref 13.3–17.7)
INTERPRETATION ECG - MUSE: NORMAL
INTERPRETATION ECG - MUSE: NORMAL
MAGNESIUM SERPL-MCNC: 2.6 MG/DL (ref 1.6–2.3)
MAGNESIUM SERPL-MCNC: 2.7 MG/DL (ref 1.6–2.3)
MCH RBC QN AUTO: 32.7 PG (ref 26.5–33)
MCHC RBC AUTO-ENTMCNC: 31.8 G/DL (ref 31.5–36.5)
MCV RBC AUTO: 103 FL (ref 78–100)
P AXIS - MUSE: 268 DEGREES
P AXIS - MUSE: NORMAL DEGREES
PLATELET # BLD AUTO: 178 10E3/UL (ref 150–450)
POTASSIUM BLD-SCNC: 3.4 MMOL/L (ref 3.4–5.3)
POTASSIUM BLD-SCNC: 3.6 MMOL/L (ref 3.4–5.3)
PR INTERVAL - MUSE: NORMAL MS
PR INTERVAL - MUSE: NORMAL MS
QRS DURATION - MUSE: 130 MS
QRS DURATION - MUSE: 78 MS
QT - MUSE: 532 MS
QT - MUSE: 562 MS
QTC - MUSE: 452 MS
QTC - MUSE: 465 MS
R AXIS - MUSE: -36 DEGREES
R AXIS - MUSE: 1 DEGREES
RBC # BLD AUTO: 2.26 10E6/UL (ref 4.4–5.9)
SODIUM SERPL-SCNC: 146 MMOL/L (ref 133–144)
SYSTOLIC BLOOD PRESSURE - MUSE: NORMAL MMHG
SYSTOLIC BLOOD PRESSURE - MUSE: NORMAL MMHG
T AXIS - MUSE: 158 DEGREES
T AXIS - MUSE: 22 DEGREES
VENTRICULAR RATE- MUSE: 39 BPM
VENTRICULAR RATE- MUSE: 46 BPM
WBC # BLD AUTO: 9.4 10E3/UL (ref 4–11)

## 2022-03-23 PROCEDURE — 36415 COLL VENOUS BLD VENIPUNCTURE: CPT | Performed by: STUDENT IN AN ORGANIZED HEALTH CARE EDUCATION/TRAINING PROGRAM

## 2022-03-23 PROCEDURE — C1785 PMKR, DUAL, RATE-RESP: HCPCS | Performed by: INTERNAL MEDICINE

## 2022-03-23 PROCEDURE — 99024 POSTOP FOLLOW-UP VISIT: CPT | Mod: 25 | Performed by: INTERNAL MEDICINE

## 2022-03-23 PROCEDURE — 83735 ASSAY OF MAGNESIUM: CPT | Performed by: STUDENT IN AN ORGANIZED HEALTH CARE EDUCATION/TRAINING PROGRAM

## 2022-03-23 PROCEDURE — C1898 LEAD, PMKR, OTHER THAN TRANS: HCPCS | Performed by: INTERNAL MEDICINE

## 2022-03-23 PROCEDURE — 210N000001 HC R&B IMCU HEART CARE

## 2022-03-23 PROCEDURE — 250N000011 HC RX IP 250 OP 636: Performed by: HOSPITALIST

## 2022-03-23 PROCEDURE — 02H63JZ INSERTION OF PACEMAKER LEAD INTO RIGHT ATRIUM, PERCUTANEOUS APPROACH: ICD-10-PCS | Performed by: INTERNAL MEDICINE

## 2022-03-23 PROCEDURE — 250N000009 HC RX 250: Performed by: INTERNAL MEDICINE

## 2022-03-23 PROCEDURE — C1894 INTRO/SHEATH, NON-LASER: HCPCS | Performed by: INTERNAL MEDICINE

## 2022-03-23 PROCEDURE — 99153 MOD SED SAME PHYS/QHP EA: CPT | Performed by: INTERNAL MEDICINE

## 2022-03-23 PROCEDURE — 83735 ASSAY OF MAGNESIUM: CPT | Performed by: HOSPITALIST

## 2022-03-23 PROCEDURE — 250N000011 HC RX IP 250 OP 636: Performed by: INTERNAL MEDICINE

## 2022-03-23 PROCEDURE — 33208 INSRT HEART PM ATRIAL & VENT: CPT | Performed by: INTERNAL MEDICINE

## 2022-03-23 PROCEDURE — 250N000011 HC RX IP 250 OP 636: Performed by: SURGERY

## 2022-03-23 PROCEDURE — 94640 AIRWAY INHALATION TREATMENT: CPT

## 2022-03-23 PROCEDURE — 84132 ASSAY OF SERUM POTASSIUM: CPT | Performed by: STUDENT IN AN ORGANIZED HEALTH CARE EDUCATION/TRAINING PROGRAM

## 2022-03-23 PROCEDURE — 99233 SBSQ HOSP IP/OBS HIGH 50: CPT | Performed by: HOSPITALIST

## 2022-03-23 PROCEDURE — 02HK3JZ INSERTION OF PACEMAKER LEAD INTO RIGHT VENTRICLE, PERCUTANEOUS APPROACH: ICD-10-PCS | Performed by: INTERNAL MEDICINE

## 2022-03-23 PROCEDURE — 210N000002 HC R&B HEART CARE

## 2022-03-23 PROCEDURE — 272N000001 HC OR GENERAL SUPPLY STERILE: Performed by: INTERNAL MEDICINE

## 2022-03-23 PROCEDURE — 71045 X-RAY EXAM CHEST 1 VIEW: CPT

## 2022-03-23 PROCEDURE — 99152 MOD SED SAME PHYS/QHP 5/>YRS: CPT | Performed by: INTERNAL MEDICINE

## 2022-03-23 PROCEDURE — C9113 INJ PANTOPRAZOLE SODIUM, VIA: HCPCS | Performed by: SURGERY

## 2022-03-23 PROCEDURE — 0JH606Z INSERTION OF PACEMAKER, DUAL CHAMBER INTO CHEST SUBCUTANEOUS TISSUE AND FASCIA, OPEN APPROACH: ICD-10-PCS | Performed by: INTERNAL MEDICINE

## 2022-03-23 PROCEDURE — 258N000003 HC RX IP 258 OP 636: Performed by: HOSPITALIST

## 2022-03-23 PROCEDURE — 258N000003 HC RX IP 258 OP 636: Performed by: STUDENT IN AN ORGANIZED HEALTH CARE EDUCATION/TRAINING PROGRAM

## 2022-03-23 PROCEDURE — 85027 COMPLETE CBC AUTOMATED: CPT | Performed by: HOSPITALIST

## 2022-03-23 PROCEDURE — 80048 BASIC METABOLIC PNL TOTAL CA: CPT | Performed by: HOSPITALIST

## 2022-03-23 PROCEDURE — 250N000011 HC RX IP 250 OP 636: Performed by: STUDENT IN AN ORGANIZED HEALTH CARE EDUCATION/TRAINING PROGRAM

## 2022-03-23 PROCEDURE — 999N000157 HC STATISTIC RCP TIME EA 10 MIN

## 2022-03-23 PROCEDURE — 36415 COLL VENOUS BLD VENIPUNCTURE: CPT | Performed by: HOSPITALIST

## 2022-03-23 DEVICE — LEAD PACING SOLIA S 45 PROMRI: Type: IMPLANTABLE DEVICE | Status: FUNCTIONAL

## 2022-03-23 DEVICE — PACEMAKER PROMRI DUAL CHAMBER: Type: IMPLANTABLE DEVICE | Status: FUNCTIONAL

## 2022-03-23 DEVICE — LEAD PACING SOLIA S 53 PROMRI: Type: IMPLANTABLE DEVICE | Status: FUNCTIONAL

## 2022-03-23 RX ORDER — BUPIVACAINE HYDROCHLORIDE 2.5 MG/ML
INJECTION, SOLUTION EPIDURAL; INFILTRATION; INTRACAUDAL
Status: DISCONTINUED | OUTPATIENT
Start: 2022-03-23 | End: 2022-03-23 | Stop reason: HOSPADM

## 2022-03-23 RX ORDER — OXYCODONE AND ACETAMINOPHEN 5; 325 MG/1; MG/1
1 TABLET ORAL EVERY 4 HOURS PRN
Status: DISCONTINUED | OUTPATIENT
Start: 2022-03-23 | End: 2022-03-26 | Stop reason: HOSPADM

## 2022-03-23 RX ORDER — ACETYLCYSTEINE 200 MG/ML
2 SOLUTION ORAL; RESPIRATORY (INHALATION) EVERY 4 HOURS PRN
Status: DISCONTINUED | OUTPATIENT
Start: 2022-03-23 | End: 2022-03-26 | Stop reason: HOSPADM

## 2022-03-23 RX ORDER — IPRATROPIUM BROMIDE AND ALBUTEROL SULFATE 2.5; .5 MG/3ML; MG/3ML
3 SOLUTION RESPIRATORY (INHALATION) ONCE
Status: COMPLETED | OUTPATIENT
Start: 2022-03-23 | End: 2022-03-23

## 2022-03-23 RX ORDER — FENTANYL CITRATE 50 UG/ML
INJECTION, SOLUTION INTRAMUSCULAR; INTRAVENOUS
Status: DISCONTINUED | OUTPATIENT
Start: 2022-03-23 | End: 2022-03-23 | Stop reason: HOSPADM

## 2022-03-23 RX ORDER — POTASSIUM CHLORIDE 7.45 MG/ML
10 INJECTION INTRAVENOUS
Status: DISPENSED | OUTPATIENT
Start: 2022-03-23 | End: 2022-03-23

## 2022-03-23 RX ADMIN — CEFTRIAXONE SODIUM 2 G: 2 INJECTION, POWDER, FOR SOLUTION INTRAMUSCULAR; INTRAVENOUS at 21:04

## 2022-03-23 RX ADMIN — PANTOPRAZOLE SODIUM 40 MG: 40 INJECTION, POWDER, FOR SOLUTION INTRAVENOUS at 21:03

## 2022-03-23 RX ADMIN — POTASSIUM CHLORIDE 10 MEQ: 7.46 INJECTION, SOLUTION INTRAVENOUS at 07:46

## 2022-03-23 RX ADMIN — HYDROMORPHONE HYDROCHLORIDE 0.5 MG: 1 INJECTION, SOLUTION INTRAMUSCULAR; INTRAVENOUS; SUBCUTANEOUS at 05:31

## 2022-03-23 RX ADMIN — VANCOMYCIN HYDROCHLORIDE 1250 MG: 5 INJECTION, POWDER, LYOPHILIZED, FOR SOLUTION INTRAVENOUS at 23:44

## 2022-03-23 RX ADMIN — SODIUM CHLORIDE, POTASSIUM CHLORIDE, SODIUM LACTATE AND CALCIUM CHLORIDE: 600; 310; 30; 20 INJECTION, SOLUTION INTRAVENOUS at 17:35

## 2022-03-23 RX ADMIN — HYDROMORPHONE HYDROCHLORIDE 0.5 MG: 1 INJECTION, SOLUTION INTRAMUSCULAR; INTRAVENOUS; SUBCUTANEOUS at 10:23

## 2022-03-23 RX ADMIN — PANTOPRAZOLE SODIUM 40 MG: 40 INJECTION, POWDER, FOR SOLUTION INTRAVENOUS at 08:00

## 2022-03-23 RX ADMIN — IPRATROPIUM BROMIDE AND ALBUTEROL SULFATE 3 ML: .5; 3 SOLUTION RESPIRATORY (INHALATION) at 05:12

## 2022-03-23 ASSESSMENT — ACTIVITIES OF DAILY LIVING (ADL)
ADLS_ACUITY_SCORE: 13
ADLS_ACUITY_SCORE: 14
ADLS_ACUITY_SCORE: 13
ADLS_ACUITY_SCORE: 11
ADLS_ACUITY_SCORE: 13
ADLS_ACUITY_SCORE: 11
ADLS_ACUITY_SCORE: 11
ADLS_ACUITY_SCORE: 13

## 2022-03-23 NOTE — PLAN OF CARE
Shift 6362-0155: VSS ex. bradycardic. Neuro: A/O x 4, but forgetful. Pulm: Lungs: clear throughout, on O2 NC. GI/: BS hypo, no BM; Blanca in place: low to adequate output. Diet: NPO ex. Meds; OG in place to LIS. Access: PIV's. Pain managed w/ PRN IV Dilaudid. Skin: Staples to lap incision, KAVEH, no drainage. Lili (spouse) updated at bedside.    AM Shift:  - Pt went for pacemaker placement, pt to transfer to CCU after procedure.

## 2022-03-23 NOTE — PROGRESS NOTES
EP CARDIOLOGY PROGRESS NOTE  aGy Gray MD (pager [phone])    81-year-old male with history of CAD (RCA stent in 2001) admitted with UTI and gross hematuria.  There was initial suspicion for high-grade SMA stenosis and ischemic bowel.  However, exploratory laparotomy did not reveal ischemic bowel.  He was treated with high-dose IV heparin which aggravated hematuria.     During hospitalization he was noted to have 2:1 AV block.  Ventricular rates typically around 40.  In the past 48 hours he has had paroxysmal atrial fibrillation/flutter with slow ventricular rate. EF 60-65% by echocardiography on 3/19/2022.     The patient remains in the ICU. Blood cultures from 3/19/2022 are positive for Staph Epi, possibly a contaminant.  ID is following.    Last night he was restless.  There was some concern he was dyspneic.  A chest x-ray was performed.  There was no specific intervention ordered.  Today he states he feels better.  -----------------------------------------------------------------    ASSESSMENT:  1. Second-degree AV block.  Ventricular rate typically around 40.  Off dopamine.  Will discuss with ID about proceeding with permanent pacemaker today.  The technical aspects, risks and benefits of pacemaker implantation were discussed with the patient and his wife.  There is an approximately 2% risk of serious complication.  They expressed understanding and have agreed to proceed.  2. Paroxysmal atrial flutter/fibrillation.  Recurrence last night.  Back in sinus rhythm with 2:1 AV block this am. Ventricular rate during AF is slow, as expected, due to AV conduction disease.  Not a candidate for anticoagulation for the time being because of hematuria.  3. UTI.  Positive blood culture likely contaminant.  4. CKD.    PLAN:  1. Dual-chamber permanent pacemaker, will discuss with ID whether we can proceed today.    Total Time: 25 minutes;   10 minutes spent in direct communication with patient / family and care  "coordination.             Interval History:     no new complaints          Review of Systems:     CONSTITUTIONAL: NEGATIVE for fever, chills, change in weight  ENT/MOUTH: NEGATIVE for ear, mouth and throat problems  RESP: +SOB  CV: NEGATIVE for chest pain, palpitation          Physical Exam:      Blood pressure (!) 140/54, pulse (!) 38, temperature 98.8  F (37.1  C), temperature source Oral, resp. rate 25, height 1.778 m (5' 10\"), weight 76.9 kg (169 lb 8.5 oz), SpO2 94 %.  Vitals:    03/22/22 0000 03/22/22 0545 03/23/22 0500   Weight: 84.1 kg (185 lb 6.5 oz) 82.5 kg (181 lb 14.1 oz) 76.9 kg (169 lb 8.5 oz)     Vital Signs with Ranges  Temp:  [98.7  F (37.1  C)-98.9  F (37.2  C)] 98.8  F (37.1  C)  Pulse:  [37-51] 38  Resp:  [12-34] 25  BP: (101-145)/() 140/54  SpO2:  [92 %-100 %] 94 %  I/O's Last 24 hours  I/O last 3 completed shifts:  In: 514.25 [I.V.:514.25]  Out: 2760 [Urine:1460; Emesis/NG output:1300]    GENERAL: Alert  HEENT:  normocephalic, atraumatic.  NG tube in place.  NECK: Likely normal JVP.  LUNGS: Mildly decreased breath sounds bilaterally, no wheezes  CARDIOVASCULAR: Bradycardic regular rhythm, no gallop, murmur or rub  ABDOMEN: soft  EXTREMITIES:  no edema  VASCULAR:  2+ carotids, 2+ radial pulses           Medications:          cefTRIAXone  2 g Intravenous Q24H     pantoprazole (PROTONIX) IV  40 mg Intravenous BID     potassium chloride  10 mEq Intravenous Q1H     sodium chloride (PF)  3 mL Intracatheter Q8H     sodium chloride (PF)  3 mL Intracatheter Q8H     vancomycin  1,250 mg Intravenous Q24H            Data:      All new lab and imaging data was reviewed.   Recent Labs   Lab Test 03/23/22  0439 03/22/22  0510 03/21/22  2347 03/21/22  1238 03/21/22  0620 03/19/22  0502 03/19/22  0001   WBC 9.4 10.1  --   --  14.2*   < >  --    HGB 7.4* 8.3* 8.0*   < > 7.8*   < >  --    * 106*  --   --  107*   < >  --     158  --   --  155   < >  --    INR  --   --   --   --   --   --  1.21* "    < > = values in this interval not displayed.      Recent Labs   Lab Test 03/23/22  0806 03/23/22  0439 03/22/22  1627 03/22/22  1020 03/22/22  0510 03/20/22  0755 03/20/22  0523   NA  --  146*  --   --  145*  --  142   POTASSIUM  --  3.4  --   --  3.6  --  3.8   CHLORIDE  --  113*  --   --  111*  --  112*   CO2  --  28  --   --  30  --  23   BUN  --  31*  --   --  26  --  21   CR  --  1.05  --   --  1.07  --  1.22   ANIONGAP  --  5  --   --  4  --  7   PAIGE  --  8.4*  --   --  8.0*  --  7.7*   * 127* 116*   < > 135*   < > 181*    < > = values in this interval not displayed.     Recent Labs   Lab Test 09/02/14  0100   TROPI <0.015  The 99th percentile for upper reference range is 0.045 ug/L.  Troponin values in   the range of 0.045 - 0.120 ug/L may be associated with risks of adverse   clinical events.   Effective 7/30/2014, the reference range for this assay has changed to reflect   new instrumentation/methodology.           EKG results:  Reviewed     Imaging:   Recent Results (from the past 24 hour(s))   XR Chest Port 1 View    Narrative    EXAM: CHEST SINGLE VIEW PORTABLE  LOCATION: [facility]  DATE/TIME: 03/23/2022, 4:50 AM    INDICATION: Shortness of breath.  COMPARISON: None.    FINDINGS: A few patchy opacities are present in bilateral lung bases. The lungs are otherwise clear. Normal-sized cardiac silhouette. Atherosclerotic calcification in the thoracic aorta. An enteric drainage tube is present with distal tip not visualized,   but at least to the distal esophagus.      Impression    IMPRESSION:   1. A few patchy opacities in bilateral lung bases could represent atelectasis or pneumonia.  2. No other finding suspicious for active cardiopulmonary disease.

## 2022-03-23 NOTE — PROGRESS NOTES
[facility]    Urology Progress Note  Date of Service: 03/23/2022      Interval History   CBI has remained off overnight. Urine is clear yellow this morning. He admits to continued post surgical abdominal pain.     AVSS.  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 Urology following for gross hematuria requiring CBI.  CT scan revealed large prostate (80 gm) and thickening of lower posterior wall (+ smoker). UA suspicious for UTI; UCx with mixed jackelin. Blood culture x1 growing staph epidermidis; repeat blood cultures from 3/20 with no growth.         PLAN:    1. Gross hematuria  - Etiology likely prostatic in origin. CT scan revealed large prostate (80 gm) and thickening of lower posterior wall (+ smoker)  - UCx with mixed jackelin.    - CBI remains clamped.    - Okay to hand irrigate as needed for clots or concern for obstruction.   - Recommend cystoscopy as outpatient     2. BPH  - Continue flomax and finasteride  - Will need further evaluation as outpatient (may need procedure to help with urination)   - pending clinical progression, may be able to do voiding trial prior to discharge home vs follow up outpatient for voiding trial     3. 1st/2nd degree heart block  - Planning for permanent pacemaker implant prior to discharge     Physical Exam   Temp:  [98.7  F (37.1  C)-98.9  F (37.2  C)] 98.8  F (37.1  C)  Pulse:  [37-51] 38  Resp:  [12-34] 25  BP: (101-145)/() 140/54  SpO2:  [92 %-100 %] 94 %    Weights:   Vitals:    03/22/22 0000 03/22/22 0545 03/23/22 0500   Weight: 84.1 kg (185 lb 6.5 oz) 82.5 kg (181 lb 14.1 oz) 76.9 kg (169 lb 8.5 oz)    Body mass index is 24.33 kg/m .    Constitutional: Alert. Pleasant. No acute distress.   CV: bradycardia noted on monitor  Respiratory: No respiratory distress. Breathing unlabored.   GI: Soft, abdominal binder in place, midline surgical incision. approprietly  tender. NG in place.   Male : burrell in place with clear yellow outputs, one small mucous like clot noted in tubing, some dried blood around meatus.   Skin: Warm and dry.    Data   Recent Labs   Lab 03/23/22  0806 03/23/22  0439 03/22/22  1627 03/22/22  1020 03/22/22  0510 03/21/22  2347 03/21/22  0858 03/21/22  0620 03/20/22  0755 03/20/22  0523 03/19/22  0502 03/19/22  0001 03/18/22  2359   WBC  --  9.4  --   --  10.1  --   --  14.2*  --  22.0*   < >  --  11.8*   HGB  --  7.4*  --   --  8.3* 8.0*   < > 7.8*  --  10.6*   < >  --  11.9*   MCV  --  103*  --   --  106*  --   --  107*  --  103*   < >  --  104*   PLT  --  178  --   --  158  --   --  155  --  158   < >  --  182   INR  --   --   --   --   --   --   --   --   --   --   --  1.21*  --    NA  --  146*  --   --  145*  --   --   --   --  142   < >  --  140   POTASSIUM  --  3.4  --   --  3.6  --   --   --   --  3.8   < >  --  3.9   CHLORIDE  --  113*  --   --  111*  --   --   --   --  112*   < >  --  109   CO2  --  28  --   --  30  --   --   --   --  23   < >  --  23   BUN  --  31*  --   --  26  --   --   --   --  21   < >  --  25   CR  --  1.05  --   --  1.07  --   --   --   --  1.22   < >  --  1.30*   ANIONGAP  --  5  --   --  4  --   --   --   --  7   < >  --  8   PAIGE  --  8.4*  --   --  8.0*  --   --   --   --  7.7*   < >  --  8.8   * 127* 116*   < > 135*  --    < >  --    < > 181*   < >  --  121*   ALBUMIN  --   --   --   --  2.6*  --   --   --   --   --   --   --  3.8   PROTTOTAL  --   --   --   --  5.9*  --   --   --   --   --   --   --  7.4   BILITOTAL  --   --   --   --  0.5  --   --   --   --   --   --   --  0.7   ALKPHOS  --   --   --   --  37*  --   --   --   --   --   --   --  44   ALT  --   --   --   --  24  --   --   --   --   --   --   --  27   AST  --   --   --   --  22  --   --   --   --   --   --   --  20   LIPASE  --   --   --   --   --   --   --   --   --   --   --   --  208    < > = values in this interval not displayed.        Recent Labs   Lab 03/23/22  0806 03/23/22  0439 03/22/22  1627 03/22/22  1325 03/22/22  1020 03/22/22  0510   * 127* 116* 138* 128* 135*        Unresulted Labs Ordered in the Past 30 Days of this Admission     Date and Time Order Name Status Description    3/20/2022 11:34 PM Blood Culture Hand, Right Preliminary     3/20/2022 11:34 PM Blood Culture Hand, Left Preliminary     3/19/2022 12:22 AM Blood Culture Line, venous Preliminary            Imaging  Recent Results (from the past 24 hour(s))   XR Chest Port 1 View    Narrative    EXAM: CHEST SINGLE VIEW PORTABLE  LOCATION: [facility]  DATE/TIME: 03/23/2022, 4:50 AM    INDICATION: Shortness of breath.  COMPARISON: None.    FINDINGS: A few patchy opacities are present in bilateral lung bases. The lungs are otherwise clear. Normal-sized cardiac silhouette. Atherosclerotic calcification in the thoracic aorta. An enteric drainage tube is present with distal tip not visualized,   but at least to the distal esophagus.      Impression    IMPRESSION:   1. A few patchy opacities in bilateral lung bases could represent atelectasis or pneumonia.  2. No other finding suspicious for active cardiopulmonary disease.         Yessica Pringle PA-C on 3/23/2022 at 9:25 AM   [facility]

## 2022-03-23 NOTE — PROGRESS NOTES
Contacted cardiology shortly before 0400, for patient with worsening SOB and dyspnea.  Cardiology stated they did not believe the new onset of dyspnea was not related to his irregular rhythm.  Asked us to consult the intensivist team for assessing the need for diuresis.      Contacted intesivist attending tonight at 0417.  Attending came to assess patient personally.

## 2022-03-23 NOTE — PROVIDER NOTIFICATION
MD Notification    Notified Person: MD    Notified Person Name: Dr. Milka Alfaro, Fellow    Notification Date/Time:3/23 1239     Notification Interaction:Paged    Purpose of Notification:[facility] - 265 A.C. Pt had BM prior to ICD placement. Do you want to keep NG at LIS, can we do clamping trial, Diet?    Orders Received: Vascular team stopped by. Pt is able to have ice chips, 7up.    Comments:

## 2022-03-23 NOTE — PROGRESS NOTES
[facility]    Medicine Progress Note - Hospitalist Service    Date of Admission:  3/19/2022    Assessment & Plan        Sunil Lyle is a 81 year old male with past medical history significant for CAD s/p stenting, HLD, GERD  admitted on 3/19/2022 with multiple complaints including shortness of breath, hematuria and bradycardia.         Hypotension (improved)  Bradycardia   1st Degree AV block --> hi grade 2nd degree AV block at times  Heart rates have been in the 40s. He is dyspneic with ecertion but otherwise has normal blood pressures. He was evaluated by cardiology in April 2021 for fatigue and was found to have junctional bradycardia at that time with rates in the 40s. His metoprolol was stopped and he was evaluated with a zio patch which only revealed a single 3.8s pause. It was opted to continue monitoring without intervention at that time.   EKG here shows sinus bradycardia with 1st degree AV block and RBBB   - TTE - EF normal, unchanged since 2017  - Cardiology EP and ID concur - ok to place PM 3/23  - follow tele  - ID consulted given unclear infectious picture - Bld culture from adm felt to be contaminant- see below  - empiric antibiotics (ceftriazone and vanco) continued alex operatively per ID  - dopamine stopped 3/22, BP tolerated   - 3/23 EP planning pacemaker placement and transfer to heart center/CICU subsequently (blood cultures negative x 3 days)     Gram positive bacteremia, staph epi - likely contaminant  Blood cultures turned positive day 2. He has a coronary stent, but denies other hardware in his body.  GPC in clusters... follow identification, adjust abx.   - ceftriaxone and vancomycin for now per ID  - repeat cultures from 3/20 remain NGTD  - afebrile 3/21 - 3/23     Hematuria  Fever, Possible UTI - Urine culture mixed jackelin  BPH  PT febrile here to 101.8. UA notable for large LE, >182 RBCs and >182 WBCs. Urine is grossly bloody. On CT scan, there is moderate  enlargement and heterogeneity of the prostate. There is thickening of the posterior bladder wall which is somewhat nodular in appearance.   - ID following and appreciated - ceftriaxone continued  - Follow-up urine cultures and blood cultures - see above  - Urology consultation appreciated - previously on CBI, now stopped  - cystoscopy in the future     Acute anemia, primarily blood loss  Ongoing hematuria and recent surgery (see below).  Dropped 3/21 to 7.8 from 10.6. Was 11.9 on admission 3/18.  - monitor hgb, transfuse for <7  - CBI for hematuria as above  - holding heparin - plan was for 48 hours post op per vascular surgery, we are nearly at that point as of mid AM 3/21  - 3/21 follow up hgb 7.2, will transfuse 1 unit PRBC and maintain conditional for <7.    - 3/22 hgb 8.3 --> 3/23 7.3  - follow CBC     Dyspnea   Hx of COPD, former smoker   The patient's chief complaint today is actually shortness of breath. The shortness of breath has been on-going and getting worse over the past two weeks. This has been predominantly on exertion. Of note, review of clinic notes from April and July of 2021, suggest that he has been having dyspnea on exertion at that time as well. Cardiology discussed ischemic evaluation, but the patient declined. CT here showed Centrilobular emphysema without evidence of pneumonia. No evidence of pulmonary embolism. O2 saturations are within normal limits.   Unclear etiology, could be progression of emphysema vs cardiac related (new CHF, ischemia i.e. anginal equivalent, bradycardia, etc.).   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 The SMA trunk is patent distal to this.   In terms of  "symptoms, the patient has noted increased frequency of diarrhea recently, but no abdominal pain after eating, no bloody or black stools.   - Started on heparin drip in the ED, will continue cautiously in the setting of hematuria.   - initially stat lactic acid went up to 5.8 from 2 range despite fluids when initially hospitalized.  Some abdominal pain noted at that time as well - discussed with vascular surgery immediately who saw patient and took him to OR emergently. He remained on heparin gtt for 48 hrs post op per vascular surgery.  - no thrombosis found, and no ischemic bowel noted in OR 3/19  - 3/23 - BM this morning, RN paging surgery for possible NG clamping/diet trial     CAD s/p RCA stent in 2001   Hyperlipidemia  Hypertension   - Continue ASA, simvastatin and fenofibrate in longterm, held for now     Probable CKD III  Creatinine is elevated to 1.3 initially, this seems to be near his baseline of 1.2-1.3.   - creat improved to 1.07 3/22  - Monitor renal function     Hyperglycemia  A1c 5.4%. Not diabetic nor prediabetic.     Coagulation Defect  INR = 1.21 (Ref range: 0.85 - 1.15) and/or PTT = N/A on admission, will monitor for bleeding     Hypomagnesemia  Mg = 1.5 mg/dL (Ref range: 1.6 - 2.3 mg/dL) on admission, will replace as needed     Overweight  Estimated body mass index is 25.11 kg/m  as calculated from the following:    Height as of this encounter: 1.778 m (5' 10\").    Weight as of this encounter: 79.4 kg (175 lb).           Diet: NPO per Anesthesia Guidelines for Procedure/Surgery Except for: Meds    DVT Prophylaxis: Pneumatic Compression Devices  Blanac Catheter: PRESENT, indication: Strict 1-2 Hour I&O, Strict 1-2 Hour I&O  Central Lines: None  Cardiac Monitoring: ACTIVE order. Indication: Bradycardia  Code Status: Full Code      Disposition Plan   Expected Discharge: 03/25/2022     Anticipated discharge location: home with family           The patient's care was discussed with the Bedside Nurse, " Patient and Patient's Family.    Sean Bray MD  Hospitalist Service  [facility]  Securely message with the [employer] Web Console (learn more here)  Text page via [employer] App Paging/Directory         Clinically Significant Risk Factors Present on Admission                    ______________________________________________________________________    Interval History   Patient seen and examined this morning with spouse at bedside.  Reports having had a bowel movement and otherwise fairly stable.  EP and ID discussed and plans are for pacemaker placement today.  Otherwise patient denies any fevers or worsening shortness of breath.  Plans to transfer to heart center/CICU after pacemaker placement.    Data reviewed today: I reviewed all medications, new labs and imaging results over the last 24 hours. I personally reviewed no images or EKG's today.    Physical Exam   Vital Signs: Temp: 98.8  F (37.1  C) Temp src: Oral BP: (!) 140/54 Pulse: (!) 38   Resp: 25 SpO2: 94 % O2 Device: Nasal cannula Oxygen Delivery: 2 LPM  Weight: 169 lbs 8.54 oz    Gen: NAD, pleasant  HEENT: EOMI, MMM  Resp: no crackles,  no wheezes, no increased work of resp  CV: S1S2 heard,bradycardic  Abdo: soft, nontender, nondistended, bowel sounds present  Ext: calves nontender, well perfused  Neuro: aaox3, CN grossly intact, no facial asymmetry      Data   Recent Labs   Lab 03/23/22  1216 03/23/22  0806 03/23/22  0439 03/22/22  1627 03/22/22  1020 03/22/22  0510 03/21/22  2347 03/21/22  0858 03/21/22  0620 03/20/22  0755 03/20/22  0523 03/19/22  0502 03/19/22  0001 03/18/22  2359   WBC  --   --  9.4  --   --  10.1  --   --  14.2*  --  22.0*   < >  --  11.8*   HGB  --   --  7.4*  --   --  8.3* 8.0*   < > 7.8*  --  10.6*   < >  --  11.9*   MCV  --   --  103*  --   --  106*  --   --  107*  --  103*   < >  --  104*   PLT  --   --  178  --   --  158  --   --  155  --  158   < >  --  182   INR  --   --   --   --   --   --   --   --   --    --   --   --  1.21*  --    NA  --   --  146*  --   --  145*  --   --   --   --  142   < >  --  140   POTASSIUM 3.6  --  3.4  --   --  3.6  --   --   --   --  3.8   < >  --  3.9   CHLORIDE  --   --  113*  --   --  111*  --   --   --   --  112*   < >  --  109   CO2  --   --  28  --   --  30  --   --   --   --  23   < >  --  23   BUN  --   --  31*  --   --  26  --   --   --   --  21   < >  --  25   CR  --   --  1.05  --   --  1.07  --   --   --   --  1.22   < >  --  1.30*   ANIONGAP  --   --  5  --   --  4  --   --   --   --  7   < >  --  8   PAIGE  --   --  8.4*  --   --  8.0*  --   --   --   --  7.7*   < >  --  8.8   GLC  --  114* 127* 116*   < > 135*  --    < >  --    < > 181*   < >  --  121*   ALBUMIN  --   --   --   --   --  2.6*  --   --   --   --   --   --   --  3.8   PROTTOTAL  --   --   --   --   --  5.9*  --   --   --   --   --   --   --  7.4   BILITOTAL  --   --   --   --   --  0.5  --   --   --   --   --   --   --  0.7   ALKPHOS  --   --   --   --   --  37*  --   --   --   --   --   --   --  44   ALT  --   --   --   --   --  24  --   --   --   --   --   --   --  27   AST  --   --   --   --   --  22  --   --   --   --   --   --   --  20   LIPASE  --   --   --   --   --   --   --   --   --   --   --   --   --  208    < > = values in this interval not displayed.

## 2022-03-23 NOTE — PROGRESS NOTES
[facility]    Infectious Disease Progress Note    Date of Service (when I saw the patient): 03/23/2022     Assessment & Plan   Sunil Lyle is a 81 year old male who was admitted on 3/19/2022.     Assessment:  Patient with PMH BPH, CKD, COPD, junctional bradycardia admitted 3/19 with hematuria, dysuria, dyspnea and fevers - found to have intermittent high grade second degree AV block. Went for ex lap for concern for ischemic bowel in setting of CT suggesting possible SMA narrowing, but bowel appeared healthy. Requiring DA here. Infectious clinical presentation seems c/w likely UTI though it is curious that UC with only mixed jackelin. Started on  ceftriaxone. Plans in place for pacemaker - from an ID perspective would be ok with placement when blood cultures have been negative x 72h   Lyme negative.   Blood cultures with staph epi ? Contaminant vs real      Recommendations:  Patient has No intervascular devices, no prosthetic hardware, suspect staph epi is a contaminate.   Follow up on the pending follow up cultures if negative will stop vanco   Continue on vanco and ceftriaxone IV for now. Will continue alex pacemaker placement time.   Discussed with IM and with Dr. Isaac Andrews to proceed with pacemaker.       Dionna Brown MD    Interval History   Tolerating antibiotics ok   No new rashes or issues with antibiotics   Labs reviewed   Afebrile   Awake and alert able to answer ques   Creat improving     Physical Exam   Temp: 98.8  F (37.1  C) Temp src: Oral BP: (!) 140/54 Pulse: (!) 38   Resp: 25 SpO2: 94 % O2 Device: Nasal cannula Oxygen Delivery: 2 LPM  Vitals:    03/22/22 0000 03/22/22 0545 03/23/22 0500   Weight: 84.1 kg (185 lb 6.5 oz) 82.5 kg (181 lb 14.1 oz) 76.9 kg (169 lb 8.5 oz)     Vital Signs with Ranges  Temp:  [98.7  F (37.1  C)-98.9  F (37.2  C)] 98.8  F (37.1  C)  Pulse:  [37-51] 38  Resp:  [12-34] 25  BP: (101-145)/() 140/54  SpO2:  [92 %-100 %] 94 %    Constitutional:  Awake, alert, cooperative, no apparent distress  Lungs: Clear to auscultation bilaterally, no crackles or wheezing  Cardiovascular: Regular rate and rhythm, normal S1 and S2, and no murmur noted  Abdomen: Normal bowel sounds, soft, non-distended, non-tender  Skin: No rashes, no cyanosis, no edema  Other:    Medications     DOPamine Stopped (03/22/22 1601)     lactated ringers 100 mL/hr at 03/21/22 0800     - MEDICATION INSTRUCTIONS -         cefTRIAXone  2 g Intravenous Q24H     pantoprazole (PROTONIX) IV  40 mg Intravenous BID     sodium chloride (PF)  3 mL Intracatheter Q8H     sodium chloride (PF)  3 mL Intracatheter Q8H     vancomycin  1,250 mg Intravenous Q24H       Data   All microbiology laboratory data reviewed.  Recent Labs   Lab Test 03/23/22  0439 03/22/22  0510 03/21/22  2347 03/21/22  1238 03/21/22  0620   WBC 9.4 10.1  --   --  14.2*   HGB 7.4* 8.3* 8.0*   < > 7.8*   HCT 23.3* 25.8*  --   --  24.5*   * 106*  --   --  107*    158  --   --  155    < > = values in this interval not displayed.     Recent Labs   Lab Test 03/23/22  0439 03/22/22  0510 03/20/22  0523   CR 1.05 1.07 1.22     No lab results found.  No lab results found.    Invalid input(s): UC

## 2022-03-23 NOTE — PLAN OF CARE
"ICU End of Shift Summary. See flowsheets for vital signs and detailed assessment.    Changes this shift: CBI remains off.  Clear, straw/yellow colored urine.  Has remained free of clots since 2000.  Patient had a v-run  and transitioned from aflutter back to a sinus sydnie state following this.  Returned to a 1st degree block with ~15 seconds.  Completely asymptomatic at that time, A&Ox4, stated \"I feel great\".    Patient experience SOB and dyspnea later in shift, see additional progress note for details.  Order set for RN managed potassium replacement obtain.  Patient unable to tolerate K replacement via either of his peripheral IVs.  Stopped infusion.  Suggest potentially converting to PO if able.  Bowel sounds are normoactive, the NG output is clear/brown, complains of 6/10 pain, constant with infusion.      Plan: Continue to monitor rhythm, await culture results, monitor for hematuria, control pain, and promote rest.  Switch to PO potassium replacement if possible.    "

## 2022-03-23 NOTE — PLAN OF CARE
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 Hgb 7.4. Pt pulled out NG tube, Vascular updated, ok to leave out. Cherry red output in burrell, irrigated x2 with no clots. Restarted CBI.

## 2022-03-24 ENCOUNTER — DOCUMENTATION ONLY (OUTPATIENT)
Dept: CARDIOLOGY | Facility: CLINIC | Age: 82
End: 2022-03-24
Payer: COMMERCIAL

## 2022-03-24 ENCOUNTER — APPOINTMENT (OUTPATIENT)
Dept: GENERAL RADIOLOGY | Facility: CLINIC | Age: 82
DRG: 242 | End: 2022-03-24
Attending: INTERNAL MEDICINE
Payer: COMMERCIAL

## 2022-03-24 DIAGNOSIS — I44.1 MOBITZ II: ICD-10-CM

## 2022-03-24 DIAGNOSIS — Z95.0 CARDIAC PACEMAKER IN SITU: Primary | ICD-10-CM

## 2022-03-24 LAB
ABO/RH(D): NORMAL
ANION GAP SERPL CALCULATED.3IONS-SCNC: 7 MMOL/L (ref 3–14)
ANTIBODY SCREEN: NEGATIVE
BACTERIA BLD CULT: ABNORMAL
BACTERIA BLD CULT: ABNORMAL
BUN SERPL-MCNC: 27 MG/DL (ref 7–30)
C DIFF TOX B STL QL: NEGATIVE
CALCIUM SERPL-MCNC: 8.4 MG/DL (ref 8.5–10.1)
CHLORIDE BLD-SCNC: 117 MMOL/L (ref 94–109)
CO2 SERPL-SCNC: 25 MMOL/L (ref 20–32)
CREAT SERPL-MCNC: 0.94 MG/DL (ref 0.66–1.25)
ERYTHROCYTE [DISTWIDTH] IN BLOOD BY AUTOMATED COUNT: 17.4 % (ref 10–15)
GFR SERPL CREATININE-BSD FRML MDRD: 81 ML/MIN/1.73M2
GLUCOSE BLD-MCNC: 124 MG/DL (ref 70–99)
HCT VFR BLD AUTO: 22.2 % (ref 40–53)
HGB BLD-MCNC: 6.9 G/DL (ref 13.3–17.7)
HGB BLD-MCNC: 7 G/DL (ref 13.3–17.7)
IRON SATN MFR SERPL: 14 % (ref 15–46)
IRON SERPL-MCNC: 34 UG/DL (ref 35–180)
MCH RBC QN AUTO: 33.7 PG (ref 26.5–33)
MCHC RBC AUTO-ENTMCNC: 31.1 G/DL (ref 31.5–36.5)
MCV RBC AUTO: 108 FL (ref 78–100)
PLATELET # BLD AUTO: 212 10E3/UL (ref 150–450)
POTASSIUM BLD-SCNC: 3.4 MMOL/L (ref 3.4–5.3)
RBC # BLD AUTO: 2.05 10E6/UL (ref 4.4–5.9)
SODIUM SERPL-SCNC: 146 MMOL/L (ref 133–144)
SODIUM SERPL-SCNC: 149 MMOL/L (ref 133–144)
SPECIMEN EXPIRATION DATE: NORMAL
TIBC SERPL-MCNC: 237 UG/DL (ref 240–430)
VIT B12 SERPL-MCNC: 1008 PG/ML (ref 193–986)
WBC # BLD AUTO: 7.8 10E3/UL (ref 4–11)

## 2022-03-24 PROCEDURE — 85014 HEMATOCRIT: CPT | Performed by: HOSPITALIST

## 2022-03-24 PROCEDURE — 250N000013 HC RX MED GY IP 250 OP 250 PS 637: Performed by: STUDENT IN AN ORGANIZED HEALTH CARE EDUCATION/TRAINING PROGRAM

## 2022-03-24 PROCEDURE — 93010 ELECTROCARDIOGRAM REPORT: CPT | Performed by: INTERNAL MEDICINE

## 2022-03-24 PROCEDURE — 86850 RBC ANTIBODY SCREEN: CPT | Performed by: SURGERY

## 2022-03-24 PROCEDURE — 250N000013 HC RX MED GY IP 250 OP 250 PS 637: Performed by: SURGERY

## 2022-03-24 PROCEDURE — 210N000001 HC R&B IMCU HEART CARE

## 2022-03-24 PROCEDURE — 250N000013 HC RX MED GY IP 250 OP 250 PS 637: Performed by: HOSPITALIST

## 2022-03-24 PROCEDURE — 36415 COLL VENOUS BLD VENIPUNCTURE: CPT | Performed by: HOSPITALIST

## 2022-03-24 PROCEDURE — 36415 COLL VENOUS BLD VENIPUNCTURE: CPT | Performed by: INTERNAL MEDICINE

## 2022-03-24 PROCEDURE — 80048 BASIC METABOLIC PNL TOTAL CA: CPT | Performed by: HOSPITALIST

## 2022-03-24 PROCEDURE — 99232 SBSQ HOSP IP/OBS MODERATE 35: CPT | Mod: 24 | Performed by: INTERNAL MEDICINE

## 2022-03-24 PROCEDURE — 250N000011 HC RX IP 250 OP 636: Performed by: INTERNAL MEDICINE

## 2022-03-24 PROCEDURE — 82607 VITAMIN B-12: CPT | Performed by: INTERNAL MEDICINE

## 2022-03-24 PROCEDURE — 87493 C DIFF AMPLIFIED PROBE: CPT | Performed by: INTERNAL MEDICINE

## 2022-03-24 PROCEDURE — 99233 SBSQ HOSP IP/OBS HIGH 50: CPT | Performed by: INTERNAL MEDICINE

## 2022-03-24 PROCEDURE — 258N000003 HC RX IP 258 OP 636: Performed by: HOSPITALIST

## 2022-03-24 PROCEDURE — 84295 ASSAY OF SERUM SODIUM: CPT | Performed by: INTERNAL MEDICINE

## 2022-03-24 PROCEDURE — 93005 ELECTROCARDIOGRAM TRACING: CPT

## 2022-03-24 PROCEDURE — C9113 INJ PANTOPRAZOLE SODIUM, VIA: HCPCS | Performed by: SURGERY

## 2022-03-24 PROCEDURE — 83550 IRON BINDING TEST: CPT | Performed by: INTERNAL MEDICINE

## 2022-03-24 PROCEDURE — 250N000011 HC RX IP 250 OP 636: Performed by: SURGERY

## 2022-03-24 PROCEDURE — 250N000013 HC RX MED GY IP 250 OP 250 PS 637: Performed by: INTERNAL MEDICINE

## 2022-03-24 PROCEDURE — 36415 COLL VENOUS BLD VENIPUNCTURE: CPT | Performed by: SURGERY

## 2022-03-24 PROCEDURE — 85018 HEMOGLOBIN: CPT | Performed by: INTERNAL MEDICINE

## 2022-03-24 PROCEDURE — 999N000065 XR CHEST 2 VW

## 2022-03-24 PROCEDURE — 86923 COMPATIBILITY TEST ELECTRIC: CPT | Performed by: HOSPITALIST

## 2022-03-24 RX ORDER — SIMVASTATIN 40 MG
80 TABLET ORAL AT BEDTIME
Status: DISCONTINUED | OUTPATIENT
Start: 2022-03-24 | End: 2022-03-26 | Stop reason: HOSPADM

## 2022-03-24 RX ORDER — TAMSULOSIN HYDROCHLORIDE 0.4 MG/1
0.4 CAPSULE ORAL DAILY
Status: DISCONTINUED | OUTPATIENT
Start: 2022-03-24 | End: 2022-03-26 | Stop reason: HOSPADM

## 2022-03-24 RX ORDER — TAMSULOSIN HYDROCHLORIDE 0.4 MG/1
.4-.8 CAPSULE ORAL DAILY
Status: DISCONTINUED | OUTPATIENT
Start: 2022-03-24 | End: 2022-03-24

## 2022-03-24 RX ORDER — MAGNESIUM HYDROXIDE/ALUMINUM HYDROXICE/SIMETHICONE 120; 1200; 1200 MG/30ML; MG/30ML; MG/30ML
30 SUSPENSION ORAL EVERY 4 HOURS PRN
Status: DISCONTINUED | OUTPATIENT
Start: 2022-03-24 | End: 2022-03-26 | Stop reason: HOSPADM

## 2022-03-24 RX ADMIN — Medication 1 MG: at 21:06

## 2022-03-24 RX ADMIN — PANTOPRAZOLE SODIUM 40 MG: 40 INJECTION, POWDER, FOR SOLUTION INTRAVENOUS at 08:52

## 2022-03-24 RX ADMIN — SIMVASTATIN 80 MG: 40 TABLET, FILM COATED ORAL at 21:06

## 2022-03-24 RX ADMIN — ALUMINUM HYDROXIDE, MAGNESIUM HYDROXIDE, AND SIMETHICONE 30 ML: 200; 200; 20 SUSPENSION ORAL at 21:06

## 2022-03-24 RX ADMIN — CEFTRIAXONE SODIUM 2 G: 2 INJECTION, POWDER, FOR SOLUTION INTRAMUSCULAR; INTRAVENOUS at 21:05

## 2022-03-24 RX ADMIN — TAMSULOSIN HYDROCHLORIDE 0.4 MG: 0.4 CAPSULE ORAL at 18:04

## 2022-03-24 RX ADMIN — SODIUM CHLORIDE, POTASSIUM CHLORIDE, SODIUM LACTATE AND CALCIUM CHLORIDE: 600; 310; 30; 20 INJECTION, SOLUTION INTRAVENOUS at 03:33

## 2022-03-24 RX ADMIN — PANTOPRAZOLE SODIUM 40 MG: 40 INJECTION, POWDER, FOR SOLUTION INTRAVENOUS at 21:06

## 2022-03-24 ASSESSMENT — ACTIVITIES OF DAILY LIVING (ADL)
ADLS_ACUITY_SCORE: 14
ADLS_ACUITY_SCORE: 13
ADLS_ACUITY_SCORE: 14

## 2022-03-24 NOTE — PROGRESS NOTES
Vascular Surgery Progress Note     Date of Admission:  3/19/2022  Date: March 24, 2022     Subjective  Mr. Lyle is s/p pacemaker placement and is doing well following that. He is met resting comfortably in bed in the CCU, with family at bedside.   He had 1 loose, liquid BM earlier this AM but has not passed flatus yet. He does not have an appetite but would like to try a few sips of 7-up. He would like the NGT to be removed.      Physical Exam   Constitutional: cooperative, no apparent distress. Resting comfortably in bed. Bandage on over L chest PPM insertion site.   Abdomen: softly distended, decreased appropriate alex-incisional TTP; staples intact with c/d/i incision edges and mild reactive erythema.   Musculoskeletal: grossly normal and symmetric ROM and strength in BL extremities   Neurologic: Awake, alert, oriented to name, place, time, and situation      Assessment & Plan   Mr. 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 He was taken to the OR on 3/19/22 for exploratory laparotomy, with no SMA thrombus or bowel ischemia found.      Continue NGT decompression until he starts passing flatus; usually this is within 3-5 days of laparotomy    When he starts passing flatus, plan to remove NGT, start a clear liquid diet with advancement as tolerated to regular diet; staples will stay in place until 2 week follow-up visit    Encouraged walking and ambulation as much as possible    Abdominal binder for comfort while in bed or ambulating    When he is tolerating oral intake, will need to resume aspirin 81 mg and simvastatin 80 mg (home medications)    I will be away the remainder of this week; please call Milka Alfaro (vascular fellow) and Dr. Haney (vascular staff) with questions or any  concerns that arise, and they will continue to follow. Discussed this with Mr. Lyle.     Smita Garsia

## 2022-03-24 NOTE — PLAN OF CARE
Goal Outcome Evaluation:    Pt denies chest pain or SOB, up with 2 GB&W, ambulated in the karimi, on room air, good uop, Urine was clear most of the day, but got a little pink in the evening, C-diff was negative, 3 Loose BM's today, Hgb 7, recheck in AM, Tele 100% Paced, Abdomen rounded, Low fiber diet, complains of some stomach upset.

## 2022-03-24 NOTE — PROGRESS NOTES
Vascular Surgery Progress Note     Date of Admission:  3/19/2022  Date: March 24, 2022     Subjective  NAEON. Tolerating liquids without n/v. +BM. NGT removed yesterday.      Physical Exam   Constitutional: cooperative, no apparent distress. Resting comfortably in bed  Abdomen: soft, distended, staples intact with c/d/i incision edges and mild reactive erythema.   Musculoskeletal: grossly normal and symmetric ROM and strength in BL extremities   Neurologic: Awake, alert, oriented to name, place, time, and situation      Assessment & Plan   Mr. 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 He was taken to the OR on 3/19/22 for exploratory laparotomy, with no SMA thrombus or bowel ischemia found.      Advance diet as tolerated    Milka Alfaro  03/24/22  12:28 PM

## 2022-03-24 NOTE — PROGRESS NOTES
[facility]    Infectious Disease Progress Note    Date of Service (when I saw the patient): 03/24/2022     Assessment & Plan   Sunil Lyle is a 81 year old male who was admitted on 3/19/2022.     Assessment:  Patient with PMH BPH, CKD, COPD, junctional bradycardia admitted 3/19 with hematuria, dysuria, dyspnea and fevers - found to have intermittent high grade second degree AV block. Went for ex lap for concern for ischemic bowel in setting of CT suggesting possible SMA narrowing, but bowel appeared healthy. Requiring DA here. Infectious clinical presentation seems c/w likely UTI though it is curious that UC with only mixed jackelin. Started on  ceftriaxone. Plans in place for pacemaker - from an ID perspective would be ok with placement when blood cultures have been negative x 72h   Lyme negative.   Blood cultures with staph epi ? Contaminant vs real      Recommendations:  Patient has No intervascular devices, no prosthetic hardware, suspect staph epi is a contaminate.   Pending follow up cultures continue to be negative.   S/p pacemaker placement.   Stop vanco   Continue on ceftriaxone while admitted   When ready for discharge switch to PO ceftin to finish a 14 day course total of antibiotics for possible UTI   Will sign off.        Dionna Brown MD    Interval History   Tolerating antibiotics ok   No new rashes or issues with antibiotics   Labs reviewed   Afebrile   Awake and alert able to answer ques   Creat improving     Physical Exam   Temp: 98.1  F (36.7  C) Temp src: Oral BP: (!) 159/69 Pulse: 72   Resp: 20 SpO2: 99 % O2 Device: Nasal cannula Oxygen Delivery: 2 LPM  Vitals:    03/22/22 0545 03/23/22 0500 03/24/22 0559   Weight: 82.5 kg (181 lb 14.1 oz) 76.9 kg (169 lb 8.5 oz) 77.6 kg (171 lb 1.2 oz)     Vital Signs with Ranges  Temp:  [98  F (36.7  C)-99  F (37.2  C)] 98.1  F (36.7  C)  Pulse:  [62-76] 72  Resp:  [20-25] 20  BP: (112-159)/(56-85) 159/69  SpO2:  [96 %-100 %] 99  %    Constitutional: Awake, alert, cooperative, no apparent distress  Lungs: Clear to auscultation bilaterally, no crackles or wheezing  Cardiovascular: Regular rate and rhythm, normal S1 and S2, and no murmur noted  Abdomen: Normal bowel sounds, soft, non-distended, non-tender  Skin: No rashes, no cyanosis, no edema  Other:    Medications     - MEDICATION INSTRUCTIONS -         cefTRIAXone  2 g Intravenous Q24H     pantoprazole (PROTONIX) IV  40 mg Intravenous BID     sodium chloride (PF)  3 mL Intracatheter Q8H     tamsulosin  0.4 mg Oral Daily     vancomycin  1,250 mg Intravenous Q24H       Data   All microbiology laboratory data reviewed.  Recent Labs   Lab Test 03/24/22  0616 03/23/22  0439 03/22/22  0510   WBC 7.8 9.4 10.1   HGB 6.9* 7.4* 8.3*   HCT 22.2* 23.3* 25.8*   * 103* 106*    178 158     Recent Labs   Lab Test 03/24/22  0616 03/23/22  0439 03/22/22  0510   CR 0.94 1.05 1.07     No lab results found.  No lab results found.    Invalid input(s): PHILLIP

## 2022-03-24 NOTE — PROGRESS NOTES
Device Discharge  Dressing:  Clean, dry, and intact  Chest XR reviewed:  Yes  Pneumo present?:  No  Lead Measurements per Device Rep:  WNL    Education Provided:  Avoid raising left arm above the level of the shoulder for 3 weeks.  Do not shower until outer occlusive dressing has been removed.  Remove outer dressing in 3 - 4 days.  Leave steri-strips in place, these will be removed at the 1 week check.   Call Device Clinic with increased swelling, drainage, or fever > 101 degrees.   Follow-up with the Device Clinic as scheduled. 4/1/22 at 9:45am

## 2022-03-24 NOTE — PROGRESS NOTES
[facility]    EP Progress Note    Date of Service (when I saw the patient): 03/24/2022     Assessment & Plan   Sunil Lyle is a 81 year old male with h/o CAD, dyslipidemia and GERD who was admitted on 3/19/2022 d/t hematuria, SOB and bradycardia. Dx'd with UTI with bacteremia/sepsis. Developed 2:1 AV Block with bradycardia and paroxysmal AFib.      1. 2:1 AV Block, paroxysmal atrial arrhythmias -   - V rates in the mid 40s noted, along with high-grade AV Block with pauses to 5s  - Noted to have episodes of atrial flutter and subsequently atrial fibrillation  - ID OK'd PPM implantation (See #3)    - Now s/p dual chamber MRI-compatible Biotronik PPM 3/23/2022 with Dr. Elliott    - CXR without PTX. Dr. Gray reviewed - lead placement acceptable  - Device interrogation (Bill) showed 2%AP and 99%  with normal device function. No events  - Device teaching PENDING    - Tele ASVP   - EKG ASVP 73 bpm    - Not a candidate for anticoagulation at this time d/t hematuria. Will continue to monitor arrhythmia burden as an outpatient. CHADVSASc at least 3 (CAD, age)     PLAN:   1. EP will sign off. Device RN will make follow-up arrangements 7-10 days for in-office annual check, then again at 6 weeks. EP JENN follow-up will be arranged for 3 m (6/2022)    2. CAD -   - S/p RCA stent 2001  - Echo this admission 3/19/22 with nl LVEF  - Stress echo 7/2017 without inducible ischemia  - PTA on ASA, high dose simvastatin. LDL 60 mg/dL in 3/2021     PLAN:   1. Resume ASA when OK with Urology/Hospitalist Team. This has been OK'd by Vascular Surgery   2. See Dr. Landers 4/1 as previously scheduled    3. UTI, bacteremia, sepsis -   - ID following; OK'd placement of PPM  - Blood culture + for Stph Epi, thought to be contaminant. WBC remains wnl today. - Afebrile  - Remains on Rocephin IV. Vanco stopped 3/23 (3 doses)  - BPs OK/high - 110s-140s. Off Dopamine since 3/22 afternoon    - Hematuria has continued; Hgb to  "6.9 g/dL this AM     PLAN:   1. Per ID/Hospitalist Team    4. Imaging concerning for non-occlusive SMA thrombus -   - S/p ex lap 3/19/2022. No SMA thrombus or ischemic bowel found  - Off heparin gtt since 3/21 AM     PLAN:   1. Per Vascular    Smita Juan PA-C [facility]    JENN Time Statement:     I spent 25 minutes face-to-face or coordinating care of Sunil Lyle. Over 50% of our time on the unit was spent counseling the patient and/or coordinating care regarding new PPM implantation and subsequent studies    Interval History   \"Hoping to go home today\" but notes he's too weak  Remains incontinent of stool  No pain from PPM implant    Physical Exam   Temp: 98.4  F (36.9  C) Temp src: Oral BP: 128/59 Pulse: 62   Resp: 22 SpO2: 100 % O2 Device: Nasal cannula Oxygen Delivery: 2 LPM  Vitals:    03/22/22 0545 03/23/22 0500 03/24/22 0559   Weight: 82.5 kg (181 lb 14.1 oz) 76.9 kg (169 lb 8.5 oz) 77.6 kg (171 lb 1.2 oz)     Vital Signs with Ranges  Temp:  [97.4  F (36.3  C)-99  F (37.2  C)] 98.4  F (36.9  C)  Pulse:  [38-76] 62  Resp:  [18-25] 22  BP: (112-156)/(52-85) 128/59  SpO2:  [94 %-100 %] 100 %  I/O last 3 completed shifts:  In: 106 [I.V.:106]  Out: 3830 [Urine:3780; Emesis/NG output:50]    Telemetry: ASVP    Constitutional: awake, alert, cooperative, no apparent distress, and appears stated age  Respiratory: CTA B (anterior only)  Cardiovascular: Regular. PPM pocket without swelling or ecchymosis. Bandage c/d  Musculoskeletal: No edema    Medications     lactated ringers 100 mL/hr at 03/24/22 0333     - MEDICATION INSTRUCTIONS -         cefTRIAXone  2 g Intravenous Q24H     pantoprazole (PROTONIX) IV  40 mg Intravenous BID     sodium chloride (PF)  3 mL Intracatheter Q8H     sodium chloride (PF)  3 mL Intracatheter Q8H     vancomycin  1,250 mg Intravenous Q24H       Data   I personally reviewed the EKG tracing showing ASVP 73 bpm. Wide  ms.  Results for orders placed or performed during the " hospital encounter of 03/19/22 (from the past 24 hour(s))   Glucose by meter   Result Value Ref Range    GLUCOSE BY METER POCT 114 (H) 70 - 99 mg/dL   Potassium   Result Value Ref Range    Potassium 3.6 3.4 - 5.3 mmol/L   Magnesium   Result Value Ref Range    Magnesium 2.7 (H) 1.6 - 2.3 mg/dL   Basic metabolic panel   Result Value Ref Range    Sodium 149 (H) 133 - 144 mmol/L    Potassium 3.4 3.4 - 5.3 mmol/L    Chloride 117 (H) 94 - 109 mmol/L    Carbon Dioxide (CO2) 25 20 - 32 mmol/L    Anion Gap 7 3 - 14 mmol/L    Urea Nitrogen 27 7 - 30 mg/dL    Creatinine 0.94 0.66 - 1.25 mg/dL    Calcium 8.4 (L) 8.5 - 10.1 mg/dL    Glucose 124 (H) 70 - 99 mg/dL    GFR Estimate 81 >60 mL/min/1.73m2

## 2022-03-24 NOTE — PROGRESS NOTES
"UROLOGY PROGRESS NOTE    March 24, 2022     HPI/SUBJECTIVE:   Doing well after pacemaker implantation yesterday. NGT removed and tolerating diet.  Per RN report, CBI running at slow rate overnight with light pink outputs. CBI disconnected this morning and outputs remain slightly pink/watermelon colored and appears to be draining well.    AVSS  Hgb 7.4 --> 6.9    OBJECTIVE:          BP (!) 151/69 (BP Location: Right arm)   Pulse 74   Temp 98  F (36.7  C) (Oral)   Resp 20   Ht 1.778 m (5' 10\")   Wt 77.6 kg (171 lb 1.2 oz)   SpO2 98%   BMI 24.55 kg/m      Intake/Output Summary (Last 24 hours) at 3/24/2022 0926  Last data filed at 3/24/2022 0600  Gross per 24 hour   Intake 106 ml   Output 3770 ml   Net -3664 ml       GENERAL: Awake alert, NAD. Sitting upright in bed.  HEAD: atraumatic, normocephalic  NECK: symmetric  CHEST WALL: symmetric  CARDIAC: skin well perfused  RESPIRATORY: breathing unlabored  ABDOMEN: soft, non tender, non distended, no rebound or guarding  : Blanca draining watermelon colored urine. No clots in tubing. No SP tenderness.  NEUROLOGIC: no focal deficits  PSYCHIATRIC: Speech: normal Mood: normal Affect: normal      ASSESSMENT:  81 year old male admitted with SMA thrombosis and heart block. Urology following for gross hematuria requiring CBI.  CT scan revealed large prostate (80 gm) and thickening of lower posterior wall (+ smoker). UA suspicious for UTI; UCx with mixed jackelin. Blood culture x1 growing staph epidermidis; repeat blood cultures from 3/20 with no growth.      PLAN:    1. Gross hematuria  - Etiology likely prostatic in origin. CT scan revealed large prostate (80 gm) and thickening of lower posterior wall (+ smoker)  - UCx with mixed jackelin.    - CBI running slow overnight, disconnected this AM with watermelon colored outputs  - Okay to hand irrigate as needed for clots or concern for obstruction   - Recommend cystoscopy as outpatient     2. BPH  - Continue flomax and " finasteride  - Will need further evaluation as outpatient (may need procedure to help with urination)  - Will plan for voiding trial tomorrow morning (or day of planned discharge). Remove burrell at 0600. Bladder scan for post-void residual x2 and chart in flowsheet. Page urology if >400cc or if patient is uncomfortable and/or unable to void.     3. 1st/2nd degree heart block  - S/p pacemaker implant yesterday     4. Acute anemia  - Transfusions per IM. Do not believe degree of hematuria is significantly contributing to the acute drop in Hgb over the past 2 days.      ADDENDUM 1130: Paged by hospitalist, Patty Sorto MD regarding timing of restarting ASA. Indication is history of CAD s/p stent in 2001. At this point I have recommended continuing to hold ASA given his degree of acute anemia and out of concern for requiring worsening hematuria and re-initiation of CBI.      Shannon Garcia PA-C  [facility]   [email or web address]  Text Page (7:30am to 4:30pm)

## 2022-03-24 NOTE — PLAN OF CARE
Goal Outcome Evaluation:    Pt A x O x 4. No reports of pain. Mild abd discomfort reported, much improved per pt. Oxygen saturation >90% on 2L. 100% V paced on tele. Indwelling burrell in place w/ CBI at slow rate, light pink output this morning. Turn/reposition as pt allows. Loose stool x 1 overnight. Received IV abx. Pt educated on poc, cares, medications and safety. Will continue to monitor.

## 2022-03-24 NOTE — DISCHARGE INSTRUCTIONS
Discharge Instructions for Pacemaker Implantation  You have had a procedure to insert a pacemaker. Once inside your body, this small electronic device helps keep your heart from beating too slowly. A pacemaker can t fix existing heart problems. But it can help you feel better and have more energy. As you recover, follow all of the instructions you are given, including those below.  Activity    Follow the instructions you are given about limiting your activity.    Do not raise your arm on the incision side above shoulder level for 3 weeks. This gives the device lead wires time to attach securely inside your heart.    Ask your doctor when you can expect to return to work.    You can still exercise. It s good for your body and your heart. Talk with your doctor about an exercise plan.  Other Precautions    Follow your doctor's directions carefully for wound care. You may remove the outer dressing in 3 - 4 days. Leave the steri-strips in place; these will be removed at your 1 week follow-up. Never put any creams, lotions, or products like peroxide on an incision unless your doctor tells you to.     You may shower once the outer dressing has been removed.     Before you receive any treatment, tell all health care providers (including your dentist) that you have a pacemaker.    You will be given an ID card that contains information about your pacemaker. Always carry this card with you. You can show this card if your pacemaker sets off a metal detector. You should also show it to avoid screening with a hand-held security wand.    Keep your cell phone away from your pacemaker. Don t carry the phone in your shirt pocket, even when it s turned off.    Avoid strong magnets. Examples are those used in MRIs or in hand-held security wands.    Avoid strong electrical fields. Examples are those made by radio transmitting towers,  ham  radios, and heavy-duty electrical equipment.    Avoid leaning over the open gonzalez of a running car.  A running engine creates an electrical field. Most household and yard appliances will not cause any problems. If you use any large power tools, such as an industrial , talk with your doctor.   Follow-Up    Follow up in the device clinic as scheduled. You have an appointment scheduled for Friday 4/01/22 at 9:45am. Your appointment is at [facility] in [place].  [address] [place] [address], [address].     Make regular follow-up appointments with your device clinic. They will check the pacemaker to make sure it s working properly.  When to Call Your Device Clinic [phone]  Call your doctor immediately if you have any of the following:    Dizziness    Chest pain    Lack of energy    Fainting spells    Rapid pulse or pounding heartbeat    Shortness of breath    Pain around your pacemaker    Fever above 100.4 F (38 C) or other signs of infection (redness, swelling, drainage, or warmth at the incision site)     4335-6267 [employer]. [address] [place] [address]. All rights reserved. This information is not intended as a substitute for professional medical care. Always follow your healthcare professional's instructions.    [facility] in [place]: [phone]  Device Clinic (Monday to Friday, 8am-4pm): [phone]  *The device clinic is closed on weekends and holidays.  Any calls received during this time will be answered on the next business  day. For any urgent questions after hours, please call the main clinic number and you will be put in contact with the cardiologist on call.

## 2022-03-24 NOTE — PROGRESS NOTES
[facility]    Hospitalist Progress Note    Date of Service (when I saw the patient): 03/24/2022  Admit date: 3/19/2022    Assessment & Plan   Sunil Lyle is a 81 year old male with past medical history significant for CAD s/p stenting, HLD, GERD  admitted on 3/19/2022 with multiple complaints including shortness of breath, hematuria and bradycardia.     He was had rising lactic acid levels with abdominal pain and went emergently to surgery      Hypotension (improved)  Bradycardia   1st Degree AV block --> high grade 2nd degree AV block at times  S/p dual chamber PPM 3/23  Heart rates have been in the 40s. He is dyspneic with exertion but otherwise has normal blood pressures. He was evaluated by cardiology in April 2021 for fatigue and was found to have junctional bradycardia at that time with rates in the 40s. His metoprolol was stopped and he was evaluated with a zio patch which only revealed a single 3.8s pause. It was opted to continue monitoring without intervention at that time.   * EKG here shows sinus bradycardia with 1st degree AV block and RBBB   * TTE - EF normal, unchanged since 2017  * Underwent PPM on 3/23    - follow tele  - dopamine stopped 3/22, BP tolerated   - follow up per EP.     SIRS with fever on 3/19   Possible UTI - Urine culture mixed jackelin  Gram positive bacteremia, staph epi - likely contaminant - 3/19 Bcx turned positive day 2. He has a coronary stent, but denies other hardware in his body.     PT febrile here to 101.8. UA notable for large LE, >182 RBCs and >182 WBCs. Urine is grossly bloody. On CT scan, there is moderate enlargement and heterogeneity of the prostate. There is thickening of the posterior bladder wall which is somewhat nodular in appearance.     - ID consulted given unclear infectious picture - BCx from adm felt to be contaminant- see below  - empiric antibiotics (ceftriazone and vanco) continued alex operatively per ID  - ceftriaxone and  vancomycin for now per ID  - repeat cultures from 3/20 remain NGTD, remains afebrile  - Follow-up urine cultures and blood cultures - see above      Recent Labs   Lab 03/24/22  0616 03/23/22  0439 03/22/22  0510 03/21/22  0620 03/20/22  0523 03/19/22  2359 03/19/22  1825 03/19/22  1114 03/19/22  0918 03/19/22  0502 03/19/22  0336 03/19/22  0015 03/18/22  2359   WBC 7.8 9.4 10.1 14.2* 22.0*  --   --   --   --  14.2*  --   --  11.8*   LACT  --   --   --   --  1.8 1.9 2.2* 2.4* 5.8*  --  2.1* 2.3*  --         Abdominal pain, lactic acidosis secondary to above Lactic acid to 5.8, resolved.   s/p ex lap given worsening lactic acidosis and abdominal pain - No SMA thrombus or bowel ischemia found.   Patient's lactic acid went up to 5.8 from 2 range despite aggressive IVF at admission. Some abdominal pain noted at that time as well. Vascular surgery consulted and took him to OR emergently. He remained on heparin gtt for 48 hrs post op per vascular surgery.  - no thrombosis found, and no ischemic bowel noted in OR 3/19     Routine post-op mgt per vascular surgery:   - NGT pulled overnight, advancing diet. Stopped IVF on 03/24/22.   - Encouraged walking and ambulation as much as possible  - Abdominal binder for comfort while in bed or ambulating  - Per vascular surgery: When he is tolerating oral intake, will need to resume aspirin 81 mg and simvastatin 80 mg (home medications) - Discussed with urology, recommend holding on ASA at this time due to hematuria.   - Please call Milka Alfaro (vascular fellow) and Dr. Haney (vascular staff) with questions or any concerns that arise, and they will continue to follow. Discussed this with Mr. Lyle.     Diarrhea 3 loose stool with continued abdominal pain after surgery. Does not appear bloody or melanotic  - enteric precautions and c.diff.     Acute anemia, acute blood loss from surgery and hematuria  Macrocytosis  Hematuria, BPH  * Hematuria with drop in Hgb, post op. Was 11.9  on admission 3/18.    - Urology consultation appreciated  - resume PTA flomax on 03/24/22  - cystoscopy in the future  - monitor hgb q 12 hours, transfuse for <7  - CBI stopped this AM 03/24/22 > urine blood tinged  - If does okay overnight, remove burrell in AM for TOV  - If he continues to have drop in hgb will investigate for other source  - Check iron panel and B12    Recent Labs   Lab 03/24/22  0616 03/23/22  0439 03/22/22  0510 03/21/22  2347 03/21/22  1855 03/21/22  1238   HGB 6.9* 7.4* 8.3* 8.0* 8.3* 7.2*       Dyspnea   Hx of COPD, former smoker   The patient's chief complaint today is actually shortness of breath. The shortness of breath has been on-going and getting worse over the past two weeks. This has been predominantly on exertion. Of note, review of clinic notes from April and July of 2021, suggest that he has been having dyspnea on exertion at that time as well. Cardiology discussed ischemic evaluation, but the patient declined. CT here showed Centrilobular emphysema without evidence of pneumonia. No evidence of pulmonary embolism. O2 saturations are within normal limits.   Unclear etiology, could be progression of emphysema vs cardiac related (new CHF, ischemia i.e. anginal equivalent, bradycardia, etc.).   * TTE unremarkable as noted above     - Monitor O2 saturations   - Cardiology consultation - EP planning pacemaker placement     CAD s/p RCA stent in 2001   Hyperlipidemia  Hypertension   - Holding PTA ASA, simvastatin and fenofibrate. NG decompression, NPO and hematuria.   - Resume statin on 03/24/22   - Discussed with Urology > hold ASA while still having hematuria.      Probable CKD III, stable.   Creatinine is elevated to 1.3 initially, this seems to be near his baseline of 1.2-1.3.     Recent Labs   Lab 03/24/22  0616 03/23/22  0439 03/22/22  0510 03/20/22  0523 03/19/22  1114 03/19/22  0502   CR 0.94 1.05 1.07 1.22 1.33* 1.37*        Hyperglycemia, No dx of DM  Hemoglobin A1C   Date Value  "Ref Range Status   03/20/2022 5.4 0.0 - 5.6 % Final     Comment:     Normal <5.7%   Prediabetes 5.7-6.4%    Diabetes 6.5% or higher     Note: Adopted from ADA consensus guidelines.     Recent Labs   Lab 03/24/22  0616 03/23/22  0806 03/23/22  0439 03/22/22  1627 03/22/22  1325 03/22/22  1020   * 114* 127* 116* 138* 128*       Coagulation Defect  INR = 1.21 (Ref range: 0.85 - 1.15) and/or PTT = N/A on admission, will monitor for bleeding    Recent Labs   Lab 03/19/22  0001   INR 1.21*        - Give vitamin K and monitor    Hypomagnesemia  Mg = 1.5 mg/dL (Ref range: 1.6 - 2.3 mg/dL) on admission, will replace as needed     Overweight  Estimated body mass index is 25.11 kg/m  as calculated from the following:    Height as of this encounter: 1.778 m (5' 10\").    Weight as of this encounter: 79.4 kg (175 lb).     Diet: Orders Placed This Encounter      Advance Diet as Tolerated: Clear Liquid Diet     IVF: On LR @ 100 ml/h  Blanca Catheter: PRESENT, indication: Gross Hematuria;Retention, Strict 1-2 Hour I&O     DVT Prophylaxis: PCDs and ambulate. Anticipate discharge within the next 2 days.   Code Status: Full Code     Disposition: Expected discharge once diet is advanced and hgb stable... Very earliest would be 3/26. PT/CM consulted for discharge planning. This is appreciated.    Communication: Discussed with urology, son, patient and RN on 03/24/22    Patty Sorto  Hospitalist Service  [facility]  Securely message with the Vocera Web Console (learn more here)  Text page via [employer] Paging/Directory    Total time spent:  > 35 minutes  Time spent counseling, coordination of care: 25 minutes including discussion with care team, urology and son, personal review and interpretation of labs and studies as noted above    Interval History   Events over last 24 hours as outlined above.   Patient continues to have some diffuse mild abdominal discomfort and belching. Mildly distended. Noted to have " 3 loose brown stools overnight. No black or bloody.   Denies any SOB, CP, abdominal pain, vomiting.    -Data reviewed today: I reviewed all new labs and imaging results over the last 24 hours. I personally reviewed no images or EKG's today.    Physical Exam   Temp: 98  F (36.7  C) Temp src: Oral BP: (!) 151/69 Pulse: 74   Resp: 20 SpO2: 98 % O2 Device: Nasal cannula Oxygen Delivery: 2 LPM  Vitals:    03/22/22 0545 03/23/22 0500 03/24/22 0559   Weight: 82.5 kg (181 lb 14.1 oz) 76.9 kg (169 lb 8.5 oz) 77.6 kg (171 lb 1.2 oz)     Vital Signs with Ranges  Temp:  [97.4  F (36.3  C)-99  F (37.2  C)] 98  F (36.7  C)  Pulse:  [38-76] 74  Resp:  [20-25] 20  BP: (112-156)/(54-85) 151/69  SpO2:  [94 %-100 %] 98 %  I/O last 3 completed shifts:  In: 106 [I.V.:106]  Out: 3830 [Urine:3780; Emesis/NG output:50]    Today's Exam  Constitutional: NAD,   Neuropsyche:  alert and oriented, answers questions appropriately.   Respiratory:  Breathing comfortably, good air exchange, no wheezes, no crackles.   Cardiovascular:  Regular rate and rhythm, no edema.  GI:  Soft, minimal tenderness diffusely /moderate distention, BS normal, incision clean dry and intact.   Skin/Integumen: No acute rash or sign of bleeding.     Medications   All medications reviewed on 03/24/22     lactated ringers 100 mL/hr at 03/24/22 0333     - MEDICATION INSTRUCTIONS -         cefTRIAXone  2 g Intravenous Q24H     pantoprazole (PROTONIX) IV  40 mg Intravenous BID     simvastatin  80 mg Oral At Bedtime     sodium chloride (PF)  3 mL Intracatheter Q8H     tamsulosin  0.4 mg Oral Daily     vancomycin  1,250 mg Intravenous Q24H       Data   Recent Labs   Lab 03/24/22  0616 03/23/22  1216 03/23/22  0806 03/23/22  0439 03/22/22  1020 03/22/22  0510 03/19/22  0502 03/19/22  0001 03/18/22  2359   WBC 7.8  --   --  9.4  --  10.1   < >  --  11.8*   HGB 6.9*  --   --  7.4*  --  8.3*   < >  --  11.9*   *  --   --  103*  --  106*   < >  --  104*     --   --  178   --  158   < >  --  182   INR  --   --   --   --   --   --   --  1.21*  --    *  --   --  146*  --  145*   < >  --  140   POTASSIUM 3.4 3.6  --  3.4  --  3.6   < >  --  3.9   CHLORIDE 117*  --   --  113*  --  111*   < >  --  109   CO2 25  --   --  28  --  30   < >  --  23   BUN 27  --   --  31*  --  26   < >  --  25   CR 0.94  --   --  1.05  --  1.07   < >  --  1.30*   ANIONGAP 7  --   --  5  --  4   < >  --  8   PAIGE 8.4*  --   --  8.4*  --  8.0*   < >  --  8.8   *  --  114* 127*   < > 135*   < >  --  121*   ALBUMIN  --   --   --   --   --  2.6*  --   --  3.8   PROTTOTAL  --   --   --   --   --  5.9*  --   --  7.4   BILITOTAL  --   --   --   --   --  0.5  --   --  0.7   ALKPHOS  --   --   --   --   --  37*  --   --  44   ALT  --   --   --   --   --  24  --   --  27   AST  --   --   --   --   --  22  --   --  20   LIPASE  --   --   --   --   --   --   --   --  208    < > = values in this interval not displayed.       Recent Results (from the past 24 hour(s))   X-ray Chest 2 vws*    Narrative    CHEST TWO VIEWS  3/24/2022 8:11 AM       INDICATION: Status post pacer/ICD.    COMPARISON: 3/23/2022       Impression    IMPRESSION: New dual-lead cardiac pacemaker with leads in the right  atrium and right ventricle. No pneumothorax. Small bilateral pleural  effusions. Minimal bibasilar atelectasis, improved from previous.  Previously seen NG tube has been removed.

## 2022-03-25 ENCOUNTER — APPOINTMENT (OUTPATIENT)
Dept: CT IMAGING | Facility: CLINIC | Age: 82
DRG: 242 | End: 2022-03-25
Attending: INTERNAL MEDICINE
Payer: COMMERCIAL

## 2022-03-25 ENCOUNTER — APPOINTMENT (OUTPATIENT)
Dept: PHYSICAL THERAPY | Facility: CLINIC | Age: 82
DRG: 242 | End: 2022-03-25
Attending: INTERNAL MEDICINE
Payer: COMMERCIAL

## 2022-03-25 LAB
BLD PROD TYP BPU: NORMAL
BLOOD COMPONENT TYPE: NORMAL
CODING SYSTEM: NORMAL
CROSSMATCH: NORMAL
HGB BLD-MCNC: 6.8 G/DL (ref 13.3–17.7)
HGB BLD-MCNC: 7.9 G/DL (ref 13.3–17.7)
ISSUE DATE AND TIME: NORMAL
UNIT ABO/RH: NORMAL
UNIT NUMBER: NORMAL
UNIT STATUS: NORMAL
UNIT TYPE ISBT: 600
WBC # BLD AUTO: 7.7 10E3/UL (ref 4–11)

## 2022-03-25 PROCEDURE — 250N000011 HC RX IP 250 OP 636: Performed by: STUDENT IN AN ORGANIZED HEALTH CARE EDUCATION/TRAINING PROGRAM

## 2022-03-25 PROCEDURE — 258N000003 HC RX IP 258 OP 636: Performed by: STUDENT IN AN ORGANIZED HEALTH CARE EDUCATION/TRAINING PROGRAM

## 2022-03-25 PROCEDURE — 250N000013 HC RX MED GY IP 250 OP 250 PS 637: Performed by: STUDENT IN AN ORGANIZED HEALTH CARE EDUCATION/TRAINING PROGRAM

## 2022-03-25 PROCEDURE — P9016 RBC LEUKOCYTES REDUCED: HCPCS | Performed by: HOSPITALIST

## 2022-03-25 PROCEDURE — 250N000011 HC RX IP 250 OP 636: Performed by: SURGERY

## 2022-03-25 PROCEDURE — 85018 HEMOGLOBIN: CPT | Performed by: INTERNAL MEDICINE

## 2022-03-25 PROCEDURE — 36415 COLL VENOUS BLD VENIPUNCTURE: CPT | Performed by: INTERNAL MEDICINE

## 2022-03-25 PROCEDURE — 258N000003 HC RX IP 258 OP 636: Performed by: INTERNAL MEDICINE

## 2022-03-25 PROCEDURE — 210N000002 HC R&B HEART CARE

## 2022-03-25 PROCEDURE — 85048 AUTOMATED LEUKOCYTE COUNT: CPT | Performed by: INTERNAL MEDICINE

## 2022-03-25 PROCEDURE — 97161 PT EVAL LOW COMPLEX 20 MIN: CPT | Mod: GP

## 2022-03-25 PROCEDURE — 99233 SBSQ HOSP IP/OBS HIGH 50: CPT | Performed by: INTERNAL MEDICINE

## 2022-03-25 PROCEDURE — 250N000013 HC RX MED GY IP 250 OP 250 PS 637: Performed by: INTERNAL MEDICINE

## 2022-03-25 PROCEDURE — 250N000011 HC RX IP 250 OP 636: Performed by: INTERNAL MEDICINE

## 2022-03-25 PROCEDURE — 250N000009 HC RX 250: Performed by: STUDENT IN AN ORGANIZED HEALTH CARE EDUCATION/TRAINING PROGRAM

## 2022-03-25 PROCEDURE — 97116 GAIT TRAINING THERAPY: CPT | Mod: GP

## 2022-03-25 PROCEDURE — 250N000013 HC RX MED GY IP 250 OP 250 PS 637: Performed by: SURGERY

## 2022-03-25 PROCEDURE — 250N000009 HC RX 250: Performed by: INTERNAL MEDICINE

## 2022-03-25 PROCEDURE — C9113 INJ PANTOPRAZOLE SODIUM, VIA: HCPCS | Performed by: SURGERY

## 2022-03-25 PROCEDURE — 74177 CT ABD & PELVIS W/CONTRAST: CPT

## 2022-03-25 PROCEDURE — 97530 THERAPEUTIC ACTIVITIES: CPT | Mod: GP

## 2022-03-25 RX ORDER — ZOLPIDEM TARTRATE 5 MG/1
5 TABLET ORAL
Status: DISCONTINUED | OUTPATIENT
Start: 2022-03-25 | End: 2022-03-26 | Stop reason: HOSPADM

## 2022-03-25 RX ORDER — IOPAMIDOL 755 MG/ML
84 INJECTION, SOLUTION INTRAVASCULAR ONCE
Status: COMPLETED | OUTPATIENT
Start: 2022-03-25 | End: 2022-03-25

## 2022-03-25 RX ORDER — WATER 10 ML/10ML
INJECTION INTRAMUSCULAR; INTRAVENOUS; SUBCUTANEOUS
Status: DISPENSED
Start: 2022-03-25 | End: 2022-03-26

## 2022-03-25 RX ORDER — LOPERAMIDE HCL 2 MG
2 CAPSULE ORAL 3 TIMES DAILY PRN
Status: DISCONTINUED | OUTPATIENT
Start: 2022-03-25 | End: 2022-03-26 | Stop reason: HOSPADM

## 2022-03-25 RX ADMIN — CEFTRIAXONE SODIUM 2 G: 2 INJECTION, POWDER, FOR SOLUTION INTRAMUSCULAR; INTRAVENOUS at 20:09

## 2022-03-25 RX ADMIN — IRON SUCROSE 300 MG: 20 INJECTION, SOLUTION INTRAVENOUS at 14:04

## 2022-03-25 RX ADMIN — ZOLPIDEM TARTRATE 5 MG: 5 TABLET ORAL at 20:32

## 2022-03-25 RX ADMIN — LOPERAMIDE HYDROCHLORIDE 2 MG: 2 CAPSULE ORAL at 10:14

## 2022-03-25 RX ADMIN — PHYTONADIONE 5 MG: 10 INJECTION, EMULSION INTRAMUSCULAR; INTRAVENOUS; SUBCUTANEOUS at 18:11

## 2022-03-25 RX ADMIN — VANCOMYCIN HYDROCHLORIDE 1250 MG: 5 INJECTION, POWDER, LYOPHILIZED, FOR SOLUTION INTRAVENOUS at 00:29

## 2022-03-25 RX ADMIN — SIMVASTATIN 80 MG: 40 TABLET, FILM COATED ORAL at 20:32

## 2022-03-25 RX ADMIN — SODIUM CHLORIDE 64 ML: 900 INJECTION INTRAVENOUS at 13:33

## 2022-03-25 RX ADMIN — OXYCODONE HYDROCHLORIDE AND ACETAMINOPHEN 1 TABLET: 5; 325 TABLET ORAL at 20:32

## 2022-03-25 RX ADMIN — PANTOPRAZOLE SODIUM 40 MG: 40 INJECTION, POWDER, FOR SOLUTION INTRAVENOUS at 08:30

## 2022-03-25 RX ADMIN — TAMSULOSIN HYDROCHLORIDE 0.4 MG: 0.4 CAPSULE ORAL at 08:30

## 2022-03-25 RX ADMIN — PANTOPRAZOLE SODIUM 40 MG: 40 INJECTION, POWDER, FOR SOLUTION INTRAVENOUS at 20:09

## 2022-03-25 RX ADMIN — Medication 1 MG: at 20:30

## 2022-03-25 RX ADMIN — IOPAMIDOL 84 ML: 755 INJECTION, SOLUTION INTRAVENOUS at 13:33

## 2022-03-25 ASSESSMENT — ACTIVITIES OF DAILY LIVING (ADL)
ADLS_ACUITY_SCORE: 15
ADLS_ACUITY_SCORE: 15
ADLS_ACUITY_SCORE: 13
ADLS_ACUITY_SCORE: 13
ADLS_ACUITY_SCORE: 12
ADLS_ACUITY_SCORE: 11
DEPENDENT_IADLS:: INDEPENDENT
ADLS_ACUITY_SCORE: 15
ADLS_ACUITY_SCORE: 11
ADLS_ACUITY_SCORE: 15
ADLS_ACUITY_SCORE: 11
ADLS_ACUITY_SCORE: 15
ADLS_ACUITY_SCORE: 15
ADLS_ACUITY_SCORE: 13
ADLS_ACUITY_SCORE: 11
ADLS_ACUITY_SCORE: 15
ADLS_ACUITY_SCORE: 15
ADLS_ACUITY_SCORE: 13
ADLS_ACUITY_SCORE: 13
ADLS_ACUITY_SCORE: 15
ADLS_ACUITY_SCORE: 13
ADLS_ACUITY_SCORE: 15
ADLS_ACUITY_SCORE: 13

## 2022-03-25 NOTE — PLAN OF CARE
Neuro: A/O x4  CV/Rhythm: 100% Vpaced  Resp/02: LS clear dim at bases, on RA, IS encouraged  GI/Diet: low fiber diet, diarrhea today- imodium given x1  : burrell removed at 1020, voided x1 with   Skin/Incisions/Sites: Abd incision is c/d/I, blanchable coccyx with mepilex in place  Pulses/CMS: +2 radial, pedal  Edema: na  Activity/Falls Risk: Up with SBA, gait belt, up in chair today and ambulated in room  Lines/Drains/IVs: Iron infusing  Labs/BGM: Hgb 6.8- 1 unit bld given recheck at 1630  Test/Procedures: Abd CT done  VS/Pain: VSS, pt c/o of intermittent abd pain- improving  DC Plan: likely home once stable  Other:

## 2022-03-25 NOTE — PROGRESS NOTES
Vascular Surgery Progress Note     Date of Admission:  3/19/2022  Date: 03/25/22     Subjective  Having diarrhea. Denies n/v. Otherwise tolerating diet. Good pain control.      Physical Exam   Constitutional: cooperative, no apparent distress. Resting comfortably in bed, wife at bedside  Abdomen: soft, distended, staples intact with c/d/i incision edges and mild reactive erythema.   Musculoskeletal: grossly normal and symmetric ROM and strength in BL extremities   Neurologic: Awake, alert, oriented to name, place, time, and situation    Imaging:  CT reviewed, no evidence of bowel obstruction or ischemia; hematoma at the surgical site visualized      Assessment & Plan   Mr. 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 He was taken to the OR on 3/19/22 for exploratory laparotomy, with no SMA thrombus or bowel ischemia found.      Continue to advance diet as tolerated    No intervention indicated for hematoma    Milka Alfaro  03/25/22  3:06 PM     VASCULAR SURGICAL STAFF:  As noted above.  Currently resting comfortably in bed with complaints of diarrhea x2 earlier today.  He describes abdominal fullness with early satiety and poor appetite.  He is passing flatus.  He denies nausea, vomiting, or increasing abdominal pain.  On exam he is protuberant, soft, with appropriate postoperative tenderness.  Certainly no peritoneal signs.  Reviewed the CT scan of the abdomen from this afternoon.  Small abdominal hematomas  related to the recent SMA thrombectomy.  This explains some of the recent drop in his hemoglobin along with hematuria.  No bowel obstructive pattern.  No indication to reinsert NG tube at this time.  Continue present cares.  Would be slow to advance diet at this time.  If diarrhea persists  would check stool for C. difficile toxin.    Bill Haney MD

## 2022-03-25 NOTE — PROGRESS NOTES
[facility]    Hospitalist Progress Note    Date of Service (when I saw the patient): 03/25/2022  Admit date: 3/19/2022    Assessment & Plan   Sunil Lyle is a 81 year old male with past medical history significant for CAD s/p stenting, HLD, GERD  admitted on 3/19/2022 with multiple complaints including shortness of breath, hematuria and bradycardia.     He was had rising lactic acid levels with abdominal pain and went emergently to surgery      Hypotension (improved)  Bradycardia   1st Degree AV block --> high grade 2nd degree AV block at times  S/p dual chamber PPM 3/23  Heart rates have been in the 40s. He is dyspneic with exertion but otherwise has normal blood pressures. He was evaluated by cardiology in April 2021 for fatigue and was found to have junctional bradycardia at that time with rates in the 40s. His metoprolol was stopped and he was evaluated with a zio patch which only revealed a single 3.8s pause. It was opted to continue monitoring without intervention at that time.   * EKG here shows sinus bradycardia with 1st degree AV block and RBBB   * TTE - EF normal, unchanged since 2017  * Underwent PPM on 3/23    - follow up per EP    Abdominal pain, lactic acidosis secondary to above Lactic acid to 5.8, resolved.   s/p ex lap given worsening lactic acidosis and abdominal pain - No SMA thrombus or bowel ischemia found.   Patient's lactic acid went up to 5.8 from 2 range despite aggressive IVF at admission. Some abdominal pain noted at that time as well. Vascular surgery consulted and took him to OR emergently. He remained on heparin gtt for 48 hrs post op per vascular surgery.  - no thrombosis found, and no ischemic bowel noted in OR 3/19     Routine post-op mgt per vascular surgery:   - NGT pulled overnight, advancing diet. Stopped IVF on 03/24/22.   - Encouraged walking and ambulation as much as possible  - Abdominal binder for comfort while in bed or ambulating  - On  03/25/22 Slow to tolerate diet. Continues to belch, feel nauseaed and has distended abdomen with mild tenderness in the left lower abdomen.   - Obtained abdominal CT due to recent drop in hgb and above sx: New upper abdominal hematomas with small amount of hemoperitoneum.   - Discussed with patient that this likely explains significant drop in hgb, may play a role some of his abdominal discomfort but no evidence of obstruction. We should continue to advance diet as tolerated. Patient expressed agreement and appreciation for care.   - Discussed above with vascula surgery. No further recommendations.     SIRS with fever on 3/19, RESOLVED  Possible UTI - Urine culture mixed jackelin  Gram positive bacteremia, staph epi - likely contaminant - 3/19 Bcx turned positive day 2. He has a coronary stent, but denies other hardware in his body.     PT febrile here to 101.8. UA notable for large LE, >182 RBCs and >182 WBCs. Urine is grossly bloody. On CT scan, there is moderate enlargement and heterogeneity of the prostate. There is thickening of the posterior bladder wall which is somewhat nodular in appearance.     - ID consulted given unclear infectious picture - BCx from adm felt to be contaminant- see below  - empiric antibiotics (ceftriazone and vanco) continued alex operatively per ID  - ceftriaxone and vancomycin > IV ceftraxone for duration of stay. Discharge on PO ceftin to complete total 14 day course for potential UTI.   - Stopped vancomycin on 03/25/22 per ID recommendation.   - repeat cultures from 3/20 remain NGTD, remains afebrile  - Follow-up urine cultures and blood cultures - see above      Recent Labs   Lab 03/24/22  0616 03/23/22  0439 03/22/22  0510 03/21/22  0620 03/20/22  0523 03/19/22  2359 03/19/22  1825 03/19/22  1114 03/19/22  0918 03/19/22  0502 03/19/22  0336 03/19/22  0015 03/18/22  2359   WBC 7.8 9.4 10.1 14.2* 22.0*  --   --   --   --  14.2*  --   --  11.8*   LACT  --   --   --   --  1.8 1.9 2.2*  2.4* 5.8*  --  2.1* 2.3*  --        Diarrhea 3 loose stool with continued abdominal pain after surgery. Does not appear bloody or melanotic  - C.diff negative. No fevers  - loperamide ordered PRN if OK with surgery.     Acute anemia, acute blood loss, surgery, abdominal hematomas and hematuria  Macrocytosis  Hematuria, BPH  * Hematuria with drop in Hgb, post op. On admission, hgb 11.9 > 10 post-surgery > 7.8 on 3/20.   * B12 04891, Iron panel consistent with anemia of chronic disease with mild iron deficiency, iron level and iron saturation just below normal.     - Urology consultation appreciated  - resume PTA flomax on 03/24/22  - cystoscopy in the future  - monitor hgb q 12 hours, transfuse for <7  - pRBC on 03/25/22 for hgb 6.8. No sign of ongoing bleeding and hgb has been stable around 7, for 3 days prior.   - CBI stopped this AM 03/24/22 > urine blood tinged  - Removed burrell on 03/25/22 with TOV  - Ferrous SO4 at discharge.     Recent Labs   Lab 03/25/22  0552 03/24/22  1741 03/24/22  0616 03/23/22  0439 03/22/22  0510 03/21/22  2347   HGB 6.8* 7.0* 6.9* 7.4* 8.3* 8.0*     Hypernatremia, from NPO status on IVF, resolving.   Recent Labs   Lab 03/24/22  1129 03/24/22  0616 03/23/22  0439 03/22/22  0510 03/20/22  0523 03/19/22  1114   * 149* 146* 145* 142 141     - encourage PO fluids   - recheck in AM.     Dyspnea   Hx of COPD, former smoker   The patient's chief complaint today is actually shortness of breath. The shortness of breath has been on-going and getting worse over the past two weeks. This has been predominantly on exertion. Of note, review of clinic notes from April and July of 2021, suggest that he has been having dyspnea on exertion at that time as well. Cardiology discussed ischemic evaluation, but the patient declined. CT here showed Centrilobular emphysema without evidence of pneumonia. No evidence of pulmonary embolism. O2 saturations are within normal limits.   Unclear etiology, could be  "progression of emphysema vs cardiac related (new CHF, ischemia i.e. anginal equivalent, bradycardia, etc.).   * TTE unremarkable as noted above \  * S/p PPM as above.      CAD s/p RCA stent in 2001   Hyperlipidemia  Hypertension   - Holding PTA ASA, simvastatin and fenofibrate. NG decompression, NPO and hematuria.   - Resume statin on 03/24/22   - Discussed with Urology > hold ASA while still having hematuria.      Probable CKD III, stable.   Creatinine is elevated to 1.3 initially, this seems to be near his baseline of 1.2-1.3.     Recent Labs   Lab 03/24/22  0616 03/23/22  0439 03/22/22  0510 03/20/22  0523 03/19/22  1114 03/19/22  0502   CR 0.94 1.05 1.07 1.22 1.33* 1.37*        Hyperglycemia, No dx of DM  Hemoglobin A1C   Date Value Ref Range Status   03/20/2022 5.4 0.0 - 5.6 % Final     Comment:     Normal <5.7%   Prediabetes 5.7-6.4%    Diabetes 6.5% or higher     Note: Adopted from ADA consensus guidelines.     Recent Labs   Lab 03/24/22  0616 03/23/22  0806 03/23/22  0439 03/22/22  1627 03/22/22  1325 03/22/22  1020   * 114* 127* 116* 138* 128*       Coagulation Defect  INR = 1.21 (Ref range: 0.85 - 1.15) and/or PTT = N/A on admission, will monitor for bleeding    Recent Labs   Lab 03/19/22  0001   INR 1.21*      - Now that he's advancing diet, will give vitamin K 5 mg today and tomorrow. Follow up with PCP.     Hypomagnesemia, RESOLVED  Mg = 1.5 mg/dL (Ref range: 1.6 - 2.3 mg/dL) on admission, will replace as needed    Recent Labs   Lab 03/24/22  0616 03/23/22  1216 03/23/22  0439 03/22/22  0510 03/21/22  0620 03/20/22  0523 03/19/22  1114 03/19/22  0502   POTASSIUM 3.4 3.6 3.4 3.6  --  3.8 3.9 3.9   MAG  --  2.7* 2.6* 2.6* 2.4* 2.3  --  2.4*        Overweight  Estimated body mass index is 25.11 kg/m  as calculated from the following:    Height as of this encounter: 1.778 m (5' 10\").    Weight as of this encounter: 79.4 kg (175 lb).     Diet: Orders Placed This Encounter      Low Fiber Diet     IVF: " Off IVF  Blanca Catheter: Not present     DVT Prophylaxis: PCDs and ambulate. Anticipate discharge within the next 2 days.   Code Status: Full Code     Disposition: Likely tomorrow to home with home health services.   Communication: Discussed with patient, daughter, surgery, wife on 03/25/22.     Patty Sorto  Hospitalist Service  [facility]  Securely message with the Vocera Web Console (learn more here)  Text page via [employer] Paging/Directory    Total time spent:  > 35 minutes  Time spent counseling, coordination of care: 25 minutes including discussion with care team, urology and son, personal review and interpretation of labs and studies as noted above    Interval History   Events over last 24 hours as outlined above.   Patient continues to have some diffuse mild abdominal discomfort and belching. Mildly distended. Noted to have 3 loose brown stools overnight. No black or bloody. He feels full when he tries to eat anything and has been belching. He is unsure if he is passing gas.   Denies any SOB, CP, abdominal pain, vomiting.    -Data reviewed today: I reviewed all new labs and imaging results over the last 24 hours. I personally reviewed no images or EKG's today.    Physical Exam   Temp: 98.5  F (36.9  C) Temp src: Oral BP: (!) 157/71 Pulse: 70   Resp: 16 SpO2: 98 % O2 Device: None (Room air) Oxygen Delivery: 2 LPM  Vitals:    03/23/22 0500 03/24/22 0559 03/25/22 0541   Weight: 76.9 kg (169 lb 8.5 oz) 77.6 kg (171 lb 1.2 oz) 76.5 kg (168 lb 10.4 oz)     Vital Signs with Ranges  Temp:  [98.1  F (36.7  C)-98.6  F (37  C)] 98.5  F (36.9  C)  Pulse:  [60-82] 70  Resp:  [16-20] 16  BP: (114-159)/(53-73) 157/71  SpO2:  [92 %-99 %] 98 %  I/O last 3 completed shifts:  In: -   Out: 1350 [Urine:1350]    Today's Exam  Constitutional: NAD,   Neuropsyche:  alert and oriented, answers questions appropriately.   Respiratory:  Breathing comfortably, good air exchange, no wheezes, no crackles.    Cardiovascular:  Regular rate and rhythm, no edema.  GI:  Soft, mild TTP in the LLQ, BS normal, incision clean dry and intact.   Skin/Integumen: No acute rash or sign of bleeding.     Medications   All medications reviewed on 03/25/22     - MEDICATION INSTRUCTIONS -         cefTRIAXone  2 g Intravenous Q24H     pantoprazole (PROTONIX) IV  40 mg Intravenous BID     phytonadione  5 mg Oral Daily     simvastatin  80 mg Oral At Bedtime     tamsulosin  0.4 mg Oral Daily     vancomycin  1,250 mg Intravenous Q24H       Data   Recent Labs   Lab 03/25/22  0552 03/24/22  1741 03/24/22  1129 03/24/22  0616 03/23/22  1216 03/23/22  0806 03/23/22  0439 03/22/22  1020 03/22/22  0510 03/19/22  0502 03/19/22  0001 03/18/22  2359   WBC  --   --   --  7.8  --   --  9.4  --  10.1   < >  --  11.8*   HGB 6.8* 7.0*  --  6.9*  --   --  7.4*  --  8.3*   < >  --  11.9*   MCV  --   --   --  108*  --   --  103*  --  106*   < >  --  104*   PLT  --   --   --  212  --   --  178  --  158   < >  --  182   INR  --   --   --   --   --   --   --   --   --   --  1.21*  --    NA  --   --  146* 149*  --   --  146*  --  145*   < >  --  140   POTASSIUM  --   --   --  3.4 3.6  --  3.4  --  3.6   < >  --  3.9   CHLORIDE  --   --   --  117*  --   --  113*  --  111*   < >  --  109   CO2  --   --   --  25  --   --  28  --  30   < >  --  23   BUN  --   --   --  27  --   --  31*  --  26   < >  --  25   CR  --   --   --  0.94  --   --  1.05  --  1.07   < >  --  1.30*   ANIONGAP  --   --   --  7  --   --  5  --  4   < >  --  8   PAIGE  --   --   --  8.4*  --   --  8.4*  --  8.0*   < >  --  8.8   GLC  --   --   --  124*  --  114* 127*   < > 135*   < >  --  121*   ALBUMIN  --   --   --   --   --   --   --   --  2.6*  --   --  3.8   PROTTOTAL  --   --   --   --   --   --   --   --  5.9*  --   --  7.4   BILITOTAL  --   --   --   --   --   --   --   --  0.5  --   --  0.7   ALKPHOS  --   --   --   --   --   --   --   --  37*  --   --  44   ALT  --   --   --   --   --    --   --   --  24  --   --  27   AST  --   --   --   --   --   --   --   --  22  --   --  20   LIPASE  --   --   --   --   --   --   --   --   --   --   --  208    < > = values in this interval not displayed.       No results found for this or any previous visit (from the past 24 hour(s)).

## 2022-03-25 NOTE — PROGRESS NOTES
"CLINICAL NUTRITION SERVICES  -  ASSESSMENT NOTE      Recommendations Ordered by Registered Dietitian (RD):   Ensure Enlive Chocolate + vanilla ice cream at 2 pm daily  Encourage PO intake as much as possible with small, frequent meals   Provided low fiber diet handout and room service menu    Malnutrition: (3/25)  % Weight Loss:  > 2% in 1 week (severe malnutrition)  % Intake:  </= 50% for >/= 5 days (severe malnutrition)  Subcutaneous Fat Loss:  Orbital region mild depletion  Muscle Loss:  Temporal region mild depletion, Clavicle bone region mild depletion and Acromion bone region mild depletion  Fluid Retention:  None noted    Malnutrition Diagnosis: Severe malnutrition  In Context of:  Acute illness or injury       REASON FOR ASSESSMENT  Sunil Lyle is a 81 year old male seen by Registered Dietitian for LOS    Per chart review, PMH significant for CAD s/p stenting, COPD, HTN, HLD and GERD.  Admitted 2/2 SOB, hematuria, bradycardia and incidental imaging findings of non-occlusive SMA thrombus.        NUTRITION HISTORY  - Information obtained from patient and spouse at bedside this afternoon.  - Patient states the last time he ate anything prior to yesterday was last Friday at lunch. He typically eats 2 meals/day and did not have any nutrition concerns prior to coming in. Him and his spouse will eat an early lunch at 11 am for their first meal and then dinner around 5-6 pm. He is lactose intolerant but will eat lactose when he has a \"pill\" (?lactase enzyme) to take it with.   - No known food allergies noted.       CURRENT NUTRITION ORDERS  Diet Order:     Low Fiber (advanced yesterday afternoon)    Current Intake/Tolerance:  Patient states he had a few bites of mashed potatoes yesterday. Nothing to eat thus far today. Recently ordered some diced peaches and a banana to have. Accepting of chocolate protein + ice cream shake a little later around 4 pm.       NUTRITION FOCUSED PHYSICAL ASSESSMENT FOR DIAGNOSING " "MALNUTRITION)  Yes         Observed:    Muscle wasting (refer to documentation in Malnutrition section) and Subcutaneous fat loss (refer to documentation in Malnutrition section)    Obtained from Chart/Interdisciplinary Team:  Emergently taken to OR on 3/19 by vascular surgery for exploratory laparotomy --> no SMA thrombus or bowel ischemia found  NGT to decompression, removed on 3/30    ANTHROPOMETRICS  Height: 5' 10\"  Weight: 168 lbs 10.43 oz  Body mass index is 24.2 kg/m .  Weight Status:  Normal BMI  Weight History: Patient states his UBW is 172# which he has been at for many years. Admit weight of 175# on 3/18, current weight today of 168#. Overall potentially down </= 7# in the past week though some loss may be r/t fluid (4% wt loss).   Wt Readings from Last 10 Encounters:   03/25/22 76.5 kg (168 lb 10.4 oz)   07/27/21 79.1 kg (174 lb 4.8 oz)   04/01/21 80.7 kg (178 lb)   12/02/19 78.2 kg (172 lb 6.4 oz)   10/30/18 78.9 kg (174 lb)   07/20/17 73 kg (161 lb)   03/14/17 74.8 kg (165 lb)   06/23/16 76.4 kg (168 lb 6.4 oz)   06/18/15 75.8 kg (167 lb)   09/02/14 74.8 kg (165 lb)       LABS  Labs reviewed  - Na 146 (H)    MEDICATIONS  Medications reviewed  - protonix BID      ASSESSED NUTRITION NEEDS PER APPROVED PRACTICE GUIDELINES:    Dosing Weight 76.5 kg (3/25)  Estimated Energy Needs: 2851-5740 kcals (25-30 Kcal/Kg)  Justification: maintenance  Estimated Protein Needs: 77-92 grams protein (1-1.2 g pro/Kg)  Justification: maintenance, preservation of lean body mass  Estimated Fluid Needs: 5788-4872  mL (30-35 mL/kg)  Justification: maintenance or per provider pending fluid status      NUTRITION DIAGNOSIS:  Inadequate oral intake related to NPO s/p ex lap, now with decreased appetite with diet advancement as evidenced by has only taken bites of mashed potatoes and fruit over the past 24 hours, previously NPO x 4-5 days       NUTRITION INTERVENTIONS  Recommendations / Nutrition Prescription  Ensure Enlive " Chocolate + vanilla ice cream at 2 pm daily  Encourage PO intake as much as possible with small, frequent meals   Provided low fiber diet handout and room service menu       Implementation  Nutrition education: Provided education on RD and role of care. Discussed low fiber diet and appropriate food options. Encouraged small, frequent meals for PO intake. Reviewed available ONS and indications for use.   Medical Food Supplement - as above       Nutrition Goals  Patient will consume % meals BID at minimum + 1 supplement daily within the next 3-5 days       MONITORING AND EVALUATION:  Progress towards goals will be monitored and evaluated per protocol and Practice Guidelines      Heather Reese RD, LD

## 2022-03-25 NOTE — PLAN OF CARE
Goal Outcome Evaluation:    Pt A x O x 4. No reports of pain. Mild abd discomfort reported. Oxygen saturation >90% on RA. 100% V paced on tele.Received IV abx. Indwelling burrell in place, light pink to clear output. Plan to removed burrell catheter at 0800 for voiding trial. AM hemoglobin 6.8, conditional transfuse orders released. Pt educated on poc, cares, medications and safety. Will continue to monitor.

## 2022-03-25 NOTE — CONSULTS
Care Management Initial Consult    General Information  Assessment completed with: Patient, Gerard and DIL  Type of CM/SW Visit: Initial Assessment    Primary Care Provider verified and updated as needed: Yes   Readmission within the last 30 days: no previous admission in last 30 days         Advance Care Planning: Advance Care Planning Reviewed: concerns discussed (wife is checking on HCD)          Communication Assessment  Patient's communication style: spoken language (English or Bilingual)    Hearing Difficulty or Deaf: yes   Wear Glasses or Blind: yes    Cognitive  Cognitive/Neuro/Behavioral: WDL  Level of Consciousness: alert  Arousal Level: opens eyes spontaneously  Orientation: oriented x 4  Mood/Behavior: calm, cooperative  Best Language: 0 - No aphasia  Speech: clear, spontaneous    Living Environment:   People in home: spouse  Lili  Current living Arrangements: condominium      Able to return to prior arrangements:         Family/Social Support:  Care provided by: spouse/significant other  Provides care for: no one, unable/limited ability to care for self  Marital Status:   Wife          Description of Support System:           Current Resources:   Patient receiving home care services: No     Community Resources: None  Equipment currently used at home: none  Supplies currently used at home: None    Employment/Financial:  Employment Status: retired        Financial Concerns:      none noted       Lifestyle & Psychosocial Needs:  Social Determinants of Health     Tobacco Use: Medium Risk     Smoking Tobacco Use: Former Smoker     Smokeless Tobacco Use: Never Used   Alcohol Use: Not on file   Financial Resource Strain: Not on file   Food Insecurity: Not on file   Transportation Needs: Not on file   Physical Activity: Not on file   Stress: Not on file   Social Connections: Not on file   Intimate Partner Violence: Not on file   Depression: Not on file   Housing Stability: Not on file       Functional  "Status:  Prior to admission patient needed assistance:   Dependent ADLs:: Independent  Dependent IADLs:: Independent  Assesssment of Functional Status: Not at  functional baseline    Mental Health Status:  Mental Health Status: No Current Concerns       Chemical Dependency Status:      None noted          Values/Beliefs:  Spiritual, Cultural Beliefs, Yazdanism Practices, Values that affect care: no               Additional Information:  Met with patient. Patient informed wrier he is weaker that normal and isn't able to go home yet, per pat, \"I need to goto rehab.\" Writer explained that therapies would have to recommend a TCU/SNF in order for his insurance to pay for it.Patient understands this but asked for a list of SNF's in his area.Writer supplied list. DIL stated, \"We can look them up. It gives us something to do.\"    Elaine Burr RN        "

## 2022-03-25 NOTE — PROGRESS NOTES
03/25/22 1500   Quick Adds   Type of Visit Initial PT Evaluation   Living Environment   People in Home spouse   Current Living Arrangements house   Home Accessibility stairs to enter home;stairs within home   Number of Stairs, Main Entrance 2   Stair Railings, Main Entrance none   Number of Stairs, Within Home, Primary greater than 10 stairs   Stair Railings, Within Home, Primary railing on right side (ascending)   Self-Care   Usual Activity Tolerance good   Current Activity Tolerance moderate   Equipment Currently Used at Home none   Fall history within last six months no   General Information   Onset of Illness/Injury or Date of Surgery 03/19/22   Referring Physician Patty Sorto MD   Patient/Family Therapy Goals Statement (PT) Discharge home   Pertinent History of Current Problem (include personal factors and/or comorbidities that impact the POC) Patient admitted on 3/19/22 for worsening shortness of breath, hernan hematuria, and 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 He was taken to the OR on 3/19/22 for exploratory laparotomy, with no SMA thrombus or bowel ischemia found. Patient with pace maker placed on 3/24/22. Patient with PMH of BPH, CKD, COPD, junctional bradycardia.    Existing Precautions/Restrictions fall   Weight-Bearing Status - LLE full weight-bearing   Weight-Bearing Status - RLE full weight-bearing   Cognition   Orientation Status (Cognition) oriented x 4   Pain Assessment   Patient Currently in Pain Yes, see Vital Sign flowsheet  (some stomach pain, not rated on scale of 0-10)   Posture    Posture Forward head position;Protracted shoulders   Range of Motion (ROM)   ROM Comment B LE ROM WNL   Strength (Manual Muscle Testing)   Strength Comments Not formally assessed but at least 3+/5 with functional transfers and gait   Bed Mobility   Comment, (Bed Mobility) Supine>sit with SBA and time to complete   Transfers   Comment, (Transfers) Sit>stand from EOB with FWW and CGA    Gait/Stairs (Locomotion)   Comment, (Gait/Stairs) Patient ambulated 10 feet with FWW and CGA, slow but steady gait   Balance   Balance Comments Some path deviations present but no overt LOB noted   Clinical Impression   Criteria for Skilled Therapeutic Intervention Yes, treatment indicated   PT Diagnosis (PT) Impaired mobility and gait   Influenced by the following impairments pain, weakness, decreased tolerance to activity   Functional limitations due to impairments bed mobility, transfers, gait, stairs   Clinical Presentation (PT Evaluation Complexity) Stable/Uncomplicated   Clinical Presentation Rationale Based on PMH, current presentation, and social support    Clinical Decision Making (Complexity) low complexity   Planned Therapy Interventions (PT) balance training;bed mobility training;gait training;home exercise program;stair training;strengthening;transfer training   Risk & Benefits of therapy have been explained evaluation/treatment results reviewed;care plan/treatment goals reviewed;risks/benefits reviewed;current/potential barriers reviewed;participants voiced agreement with care plan;participants included;patient;spouse/significant other   PT Discharge Planning   PT Discharge Recommendation (DC Rec) home with assist;home with home care physical therapy   PT Rationale for DC Rec Patient lives in a house with his spouse, 2 stairs to enter and a flight of stairs to get to an office area (does not need to access). Patient currently requiring CGA to SBA for mobility/ambulation with FWW; demo's decreased tolerance to activity and weakness. Anticipate with medical management and further skilled therapy intervention, may progress to level of A for safe discharge home with A for household tasks and stairs. Pending tolerance to activity, may benefit from HH PT.    Plan of Care Review   Plan of Care Reviewed With spouse;patient   Total Evaluation Time   Total Evaluation Time (Minutes) 5   Physical Therapy Goals    PT Frequency Daily   PT Predicated Duration/Target Date for Goal Attainment 03/28/22   PT Goals Bed Mobility;Transfers;Gait;Stairs   PT: Bed Mobility Independent;Supine to/from sit;Within precautions  (abdominal precautions)   PT: Transfers Modified independent;Sit to/from stand;Assistive device   PT: Gait Modified independent;Assistive device;Rolling walker;Greater than 200 feet   PT: Stairs Greater than 10 stairs;Rail on right

## 2022-03-25 NOTE — PROGRESS NOTES
[facility]    Urology Progress Note  Date of Service: 03/25/2022      Interval History   Blanca was in place overnight, cbi remained off, outputs light pink per report.   Blanca removed this morning at 1023 am. During my assessment at around 11 am he had not yet voided.     AVSS.  Hemoglobin: 7.0--6.8    Blanca output: 550/1350    Assessment & Plan    81 year old male admitted with SMA thrombosis and heart block. Urology following for gross hematuria requiring CBI.  CT scan revealed large prostate (80 gm) and thickening of lower posterior wall (+ smoker). UA suspicious for UTI; UCx with mixed jackelin. Blood culture x1 growing staph epidermidis; repeat blood cultures from 3/20 with no growth.     PLAN:    1. Gross hematuria  Etiology likely prostatic in origin. CT scan revealed large prostate (80 gm) and thickening of lower posterior wall (+ smoker)  - UCx with mixed jackelin.    - CBI stopped since 3/24.   - Recommend cystoscopy as outpatient.     2. BPH  - Continue flomax and finasteride  - Will need further evaluation as outpatient (may need procedure to help with urination)  - Blanca removed this morning. Bladder scan for post-void residual x2 and chart in flowsheet. Page urology if >400cc or if patient is uncomfortable and/or unable to void.      3. 1st/2nd degree heart block  - S/p pacemaker implant yesterday      4. Acute anemia  - Transfusions per IM. Do not believe degree of hematuria is significantly contributing to the acute drop in Hgb over the past 2 days.      Physical Exam   Temp:  [98.1  F (36.7  C)-98.6  F (37  C)] 98.5  F (36.9  C)  Pulse:  [60-82] 70  Resp:  [16-20] 16  BP: (114-159)/(53-73) 157/71  SpO2:  [92 %-99 %] 98 %    Weights:   Vitals:    03/23/22 0500 03/24/22 0559 03/25/22 0541   Weight: 76.9 kg (169 lb 8.5 oz) 77.6 kg (171 lb 1.2 oz) 76.5 kg (168 lb 10.4 oz)    Body mass index is 24.2 kg/m .    Constitutional: Alert. Pleasant. No acute distress. Daughter at bedside.    CV: Perfusing well. Paced rhythm  Respiratory: No respiratory distress. Breathing unlabored.   Skin: Warm and dry.  Musculoskeletal: Moving all extremities approprietly.    Data   Recent Labs   Lab 03/25/22  0552 03/24/22  1741 03/24/22  1129 03/24/22  0616 03/23/22  1216 03/23/22  0806 03/23/22  0439 03/22/22  1020 03/22/22  0510 03/19/22  0502 03/19/22  0001 03/18/22  2359   WBC  --   --   --  7.8  --   --  9.4  --  10.1   < >  --  11.8*   HGB 6.8* 7.0*  --  6.9*  --   --  7.4*  --  8.3*   < >  --  11.9*   MCV  --   --   --  108*  --   --  103*  --  106*   < >  --  104*   PLT  --   --   --  212  --   --  178  --  158   < >  --  182   INR  --   --   --   --   --   --   --   --   --   --  1.21*  --    NA  --   --  146* 149*  --   --  146*  --  145*   < >  --  140   POTASSIUM  --   --   --  3.4 3.6  --  3.4  --  3.6   < >  --  3.9   CHLORIDE  --   --   --  117*  --   --  113*  --  111*   < >  --  109   CO2  --   --   --  25  --   --  28  --  30   < >  --  23   BUN  --   --   --  27  --   --  31*  --  26   < >  --  25   CR  --   --   --  0.94  --   --  1.05  --  1.07   < >  --  1.30*   ANIONGAP  --   --   --  7  --   --  5  --  4   < >  --  8   PAIGE  --   --   --  8.4*  --   --  8.4*  --  8.0*   < >  --  8.8   GLC  --   --   --  124*  --  114* 127*   < > 135*   < >  --  121*   ALBUMIN  --   --   --   --   --   --   --   --  2.6*  --   --  3.8   PROTTOTAL  --   --   --   --   --   --   --   --  5.9*  --   --  7.4   BILITOTAL  --   --   --   --   --   --   --   --  0.5  --   --  0.7   ALKPHOS  --   --   --   --   --   --   --   --  37*  --   --  44   ALT  --   --   --   --   --   --   --   --  24  --   --  27   AST  --   --   --   --   --   --   --   --  22  --   --  20   LIPASE  --   --   --   --   --   --   --   --   --   --   --  208    < > = values in this interval not displayed.       Recent Labs   Lab 03/24/22  0616 03/23/22  0806 03/23/22  0439 03/22/22  1627 03/22/22  1325 03/22/22  1020   * 114* 127*  116* 138* 128*        Unresulted Labs Ordered in the Past 30 Days of this Admission     Date and Time Order Name Status Description    3/25/2022  6:43 AM CONDITIONAL Prepare red blood cells (unit) Preliminary     3/20/2022 11:34 PM Blood Culture Hand, Right Preliminary     3/20/2022 11:34 PM Blood Culture Hand, Left Preliminary          Yessica Pringle PA-C on 3/25/2022 at 10:17 AM   [facility]

## 2022-03-26 ENCOUNTER — APPOINTMENT (OUTPATIENT)
Dept: ULTRASOUND IMAGING | Facility: CLINIC | Age: 82
DRG: 242 | End: 2022-03-26
Attending: INTERNAL MEDICINE
Payer: COMMERCIAL

## 2022-03-26 ENCOUNTER — APPOINTMENT (OUTPATIENT)
Dept: PHYSICAL THERAPY | Facility: CLINIC | Age: 82
DRG: 242 | End: 2022-03-26
Payer: COMMERCIAL

## 2022-03-26 VITALS
WEIGHT: 168.65 LBS | BODY MASS INDEX: 24.14 KG/M2 | SYSTOLIC BLOOD PRESSURE: 146 MMHG | TEMPERATURE: 98.1 F | OXYGEN SATURATION: 98 % | HEART RATE: 63 BPM | HEIGHT: 70 IN | RESPIRATION RATE: 17 BRPM | DIASTOLIC BLOOD PRESSURE: 66 MMHG

## 2022-03-26 LAB
ANION GAP SERPL CALCULATED.3IONS-SCNC: 5 MMOL/L (ref 3–14)
BACTERIA BLD CULT: NO GROWTH
BACTERIA BLD CULT: NO GROWTH
BUN SERPL-MCNC: 18 MG/DL (ref 7–30)
CALCIUM SERPL-MCNC: 8.5 MG/DL (ref 8.5–10.1)
CHLORIDE BLD-SCNC: 114 MMOL/L (ref 94–109)
CO2 SERPL-SCNC: 24 MMOL/L (ref 20–32)
CREAT SERPL-MCNC: 0.96 MG/DL (ref 0.66–1.25)
GFR SERPL CREATININE-BSD FRML MDRD: 79 ML/MIN/1.73M2
GLUCOSE BLD-MCNC: 114 MG/DL (ref 70–99)
HGB BLD-MCNC: 7.9 G/DL (ref 13.3–17.7)
MAGNESIUM SERPL-MCNC: 2 MG/DL (ref 1.6–2.3)
POTASSIUM BLD-SCNC: 3.3 MMOL/L (ref 3.4–5.3)
POTASSIUM BLD-SCNC: 3.9 MMOL/L (ref 3.4–5.3)
SARS-COV-2 RNA RESP QL NAA+PROBE: NEGATIVE
SODIUM SERPL-SCNC: 143 MMOL/L (ref 133–144)

## 2022-03-26 PROCEDURE — U0005 INFEC AGEN DETEC AMPLI PROBE: HCPCS | Performed by: INTERNAL MEDICINE

## 2022-03-26 PROCEDURE — 85018 HEMOGLOBIN: CPT | Performed by: INTERNAL MEDICINE

## 2022-03-26 PROCEDURE — 250N000013 HC RX MED GY IP 250 OP 250 PS 637: Performed by: STUDENT IN AN ORGANIZED HEALTH CARE EDUCATION/TRAINING PROGRAM

## 2022-03-26 PROCEDURE — 84132 ASSAY OF SERUM POTASSIUM: CPT | Performed by: INTERNAL MEDICINE

## 2022-03-26 PROCEDURE — 97116 GAIT TRAINING THERAPY: CPT | Mod: GP

## 2022-03-26 PROCEDURE — 99239 HOSP IP/OBS DSCHRG MGMT >30: CPT | Performed by: INTERNAL MEDICINE

## 2022-03-26 PROCEDURE — 83735 ASSAY OF MAGNESIUM: CPT | Performed by: INTERNAL MEDICINE

## 2022-03-26 PROCEDURE — 36415 COLL VENOUS BLD VENIPUNCTURE: CPT | Performed by: INTERNAL MEDICINE

## 2022-03-26 PROCEDURE — 82310 ASSAY OF CALCIUM: CPT | Performed by: INTERNAL MEDICINE

## 2022-03-26 PROCEDURE — 250N000013 HC RX MED GY IP 250 OP 250 PS 637: Performed by: INTERNAL MEDICINE

## 2022-03-26 PROCEDURE — 97530 THERAPEUTIC ACTIVITIES: CPT | Mod: GP

## 2022-03-26 PROCEDURE — 93971 EXTREMITY STUDY: CPT | Mod: LT

## 2022-03-26 PROCEDURE — 250N000011 HC RX IP 250 OP 636: Performed by: SURGERY

## 2022-03-26 PROCEDURE — C9113 INJ PANTOPRAZOLE SODIUM, VIA: HCPCS | Performed by: SURGERY

## 2022-03-26 RX ORDER — CEFUROXIME AXETIL 500 MG/1
500 TABLET ORAL 2 TIMES DAILY
Qty: 14 TABLET | Refills: 0 | Status: SHIPPED | OUTPATIENT
Start: 2022-03-26 | End: 2022-04-02

## 2022-03-26 RX ORDER — ZOLPIDEM TARTRATE 10 MG/1
5 TABLET ORAL
Qty: 5 TABLET | Refills: 0 | Status: SHIPPED | OUTPATIENT
Start: 2022-03-26 | End: 2022-05-31

## 2022-03-26 RX ORDER — LOPERAMIDE HCL 2 MG
2 CAPSULE ORAL 3 TIMES DAILY PRN
COMMUNITY
Start: 2022-03-26 | End: 2022-05-31

## 2022-03-26 RX ORDER — POTASSIUM CHLORIDE 1500 MG/1
40 TABLET, EXTENDED RELEASE ORAL ONCE
Status: COMPLETED | OUTPATIENT
Start: 2022-03-26 | End: 2022-03-26

## 2022-03-26 RX ORDER — OXYCODONE AND ACETAMINOPHEN 5; 325 MG/1; MG/1
1 TABLET ORAL EVERY 4 HOURS PRN
Qty: 9 TABLET | Refills: 0 | Status: SHIPPED | OUTPATIENT
Start: 2022-03-26 | End: 2022-05-31

## 2022-03-26 RX ADMIN — PANTOPRAZOLE SODIUM 40 MG: 40 INJECTION, POWDER, FOR SOLUTION INTRAVENOUS at 08:17

## 2022-03-26 RX ADMIN — TAMSULOSIN HYDROCHLORIDE 0.4 MG: 0.4 CAPSULE ORAL at 08:17

## 2022-03-26 RX ADMIN — POTASSIUM CHLORIDE 40 MEQ: 1500 TABLET, EXTENDED RELEASE ORAL at 08:17

## 2022-03-26 ASSESSMENT — ACTIVITIES OF DAILY LIVING (ADL)
ADLS_ACUITY_SCORE: 11
ADLS_ACUITY_SCORE: 12
ADLS_ACUITY_SCORE: 11
ADLS_ACUITY_SCORE: 12
ADLS_ACUITY_SCORE: 11

## 2022-03-26 NOTE — PLAN OF CARE
Neuro: Alert & Oriented x 4.  Request sleep enhancement and prn Ambien tonight.  CV:  PPM placed on 5/23 V PACED 100%. BP stable.   PULM: Room air  GI:  Loose Bowel movements, denies nausea, tolerating diet 25% and clears well. Abdomen incision staples dry/intact.   :  Voiding small amounts with adequate < 400 ml PVR checks. Denies dysuria.    Hgb stable at 7.9 re check after 1 unit given today.    Goal Outcome Evaluation:    Plan of Care Reviewed With: patient, spouse     Overall Patient Progress: improving    Outcome Evaluation: Discussed plan of care

## 2022-03-26 NOTE — PLAN OF CARE
Goal Outcome Evaluation:    Plan of Care Reviewed With: patient, son     Discharge home with wife and son, patient up in the room with walker tolerated food voiding small amounts in the urinal. Pacemaker site is dry and intact no bleeding no hematoma.

## 2022-03-26 NOTE — PROGRESS NOTES
"Urology Brief Follow-up Note:  Dx:  hematuria    Subjective:  Catheter out yesterday and voiding OK since then and no blood.  Has chronic BPH sx on tamsulosin but getting worse over the last few years despite meds.         Objective:    BP (!) 146/66 (BP Location: Right arm)   Pulse 63   Temp 98.1  F (36.7  C) (Oral)   Resp 17   Ht 1.778 m (5' 10\")   Wt 76.5 kg (168 lb 10.4 oz)   SpO2 98%   BMI 24.20 kg/m        Intake/Output Summary (Last 24 hours) at 3/26/2022 1244  Last data filed at 3/26/2022 0900  Gross per 24 hour   Intake 1120 ml   Output 1070 ml   Net 50 ml       Labs:    ROUTINE IP LABS (Last four results)  BMP  Recent Labs   Lab 03/26/22  0600 03/24/22  1129 03/24/22  0616 03/23/22  1216 03/23/22  0806 03/23/22  0439 03/22/22  1020 03/22/22  0510    146* 149*  --   --  146*  --  145*   POTASSIUM 3.3*  --  3.4 3.6  --  3.4  --  3.6   CHLORIDE 114*  --  117*  --   --  113*  --  111*   PAIGE 8.5  --  8.4*  --   --  8.4*  --  8.0*   CO2 24  --  25  --   --  28  --  30   BUN 18  --  27  --   --  31*  --  26   CR 0.96  --  0.94  --   --  1.05  --  1.07   *  --  124*  --  114* 127*   < > 135*    < > = values in this interval not displayed.     CBC  Recent Labs   Lab 03/26/22  0600 03/25/22  1744 03/25/22  0552 03/24/22  1741 03/24/22  0616 03/23/22  0439 03/22/22  0510 03/21/22  1238 03/21/22  0620   WBC  --   --  7.7  --  7.8 9.4 10.1  --  14.2*   RBC  --   --   --   --  2.05* 2.26* 2.43*  --  2.29*   HGB 7.9* 7.9* 6.8* 7.0* 6.9* 7.4* 8.3*   < > 7.8*   HCT  --   --   --   --  22.2* 23.3* 25.8*  --  24.5*   MCV  --   --   --   --  108* 103* 106*  --  107*   MCH  --   --   --   --  33.7* 32.7 34.2*  --  34.1*   MCHC  --   --   --   --  31.1* 31.8 32.2  --  31.8   RDW  --   --   --   --  17.4* 17.2* 17.1*  --  17.6*   PLT  --   --   --   --  212 178 158  --  155    < > = values in this interval not displayed.     INRNo lab results found in last 7 days.      Exam:    Gen: pleasant andalert  : no " burrell  Ext: noemal      Assessment/Plan:     1.  Gross hematuria    2.  ? Bladder lesion on CT   3.  BPH, about 100g size based on CT scan 3-line method.  He should follow up in clinic 2-4 weeks for cystoscopy and discussion about further treatment for BPH        Tyree Helton MD  [facility]  Pager:  [phone]  After 5pm and on weekends, please call [phone]

## 2022-03-26 NOTE — PLAN OF CARE
Physical Therapy Discharge Summary    Reason for therapy discharge:    Discharged to home with home therapy.    Progress towards therapy goal(s). See goals on Care Plan in [facility] electronic health record for goal details.  Goals partially met.  Barriers to achieving goals:   discharge from facility.    Therapy recommendation(s):    Continued therapy is recommended.  Rationale/Recommendations:  home PT to increase safety and independence in home environment.

## 2022-03-26 NOTE — DISCHARGE SUMMARY
[facility]  Hospitalist Discharge Summary      Date of Admission:  3/19/2022  Date of Discharge:  3/26/2022  Discharging Provider: Patty Sorto MD  Discharge Service: Hospitalist Service    Discharge Diagnoses   Abdominal pain, lactic acidosis   S/p ex lap given worsening lactic acidosis and abdominal pain - No SMA thrombus or bowel ischemia found.   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   Hyperglycemia, stress-induced RESOLVED. No dx of DM  Mildly elevated INR  Hypomagnesemia, RESOLVED  Mild hypokalemia, replaced with follow up ordered.   Overweight  Superficial thrombophlebitis LUE related to peripheral IV    Follow-ups Needed After Discharge   Follow-up Appointments     Follow Up ([facility])      Follow up with Dr. Helton , at [facility] after hospital   discharge for cystoscopy and discussion of BPH.   Please call to schedule follow up:  [facility]  [address] [place] [address]  You may call [phone] with any questions or concerns.         Appointments on [place] and/or [facility] (with [facility] or [facility]   provider or service). Call [phone] if you haven't heard regarding   these appointments within 7 days of discharge.         Follow-up and recommended labs and tests       Follow up with primary care  provider, Poli Alberto, within 7 days to   evaluate medication change and for hospital follow- up.  The following   labs/tests are recommended: BMP, hgb, INR.  Follow up with cardiology for device check per their recommendations  Follow up with urology per their recommendations.         Follow-up and recommended labs and tests       Follow up appointment made for 3/30 at 10:25 am with Dr. Poli Alberto  [place] [id number]    [place] [address] [phone]              Unresulted Labs Ordered in the Past 30 Days of this Admission     No orders found from 2/17/2022 to 3/20/2022.           Discharge Disposition   Discharged to home  Condition at discharge: Good      Hospital Course   Sunil Lyle is a 81 year old male with past medical history significant for CAD s/p stenting, HLD, GERD  admitted on 3/19/2022 with multiple complaints including shortness of breath, hematuria and bradycardia.     He had rising lactic acid levels with abdominal pain. CT C/A/P showed severe narrowing of SMA  He went emergently to surgery.     Abdominal pain, lactic acidosis Lactic acid to 5.8, RESOLVED.   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   Severe narrowing SMA  Post-op abdominal hematomas with small amount of hemoperitoneum   Patient's lactic acid went up to 5.8 from 2 range despite aggressive IVF at admission.   Some abdominal pain noted at that time as well. Vascular surgery consulted and took him to OR emergently. He remained on heparin gtt for 48 hrs post op per vascular surgery.  - no thrombosis found, and no ischemic bowel noted in OR 3/19     Routine post-op mgt per vascular surgery:   - NGT pulled overnight, advancing diet. Stopped IVF on 03/24/22.   - Encourage walking and ambulation as much as possible  - Abdominal binder for comfort while in bed or ambulating  - On 03/25/22 slow  to tolerate diet. Continues to belch, feel nauseaed and has distended abdomen with mild tenderness in the left lower abdomen.   - Obtained abdominal CT on 3/25/22, due to recent drop in hgb and above sx: New upper abdominal hematomas with small amount of hemoperitoneum.   - Discussed with patient that this likely explains significant drop in hgb, may play a role some of his abdominal discomfort but no evidence of obstruction. We should continue to advance diet as tolerated. Patient expressed agreement and appreciation for care.   - Discussed above with vascula surgery. No further recommendations.     Severe sepsis with lactic acidosis, RESOLVED  Possible UTI - Urine culture mixed jackelin  Gram positive bacteremia, staph epi - likely contaminant - 3/19 Bcx turned positive day 2. He has a coronary stent, but denies other hardware in his body.     PT febrile to 101.8, lactic acid elevated with WBC of 22. Initially concerned about intestinal ischemia, exploratory lap negative. UA notable for large LE, >182 RBCs and >182 WBCs. Urine is grossly bloody. On CT C/A/P scan, there is moderate enlargement and heterogeneity of the prostate. There is thickening of the posterior bladder wall which is somewhat nodular in appearance.     - ID consulted given unclear infectious picture - BCx from adm felt to be contaminant  - empiric antibiotics (ceftriazone and vanco) continued alex operatively per ID   - repeat cultures from 3/20 remain NGTD, remains afebrile  - urine culture grew only mixture of urogenital jackelin.   - ceftriaxone and vancomycin > just IV ceftraxone for duration of stay. Discharge on PO ceftin to complete total 14 day course for potential UTI (7 more days at discharge.)     Recent Labs   Lab 03/25/22  0552 03/24/22  0616 03/23/22  0439 03/22/22  0510 03/21/22  0620 03/20/22  0523 03/19/22  2359 03/19/22  1825 03/19/22  1114 03/19/22  0918   WBC 7.7 7.8 9.4 10.1 14.2* 22.0*  --   --   --   --    LACT  --   --   --   --    --  1.8 1.9 2.2* 2.4* 5.8*       Acute anemia, acute blood loss, surgery, secondary abdominal hematomas and hematuria  Macrocytosis  Mild iron deficiency  * Hematuria with drop in Hgb, post op. On admission, hgb 11.9 > 10 post-surgery > 7.8 on 3/20.   * B12 85184, Iron panel consistent with anemia of chronic disease with mild iron deficiency, iron level and iron saturation just below normal.   * Hematomas on CT as above.   * No sign of melana or bloody stools.      - pRBC on 03/25/22 for hgb 6.8. No sign of ongoing bleeding and hgb has been stable around 7, for 3 days prior.   - Gave 300 mg IV venofer on 3/25/22 for mild iron deficiency on iron panel.     Hematuria, BPH  - Urology consultation appreciated  - CBI stopped this AM 03/24/22 > urine blood tinged  - resume PTA flomax on 03/24/22  - Removed burrell on 03/25/22 with successful TOV  - monitor hgb q 12 hours, transfuse for <7  - Uorology follow up with cystoscopy as outpatient.   - Per Urology hold ASA until hematuria resolved.     Recent Labs   Lab 03/26/22  0600 03/25/22  1744 03/25/22  0552 03/24/22  1741 03/24/22  0616 03/23/22  0439   HGB 7.9* 7.9* 6.8* 7.0* 6.9* 7.4*       Hypernatremia, from NPO status on IVF, RESOLVED.   Recent Labs   Lab 03/26/22  0600 03/24/22  1129 03/24/22  0616 03/23/22  0439 03/22/22  0510 03/20/22  0523    146* 149* 146* 145* 142     - encourage PO fluids   - recheck in AM.     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 He is dyspneic with exertion but otherwise has normal blood pressures. He was evaluated by cardiology in April 2021 for fatigue and was found to have junctional bradycardia at that time with rates in the 40s. His metoprolol was stopped and he was evaluated with a zio patch which only revealed a single 3.8s pause. It was opted to continue monitoring without intervention at that time.   *  EKG here shows sinus bradycardia with 1st degree AV block and RBBB   * TTE - EF normal, unchanged since 2017  * Underwent PPM on 3/23    - follow up per EP    Diarrhea, post-op, antibiotic-associated 3 loose stool with continued abdominal pain after surgery. Does not appear bloody or melanotic  - C.diff negative. No fevers  - loperamide ordered PRN OK per surgery.     Dyspnea   Hx of COPD, former smoker   The patient's chief complaint today is actually shortness of breath. The shortness of breath has been on-going and getting worse over the past two weeks. This has been predominantly on exertion. Of note, review of clinic notes from April and July of 2021, suggest that he has been having dyspnea on exertion at that time as well. Cardiology discussed ischemic evaluation, but the patient declined. CT here showed Centrilobular emphysema without evidence of pneumonia. No evidence of pulmonary embolism. O2 saturations are within normal limits.   Unclear etiology, could be progression of emphysema vs cardiac related (new CHF, ischemia i.e. anginal equivalent, bradycardia, etc.).   * TTE unremarkable as noted above \  * S/p PPM as above.      CAD s/p RCA stent in 2001   Hyperlipidemia  Hypertension   - Holding PTA ASA, simvastatin and fenofibrate. NG decompression, NPO and hematuria.   - Resume statin on 03/24/22   - Discussed with Urology > hold ASA while still having hematuria.      CKD III, stable.   Creatinine is elevated to 1.3 initially, this seems to be near his baseline of 1.2-1.3.     Recent Labs   Lab 03/26/22  0600 03/24/22  0616 03/23/22  0439 03/22/22  0510 03/20/22  0523 03/19/22  1114   CR 0.96 0.94 1.05 1.07 1.22 1.33*        Hyperglycemia, stress- induced, RESOLVED No dx of DM  Hemoglobin A1C   Date Value Ref Range Status   03/20/2022 5.4 0.0 - 5.6 % Final     Comment:     Normal <5.7%   Prediabetes 5.7-6.4%    Diabetes 6.5% or higher     Note: Adopted from ADA consensus guidelines.     Recent Labs   Lab  "03/26/22  0600 03/24/22  0616 03/23/22  0806 03/23/22  0439 03/22/22  1627 03/22/22  1325   * 124* 114* 127* 116* 138*       Coagulation Defect  INR = 1.21 (Ref range: 0.85 - 1.15) and/or PTT = N/A on admission, will monitor for bleeding     - Gave 2 doses of vitamin K 5 mg after diet advanced  - INR with follow up with PCP    Hypomagnesemia, RESOLVED  Mild hypokalemia - on day of discharge. Slow to advance diet with diarrhea. Replaced.   Mg = 1.5 mg/dL (Ref range: 1.6 - 2.3 mg/dL) on admission, will replace as needed    Recent Labs   Lab 03/26/22  0600 03/24/22  0616 03/23/22  1216 03/23/22  0439 03/22/22  0510 03/21/22  0620 03/20/22  0523   POTASSIUM 3.3* 3.4 3.6 3.4 3.6  --  3.8   MAG 2.0  --  2.7* 2.6* 2.6* 2.4* 2.3     - BMP at follow up  - provided with list of high potassium foods.     Overweight  Estimated body mass index is 25.11 kg/m  as calculated from the following:    Height as of this encounter: 1.778 m (5' 10\").    Weight as of this encounter: 79.4 kg (175 lb).    Insomnia - secondary to adjustment of recent medical illness / hospitalizatoin  - Used ambien 5 mg the night prior to discharge with good effect. Tolerated well  - limited Rx of #10 10 mg tablet provided.     Superficial thrombophlebitis of cephalic vein, L arm - this started the day of discharge and likely related to prior peripheral IV. RUQ U/S was performed, which ruled out DVT. Patient having no erythema, tenderness or warmth.  - Recommended elevation and ACE bandage for swelling, OTC Voltaren gel PRN if develops pain or irritation  - Contact PCP if swelling does not resolve or gets worse.     Communication: Discussed with patient and RN 03/26/22    Patty Sorto  Hospitalist Service  [facility]  Securely message with the Vocera Web Console (learn more here)  Text page via [facility] Paging/Directory      Consultations This Hospital Stay   PHARMACY IP CONSULT  PHARMACY IP CONSULT  PHARMACY IP " CONSULT  PHARMACY IP CONSULT  UROLOGY IP CONSULT  VASCULAR SURGERY IP CONSULT  PHARMACY IP CONSULT  PHARMACY IP CONSULT  CARDIOLOGY IP CONSULT  INTENSIVIST IP CONSULT  INFECTIOUS DISEASES IP CONSULT  PHARMACY TO DOSE VANCO  PHYSICAL THERAPY ADULT IP CONSULT  CARE MANAGEMENT / SOCIAL WORK IP CONSULT    Code Status   Full Code    Time Spent on this Encounter   I, Patty Sorto MD, personally saw the patient today and spent greater than 30 minutes discharging this patient.       Patty Sorto MD  [facility] CORONARY CARE UNIT  [address], [address] [address]  Phone: [phone]  ______________________________________________________________________    Physical Exam   Vital Signs: Temp: 98.1  F (36.7  C) Temp src: Oral BP: (!) 146/66 Pulse: 63   Resp: 17 SpO2: 98 % O2 Device: None (Room air)    Weight: 168 lbs 10.43 oz  Constitutional: NAD,   Neuropsyche:  alert and oriented, answers questions appropriately.   Respiratory:      Breathing comfortably, good air exchange, no wheezes, no crackles.   Cardiovascular:  Regular rate and rhythm, no edema. Mild swelling of left hand noted. Note he has prior IV punture site over left antecubital fossa and two IVs lower down on left arm, without erythema, tenderness over superficial veins.  GI:  Soft, minimal TTP in the LLQ, BS normal, incision clean dry and intact.   Skin/Integumen: No acute rash or sign of bleeding. As above, no erythema, warmth or tenderness.        Primary Care Physician   Poli Alberto    Discharge Orders      Home Care Referral      Follow Up ([facility])    Follow up with Dr. Helton , at [facility] after hospital discharge for cystoscopy and discussion of BPH.   Please call to schedule follow up:  [facility]  [address] [place] [address]  You may call [phone] with any questions or concerns.         Appointments on [place] and/or [facility] (with  [facility] or [facility] provider or service). Call [phone] if you haven't heard regarding these appointments within 7 days of discharge.     Reason for your hospital stay    You were admitted with suspected acute sepsis and concern for low blood flow to your intestines. You were started on antibiotics and went to exploratory surgery. No abnormalities were found within the intestines. You subsequently were found to have bradycardia that required pacemaker placement. Your course was further complicated by bleeding from your bladder that required irrigation through a burrell cathter. Your burrell catheter has been removed and you have been able to void.   You will need to follow up with Urology and Cardiology.     Activity    Your activity upon discharge: activity as tolerated  Restrictions per cardiology (per PPM protocol) and vascular surgery.     Wound care and dressings    Instructions to care for your wound at home: as directed per vascular surgery (abdominal incision) and cardiology (PM site).  Staple removal per vascular surgery.     Follow-up and recommended labs and tests     Follow up with primary care provider, Poli Alberto, within 7 days to evaluate medication change and for hospital follow- up.  The following labs/tests are recommended: BMP, hgb, INR.  Follow up with cardiology for device check per their recommendations  Follow up with urology per their recommendations.     Follow-up and recommended labs and tests     Follow up appointment made for 3/30 at 10:25 am with Dr. Poli Alberto  [address] [place] [address] [phone]      Patient care order    HOLD ASPIRIN FOR 4 DAYS and UNTIL NO BLOOD IN URINE.  You tolerated the combination of the following medications well in the hospital, but please note:   Loperamide taken with oxycodone will increase risk of constipation.   Ambien taken with oxycodone will risk of oversedation.    As we discussed, the  most effective sleeping medication, is the one you use only as needed. If you need to take oxycodone before bed, I suggest you wait an hour before you take Ambien as needed for insomnia.    You have superficial thrombophlebitis from recent IV. You can elevate this and apply compression with ACE bandage to decrease swelling. If having pain, you can use Voltaren gel (OTC) for the pain. If swelling does not resolve or gets worse, contact your primary care doctor to repeat ultrasound.     Walker Order for DME - ONLY FOR DME    DME Documentation:   Describe the reason for need to support medical necessity: Decreased ambulation after surgery.     I, the undersigned, certify that the above prescribed supplies are medically necessary for this patient and is both reasonable and necessary in reference to accepted standards of medical and necessary in reference to accepted standards of medical practice in the treatment of this patient's condition and is not prescribed as a convenience.     Diet    Follow this diet upon discharge: Orders Placed This Encounter      Snacks/Supplements Adult: Ensure Enlive; Between Meals      Low Fiber Diet  You can advance to a regular diet in 2 weeks.       Significant Results and Procedures   Most Recent 3 CBC's:Recent Labs   Lab Test 03/26/22  0600 03/25/22  1744 03/25/22  0552 03/24/22  1741 03/24/22  0616 03/23/22  0439 03/22/22  0510   WBC  --   --  7.7  --  7.8 9.4 10.1   HGB 7.9* 7.9* 6.8*   < > 6.9* 7.4* 8.3*   MCV  --   --   --   --  108* 103* 106*   PLT  --   --   --   --  212 178 158    < > = values in this interval not displayed.     Most Recent 3 BMP's:Recent Labs   Lab Test 03/26/22  0600 03/24/22  1129 03/24/22  0616 03/23/22  1216 03/23/22  0806 03/23/22  0439    146* 149*  --   --  146*   POTASSIUM 3.3*  --  3.4 3.6  --  3.4   CHLORIDE 114*  --  117*  --   --  113*   CO2 24  --  25  --   --  28   BUN 18  --  27  --   --  31*   CR 0.96  --  0.94  --   --  1.05   ANIONGAP 5   --  7  --   --  5   PAIGE 8.5  --  8.4*  --   --  8.4*   *  --  124*  --  114* 127*     Most Recent 2 LFT's:Recent Labs   Lab Test 03/22/22  0510 03/18/22  2359   AST 22 20   ALT 24 27   ALKPHOS 37* 44   BILITOTAL 0.5 0.7     Most Recent 3 INR's:Recent Labs   Lab Test 03/19/22  0001   INR 1.21*   ,   Results for orders placed or performed during the hospital encounter of 03/19/22   CT Chest (PE) Abdomen Pelvis w Contrast     Value    Radiologist flags SMA thrombus (AA)    Narrative    EXAM: CT CHEST PE ABDOMEN PELVIS W CONTRAST  LOCATION: [facility]  DATE/TIME: 3/19/2022 1:21 AM    INDICATION: FEVER  COMPARISON: CT chest from 11/17/2017. CT abdomen pelvis from 03/14/2017.  TECHNIQUE: CT chest pulmonary angiogram and routine CT abdomen pelvis with IV contrast. Arterial phase through the chest and venous phase through the abdomen and pelvis. Multiplanar reformats and MIP reconstructions were performed. Dose reduction   techniques were used.   CONTRAST: 88mL Isovue 370    FINDINGS:  ANGIOGRAM CHEST: Pulmonary arteries are normal caliber and negative for pulmonary emboli. Thoracic aorta is negative for dissection. No CT evidence of right heart strain.     LUNGS AND PLEURA: Centrilobular emphysematous change. Scattered strands of secretions within the lower thoracic trachea. No airspace consolidation. No interstitial edema. No pleural effusion.    MEDIASTINUM/AXILLAE: Heart size at upper limits normal. Small hiatal hernia. Coronary artery disease and stent.    CORONARY ARTERY CALCIFICATION: Previous intervention (stents or CABG).    HEPATOBILIARY: Contracted gallbladder. Liver within normal limits.    PANCREAS: Normal.    SPLEEN: Normal.    ADRENAL GLANDS: Normal.    KIDNEYS/BLADDER: Benign left upper pole renal cortical cyst. Benign right lower pole renal cortical cyst. No hydronephrosis. There is some thickening of the posterior bladder wall which is somewhat nodular in appearance on  sagittal image 68.    BOWEL: Normal appendix. Colonic diverticulosis. No bowel obstruction or free air.    LYMPH NODES: Normal.    VASCULATURE: Moderate abdominal aortic atherosclerotic calcification. There is severe narrowing of the proximal superior mesenteric artery as seen on image 71 of the sagittal series, secondary to a low density focus along the inferior wall of the   proximal SMA trunk. This is noncalcified. The SMA is patent distal to this.    PELVIC ORGANS: Moderately enlarged, heterogeneous prostate.    MUSCULOSKELETAL: Thoracolumbar spine degenerative disc change. Arthritic change of the hips.      Impression    IMPRESSION:  1.  Negative for pulmonary embolism.    2.  Centrilobular emphysema without evidence of pneumonia.    3.  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 The SMA trunk is patent distal to this.    4.  Moderate enlargement and heterogeneity of the prostate. There is thickening of the posterior bladder wall which is somewhat nodular in appearance on the sagittal series, though difficult to ascertain if this is separate from the prostate which   indents on the undersurface of the bladder. Recommend follow-up with urology.      [Critical Result: SMA thrombus]    Finding was identified on 3/19/2022 1:33 AM.     Dr. Hernandez was contacted by me on 3/19/2022 1:49 AM and verbalized understanding of the critical result.    XR Chest Port 1 View    Narrative    EXAM: CHEST SINGLE VIEW PORTABLE  LOCATION: [facility]  DATE/TIME: 03/23/2022, 4:50 AM    INDICATION: Shortness of breath.  COMPARISON: None.    FINDINGS: A few patchy opacities are present in bilateral lung bases. The lungs are otherwise clear. Normal-sized cardiac silhouette. Atherosclerotic calcification in the thoracic aorta. An enteric drainage tube is present with distal tip not visualized,   but at  least to the distal esophagus.      Impression    IMPRESSION:   1. A few patchy opacities in bilateral lung bases could represent atelectasis or pneumonia.  2. No other finding suspicious for active cardiopulmonary disease.      X-ray Chest 2 vws*    Narrative    CHEST TWO VIEWS  3/24/2022 8:11 AM       INDICATION: Status post pacer/ICD.    COMPARISON: 3/23/2022       Impression    IMPRESSION: New dual-lead cardiac pacemaker with leads in the right  atrium and right ventricle. No pneumothorax. Small bilateral pleural  effusions. Minimal bibasilar atelectasis, improved from previous.  Previously seen NG tube has been removed.    NATALIA BURGOS MD         SYSTEM ID:  [id number]   CT Abdomen Pelvis w Contrast    Narrative    CT ABDOMEN PELVIS WITH CONTRAST 3/25/2022 1:42 PM    CLINICAL HISTORY: Abdominal pain, fever, postop. Abdominal distension.  Bowel obstruction suspected. Significant anemia in the context of  hematuria.    TECHNIQUE: CT scan of the abdomen and pelvis was performed following  injection of IV contrast. Multiplanar reformats were obtained. Dose  reduction techniques were used.  CONTRAST: 84 mL Isovue-370    COMPARISON: 3/19/2022.    FINDINGS:   LOWER CHEST: New trace pleural fluid bilaterally.    HEPATOBILIARY: Normal.    PANCREAS: Normal.    SPLEEN: Normal.    ADRENAL GLANDS: Normal.    KIDNEYS/BLADDER: Benign bilateral renal cysts. The kidneys are  otherwise normal. There are several tiny bladder calculi measuring up  to 3 mm, new from previous.    BOWEL: Normal.    PELVIC ORGANS: Moderate prostate gland enlargement. Median lobe  hypertrophy protrudes into the base of the bladder.    ADDITIONAL FINDINGS: Small amount of hemoperitoneum. There are focal  hematomas underlying the anterior abdominal wall incision measuring up  to 4.5 cm in the axial plane. There is an additional hematoma in the  region of the gastrohepatic ligament measuring 8 x 3 cm.    Again seen is moderate to severe stenosis of the  proximal superior  mesenteric artery. The distal artery and its branches are patent. The  celiac and ANDRAE are widely patent.    MUSCULOSKELETAL: Normal.      Impression    IMPRESSION:   1.  New upper abdominal hematomas with small amount of hemoperitoneum.  2.  Small bladder calculi appear new from previous. No ureteral  calculi or hydronephrosis.     NATALIA BURGOS MD         SYSTEM ID:  [id number]   EP Device    Narrative    PROCEDURES PERFORMED:   1. Implantation of a dual-chamber pacemaker, MRI compatible  2. Conscious sedation.   3. Cardiac fluoroscopy    INDICATION: High grade AVB    HISTORY OF PRESENT ILLNESS: This is a 81 year old year-old patient with a   history of high grade AVB associated with dizziness considered to be   irreversible who is referred for a permanent pacemaker. Risks of the   procedure was discussed before the procedure including but not limited to   vascular injury, infection, pneumothorax, and cardiac perforation.    METHOD: I determined this patient to be an appropriate candidate for the   planned sedation and procedure and have reassessed the patient immediately   prior to sedation and procedure. Intravenous antibiotic was given prior to   the procedure. The patient was prepped and draped in the usual manner. 1%   lidocaine was infiltrated into the left axillary vein area. This vein was   access using the modified Seldinger's technique with ultrasound guidance.   An incision was then made with a #15 blade. A sheath was then glide over   the wire into the vein. A lead was then advanced into the heart and was   fixed into the right ventricular  area. Appropriate sensing and threshold   were obtained. There was no diaphragmatic stimulation with high output   pacing. Another sheath was glided over the wire into the vein. A lead was   advanced to the right atrium and fixed into the right atrial wall. There   was no diaphragmatic stimulation with high output pacing. The leads were   then  secured to the pectoralis muscle fascia using O-Ethibond sutures.     A pocket was then fashioned. The leads were then attached to the device   and placed in the pocket.  The pocket was then closed with 2-0 and 4-0   Vicryl sutures. Steri-Strips and an OpSite dressing were then placed over   the incision.  The [facility]   [facility] condition.     DEVICE INFORMATION:  Implant Name Type Inv. Item Serial No.  Lot No. LRB No. Used   Action   LEAD PACING SOLIA S 53 PROMRI - [id number] Leads LEAD PACING SOLIA S 53   PROMRI [id number] BIOTRONIK [id number]  1 Implanted   LEAD PACING SOLIA S 45 PROMRI - [id number] Leads LEAD PACING SOLIA S 45   PROMRI [id number] BIOTRONIK [id number]  1 Implanted   PACEMAKER PROMRI DUAL CHAMBER - [id number] Pacemaker PACEMAKER PROMRI DUAL   CHAMBER [id number] BIOTRONIK [id number]  1 Implanted       STIMULATION THRESHOLDS:  RA -  Sense:5.6 mV, Threshold: 0.5 V @ 0.5 ms, Impedance: 526 ohms  RV -  Sense:10.10 mV, Threshold: 0.7 V @ 0.5 ms, Impedance: 546 ohms    COMPLICATIONS: None.              CONCLUSION:    1. Uneventful implantation of a dual-chamber pacemaker, MRI compatible    Thai Elliott MD     Echocardiogram Complete     Value    LVEF  60-65%    Narrative    [id number]  [id number]  [id number]^NEEMA^MERCEDES^DEANA     [facility]  Echocardiography Laboratory  [address] [place] [address]     Name: SPARKLE HERBERT  MRN: [id number]  : 1940  Study Date: 2022 10:24 AM  Age: 81 yrs  Gender: Male  Patient Location: [facility]  Reason For Study: Dyspnea  Ordering Physician: MERCEDES BETHEA  Referring Physician: Poli Alberto  Performed By: Patti Ibanez     BSA: 2.0 m2  Height: 70 in  Weight: 178 lb  BP: 158/77 mmHg  ______________________________________________________________________________  Procedure  Complete Portable Echo Adult. Optison (NDC #[id number]) given  intravenously.  ______________________________________________________________________________  Interpretation Summary     The rhythm was sinus with wide QRS.  Left ventricular systolic function is normal.  There is mild to moderate concentric left ventricular hypertrophy.  The left ventricle is normal in size.  The right ventricle is normal in structure, function and size.  Right ventricular systolic pressure is elevated, consistent with moderate  pulmonary hypertension.  Doppler interrogation does not demonstrate signficant stenosis or  insufficiency involivng cardiac valves     As compared with a very limited study performed 7/14/2017 there has been no  significant change.  ______________________________________________________________________________  Left Ventricle  The left ventricle is normal in size. There is mild to moderate concentric  left ventricular hypertrophy. Left ventricular systolic function is normal.  The visual ejection fraction is 60-65%. Grade I or early diastolic  dysfunction. No regional wall motion abnormalities noted. There is no thrombus  seen in the left ventricle.     Right Ventricle  The right ventricle is normal in structure, function and size. There is no  mass or thrombus in the right ventricle.     Atria  Normal left atrial size. Right atrial size is normal. There is no atrial shunt  seen. The left atrial appendage is not well visualized.     Mitral Valve  The mitral valve leaflets appear normal. There is no evidence of stenosis,  fluttering, or prolapse. There is no mitral regurgitation noted. There is no  mitral valve stenosis.     Tricuspid Valve  Normal tricuspid valve. Right ventricular systolic pressure is elevated,  consistent with moderate pulmonary hypertension. The right ventricular  systolic pressure is elevated at 47.3 mmHg. There is mild (1+) tricuspid  regurgitation. There is no tricuspid stenosis.     Aortic Valve  The aortic valve is trileaflet. No aortic  regurgitation is present. No aortic  stenosis is present.     Pulmonic Valve  The pulmonic valve is not well visualized.     Vessels  The aortic root is normal size. Normal size ascending aorta. The inferior vena  cava is normal. The pulmonary artery is normal size.     Pericardium  The pericardium appears normal. There is no pleural effusion.     Rhythm  The rhythm was sinus with wide QRS.  ______________________________________________________________________________  MMode/2D Measurements & Calculations     IVSd: 1.5 cm  LVIDd: 4.3 cm  LVIDs: 3.0 cm  LVPWd: 1.3 cm  FS: 29.9 %  LV mass(C)d: 225.0 grams  LV mass(C)dI: 113.3 grams/m2  RWT: 0.59     Doppler Measurements & Calculations  MV E max yoseph: 94.1 cm/sec  MV A max yoseph: 113.8 cm/sec  MV E/A: 0.83  MV dec slope: 647.7 cm/sec2  MV dec time: 0.15 sec  PA acc time: 0.10 sec  TR max yoseph: 343.9 cm/sec  TR max P.3 mmHg  E/E' av.5  Lateral E/e': 6.1  Medial E/e': 10.9     ______________________________________________________________________________  Report approved by: Dr. Doug Becker 2022 12:09 PM           EXAM: ULTRASOUND UPPER EXTREMITY VENOUS DUPLEX LEFT  LOCATION: [facility]  DATE/TIME: 2022, 10:29 AM     INDICATION: Hand swelling, rule out DVT.  COMPARISON: None.  TECHNIQUE: Venous Duplex ultrasound of the left upper extremity with (when possible) and without compression, augmentation, and duplex. Color flow and spectral Doppler with waveform analysis performed.     FINDINGS: Ultrasound includes evaluation of the internal jugular vein, innominate vein, subclavian vein, axillary vein, and brachial vein. The superficial cephalic and basilic veins were also evaluated where seen.      LEFT: No deep venous thrombosis. There is noncompressible echogenic material in the left cephalic vein from the mid forearm to the mid upper arm.                                                                       IMPRESSION:   1.   Superficial thrombophlebitis in the cephalic vein of the left arm. No evidence of DVT.    Discharge Medications   Current Discharge Medication List      START taking these medications    Details   cefuroxime (CEFTIN) 500 MG tablet Take 1 tablet (500 mg) by mouth 2 times daily for 7 days  Qty: 14 tablet, Refills: 0    Associated Diagnoses: Severe sepsis (H)      loperamide (IMODIUM) 2 MG capsule Take 1 capsule (2 mg) by mouth 3 times daily as needed for diarrhea    Associated Diagnoses: Diarrhea, unspecified type      oxyCODONE-acetaminophen (PERCOCET) 5-325 MG tablet Take 1 tablet by mouth every 4 hours as needed for moderate to severe pain  Qty: 9 tablet, Refills: 0    Associated Diagnoses: S/P exploratory laparotomy         CONTINUE these medications which have NOT CHANGED    Details   ASPIRIN 81 MG OR TABS 1 TABLET DAILY      fenofibrate (LOFIBRA) 54 MG tablet Take 2 tablets (108 mg) by mouth daily  Qty: 180 tablet, Refills: 3    Associated Diagnoses: Hyperlipidemia LDL goal <70      MULTI-VITAMIN OR TABS Take 1 tablet by mouth daily       simvastatin (ZOCOR) 80 MG tablet Take 1 tablet (80 mg) by mouth At Bedtime  Qty: 90 tablet, Refills: 3    Associated Diagnoses: Hyperlipidemia LDL goal <70      tamsulosin (FLOMAX) 0.4 MG 24 hr capsule Take 0.4-0.8 mg by mouth daily       VITAMIN D 1000 UNIT OR TABS Take 5,000 Units by mouth daily            Allergies   Allergies   Allergen Reactions     Bupropion Hcl      vivid dreams     Chantix [Varenicline] Other (See Comments)     CNS side effects     Lactose Diarrhea     Pcn [Penicillins] Anaphylaxis

## 2022-03-26 NOTE — PLAN OF CARE
Goal Outcome Evaluation:    A&Ox4. VSS ex. HTN, on RA. Tele 100% paced, occasionally AV paced. Denies pain. Abdomin incision KAVEH with Staples. Pacemaker dressing CDI. CMS intact. Up Ax1, GB, walker. Voiding at bedside. PVR WDL.

## 2022-03-26 NOTE — PROGRESS NOTES
Vascular Surgery Progress Note     Date of Admission:  3/19/2022  Date: March 26, 2022    Subjective  Overall doing well.  Denies any nausea or vomiting.  Tolerating Jell-O and liquids.  Ports passing gas.  No diarrhea.      Physical Exam   Constitutional: cooperative, no apparent distress. Resting comfortably in bed  Abdomen: soft, distended, staples intact with c/d/i incision edges and mild reactive erythema.   Musculoskeletal: grossly normal and symmetric ROM and strength in BL extremities   Neurologic: Awake, alert, oriented to name, place, time, and situation    Imaging:  CT reviewed, no evidence of bowel obstruction or ischemia; hematoma at the surgical site visualized      Assessment & Plan   Mr. 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 He was taken to the OR on 3/19/22 for exploratory laparotomy, with no SMA thrombus or bowel ischemia found.      Continue to advance diet as tolerated    No intervention indicated for hematoma    Discussed with Dr. Govea, who is in agreement with the above plan.    Willy Last  03/25/22  3:06 PM

## 2022-03-28 ENCOUNTER — TELEPHONE (OUTPATIENT)
Dept: OTHER | Facility: CLINIC | Age: 82
End: 2022-03-28
Payer: COMMERCIAL

## 2022-03-28 ENCOUNTER — PATIENT OUTREACH (OUTPATIENT)
Dept: CARE COORDINATION | Facility: CLINIC | Age: 82
End: 2022-03-28
Payer: COMMERCIAL

## 2022-03-28 DIAGNOSIS — Z71.89 OTHER SPECIFIED COUNSELING: ICD-10-CM

## 2022-03-28 NOTE — TELEPHONE ENCOUNTER
Pt is s/p exploratory laparotomy, superior mesenteric artery exploration on 3/19/22 with Dr. Garsia.    Routing to scheduling to contact patient to schedule in person post op appointment in approximately 2 weeks.    Appt note: 1st PO - s/p exploratory laparotomy, superior mesenteric artery exploration on 3/19/22; set up staple removal.    Kamala Worley, GEORGEN, RN-[facility]

## 2022-03-28 NOTE — PROGRESS NOTES
Clinic Care Coordination Contact  [facility]/Voicemail    Clinical Data: Care Coordinator Outreach  Outreach attempted x 1. Left message on patient's voicemail with call back information and requested return call.     Plan:Care Coordinator will try to reach patient again in 1-2 business days.    MARIYA Franz  [phone]  [facility]

## 2022-03-29 LAB
ATRIAL RATE - MUSE: 73 BPM
DIASTOLIC BLOOD PRESSURE - MUSE: NORMAL MMHG
INTERPRETATION ECG - MUSE: NORMAL
P AXIS - MUSE: 62 DEGREES
PR INTERVAL - MUSE: 200 MS
QRS DURATION - MUSE: 182 MS
QT - MUSE: 566 MS
QTC - MUSE: 623 MS
R AXIS - MUSE: -71 DEGREES
SYSTOLIC BLOOD PRESSURE - MUSE: NORMAL MMHG
T AXIS - MUSE: 88 DEGREES
VENTRICULAR RATE- MUSE: 73 BPM

## 2022-03-29 NOTE — PROGRESS NOTES
Clinic Care Coordination Contact  [facility]/Voicemail    Clinical Data: Care Coordinator Outreach  Outreach attempted x 2. Left message on patient's voicemail with call back information and requested return call.    Plan:Care Coordinator will do no further outreaches at this time.    MARIYA Franz  [phone]  [facility]

## 2022-04-01 ENCOUNTER — ANCILLARY PROCEDURE (OUTPATIENT)
Dept: CARDIOLOGY | Facility: CLINIC | Age: 82
End: 2022-04-01
Attending: INTERNAL MEDICINE
Payer: COMMERCIAL

## 2022-04-01 DIAGNOSIS — I44.1 MOBITZ II: ICD-10-CM

## 2022-04-01 DIAGNOSIS — Z95.0 CARDIAC PACEMAKER IN SITU: ICD-10-CM

## 2022-04-01 PROCEDURE — 93280 PM DEVICE PROGR EVAL DUAL: CPT | Performed by: INTERNAL MEDICINE

## 2022-04-05 LAB
MDC_IDC_LEAD_IMPLANT_DT: NORMAL
MDC_IDC_LEAD_IMPLANT_DT: NORMAL
MDC_IDC_LEAD_LOCATION: NORMAL
MDC_IDC_LEAD_LOCATION: NORMAL
MDC_IDC_LEAD_LOCATION_DETAIL_1: NORMAL
MDC_IDC_LEAD_MFG: NORMAL
MDC_IDC_LEAD_MFG: NORMAL
MDC_IDC_LEAD_MODEL: NORMAL
MDC_IDC_LEAD_MODEL: NORMAL
MDC_IDC_LEAD_POLARITY_TYPE: NORMAL
MDC_IDC_LEAD_POLARITY_TYPE: NORMAL
MDC_IDC_LEAD_SERIAL: NORMAL
MDC_IDC_LEAD_SERIAL: NORMAL
MDC_IDC_MSMT_BATTERY_STATUS: NORMAL
MDC_IDC_MSMT_LEADCHNL_RA_IMPEDANCE_VALUE: 468 OHM
MDC_IDC_MSMT_LEADCHNL_RA_PACING_THRESHOLD_AMPLITUDE: 0.7 V
MDC_IDC_MSMT_LEADCHNL_RA_PACING_THRESHOLD_AMPLITUDE: 0.7 V
MDC_IDC_MSMT_LEADCHNL_RA_PACING_THRESHOLD_AMPLITUDE: 0.8 V
MDC_IDC_MSMT_LEADCHNL_RA_PACING_THRESHOLD_PULSEWIDTH: 0.4 MS
MDC_IDC_MSMT_LEADCHNL_RA_SENSING_INTR_AMPL: 10.5 MV
MDC_IDC_MSMT_LEADCHNL_RA_SENSING_INTR_AMPL: 10.5 MV
MDC_IDC_MSMT_LEADCHNL_RV_IMPEDANCE_VALUE: 546 OHM
MDC_IDC_MSMT_LEADCHNL_RV_PACING_THRESHOLD_AMPLITUDE: 0.7 V
MDC_IDC_MSMT_LEADCHNL_RV_PACING_THRESHOLD_AMPLITUDE: 0.7 V
MDC_IDC_MSMT_LEADCHNL_RV_PACING_THRESHOLD_PULSEWIDTH: 0.4 MS
MDC_IDC_MSMT_LEADCHNL_RV_PACING_THRESHOLD_PULSEWIDTH: 0.4 MS
MDC_IDC_MSMT_LEADCHNL_RV_SENSING_INTR_AMPL: 13.5 MV
MDC_IDC_MSMT_LEADCHNL_RV_SENSING_INTR_AMPL: 9.6 MV
MDC_IDC_PG_IMPLANT_DTM: NORMAL
MDC_IDC_PG_MFG: NORMAL
MDC_IDC_PG_MODEL: NORMAL
MDC_IDC_PG_SERIAL: NORMAL
MDC_IDC_PG_TYPE: NORMAL
MDC_IDC_SESS_CLINIC_NAME: NORMAL
MDC_IDC_SESS_DTM: NORMAL
MDC_IDC_SESS_REPROGRAMMED: NORMAL
MDC_IDC_SESS_TYPE: NORMAL
MDC_IDC_SET_BRADY_AT_MODE_SWITCH_MODE: NORMAL
MDC_IDC_SET_BRADY_AT_MODE_SWITCH_RATE: 180 {BEATS}/MIN
MDC_IDC_SET_BRADY_HYSTRATE: 60 {BEATS}/MIN
MDC_IDC_SET_BRADY_LOWRATE: 60 {BEATS}/MIN
MDC_IDC_SET_BRADY_MAX_SENSOR_RATE: 120 {BEATS}/MIN
MDC_IDC_SET_BRADY_MAX_TRACKING_RATE: 130 {BEATS}/MIN
MDC_IDC_SET_BRADY_MODE: NORMAL
MDC_IDC_SET_BRADY_NIGHT_RATE: 60 {BEATS}/MIN
MDC_IDC_SET_BRADY_PAV_DELAY_HIGH: 160 MS
MDC_IDC_SET_BRADY_PAV_DELAY_LOW: 200 MS
MDC_IDC_SET_BRADY_SAV_DELAY_HIGH: 130 MS
MDC_IDC_SET_BRADY_SAV_DELAY_LOW: 170 MS
MDC_IDC_SET_CRT_PACED_CHAMBERS: NORMAL
MDC_IDC_SET_LEADCHNL_LV_PACING_CATHODE_ELECTRODE_1: NORMAL
MDC_IDC_SET_LEADCHNL_LV_PACING_CATHODE_LOCATION_1: NORMAL
MDC_IDC_SET_LEADCHNL_LV_PACING_POLARITY: NORMAL
MDC_IDC_SET_LEADCHNL_LV_SENSING_CATHODE_ELECTRODE_1: NORMAL
MDC_IDC_SET_LEADCHNL_LV_SENSING_CATHODE_LOCATION_1: NORMAL
MDC_IDC_SET_LEADCHNL_LV_SENSING_POLARITY: NORMAL
MDC_IDC_SET_LEADCHNL_RA_PACING_AMPLITUDE: 1.8 V
MDC_IDC_SET_LEADCHNL_RA_PACING_POLARITY: NORMAL
MDC_IDC_SET_LEADCHNL_RA_PACING_PULSEWIDTH: 0.4 MS
MDC_IDC_SET_LEADCHNL_RA_SENSING_ADAPTATION_MODE: NORMAL
MDC_IDC_SET_LEADCHNL_RA_SENSING_POLARITY: NORMAL
MDC_IDC_SET_LEADCHNL_RA_SENSING_SENSITIVITY: NORMAL
MDC_IDC_SET_LEADCHNL_RV_PACING_AMPLITUDE: 1.2 V
MDC_IDC_SET_LEADCHNL_RV_PACING_POLARITY: NORMAL
MDC_IDC_SET_LEADCHNL_RV_PACING_PULSEWIDTH: 0.4 MS
MDC_IDC_SET_LEADCHNL_RV_SENSING_ADAPTATION_MODE: NORMAL
MDC_IDC_SET_LEADCHNL_RV_SENSING_POLARITY: NORMAL
MDC_IDC_SET_LEADCHNL_RV_SENSING_SENSITIVITY: NORMAL

## 2022-04-08 LAB
ALT SERPL-CCNC: 27 IU/L (ref 8–45)
AST SERPL-CCNC: 21 IU/L (ref 2–40)

## 2022-04-12 ENCOUNTER — OFFICE VISIT (OUTPATIENT)
Dept: OTHER | Facility: CLINIC | Age: 82
End: 2022-04-12
Attending: SURGERY
Payer: COMMERCIAL

## 2022-04-12 VITALS
OXYGEN SATURATION: 97 % | DIASTOLIC BLOOD PRESSURE: 73 MMHG | WEIGHT: 163 LBS | SYSTOLIC BLOOD PRESSURE: 126 MMHG | BODY MASS INDEX: 23.34 KG/M2 | HEIGHT: 70 IN | HEART RATE: 72 BPM

## 2022-04-12 DIAGNOSIS — K55.069 SUPERIOR MESENTERIC ARTERY THROMBOSIS (H): ICD-10-CM

## 2022-04-12 DIAGNOSIS — R19.7 DIARRHEA, UNSPECIFIED TYPE: Primary | ICD-10-CM

## 2022-04-12 DIAGNOSIS — I10 ESSENTIAL HYPERTENSION, BENIGN: ICD-10-CM

## 2022-04-12 PROCEDURE — G0463 HOSPITAL OUTPT CLINIC VISIT: HCPCS

## 2022-04-12 PROCEDURE — 99024 POSTOP FOLLOW-UP VISIT: CPT | Performed by: SURGERY

## 2022-04-12 NOTE — PROGRESS NOTES
"Patient is here to discuss post op.    /73 (BP Location: Left arm, Patient Position: Chair, Cuff Size: Adult Regular)   Pulse 72   Ht 5' 10\" (1.778 m)   Wt 163 lb (73.9 kg)   SpO2 97%   BMI 23.39 kg/m      Questions patient would like addressed today are: Patient is having numbness in left hip after surgery. Wants to know if that's normal..    Refills are needed: No    Has homecare services and agency name:  [facility]  "

## 2022-04-12 NOTE — PROGRESS NOTES
Vascular Surgery Progress Note     Date: April 12, 2022     Reason for Visit:  Post-op follow-up    Hospital History (admission 3/19-3/26):     Placement of a PPM for arrhythmias    He was treated for Staph epidermidis bacteremia (? Contaminant) and UTI for a total of 14 days empirically    He will follow-up with Urology for hernan hematuria, present prior to and throughout his admission    Subjective:  Mr. Lyle reports doing fairly well since his discharge home.   Taking immodium daily or up to twice a day; has had ongoing irregularity of his BMs, prone to diarrhea. He feels he is eating more with an improved appetite, but is still only eating approximately 50% of his meals and is catching up on weight loss from his hospitalization. No pain with eating, no nausea or vomiting.   He has not had hernan hematuria but is concerned about needing a cystoscopy.       Current Outpatient Medications:      ASPIRIN 81 MG OR TABS, 1 TABLET DAILY, Disp: , Rfl:      fenofibrate (LOFIBRA) 54 MG tablet, Take 2 tablets (108 mg) by mouth daily, Disp: 180 tablet, Rfl: 3     loperamide (IMODIUM) 2 MG capsule, Take 1 capsule (2 mg) by mouth 3 times daily as needed for diarrhea, Disp: , Rfl:      MULTI-VITAMIN OR TABS, Take 1 tablet by mouth daily , Disp: , Rfl:      oxyCODONE-acetaminophen (PERCOCET) 5-325 MG tablet, Take 1 tablet by mouth every 4 hours as needed for moderate to severe pain, Disp: 9 tablet, Rfl: 0     simvastatin (ZOCOR) 80 MG tablet, Take 1 tablet (80 mg) by mouth At Bedtime, Disp: 90 tablet, Rfl: 3     tamsulosin (FLOMAX) 0.4 MG 24 hr capsule, Take 0.4-0.8 mg by mouth daily , Disp: , Rfl:      VITAMIN D 1000 UNIT OR TABS, Take 5,000 Units by mouth daily , Disp: , Rfl:      zolpidem (AMBIEN) 10 MG tablet, Take 0.5 tablets (5 mg) by mouth nightly as needed for sleep, Disp: 5 tablet, Rfl: 0     Physical Exam     Constitutional: cooperative, no apparent distress, sitting comfortably in chair. Accompanied by his  wife.  Abdomen: softly distended with no TTP in any quadrant. Staples removed from well-healed laparotomy incision. Diastasis recti; no hernia present.   Musculoskeletal: grossly normal and symmetric ROM and strength in BL extremities   Neurologic: Awake, alert, oriented to name, place, time, and situation        Assessment & Plan   Mr. 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 He was taken to the OR on 3/19/22 for exploratory laparotomy, with no SMA thrombus or bowel ischemia found.      The SMA filling defect is presumably soft plaque, and given his improvement, would not plan on intervening    Will place a referral to Gastroenterology, given his complaints of intermittent diarrhea (and current imodium dependence)    He has healed well from his laparotomy. OK to resume all regular activities and start slowly lifting weights heavier than 10 lbs as tolerated starting 5/1/22.    He should continue on aspirin 81 mg + simvastatin 80 mg daily indefinitely for optimal medical management of PAD/CAD    Will plan to see him in approximately 3-6 months, or sooner if any concerns arise in the interim    Smita Garsia

## 2022-04-17 ENCOUNTER — HEALTH MAINTENANCE LETTER (OUTPATIENT)
Age: 82
End: 2022-04-17

## 2022-04-22 ENCOUNTER — TELEPHONE (OUTPATIENT)
Dept: OTHER | Facility: CLINIC | Age: 82
End: 2022-04-22
Payer: COMMERCIAL

## 2022-04-22 NOTE — TELEPHONE ENCOUNTER
[facility]    Who is the name of the provider?:  Ady      What is the location you see this provider at?: [place]    Person calling: Lili Lyle    Call back phone: [phone]                Can we leave a detailed message on this number?  Yes        Reason for call:  Please fax patients referral from Dr. Garsia for GI to [facility] fax: [phone]    Imaging available: n/a    Name / Loc of pharmacy: n/a    Diego Loya I   [address] [facility]  [place] [id number] [phone]

## 2022-05-12 ENCOUNTER — TELEPHONE (OUTPATIENT)
Dept: CARDIOLOGY | Facility: CLINIC | Age: 82
End: 2022-05-12
Payer: COMMERCIAL

## 2022-05-12 NOTE — TELEPHONE ENCOUNTER
Pt no-showed to visit today with Dr. Landers. Called pt, no answer. Left VM requesting call back to reschedule, left call back numbers for both Team 1 and scheduling.

## 2022-05-17 ENCOUNTER — ANCILLARY PROCEDURE (OUTPATIENT)
Dept: CARDIOLOGY | Facility: CLINIC | Age: 82
End: 2022-05-17
Attending: INTERNAL MEDICINE
Payer: COMMERCIAL

## 2022-05-17 DIAGNOSIS — I44.1 MOBITZ II: ICD-10-CM

## 2022-05-17 DIAGNOSIS — Z95.0 CARDIAC PACEMAKER IN SITU: ICD-10-CM

## 2022-05-17 PROCEDURE — 93279 PRGRMG DEV EVAL PM/LDLS PM: CPT | Performed by: INTERNAL MEDICINE

## 2022-05-18 LAB
MDC_IDC_LEAD_IMPLANT_DT: NORMAL
MDC_IDC_LEAD_IMPLANT_DT: NORMAL
MDC_IDC_LEAD_LOCATION: NORMAL
MDC_IDC_LEAD_LOCATION: NORMAL
MDC_IDC_LEAD_LOCATION_DETAIL_1: NORMAL
MDC_IDC_LEAD_MFG: NORMAL
MDC_IDC_LEAD_MFG: NORMAL
MDC_IDC_LEAD_MODEL: NORMAL
MDC_IDC_LEAD_MODEL: NORMAL
MDC_IDC_LEAD_POLARITY_TYPE: NORMAL
MDC_IDC_LEAD_POLARITY_TYPE: NORMAL
MDC_IDC_LEAD_SERIAL: NORMAL
MDC_IDC_LEAD_SERIAL: NORMAL
MDC_IDC_MSMT_BATTERY_STATUS: NORMAL
MDC_IDC_MSMT_LEADCHNL_RA_IMPEDANCE_VALUE: 604 OHM
MDC_IDC_MSMT_LEADCHNL_RA_PACING_THRESHOLD_AMPLITUDE: 0.7 V
MDC_IDC_MSMT_LEADCHNL_RA_PACING_THRESHOLD_AMPLITUDE: 0.7 V
MDC_IDC_MSMT_LEADCHNL_RA_PACING_THRESHOLD_PULSEWIDTH: 0.4 MS
MDC_IDC_MSMT_LEADCHNL_RA_PACING_THRESHOLD_PULSEWIDTH: 0.4 MS
MDC_IDC_MSMT_LEADCHNL_RA_SENSING_INTR_AMPL: 10.1 MV
MDC_IDC_MSMT_LEADCHNL_RA_SENSING_INTR_AMPL: 11.2 MV
MDC_IDC_MSMT_LEADCHNL_RV_IMPEDANCE_VALUE: 604 OHM
MDC_IDC_MSMT_LEADCHNL_RV_PACING_THRESHOLD_AMPLITUDE: 0.7 V
MDC_IDC_MSMT_LEADCHNL_RV_PACING_THRESHOLD_AMPLITUDE: 0.8 V
MDC_IDC_MSMT_LEADCHNL_RV_PACING_THRESHOLD_PULSEWIDTH: 0.4 MS
MDC_IDC_MSMT_LEADCHNL_RV_PACING_THRESHOLD_PULSEWIDTH: 0.4 MS
MDC_IDC_MSMT_LEADCHNL_RV_SENSING_INTR_AMPL: 11.3 MV
MDC_IDC_MSMT_LEADCHNL_RV_SENSING_INTR_AMPL: 12.4 MV
MDC_IDC_PG_IMPLANT_DTM: NORMAL
MDC_IDC_PG_MFG: NORMAL
MDC_IDC_PG_MODEL: NORMAL
MDC_IDC_PG_SERIAL: NORMAL
MDC_IDC_PG_TYPE: NORMAL
MDC_IDC_SESS_CLINIC_NAME: NORMAL
MDC_IDC_SESS_DTM: NORMAL
MDC_IDC_SESS_REPROGRAMMED: NORMAL
MDC_IDC_SESS_TYPE: NORMAL
MDC_IDC_SET_BRADY_AT_MODE_SWITCH_MODE: NORMAL
MDC_IDC_SET_BRADY_AT_MODE_SWITCH_RATE: 180 {BEATS}/MIN
MDC_IDC_SET_BRADY_HYSTRATE: 60 {BEATS}/MIN
MDC_IDC_SET_BRADY_LOWRATE: 60 {BEATS}/MIN
MDC_IDC_SET_BRADY_MAX_SENSOR_RATE: 120 {BEATS}/MIN
MDC_IDC_SET_BRADY_MAX_TRACKING_RATE: 130 {BEATS}/MIN
MDC_IDC_SET_BRADY_MODE: NORMAL
MDC_IDC_SET_BRADY_NIGHT_RATE: 60 {BEATS}/MIN
MDC_IDC_SET_BRADY_PAV_DELAY_HIGH: 160 MS
MDC_IDC_SET_BRADY_PAV_DELAY_LOW: 200 MS
MDC_IDC_SET_BRADY_SAV_DELAY_HIGH: 130 MS
MDC_IDC_SET_BRADY_SAV_DELAY_LOW: 170 MS
MDC_IDC_SET_CRT_PACED_CHAMBERS: NORMAL
MDC_IDC_SET_LEADCHNL_LV_PACING_CATHODE_ELECTRODE_1: NORMAL
MDC_IDC_SET_LEADCHNL_LV_PACING_CATHODE_LOCATION_1: NORMAL
MDC_IDC_SET_LEADCHNL_LV_PACING_POLARITY: NORMAL
MDC_IDC_SET_LEADCHNL_LV_SENSING_CATHODE_ELECTRODE_1: NORMAL
MDC_IDC_SET_LEADCHNL_LV_SENSING_CATHODE_LOCATION_1: NORMAL
MDC_IDC_SET_LEADCHNL_LV_SENSING_POLARITY: NORMAL
MDC_IDC_SET_LEADCHNL_RA_PACING_AMPLITUDE: 1.7 V
MDC_IDC_SET_LEADCHNL_RA_PACING_POLARITY: NORMAL
MDC_IDC_SET_LEADCHNL_RA_PACING_PULSEWIDTH: 0.4 MS
MDC_IDC_SET_LEADCHNL_RA_SENSING_ADAPTATION_MODE: NORMAL
MDC_IDC_SET_LEADCHNL_RA_SENSING_POLARITY: NORMAL
MDC_IDC_SET_LEADCHNL_RA_SENSING_SENSITIVITY: NORMAL
MDC_IDC_SET_LEADCHNL_RV_PACING_AMPLITUDE: 1.3 V
MDC_IDC_SET_LEADCHNL_RV_PACING_POLARITY: NORMAL
MDC_IDC_SET_LEADCHNL_RV_PACING_PULSEWIDTH: 0.4 MS
MDC_IDC_SET_LEADCHNL_RV_SENSING_ADAPTATION_MODE: NORMAL
MDC_IDC_SET_LEADCHNL_RV_SENSING_POLARITY: NORMAL
MDC_IDC_SET_LEADCHNL_RV_SENSING_SENSITIVITY: NORMAL

## 2022-05-19 DIAGNOSIS — Z11.59 ENCOUNTER FOR SCREENING FOR OTHER VIRAL DISEASES: Primary | ICD-10-CM

## 2022-05-24 ENCOUNTER — TRANSFERRED RECORDS (OUTPATIENT)
Dept: MULTI SPECIALTY CLINIC | Facility: CLINIC | Age: 82
End: 2022-05-24
Payer: COMMERCIAL

## 2022-05-24 LAB
CREATININE (EXTERNAL): 1.11 MG/DL (ref 0.72–1.25)
GFR ESTIMATED (EXTERNAL): 66 ML/MIN/1.73M2
GLUCOSE (EXTERNAL): 88 MG/DL (ref 65–100)
INR (EXTERNAL): 1.1
POTASSIUM (EXTERNAL): 4 MMOL/L (ref 3.5–5)

## 2022-05-31 ENCOUNTER — LAB (OUTPATIENT)
Dept: LAB | Facility: CLINIC | Age: 82
End: 2022-05-31
Attending: UROLOGY
Payer: COMMERCIAL

## 2022-05-31 DIAGNOSIS — Z11.59 ENCOUNTER FOR SCREENING FOR OTHER VIRAL DISEASES: ICD-10-CM

## 2022-05-31 LAB — SARS-COV-2 RNA RESP QL NAA+PROBE: NEGATIVE

## 2022-05-31 PROCEDURE — U0003 INFECTIOUS AGENT DETECTION BY NUCLEIC ACID (DNA OR RNA); SEVERE ACUTE RESPIRATORY SYNDROME CORONAVIRUS 2 (SARS-COV-2) (CORONAVIRUS DISEASE [COVID-19]), AMPLIFIED PROBE TECHNIQUE, MAKING USE OF HIGH THROUGHPUT TECHNOLOGIES AS DESCRIBED BY CMS-2020-01-R: HCPCS

## 2022-05-31 PROCEDURE — U0005 INFEC AGEN DETEC AMPLI PROBE: HCPCS

## 2022-05-31 RX ORDER — FENOFIBRATE 160 MG/1
160 TABLET ORAL DAILY
COMMUNITY
End: 2022-07-19

## 2022-05-31 RX ORDER — ACETAMINOPHEN 500 MG
500-1000 TABLET ORAL 2 TIMES DAILY PRN
Status: ON HOLD | COMMUNITY
End: 2022-06-02

## 2022-05-31 NOTE — PROGRESS NOTES
PTA medications updated by Medication Scribe prior to surgery via phone call with patient (last doses completed by Nurse)     Medication history sources: Patient, Surescripts and H&P  In the past week, patient estimated taking medication this percent of the time: Greater than 90%  Adherence assessment: N/A Not Observed    Significant changes made to the medication list:  None      Additional medication history information:   None    Medication reconciliation completed by provider prior to medication history? No    Time spent in this activity: 30 MINUTES    The information provided in this note is only as accurate as the sources available at the time of update(s)      Prior to Admission medications    Medication Sig Last Dose Taking? Auth Provider   acetaminophen (TYLENOL) 500 MG tablet Take 500-1,000 mg by mouth 2 times daily as needed for mild pain  at PRN Yes Reported, Patient   ASPIRIN 81 MG OR TABS Take 81 mg by mouth daily  at AM Yes Reported, Patient   fenofibrate (TRIGLIDE/LOFIBRA) 160 MG tablet Take 160 mg by mouth daily  at PM Yes Reported, Patient   multivitamin w/minerals (THERA-VIT-M) tablet Take 1 tablet by mouth daily   at PM Yes Poli Alberto MD   simvastatin (ZOCOR) 80 MG tablet Take 1 tablet (80 mg) by mouth At Bedtime  at PM Yes Rsos Landers MD   tamsulosin (FLOMAX) 0.4 MG capsule Take 0.4 mg by mouth daily  at PM Yes Reported, Patient   vitamin D3 (CHOLECALCIFEROL) 50 mcg (2000 units) tablet Take 2,000 Units by mouth daily  at AM Yes Reported, Patient

## 2022-06-01 ENCOUNTER — HOSPITAL ENCOUNTER (OUTPATIENT)
Facility: CLINIC | Age: 82
Discharge: HOME | End: 2022-06-02
Attending: UROLOGY | Admitting: UROLOGY
Payer: COMMERCIAL

## 2022-06-01 ENCOUNTER — ANESTHESIA (OUTPATIENT)
Dept: SURGERY | Facility: CLINIC | Age: 82
End: 2022-06-01
Payer: COMMERCIAL

## 2022-06-01 ENCOUNTER — ANESTHESIA EVENT (OUTPATIENT)
Dept: SURGERY | Facility: CLINIC | Age: 82
End: 2022-06-01
Payer: COMMERCIAL

## 2022-06-01 DIAGNOSIS — N30.01 ACUTE CYSTITIS WITH HEMATURIA: ICD-10-CM

## 2022-06-01 DIAGNOSIS — N40.1 BPH WITH OBSTRUCTION/LOWER URINARY TRACT SYMPTOMS: Primary | ICD-10-CM

## 2022-06-01 DIAGNOSIS — N13.8 BPH WITH OBSTRUCTION/LOWER URINARY TRACT SYMPTOMS: Primary | ICD-10-CM

## 2022-06-01 PROBLEM — Z98.890 POST-OPERATIVE STATE: Status: ACTIVE | Noted: 2022-06-01

## 2022-06-01 LAB — POTASSIUM BLD-SCNC: 3.8 MMOL/L (ref 3.4–5.3)

## 2022-06-01 PROCEDURE — 999N000141 HC STATISTIC PRE-PROCEDURE NURSING ASSESSMENT: Performed by: UROLOGY

## 2022-06-01 PROCEDURE — 360N000076 HC SURGERY LEVEL 3, PER MIN: Performed by: UROLOGY

## 2022-06-01 PROCEDURE — 258N000003 HC RX IP 258 OP 636: Performed by: ANESTHESIOLOGY

## 2022-06-01 PROCEDURE — 250N000011 HC RX IP 250 OP 636: Performed by: NURSE ANESTHETIST, CERTIFIED REGISTERED

## 2022-06-01 PROCEDURE — 370N000017 HC ANESTHESIA TECHNICAL FEE, PER MIN: Performed by: UROLOGY

## 2022-06-01 PROCEDURE — 250N000013 HC RX MED GY IP 250 OP 250 PS 637: Performed by: UROLOGY

## 2022-06-01 PROCEDURE — 88305 TISSUE EXAM BY PATHOLOGIST: CPT | Mod: TC | Performed by: UROLOGY

## 2022-06-01 PROCEDURE — 250N000009 HC RX 250: Performed by: NURSE ANESTHETIST, CERTIFIED REGISTERED

## 2022-06-01 PROCEDURE — 250N000013 HC RX MED GY IP 250 OP 250 PS 637: Performed by: ANESTHESIOLOGY

## 2022-06-01 PROCEDURE — 36415 COLL VENOUS BLD VENIPUNCTURE: CPT | Performed by: ANESTHESIOLOGY

## 2022-06-01 PROCEDURE — 710N000009 HC RECOVERY PHASE 1, LEVEL 1, PER MIN: Performed by: UROLOGY

## 2022-06-01 PROCEDURE — 250N000025 HC SEVOFLURANE, PER MIN: Performed by: UROLOGY

## 2022-06-01 PROCEDURE — 272N000001 HC OR GENERAL SUPPLY STERILE: Performed by: UROLOGY

## 2022-06-01 PROCEDURE — 250N000011 HC RX IP 250 OP 636: Performed by: PHYSICIAN ASSISTANT

## 2022-06-01 PROCEDURE — 250N000009 HC RX 250: Performed by: UROLOGY

## 2022-06-01 PROCEDURE — 250N000011 HC RX IP 250 OP 636: Performed by: ANESTHESIOLOGY

## 2022-06-01 PROCEDURE — 258N000001 HC RX 258: Performed by: UROLOGY

## 2022-06-01 PROCEDURE — 84132 ASSAY OF SERUM POTASSIUM: CPT | Performed by: ANESTHESIOLOGY

## 2022-06-01 RX ORDER — NALOXONE HYDROCHLORIDE 0.4 MG/ML
0.2 INJECTION, SOLUTION INTRAMUSCULAR; INTRAVENOUS; SUBCUTANEOUS
Status: DISCONTINUED | OUTPATIENT
Start: 2022-06-01 | End: 2022-06-02 | Stop reason: HOSPADM

## 2022-06-01 RX ORDER — LIDOCAINE 40 MG/G
CREAM TOPICAL
Status: DISCONTINUED | OUTPATIENT
Start: 2022-06-01 | End: 2022-06-02 | Stop reason: HOSPADM

## 2022-06-01 RX ORDER — ACETAMINOPHEN 325 MG/1
650 TABLET ORAL EVERY 4 HOURS PRN
Qty: 100 TABLET | Refills: 0 | Status: SHIPPED | OUTPATIENT
Start: 2022-06-01 | End: 2022-07-19

## 2022-06-01 RX ORDER — ONDANSETRON 4 MG/1
4 TABLET, ORALLY DISINTEGRATING ORAL EVERY 6 HOURS PRN
Status: DISCONTINUED | OUTPATIENT
Start: 2022-06-01 | End: 2022-06-02 | Stop reason: HOSPADM

## 2022-06-01 RX ORDER — ONDANSETRON 4 MG/1
4-8 TABLET, ORALLY DISINTEGRATING ORAL EVERY 8 HOURS PRN
Qty: 10 TABLET | Refills: 0 | Status: SHIPPED | OUTPATIENT
Start: 2022-06-01 | End: 2022-07-19

## 2022-06-01 RX ORDER — TAMSULOSIN HYDROCHLORIDE 0.4 MG/1
0.4 CAPSULE ORAL DAILY
Status: DISCONTINUED | OUTPATIENT
Start: 2022-06-01 | End: 2022-06-02 | Stop reason: HOSPADM

## 2022-06-01 RX ORDER — CIPROFLOXACIN 2 MG/ML
400 INJECTION, SOLUTION INTRAVENOUS EVERY 12 HOURS
Status: DISCONTINUED | OUTPATIENT
Start: 2022-06-01 | End: 2022-06-01

## 2022-06-01 RX ORDER — ATROPA BELLADONNA AND OPIUM 16.2; 3 MG/1; MG/1
30 SUPPOSITORY RECTAL 3 TIMES DAILY PRN
Status: DISCONTINUED | OUTPATIENT
Start: 2022-06-01 | End: 2022-06-02 | Stop reason: HOSPADM

## 2022-06-01 RX ORDER — PROPOFOL 10 MG/ML
INJECTION, EMULSION INTRAVENOUS PRN
Status: DISCONTINUED | OUTPATIENT
Start: 2022-06-01 | End: 2022-06-01

## 2022-06-01 RX ORDER — NALOXONE HYDROCHLORIDE 0.4 MG/ML
0.4 INJECTION, SOLUTION INTRAMUSCULAR; INTRAVENOUS; SUBCUTANEOUS
Status: DISCONTINUED | OUTPATIENT
Start: 2022-06-01 | End: 2022-06-02 | Stop reason: HOSPADM

## 2022-06-01 RX ORDER — ATROPA BELLADONNA AND OPIUM 16.2; 3 MG/1; MG/1
SUPPOSITORY RECTAL PRN
Status: DISCONTINUED | OUTPATIENT
Start: 2022-06-01 | End: 2022-06-01 | Stop reason: HOSPADM

## 2022-06-01 RX ORDER — FENTANYL CITRATE 0.05 MG/ML
25 INJECTION, SOLUTION INTRAMUSCULAR; INTRAVENOUS EVERY 5 MIN PRN
Status: DISCONTINUED | OUTPATIENT
Start: 2022-06-01 | End: 2022-06-01 | Stop reason: HOSPADM

## 2022-06-01 RX ORDER — SODIUM CHLORIDE, SODIUM LACTATE, POTASSIUM CHLORIDE, CALCIUM CHLORIDE 600; 310; 30; 20 MG/100ML; MG/100ML; MG/100ML; MG/100ML
INJECTION, SOLUTION INTRAVENOUS CONTINUOUS
Status: DISCONTINUED | OUTPATIENT
Start: 2022-06-01 | End: 2022-06-02 | Stop reason: HOSPADM

## 2022-06-01 RX ORDER — OXYCODONE HYDROCHLORIDE 5 MG/1
5 TABLET ORAL EVERY 4 HOURS PRN
Status: DISCONTINUED | OUTPATIENT
Start: 2022-06-01 | End: 2022-06-02 | Stop reason: HOSPADM

## 2022-06-01 RX ORDER — AMOXICILLIN 250 MG
1-2 CAPSULE ORAL 2 TIMES DAILY
Qty: 30 TABLET | Refills: 0 | Status: SHIPPED | OUTPATIENT
Start: 2022-06-01 | End: 2022-07-19

## 2022-06-01 RX ORDER — DIPHENHYDRAMINE HYDROCHLORIDE 50 MG/ML
12.5 INJECTION INTRAMUSCULAR; INTRAVENOUS EVERY 6 HOURS PRN
Status: DISCONTINUED | OUTPATIENT
Start: 2022-06-01 | End: 2022-06-02 | Stop reason: HOSPADM

## 2022-06-01 RX ORDER — FENTANYL CITRATE 0.05 MG/ML
25 INJECTION, SOLUTION INTRAMUSCULAR; INTRAVENOUS
Status: DISCONTINUED | OUTPATIENT
Start: 2022-06-01 | End: 2022-06-01 | Stop reason: HOSPADM

## 2022-06-01 RX ORDER — GLYCINE 1.5 G/100ML
IRRIGANT IRRIGATION PRN
Status: DISCONTINUED | OUTPATIENT
Start: 2022-06-01 | End: 2022-06-01 | Stop reason: HOSPADM

## 2022-06-01 RX ORDER — CLINDAMYCIN HCL 300 MG
300 CAPSULE ORAL EVERY 8 HOURS SCHEDULED
Status: DISCONTINUED | OUTPATIENT
Start: 2022-06-01 | End: 2022-06-02 | Stop reason: HOSPADM

## 2022-06-01 RX ORDER — ONDANSETRON 2 MG/ML
4 INJECTION INTRAMUSCULAR; INTRAVENOUS EVERY 6 HOURS PRN
Status: DISCONTINUED | OUTPATIENT
Start: 2022-06-01 | End: 2022-06-02 | Stop reason: HOSPADM

## 2022-06-01 RX ORDER — ONDANSETRON 2 MG/ML
INJECTION INTRAMUSCULAR; INTRAVENOUS PRN
Status: DISCONTINUED | OUTPATIENT
Start: 2022-06-01 | End: 2022-06-01

## 2022-06-01 RX ORDER — OXYCODONE HYDROCHLORIDE 5 MG/1
10 TABLET ORAL EVERY 4 HOURS PRN
Status: DISCONTINUED | OUTPATIENT
Start: 2022-06-01 | End: 2022-06-02 | Stop reason: HOSPADM

## 2022-06-01 RX ORDER — ONDANSETRON 2 MG/ML
4 INJECTION INTRAMUSCULAR; INTRAVENOUS EVERY 30 MIN PRN
Status: DISCONTINUED | OUTPATIENT
Start: 2022-06-01 | End: 2022-06-01 | Stop reason: HOSPADM

## 2022-06-01 RX ORDER — HYDROMORPHONE HCL IN WATER/PF 6 MG/30 ML
0.2 PATIENT CONTROLLED ANALGESIA SYRINGE INTRAVENOUS EVERY 5 MIN PRN
Status: DISCONTINUED | OUTPATIENT
Start: 2022-06-01 | End: 2022-06-01 | Stop reason: HOSPADM

## 2022-06-01 RX ORDER — FENTANYL CITRATE 50 UG/ML
INJECTION, SOLUTION INTRAMUSCULAR; INTRAVENOUS PRN
Status: DISCONTINUED | OUTPATIENT
Start: 2022-06-01 | End: 2022-06-01

## 2022-06-01 RX ORDER — MEPERIDINE HYDROCHLORIDE 25 MG/ML
12.5 INJECTION INTRAMUSCULAR; INTRAVENOUS; SUBCUTANEOUS
Status: DISCONTINUED | OUTPATIENT
Start: 2022-06-01 | End: 2022-06-01 | Stop reason: HOSPADM

## 2022-06-01 RX ORDER — OXYCODONE HYDROCHLORIDE 5 MG/1
5 TABLET ORAL EVERY 4 HOURS PRN
Status: DISCONTINUED | OUTPATIENT
Start: 2022-06-01 | End: 2022-06-01 | Stop reason: HOSPADM

## 2022-06-01 RX ORDER — SODIUM CHLORIDE, SODIUM LACTATE, POTASSIUM CHLORIDE, CALCIUM CHLORIDE 600; 310; 30; 20 MG/100ML; MG/100ML; MG/100ML; MG/100ML
INJECTION, SOLUTION INTRAVENOUS CONTINUOUS
Status: DISCONTINUED | OUTPATIENT
Start: 2022-06-01 | End: 2022-06-01 | Stop reason: HOSPADM

## 2022-06-01 RX ORDER — NEOMYCIN/BACITRACIN/POLYMYXINB 3.5-400-5K
OINTMENT (GRAM) TOPICAL 4 TIMES DAILY PRN
Status: DISCONTINUED | OUTPATIENT
Start: 2022-06-01 | End: 2022-06-02 | Stop reason: HOSPADM

## 2022-06-01 RX ORDER — HYDROXYZINE HYDROCHLORIDE 10 MG/1
10 TABLET, FILM COATED ORAL EVERY 6 HOURS PRN
Qty: 30 TABLET | Refills: 0 | Status: SHIPPED | OUTPATIENT
Start: 2022-06-01 | End: 2022-07-19

## 2022-06-01 RX ORDER — LIDOCAINE HYDROCHLORIDE 20 MG/ML
INJECTION, SOLUTION INFILTRATION; PERINEURAL PRN
Status: DISCONTINUED | OUTPATIENT
Start: 2022-06-01 | End: 2022-06-01

## 2022-06-01 RX ORDER — ONDANSETRON 4 MG/1
4 TABLET, ORALLY DISINTEGRATING ORAL EVERY 30 MIN PRN
Status: DISCONTINUED | OUTPATIENT
Start: 2022-06-01 | End: 2022-06-01 | Stop reason: HOSPADM

## 2022-06-01 RX ORDER — CLINDAMYCIN PHOSPHATE 900 MG/50ML
INJECTION, SOLUTION INTRAVENOUS PRN
Status: DISCONTINUED | OUTPATIENT
Start: 2022-06-01 | End: 2022-06-01

## 2022-06-01 RX ADMIN — HYDROMORPHONE HYDROCHLORIDE 0.2 MG: 0.2 INJECTION, SOLUTION INTRAMUSCULAR; INTRAVENOUS; SUBCUTANEOUS at 11:39

## 2022-06-01 RX ADMIN — FENTANYL CITRATE 25 MCG: 50 INJECTION, SOLUTION INTRAMUSCULAR; INTRAVENOUS at 11:04

## 2022-06-01 RX ADMIN — OXYCODONE HYDROCHLORIDE 5 MG: 5 TABLET ORAL at 13:41

## 2022-06-01 RX ADMIN — FENTANYL CITRATE 25 MCG: 50 INJECTION, SOLUTION INTRAMUSCULAR; INTRAVENOUS at 10:05

## 2022-06-01 RX ADMIN — HYDROMORPHONE HYDROCHLORIDE 0.2 MG: 0.2 INJECTION, SOLUTION INTRAMUSCULAR; INTRAVENOUS; SUBCUTANEOUS at 12:52

## 2022-06-01 RX ADMIN — HYDROMORPHONE HYDROCHLORIDE 0.2 MG: 0.2 INJECTION, SOLUTION INTRAMUSCULAR; INTRAVENOUS; SUBCUTANEOUS at 11:24

## 2022-06-01 RX ADMIN — PROPOFOL 150 MG: 10 INJECTION, EMULSION INTRAVENOUS at 08:59

## 2022-06-01 RX ADMIN — SODIUM CHLORIDE: 900 IRRIGANT IRRIGATION at 15:01

## 2022-06-01 RX ADMIN — FENTANYL CITRATE 25 MCG: 50 INJECTION, SOLUTION INTRAMUSCULAR; INTRAVENOUS at 10:51

## 2022-06-01 RX ADMIN — SODIUM CHLORIDE: 900 IRRIGANT IRRIGATION at 17:17

## 2022-06-01 RX ADMIN — CIPROFLOXACIN 400 MG: 2 INJECTION, SOLUTION INTRAVENOUS at 07:52

## 2022-06-01 RX ADMIN — CLINDAMYCIN HYDROCHLORIDE 300 MG: 300 CAPSULE ORAL at 21:39

## 2022-06-01 RX ADMIN — CLINDAMYCIN PHOSPHATE 900 MG: 900 INJECTION, SOLUTION INTRAVENOUS at 09:10

## 2022-06-01 RX ADMIN — LIDOCAINE HYDROCHLORIDE 60 MG: 20 INJECTION, SOLUTION INFILTRATION; PERINEURAL at 08:59

## 2022-06-01 RX ADMIN — FENTANYL CITRATE 25 MCG: 50 INJECTION, SOLUTION INTRAMUSCULAR; INTRAVENOUS at 10:37

## 2022-06-01 RX ADMIN — FENTANYL CITRATE 25 MCG: 50 INJECTION, SOLUTION INTRAMUSCULAR; INTRAVENOUS at 09:20

## 2022-06-01 RX ADMIN — CLINDAMYCIN HYDROCHLORIDE 300 MG: 300 CAPSULE ORAL at 15:39

## 2022-06-01 RX ADMIN — ONDANSETRON 4 MG: 2 INJECTION INTRAMUSCULAR; INTRAVENOUS at 09:12

## 2022-06-01 RX ADMIN — TAMSULOSIN HYDROCHLORIDE 0.4 MG: 0.4 CAPSULE ORAL at 15:01

## 2022-06-01 RX ADMIN — DIPHENHYDRAMINE HYDROCHLORIDE 12.5 MG: 50 INJECTION, SOLUTION INTRAMUSCULAR; INTRAVENOUS at 08:12

## 2022-06-01 RX ADMIN — SODIUM CHLORIDE, POTASSIUM CHLORIDE, SODIUM LACTATE AND CALCIUM CHLORIDE: 600; 310; 30; 20 INJECTION, SOLUTION INTRAVENOUS at 07:51

## 2022-06-01 ASSESSMENT — ENCOUNTER SYMPTOMS
DYSRHYTHMIAS: 1
SEIZURES: 0

## 2022-06-01 ASSESSMENT — LIFESTYLE VARIABLES: TOBACCO_USE: 1

## 2022-06-01 ASSESSMENT — COPD QUESTIONNAIRES: COPD: 1

## 2022-06-01 NOTE — ANESTHESIA PROCEDURE NOTES
Airway         Procedure Start/Stop Times: 6/1/2022 9:03 AM and 6/1/2022 9:03 AM  Staff -        Anesthesiologist:  Praneeth Curiel MD       CRNA: Kiesha Enriquez APRN CRNA       Performed By: CRNAIndications and Patient Condition       Indications for airway management: alex-procedural       Induction type:intravenous       Mask difficulty assessment: 0 - not attempted    Final Airway Details       Final airway type: supraglottic airway    Supraglottic Airway Details        Type: LMA       Brand: I-Gel       LMA size: 4    Post intubation assessment        Placement verified by: capnometry, equal breath sounds and chest rise        Number of attempts at approach: 1       Number of other approaches attempted: 0       Secured with: pink tape       Ease of procedure: easy       Dentition: Intact and Unchanged    Medication(s) Administered   Medication Administration Time: 6/1/2022 9:03 AM

## 2022-06-01 NOTE — PROGRESS NOTES
Received patient at 1300, POD 0 TURP.  A/OX4, VSS on 2L NC, denies pain N/V. CBi running at slow rate, intermittently increased to moderate, light pink/watermelon colored. Tolerating full liquid diet, slept for most part of shift. Continue plan of care.

## 2022-06-01 NOTE — ANESTHESIA POSTPROCEDURE EVALUATION
Patient: Sunil Lyle    Procedure: Procedure(s):  CYSTOSCOPY, WITH TRANSURETHRAL RESECTION PROSTATE       Anesthesia Type:  General    Note:     Postop Pain Control: Uneventful            Sign Out: Well controlled pain   PONV: No   Neuro/Psych: Uneventful            Sign Out: Acceptable/Baseline neuro status   Airway/Respiratory: Uneventful            Sign Out: Acceptable/Baseline resp. status   CV/Hemodynamics: Uneventful            Sign Out: Acceptable CV status; No obvious hypovolemia; No obvious fluid overload   Other NRE: NONE   DID A NON-ROUTINE EVENT OCCUR? No           Last vitals:  Vitals Value Taken Time   /78 06/01/22 1215   Temp 36.2  C (97.2  F) 06/01/22 1130   Pulse 61 06/01/22 1222   Resp 18 06/01/22 1222   SpO2 99 % 06/01/22 1222   Vitals shown include unvalidated device data.    Electronically Signed By: Praneeth Curiel MD  June 1, 2022  12:23 PM

## 2022-06-01 NOTE — ANESTHESIA PREPROCEDURE EVALUATION
Anesthesia Pre-Procedure Evaluation    Patient: Sunil Lyle   MRN: [id number] : 1940        Procedure : Procedure(s):  CYSTOSCOPY, WITH TRANSURETHRAL RESECTION PROSTATE          Past Medical History:   Diagnosis Date     Allergic state      BPH (benign prostatic hyperplasia)      CAD (coronary artery disease) 2014     s/p stent to RCA ()     COPD (chronic obstructive pulmonary disease) (H)      GERD (gastroesophageal reflux disease)      Hypercholesterolaemia      Hyperlipidaemia      Other affections of shoulder region, not elsewhere classified     impingement syndrome of right shoulder     Renal disease      Sebaceous cyst     right shoulder area     Tachycardia      Tobacco use disorder 2008      Past Surgical History:   Procedure Laterality Date     COLONOSCOPY       EP PACEMAKER DEVICE & LEAD IMPLANT- RIGHT ATRIAL & RIGHT VENTRICULAR Left 3/23/2022    Procedure: Pacemaker Device & Lead Implant - Right Atrial & Right Ventricular;  Surgeon: Thai Elliott MD;  Location:  [facility] CATH LAB     HEART CATH, ANGIOPLASTY  2001    RCA stent     LAPAROTOMY EXPLORATORY N/A 3/19/2022    Procedure: EXPLORATORY LAPAROTOMY, SUPERIOR MESENTERIC ARTERY EXPLORATION;  Surgeon: Smita Garsia MD;  Location:  OR     TONSILLECTOMY & ADENOIDECTOMY       VASCULAR SURGERY        Allergies   Allergen Reactions     Pcn [Penicillins] Anaphylaxis     Chantix [Varenicline] Other (See Comments)     CNS side effects     Bupropion Hcl      vivid dreams     Lactose Diarrhea      Social History     Tobacco Use     Smoking status: Former Smoker     Packs/day: 0.50     Years: 33.50     Pack years: 16.75     Types: Cigarettes     Start date:      Quit date: 2017     Years since quittin.5     Smokeless tobacco: Never Used     Tobacco comment: off and on since age 17- smokes about 5 cigs/day plus vapor cigarettes    Substance Use Topics     Alcohol use: Yes     Alcohol/week: 3.3  standard drinks     Types: 4 Shots of liquor per week     Comment: 5-6 drink per week      Wt Readings from Last 1 Encounters:   04/12/22 73.9 kg (163 lb)        Anesthesia Evaluation   Pt has had prior anesthetic. Type: General.    No history of anesthetic complications       ROS/MED HX  ENT/Pulmonary:     (+) tobacco use, Past use, COPD,  (-) sleep apnea   Neurologic:    (-) no seizures and no CVA   Cardiovascular:     (+) Dyslipidemia hypertension--CAD --stent-pacemaker, dysrhythmias, 2nd Deg Heart Block, pulmonary hypertension, Previous cardiac testing   Echo: Date: 3/19/2022 Results:  The rhythm was sinus with wide QRS.  Left ventricular systolic function is normal.  There is mild to moderate concentric left ventricular hypertrophy.  The left ventricle is normal in size.  The right ventricle is normal in structure, function and size.  Right ventricular systolic pressure is elevated, consistent with moderate  pulmonary hypertension.  Doppler interrogation does not demonstrate signficant stenosis or  insufficiency involivng cardiac valves  Stress Test: Date: Results:    ECG Reviewed: Date: Results:  Sinus sydnie, 1st degree AVB, RBBB, diffuse T wave abnormalities  Cath: Date: Results:   (-) murmur   METS/Exercise Tolerance: >4 METS    Hematologic:     (+) anemia,     Musculoskeletal:       GI/Hepatic:     (+) GERD,  (-) liver disease   Renal/Genitourinary:     (+) renal disease, type: CRI, BPH,     Endo:    (-) Type II DM and thyroid disease   Psychiatric/Substance Use:       Infectious Disease:       Malignancy:       Other:            Physical Exam    Airway        Mallampati: II   TM distance: > 3 FB   Neck ROM: full   Mouth opening: > 3 cm    Respiratory Devices and Support         Dental       (+) upper dentures and lower dentures      Cardiovascular   cardiovascular exam normal      (-) no murmur    Pulmonary   pulmonary exam normal                OUTSIDE LABS:  CBC:   Lab Results   Component Value Date     WBC 7.7 03/25/2022    WBC 7.8 03/24/2022    HGB 7.9 (L) 03/26/2022    HGB 7.9 (L) 03/25/2022    HCT 22.2 (L) 03/24/2022    HCT 23.3 (L) 03/23/2022     03/24/2022     03/23/2022     BMP:   Lab Results   Component Value Date     03/26/2022     (H) 03/24/2022    POTASSIUM 3.9 03/26/2022    POTASSIUM 3.3 (L) 03/26/2022    CHLORIDE 114 (H) 03/26/2022    CHLORIDE 117 (H) 03/24/2022    CO2 24 03/26/2022    CO2 25 03/24/2022    BUN 18 03/26/2022    BUN 27 03/24/2022    CR 0.96 03/26/2022    CR 0.94 03/24/2022     (H) 03/26/2022     (H) 03/24/2022     COAGS:   Lab Results   Component Value Date    INR 1.1 05/24/2022     POC: No results found for: BGM, HCG, HCGS  HEPATIC:   Lab Results   Component Value Date    ALBUMIN 2.6 (L) 03/22/2022    PROTTOTAL 5.9 (L) 03/22/2022    ALT 24 03/22/2022    AST 22 03/22/2022    ALKPHOS 37 (L) 03/22/2022    BILITOTAL 0.5 03/22/2022     OTHER:   Lab Results   Component Value Date    PH 7.41 03/19/2022    LACT 1.8 03/20/2022    A1C 5.4 03/20/2022    PAIGE 8.5 03/26/2022    MAG 2.0 03/26/2022    LIPASE 208 03/18/2022    TSH 0.48 03/19/2022    SED 61 (H) 09/26/2003       Anesthesia Plan    ASA Status:  3   NPO Status:  NPO Appropriate    Anesthesia Type: General.     - Airway: LMA   Induction: Intravenous.   Maintenance: Balanced.        Consents    Anesthesia Plan(s) and associated risks, benefits, and realistic alternatives discussed. Questions answered and patient/representative(s) expressed understanding.    - Discussed:     - Discussed with:  Patient         Postoperative Care    Pain management: Multi-modal analgesia.   PONV prophylaxis: Ondansetron (or other 5HT-3), Dexamethasone or Solumedrol     Comments:                Praneeth Curiel MD

## 2022-06-01 NOTE — OR NURSING
Patient arrived to pacu without traction and Dr Heltons order stated traction on. Urine is light watermelon color. Dr Helton aware and stated no need for traction as long as urine stays the same color.

## 2022-06-01 NOTE — BRIEF OP NOTE
[facility] Brief Operative Note    Pre-operative diagnosis: BPH with obstruction   Post-operative diagnosis: Same   Procedure: Procedure(s):  CYSTOSCOPY, WITH TRANSURETHRAL RESECTION PROSTATE   Surgeon: Tyree Helton MD, MD   Assistant(s): OR staff   Anesthesia: General endotracheal anesthesia   Estimated blood loss: Less than 100 ml   Total IV fluids: (See anesthesia record)   Blood transfusion: No transfusion was given during surgery   Total urine output: Not measured   Drains: Blanca catheter   Specimens: Prostate tissue   Implants: None   Findings: About 30g tissue resected   Complications: None   Condition: Stable   Comments: See dictated operative report for full details.    Tyree Helton MD, MD

## 2022-06-01 NOTE — OP NOTE
Procedure Date: 2022    NAME OF PROCEDURE:  Conventional transurethral resection of the prostate.    PREOPERATIVE DIAGNOSES:  Benign prostatic hypertrophy with obstruction.    POSTOPERATIVE DIAGNOSES:  Benign prostatic hypertrophy with obstruction.    DESCRIPTION OF PROCEDURE:  Informed consent was obtained from the patient.  He was brought to the operating room, given anesthesia and placed in the lithotomy position.  Sterile prep and drape were applied and a surgical timeout was taken.  Continuous flow resectoscope was inserted into the bladder using the visual obturator.  The working element was then placed.  The patient had a fairly large median lobe.  This was resected first and then floor of the prostatic fossa out to and just lateral to the verumontanum.  Scope was then rotated 180 degrees and the lateral lobe was resected in a stepwise fashion from 12 o'clock to 6 o'clock positions.  Arterial bleeders were cauterized as they were encountered.  Once resection was complete, the tissue chips were irrigated out from the bladder, collected, and sent for permanent pathology.  Final inspection revealed the ureteral orifices uninvolved by the resection.  No evidence of ongoing arterial bleeding and no residual tissue chips in the bladder.  The bladder was left full and the scope was removed.  A 24-Azeri, 3-way catheter was then placed.  Balloon was inflated with 50 mL of sterile water and continuous irrigation was begun.  Approximately 30 grams of tissue were resected and sent to Pathology.    Estimated blood loss for the procedure was 100 mL or less.    He tolerated the procedure well, and there were no complications.    Tyree Helton MD        D: 2022   T: 2022   MT: [facility]    Name:     SPARKLE HERBERT  MRN:      [id number]        Account:        [id number]   :      1940           Procedure Date: 2022     Document: [id number]

## 2022-06-01 NOTE — ANESTHESIA CARE TRANSFER NOTE
Patient: Sunil Lyle    Procedure: Procedure(s):  CYSTOSCOPY, WITH TRANSURETHRAL RESECTION PROSTATE       Diagnosis: Hematuria syndrome [R31.9]  Diagnosis Additional Information: No value filed.    Anesthesia Type:   General     Note:    Oropharynx: oropharynx clear of all foreign objects  Level of Consciousness: drowsy  Oxygen Supplementation: face mask  Level of Supplemental Oxygen (L/min / FiO2): 6  Independent Airway: airway patency satisfactory and stable  Dentition: dentition unchanged  Vital Signs Stable: post-procedure vital signs reviewed and stable  Report to RN Given: handoff report given  Patient transferred to: Phase II    Handoff Report: Identifed the Patient, Identified the Reponsible Provider, Reviewed the pertinent medical history, Discussed the surgical course, Reviewed Intra-OP anesthesia mangement and issues during anesthesia, Set expectations for post-procedure period and Allowed opportunity for questions and acknowledgement of understanding      Vitals:  Vitals Value Taken Time   /73 06/01/22 1036   Temp     Pulse 67 06/01/22 1038   Resp 17 06/01/22 1038   SpO2 100 % 06/01/22 1038   Vitals shown include unvalidated device data.    Electronically Signed By: YARITZA Terry CRNA  June 1, 2022  10:40 AM

## 2022-06-02 VITALS
BODY MASS INDEX: 23.59 KG/M2 | DIASTOLIC BLOOD PRESSURE: 57 MMHG | OXYGEN SATURATION: 94 % | HEIGHT: 70 IN | SYSTOLIC BLOOD PRESSURE: 114 MMHG | RESPIRATION RATE: 18 BRPM | HEART RATE: 76 BPM | WEIGHT: 164.8 LBS | TEMPERATURE: 98.6 F

## 2022-06-02 LAB
PATH REPORT.COMMENTS IMP SPEC: NORMAL
PATH REPORT.COMMENTS IMP SPEC: NORMAL
PATH REPORT.FINAL DX SPEC: NORMAL
PATH REPORT.GROSS SPEC: NORMAL
PATH REPORT.MICROSCOPIC SPEC OTHER STN: NORMAL
PATH REPORT.RELEVANT HX SPEC: NORMAL
PHOTO IMAGE: NORMAL

## 2022-06-02 PROCEDURE — G0416 PROSTATE BIOPSY, ANY MTHD: HCPCS | Mod: 26 | Performed by: PATHOLOGY

## 2022-06-02 PROCEDURE — 258N000001 HC RX 258: Performed by: UROLOGY

## 2022-06-02 PROCEDURE — 250N000013 HC RX MED GY IP 250 OP 250 PS 637: Performed by: UROLOGY

## 2022-06-02 RX ORDER — CLINDAMYCIN HCL 300 MG
300 CAPSULE ORAL 3 TIMES DAILY
Qty: 9 CAPSULE | Refills: 0 | Status: SHIPPED | OUTPATIENT
Start: 2022-06-02 | End: 2022-06-05

## 2022-06-02 RX ORDER — NEOMYCIN/BACITRACIN/POLYMYXINB 3.5-400-5K
OINTMENT (GRAM) TOPICAL 4 TIMES DAILY PRN
Qty: 3.5 G | Refills: 0 | Status: SHIPPED | OUTPATIENT
Start: 2022-06-02 | End: 2022-06-08

## 2022-06-02 RX ADMIN — TAMSULOSIN HYDROCHLORIDE 0.4 MG: 0.4 CAPSULE ORAL at 08:01

## 2022-06-02 RX ADMIN — CLINDAMYCIN HYDROCHLORIDE 300 MG: 300 CAPSULE ORAL at 13:17

## 2022-06-02 RX ADMIN — CLINDAMYCIN HYDROCHLORIDE 300 MG: 300 CAPSULE ORAL at 06:24

## 2022-06-02 RX ADMIN — SODIUM CHLORIDE: 900 IRRIGANT IRRIGATION at 05:04

## 2022-06-02 RX ADMIN — SODIUM CHLORIDE: 900 IRRIGANT IRRIGATION at 00:08

## 2022-06-02 NOTE — PLAN OF CARE
Goal Outcome Evaluation:    Summary:  Pt POD1 from TUR. CBI running slow-moderate rate overnight with light peach to watermelon color. A&Ox4, no complaints of pain overnight. VSS on RA. Passing gas. L PIV running LR @ 25ml/hr. Pt hopeful for discharge today. Will continue plan of care.

## 2022-06-02 NOTE — PROGRESS NOTES
"[facility]    Urology Progress Note     Assessment & Plan   Sunil Lyle is a 81 year old male POD#1 TURP with Dr Helton on 6/1, doing well.     Plan:   - CBI at slow drip overnight with clear outputs. CBI stopped by urology at bedside this morning.   - If urine remains clear/pink at 11:00 today, OK to disconnect CBI and discharge to home  - Discharge with burrell - RN to teach burrell cares, appreciate assistance  - F/u for burrell removal in clinic next week, AVS updated.     Kennedi Herrera PA-C  [facility]  Pager: [phone]  Office: [phone]    Interval History   No events overnight. No pain. Tolerating diet, ambulating. CBI at slow rate overnight with light pink to clear outputs.   AVSS    OBJECTIVE:          /57   Pulse 76   Temp 98.6  F (37  C) (Oral)   Resp 18   Ht 1.778 m (5' 10\")   Wt 74.8 kg (164 lb 12.8 oz)   SpO2 94%   BMI 23.65 kg/m      Intake/Output Summary (Last 24 hours) at 6/2/2022 1028  Last data filed at 6/2/2022 0921  Gross per 24 hour   Intake --   Output 71360 ml   Net -49228 ml            General appearance shows no deformaties and good grooming in no acute distress.  EYES: no icterus  HEAD, EARS, NOSE, MOUTH, AND THROAT: atraumatic, normocephalic  NECK: symmetric  CHEST WALL: symmetric  CARDIAC: skin well perfused, no LE edema  RESPIRATORY: breathing unlabored  ABDOMEN: soft, non tender, non distended  : Burrell draining clear urine. CBI clamped at 10:00am  SKIN/HAIR/NAILS: no visible rashes  NEUROLOGIC: no focal deficits  PSYCHIATRIC: Speech, mood, and affect normal    LABS:         Lab Results   Component Value Date    WBC 7.7 03/25/2022    WBC 6.6 03/14/2017     Lab Results   Component Value Date    RBC 2.05 03/24/2022    RBC 4.34 03/14/2017     Lab Results   Component Value Date    HGB 7.9 03/26/2022    HGB 14.0 03/14/2017     Creatinine   Date Value Ref Range Status   03/26/2022 0.96 0.66 - 1.25 mg/dL Final   07/14/2017 1.21 " 0.70 - 1.30 mg/dL Final   ]  Lab Results   Component Value Date    BUN 18 03/26/2022    BUN 12 07/14/2017       Kennedi Herrera PA-C  [facility]

## 2022-06-16 ENCOUNTER — TELEPHONE (OUTPATIENT)
Dept: OTHER | Facility: CLINIC | Age: 82
End: 2022-06-16
Payer: COMMERCIAL

## 2022-06-16 NOTE — TELEPHONE ENCOUNTER
Patient was due for follow up to superior mesenteric artery thrombosis and that is what Dr. Garsia is recommending.   It is the patients choice to follow up or not. Owlient message sent to patient.      Toña PACHECO RN    [facility]  Office: [phone]  Fax: [phone]

## 2022-06-16 NOTE — TELEPHONE ENCOUNTER
[facility]    Who is the name of the provider?:  Ady      What is the location you see this provider at?: [place]    Reason for call:  He received a letter to schedule a return visit.  He does not feel that he needs to come back.  He is not having any issues.    If Dr. Garsia feels it is important for him to return to let him know.    Please advise patient of next steps.    Can we leave a detailed message on this number?  YES

## 2022-07-19 ENCOUNTER — LAB (OUTPATIENT)
Dept: LAB | Facility: CLINIC | Age: 82
End: 2022-07-19
Payer: COMMERCIAL

## 2022-07-19 ENCOUNTER — OFFICE VISIT (OUTPATIENT)
Dept: CARDIOLOGY | Facility: CLINIC | Age: 82
End: 2022-07-19

## 2022-07-19 VITALS
BODY MASS INDEX: 24.05 KG/M2 | HEIGHT: 70 IN | DIASTOLIC BLOOD PRESSURE: 73 MMHG | WEIGHT: 168 LBS | HEART RATE: 79 BPM | SYSTOLIC BLOOD PRESSURE: 131 MMHG

## 2022-07-19 DIAGNOSIS — I25.10 CORONARY ARTERY DISEASE INVOLVING NATIVE CORONARY ARTERY OF NATIVE HEART WITHOUT ANGINA PECTORIS: ICD-10-CM

## 2022-07-19 DIAGNOSIS — Z95.0 CARDIAC PACEMAKER IN SITU: ICD-10-CM

## 2022-07-19 DIAGNOSIS — I27.20 PULMONARY HYPERTENSION (H): ICD-10-CM

## 2022-07-19 DIAGNOSIS — I25.10 CORONARY ARTERY DISEASE INVOLVING NATIVE CORONARY ARTERY OF NATIVE HEART WITHOUT ANGINA PECTORIS: Primary | ICD-10-CM

## 2022-07-19 DIAGNOSIS — E78.5 HYPERLIPIDEMIA LDL GOAL <70: ICD-10-CM

## 2022-07-19 DIAGNOSIS — E78.2 MIXED HYPERLIPIDEMIA: ICD-10-CM

## 2022-07-19 DIAGNOSIS — J43.8 OTHER EMPHYSEMA (H): ICD-10-CM

## 2022-07-19 DIAGNOSIS — N18.31 STAGE 3A CHRONIC KIDNEY DISEASE (H): ICD-10-CM

## 2022-07-19 DIAGNOSIS — I10 ESSENTIAL HYPERTENSION: ICD-10-CM

## 2022-07-19 DIAGNOSIS — I10 ESSENTIAL HYPERTENSION, BENIGN: ICD-10-CM

## 2022-07-19 LAB
ALT SERPL W P-5'-P-CCNC: 31 U/L (ref 0–70)
CHOLEST SERPL-MCNC: 184 MG/DL
FASTING STATUS PATIENT QL REPORTED: YES
HDLC SERPL-MCNC: 46 MG/DL
LDLC SERPL CALC-MCNC: 96 MG/DL
NONHDLC SERPL-MCNC: 138 MG/DL
TRIGL SERPL-MCNC: 209 MG/DL

## 2022-07-19 PROCEDURE — 80061 LIPID PANEL: CPT | Performed by: NURSE PRACTITIONER

## 2022-07-19 PROCEDURE — 36415 COLL VENOUS BLD VENIPUNCTURE: CPT | Performed by: NURSE PRACTITIONER

## 2022-07-19 PROCEDURE — 99214 OFFICE O/P EST MOD 30 MIN: CPT | Performed by: INTERNAL MEDICINE

## 2022-07-19 PROCEDURE — 84460 ALANINE AMINO (ALT) (SGPT): CPT | Performed by: NURSE PRACTITIONER

## 2022-07-19 RX ORDER — FENOFIBRATE 54 MG/1
54 TABLET ORAL DAILY
COMMUNITY

## 2022-07-19 RX ORDER — ASPIRIN 81 MG/1
81 TABLET ORAL DAILY
COMMUNITY

## 2022-07-19 RX ORDER — ROSUVASTATIN CALCIUM 10 MG/1
10 TABLET, COATED ORAL DAILY
Qty: 90 TABLET | Refills: 3 | Status: SHIPPED | OUTPATIENT
Start: 2022-07-19 | End: 2023-08-08

## 2022-07-19 NOTE — PROGRESS NOTES
CARDIOLOGY CLINIC FOLLOW-UP NOTE      REASON FOR VISIT:   Follow-up CAD    PRIMARY CARE PHYSICIAN:  Poli Alberto        History of Present Illness   Sunil Lyle is an extremely pleasant 82 year old male, previously a patient of Dr. Medina until his snf, here for routine follow-up.  He has a history of CAD for which he underwent stenting of the right coronary artery in 2001, with reportedly minimal CAD elsewhere at that time.  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      Since his last visit, he reports that he has had multiple significant medical issues.  He was hospitalized in 3/2022 due to hematuria and elevated lactic acid and went for exploratory laparotomy with no significant abnormalities apparently found.  During that hospitalization he was found to have 2-1 high-grade AV block and had a Biotronik PPM implanted by Dr. Elliott.  Of note, initial blood cultures during this hospitalization were positive for staph epidermidis, and patient was followed by ID and it was unclear whether this was likely to be a contaminant versus a true infection.  However, PPM was ultimately felt to be safe for implantation was done, and since that time there have been no issues with the PPM or any suggestion of smoldering infection/endocarditis.  In addition, in 6/2022 he underwent a TURP for BPH with obstruction, which was somewhat uncomfortable for him but he did well with without significant issues.    At present, he actually tells me that he feels quite well.  He thinks that the pacemaker is doing great and has no significant cardiac complaints.  No chest pain or severe shortness of breath.  When  climbing multiple flights of stairs he does feel short of breath and occasionally needs to stop, but this is not out of the ordinary for him.  No lightheadedness/palpitations/syncope.  No significant lower extremity swelling.    His most recent labs are from 7/19/2022, and show a total cholesterol of 184, HDL 46, LDL 96, and triglycerides 209.  His most recent creatinine was 1.11 from 5/24/2022.  His hemoglobin in Care Everywhere had rebounded to 13.1 by 5/24/2022.  His most recent echocardiogram was from 3/19/2022, and showed mild-moderate concentric LVH, normal LV EF, normal RV size and function, moderate pulmonary hypertension by echo, no significant valve disease.  His most recent ischemic evaluation was an exercise stress echo from 2017 where he exercised for 6 minutes on a Yvon protocol, with no ECG or echocardiographic evidence of exercise-induced ischemia.  Finally, his most recent device interrogation was from 5/17/2022, and showed stable parameters, good battery life, and no atrial or ventricular dysrhythmias.      Assessment & Plan     1. CAD s/p RCA PCI in 2001, currently CCS 0 angina  2. 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 Paroxysmal AF in the setting of severe systemic illness/recent surgery in March/April 2022 without obvious recurrence  4. Moderate pulmonary hypertension by echo  5. Hypertension  6. Mixed hyperlipidemia, with suboptimal control  7. COPD  8. CKD 3  9. BPH with TURP in 6/2022  10. Former tobacco abuse (quit in 2018)      It was a pleasure to meet with Gerard again in clinic today.  I am sorry to hear that he had such an eventful past few months, but I am also glad to see that he appears to be doing well recently and making a great recovery.  In regards to his coronary disease, he does not have any clear angina, so we will hold off on any further ischemic testing at this point.  His lipid control has worsened somewhat compared to last year, which  probably is related to his multiple hospitalizations and inability to maintain a healthy diet during that time.  We discussed the possibility of simply working on diet/exercise alone and keeping his medications unchanged for now, but given that he is not on a high potency statin despite his known coronary disease, I think that switching to rosuvastatin as suggested by his primary is a very good idea.  For now, we will start with just 10 mg daily of Crestor, in addition to his 108 mg daily of fenofibrate, and repeat a lipid panel in about 3 months.  We can titrate further based on this.    In regards to his pacemaker, this is being well monitored by the device clinic and appears to be functioning well without evidence of infection.  He did have paroxysmal atrial fibrillation in the setting of his significant systemic illness/surgeries, but there has not been any documented evidence of recurrence since then.  For now, we will continue with baby aspirin alone, but we will be following his device interrogations closely and if there is evidence of recurrent mode switching, we will discuss changing this to an anticoagulant, likely a DOAC.      -Change simvastatin 80 mg daily to Crestor 10 mg daily  -Continue fenofibrate 108 mg daily  -Continue baby aspirin  -Continue to monitor device interrogation for evidence of mode switching; will consider changing baby aspirin to DOAC if this is seen.      Follow-up: 3 months with JENN, with lipid panel beforehand        Ross Landers MD  Interventional Cardiology  July 19, 2022        Medications   Current Outpatient Medications   Medication     aspirin 81 MG EC tablet     fenofibrate (LOFIBRA) 54 MG tablet     multivitamin w/minerals (THERA-VIT-M) tablet     simvastatin (ZOCOR) 80 MG tablet     vitamin D3 (CHOLECALCIFEROL) 50 mcg (2000 units) tablet     No current facility-administered medications for this visit.     Allergies   Allergies   Allergen Reactions     Pcn  [Penicillins] Anaphylaxis     Chantix [Varenicline] Other (See Comments)     CNS side effects     Bupropion Hcl      vivid dreams     Ciprofloxacin Rash     Lactose Diarrhea         Physical Exam       BP: 131/73 Pulse: 79            Vital Signs with Ranges  Pulse:  [79] 79  BP: (131)/(73) 131/73  168 lbs 0 oz    Constitutional: Well-appearing, no acute distress, left-sided pacer site C/D/I, no hematoma, nontender  Respiratory: Normal respiratory effort, CTAB  Cardiovascular: RRR, no m/r/g.  JVP < 7 cm H2O.  There is no LE edema.  Normal carotid upstrokes, no carotid bruits.

## 2022-07-19 NOTE — LETTER
7/19/2022    Poli Alberto MD  [address] [place]   [facility] [address]    RE: Sunil Lyle       Dear Colleague,     I had the pleasure of seeing Sunil Lyle in the [facility].  CARDIOLOGY CLINIC FOLLOW-UP NOTE      REASON FOR VISIT:   Follow-up CAD    PRIMARY CARE PHYSICIAN:  Poli Alberto        History of Present Illness   Sunil Lyle is an extremely pleasant 82 year old male, previously a patient of Dr. Medina until his senior living, here for routine follow-up.  He has a history of CAD for which he underwent stenting of the right coronary artery in 2001, with reportedly minimal CAD elsewhere at that time.  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      Since his last visit, he reports that he has had multiple significant medical issues.  He was hospitalized in 3/2022 due to hematuria and elevated lactic acid and went for exploratory laparotomy with no significant abnormalities apparently found.  During that hospitalization he was found to have 2-1 high-grade AV block and had a Biotronik PPM implanted by Dr. Elliott.  Of note, initial blood cultures during this hospitalization were positive for staph epidermidis, and patient was followed by ID and it was unclear whether this was likely to be a contaminant versus a true infection.  However, PPM was ultimately felt to be safe for implantation was done, and since that time there have been no issues with the PPM or any suggestion of smoldering infection/endocarditis.  In addition, in 6/2022 he underwent a TURP for BPH with obstruction, which was somewhat uncomfortable for him but he did  well with without significant issues.    At present, he actually tells me that he feels quite well.  He thinks that the pacemaker is doing great and has no significant cardiac complaints.  No chest pain or severe shortness of breath.  When climbing multiple flights of stairs he does feel short of breath and occasionally needs to stop, but this is not out of the ordinary for him.  No lightheadedness/palpitations/syncope.  No significant lower extremity swelling.    His most recent labs are from 7/19/2022, and show a total cholesterol of 184, HDL 46, LDL 96, and triglycerides 209.  His most recent creatinine was 1.11 from 5/24/2022.  His hemoglobin in Care Everywhere had rebounded to 13.1 by 5/24/2022.  His most recent echocardiogram was from 3/19/2022, and showed mild-moderate concentric LVH, normal LV EF, normal RV size and function, moderate pulmonary hypertension by echo, no significant valve disease.  His most recent ischemic evaluation was an exercise stress echo from 2017 where he exercised for 6 minutes on a Yvon protocol, with no ECG or echocardiographic evidence of exercise-induced ischemia.  Finally, his most recent device interrogation was from 5/17/2022, and showed stable parameters, good battery life, and no atrial or ventricular dysrhythmias.      Assessment & Plan     1. CAD s/p RCA PCI in 2001, currently CCS 0 angina  2. 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 Paroxysmal AF in the setting of severe systemic illness/recent surgery in March/April 2022 without obvious recurrence  4. Moderate pulmonary hypertension by echo  5. Hypertension  6. Mixed hyperlipidemia, with suboptimal control  7. COPD  8. CKD 3  9. BPH with TURP in 6/2022  10. Former tobacco abuse (quit in 2018)      It was a pleasure to meet with Gerard again in clinic today.  I am sorry to hear that he had such an eventful past few months, but I am also glad to see that he appears to be doing well recently  and making a great recovery.  In regards to his coronary disease, he does not have any clear angina, so we will hold off on any further ischemic testing at this point.  His lipid control has worsened somewhat compared to last year, which probably is related to his multiple hospitalizations and inability to maintain a healthy diet during that time.  We discussed the possibility of simply working on diet/exercise alone and keeping his medications unchanged for now, but given that he is not on a high potency statin despite his known coronary disease, I think that switching to rosuvastatin as suggested by his primary is a very good idea.  For now, we will start with just 10 mg daily of Crestor, in addition to his 108 mg daily of fenofibrate, and repeat a lipid panel in about 3 months.  We can titrate further based on this.    In regards to his pacemaker, this is being well monitored by the device clinic and appears to be functioning well without evidence of infection.  He did have paroxysmal atrial fibrillation in the setting of his significant systemic illness/surgeries, but there has not been any documented evidence of recurrence since then.  For now, we will continue with baby aspirin alone, but we will be following his device interrogations closely and if there is evidence of recurrent mode switching, we will discuss changing this to an anticoagulant, likely a DOAC.      -Change simvastatin 80 mg daily to Crestor 10 mg daily  -Continue fenofibrate 108 mg daily  -Continue baby aspirin  -Continue to monitor device interrogation for evidence of mode switching; will consider changing baby aspirin to DOAC if this is seen.      Follow-up: 3 months with JENN, with lipid panel beforehand        Ross Landers MD  Interventional Cardiology  July 19, 2022        Medications   Current Outpatient Medications   Medication     aspirin 81 MG EC tablet     fenofibrate (LOFIBRA) 54 MG tablet     multivitamin w/minerals  (THERA-VIT-M) tablet     simvastatin (ZOCOR) 80 MG tablet     vitamin D3 (CHOLECALCIFEROL) 50 mcg (2000 units) tablet     No current facility-administered medications for this visit.     Allergies   Allergies   Allergen Reactions     Pcn [Penicillins] Anaphylaxis     Chantix [Varenicline] Other (See Comments)     CNS side effects     Bupropion Hcl      vivid dreams     Ciprofloxacin Rash     Lactose Diarrhea       Physical Exam       BP: 131/73 Pulse: 79            Vital Signs with Ranges  Pulse:  [79] 79  BP: (131)/(73) 131/73  168 lbs 0 oz    Constitutional: Well-appearing, no acute distress, left-sided pacer site C/D/I, no hematoma, nontender  Respiratory: Normal respiratory effort, CTAB  Cardiovascular: RRR, no m/r/g.  JVP < 7 cm H2O.  There is no LE edema.  Normal carotid upstrokes, no carotid bruits.    Thank you for allowing me to participate in the care of your patient.      Sincerely,     Ross Landers MD     [facility]  cc:   No referring provider defined for this encounter.

## 2022-08-16 ENCOUNTER — ANCILLARY PROCEDURE (OUTPATIENT)
Dept: CARDIOLOGY | Facility: CLINIC | Age: 82
End: 2022-08-16
Attending: INTERNAL MEDICINE
Payer: COMMERCIAL

## 2022-08-16 DIAGNOSIS — I44.1 MOBITZ II: ICD-10-CM

## 2022-08-16 DIAGNOSIS — Z95.0 CARDIAC PACEMAKER IN SITU: ICD-10-CM

## 2022-08-16 PROCEDURE — 93294 REM INTERROG EVL PM/LDLS PM: CPT | Performed by: INTERNAL MEDICINE

## 2022-08-16 PROCEDURE — 93296 REM INTERROG EVL PM/IDS: CPT | Performed by: INTERNAL MEDICINE

## 2022-08-24 LAB
MDC_IDC_LEAD_IMPLANT_DT: NORMAL
MDC_IDC_LEAD_IMPLANT_DT: NORMAL
MDC_IDC_LEAD_LOCATION: NORMAL
MDC_IDC_LEAD_LOCATION: NORMAL
MDC_IDC_LEAD_LOCATION_DETAIL_1: NORMAL
MDC_IDC_LEAD_MFG: NORMAL
MDC_IDC_LEAD_MFG: NORMAL
MDC_IDC_LEAD_MODEL: NORMAL
MDC_IDC_LEAD_MODEL: NORMAL
MDC_IDC_LEAD_POLARITY_TYPE: NORMAL
MDC_IDC_LEAD_POLARITY_TYPE: NORMAL
MDC_IDC_LEAD_SERIAL: NORMAL
MDC_IDC_LEAD_SERIAL: NORMAL
MDC_IDC_MSMT_BATTERY_REMAINING_PERCENTAGE: 100 %
MDC_IDC_MSMT_BATTERY_STATUS: NORMAL
MDC_IDC_MSMT_LEADCHNL_RA_IMPEDANCE_VALUE: 552 OHM
MDC_IDC_MSMT_LEADCHNL_RA_LEAD_CHANNEL_STATUS: NORMAL
MDC_IDC_MSMT_LEADCHNL_RA_PACING_THRESHOLD_AMPLITUDE: 0.6 V
MDC_IDC_MSMT_LEADCHNL_RA_PACING_THRESHOLD_PULSEWIDTH: 0.4 MS
MDC_IDC_MSMT_LEADCHNL_RV_IMPEDANCE_VALUE: 668 OHM
MDC_IDC_MSMT_LEADCHNL_RV_LEAD_CHANNEL_STATUS: NORMAL
MDC_IDC_MSMT_LEADCHNL_RV_PACING_THRESHOLD_AMPLITUDE: 0.7 V
MDC_IDC_MSMT_LEADCHNL_RV_PACING_THRESHOLD_PULSEWIDTH: 0.4 MS
MDC_IDC_PG_IMPLANT_DTM: NORMAL
MDC_IDC_PG_MFG: NORMAL
MDC_IDC_PG_MODEL: NORMAL
MDC_IDC_PG_SERIAL: NORMAL
MDC_IDC_PG_TYPE: NORMAL
MDC_IDC_SESS_CLINIC_NAME: NORMAL
MDC_IDC_SESS_DTM: NORMAL
MDC_IDC_SESS_REPROGRAMMED: NO
MDC_IDC_SESS_TYPE: NORMAL
MDC_IDC_SET_BRADY_AT_MODE_SWITCH_MODE: NORMAL
MDC_IDC_SET_BRADY_AT_MODE_SWITCH_RATE: 180 {BEATS}/MIN
MDC_IDC_SET_BRADY_LOWRATE: 60 {BEATS}/MIN
MDC_IDC_SET_BRADY_MAX_SENSOR_RATE: 120 {BEATS}/MIN
MDC_IDC_SET_BRADY_MAX_TRACKING_RATE: 130 {BEATS}/MIN
MDC_IDC_SET_BRADY_MODE: NORMAL
MDC_IDC_SET_BRADY_PAV_DELAY_HIGH: 160 MS
MDC_IDC_SET_BRADY_PAV_DELAY_LOW: 200 MS
MDC_IDC_SET_BRADY_SAV_DELAY_HIGH: 130 MS
MDC_IDC_SET_BRADY_SAV_DELAY_LOW: 170 MS
MDC_IDC_SET_LEADCHNL_RA_PACING_AMPLITUDE: 1.5 V
MDC_IDC_SET_LEADCHNL_RA_PACING_POLARITY: NORMAL
MDC_IDC_SET_LEADCHNL_RA_PACING_PULSEWIDTH: 0.4 MS
MDC_IDC_SET_LEADCHNL_RA_SENSING_ADAPTATION_MODE: NORMAL
MDC_IDC_SET_LEADCHNL_RA_SENSING_POLARITY: NORMAL
MDC_IDC_SET_LEADCHNL_RV_PACING_AMPLITUDE: 1.2 V
MDC_IDC_SET_LEADCHNL_RV_PACING_POLARITY: NORMAL
MDC_IDC_SET_LEADCHNL_RV_PACING_PULSEWIDTH: 0.4 MS
MDC_IDC_SET_LEADCHNL_RV_SENSING_ADAPTATION_MODE: NORMAL
MDC_IDC_SET_LEADCHNL_RV_SENSING_POLARITY: NORMAL
MDC_IDC_STAT_AT_BURDEN_PERCENT: 0 %
MDC_IDC_STAT_AT_DTM_END: NORMAL
MDC_IDC_STAT_AT_DTM_START: NORMAL
MDC_IDC_STAT_AT_MODE_SW_COUNT_PER_DAY: 0
MDC_IDC_STAT_AT_MODE_SW_PERCENT_TIME_PER_DAY: 0 %
MDC_IDC_STAT_BRADY_AP_VP_PERCENT: 29 %
MDC_IDC_STAT_BRADY_AP_VS_PERCENT: 0 %
MDC_IDC_STAT_BRADY_AS_VP_PERCENT: 69 %
MDC_IDC_STAT_BRADY_AS_VS_PERCENT: 0 %
MDC_IDC_STAT_BRADY_DTM_END: NORMAL
MDC_IDC_STAT_BRADY_DTM_START: NORMAL
MDC_IDC_STAT_BRADY_RA_PERCENT_PACED: 30 %
MDC_IDC_STAT_BRADY_RV_PERCENT_PACED: 99 %
MDC_IDC_STAT_CRT_DTM_END: NORMAL
MDC_IDC_STAT_CRT_DTM_START: NORMAL

## 2022-10-17 ENCOUNTER — OFFICE VISIT (OUTPATIENT)
Dept: CARDIOLOGY | Facility: CLINIC | Age: 82
End: 2022-10-17
Attending: INTERNAL MEDICINE
Payer: COMMERCIAL

## 2022-10-17 VITALS
HEIGHT: 70 IN | BODY MASS INDEX: 24.77 KG/M2 | SYSTOLIC BLOOD PRESSURE: 143 MMHG | DIASTOLIC BLOOD PRESSURE: 70 MMHG | OXYGEN SATURATION: 100 % | WEIGHT: 173 LBS | HEART RATE: 66 BPM

## 2022-10-17 DIAGNOSIS — E78.5 HYPERLIPIDEMIA LDL GOAL <70: Primary | ICD-10-CM

## 2022-10-17 DIAGNOSIS — I25.10 CORONARY ARTERY DISEASE INVOLVING NATIVE CORONARY ARTERY OF NATIVE HEART WITHOUT ANGINA PECTORIS: ICD-10-CM

## 2022-10-17 PROCEDURE — 99214 OFFICE O/P EST MOD 30 MIN: CPT | Performed by: NURSE PRACTITIONER

## 2022-10-17 NOTE — LETTER
10/17/2022    Poli Alberto MD  [address]   [facility] [address]    RE: Sunil Lyle       Dear Colleague,     I had the pleasure of seeing Sunil Lyle in the [facility].  Cardiology Clinic Progress Note  Sunil Lyle MRN# [id number]   YOB: 1940 Age: 82 year old   Primary Cardiologist: Dr. Landers Reason for visit: Follow-up hyperlipidemia            Assessment and Plan:       1.  Hyperlipidemia  Changed to rosuvastatin 10 mg daily in July 2022.  Unfortunately patient did not have his lipid panel checked today. Will plan on having him complete this within the next week when he is fasting.     2.  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 Patient is due for another remote check next month.     3.  Coronary artery disease, status post DEANA to RCA (2001)  Of the last 2 months he is experienced symptoms of epigastric discomfort after eating the evenings relieved with Tums and concurrent exertional dyspnea.  His symptoms do sound suspicious for reflux and he is going to trial a last evening to see if they resolve, however considering his history of coronary disease and the fact it has been 5 years since he has had an ischemic work-up, I will have him undergo a Lexiscan nuclear stress test.  He should continue his baby aspirin, rosuvastatin, and fenofibrate.     4.  Mild to moderate pulmonary hypertension (per echo 3/2022)  We discussed today that his pulmonary hypertension may be contributing to his exertional dyspnea, however we will still have him complete a Lexiscan nuclear stress test to rule out ischemia.    5. Paroxysmal atrial fibrillation  No evidence of recurrence on most recent 8/2022 device check. Continue aspirin and q3 month device checks.     Changes today: No medication changes made, patient undergo Lexiscan nuclear stress test and have  fasting lipid panel drawn within the next week    Follow up plan: Patient return to clinic to review the results of his Lexiscan nuclear stress test and lipid panel after they has been completed        History of Presenting Illness:    Sunil Lyle is a very pleasant 82 year old male with a history of coronary artery disease, s/p DEANA to RCA (2001), 2:1 AV block status post Biotronik permanent pacemaker (3/2022), paroxysmal atrial fibrillation (in the setting of illness/surgery April 2022) with no obvious recurrence, moderate pulmonary hypertension, hypertension, hyperlipidemia, COPD, CKD stage III, BPH (turp 6/2022), former tobacco use (quit in 2018).     Patient is well-known to our clinic and most recently seen by Dr. Landers in July 2022.  His lipids were suboptimally controlled at that time with a total cholesterol 94, HDL 46, LDL 96, triglycerides 209.  Rosuvastatin 10 mg daily was added to his regimen of fenofibrate and simvastatin was discontinued.    His most recent pacemaker check done 8/16/2022 showed 99% V pacing, no atrial ventricular arrhythmias, heart rates were adequate per histogram.    Patient is here today for follow-up on his hyperlipidemia. Unfortunately he did not have his lipid panel drawn prior to our visit today.    Patient reports feeling good. Tells me he has replaced smoking, which he quit 5 years ago, with snacking. He gets epigastric pain at night after lying down at night. Resolves with 1-2 tums. If he eats at a restaurant (and does not late night snack) and or naps during the day, it does not occur.  Started within the last 2 months since he was seen last by Dr. Landers.      He is quite sedentary. He has a home with stairs and goes up and down the stairs. He gets winded with exertion. Denies any chest discomfort with exertion.     Denies dizziness, lightheadedness or other presyncopal symptoms. Denies tachycardia or palpitations.     Blood pressure 138/81 and HR 66 in clinic  today.        Recent Hospitalizations   2022 TURP          Social History    , retired  Social History     Socioeconomic History     Marital status:      Spouse name: Not on file     Number of children: Not on file     Years of education: Not on file     Highest education level: Not on file   Occupational History     Occupation: Sales/Computer Training     Employer: RETIRED   Tobacco Use     Smoking status: Former     Packs/day: 0.50     Years: 33.50     Pack years: 16.75     Types: Cigarettes     Start date:      Quit date: 2017     Years since quittin.9     Smokeless tobacco: Never   Vaping Use     Vaping Use: Never used   Substance and Sexual Activity     Alcohol use: Yes     Alcohol/week: 3.3 standard drinks     Types: 4 Shots of liquor per week     Comment: 5-6 drink per week     Drug use: No     Sexual activity: Not on file   Other Topics Concern      Service Not Asked     Comment: [place] [phone]     Blood Transfusions No     Caffeine Concern Yes     Occupational Exposure No     Hobby Hazards No     Sleep Concern No     Stress Concern No     Weight Concern No     Special Diet No     Back Care No     Exercise No     Bike Helmet Not Asked     Seat Belt Yes     Self-Exams Not Asked     Parent/sibling w/ CABG, MI or angioplasty before 65F 55M? Not Asked   Social History Narrative    ** Merged History Encounter **          Social Determinants of Health     Financial Resource Strain: Not on file   Food Insecurity: Not on file   Transportation Needs: Not on file   Physical Activity: Not on file   Stress: Not on file   Social Connections: Not on file   Intimate Partner Violence: Not on file   Housing Stability: Not on file            Review of Systems:   Skin:  Negative     Eyes:  Positive for glasses  ENT:  Positive for hearing loss  Respiratory:  Positive for dyspnea on exertion  Cardiovascular:  fatigue;dizziness;Negative;chest pain;palpitations;edema Positive  "for;lightheadedness  Gastroenterology: Positive for heartburn  Genitourinary:  Positive for prostate problem  Musculoskeletal:  not assessed  (finger only on left hand)  Neurologic:  not assessed    Psychiatric:  not assessed    Heme/Lymph/Imm:  Negative    Endocrine:  Negative thyroid disorder;diabetes         Physical Exam:   Vitals: BP (!) 143/70   Pulse 66   Ht 1.778 m (5' 10\")   Wt 78.5 kg (173 lb)   SpO2 100%   BMI 24.82 kg/m     Wt Readings from Last 4 Encounters:   10/17/22 78.5 kg (173 lb)   07/19/22 76.2 kg (168 lb)   06/01/22 74.8 kg (164 lb 12.8 oz)   04/12/22 73.9 kg (163 lb)     GEN: well nourished, in no acute distress.  HEENT:  Pupils equal, round. Sclerae nonicteric.   NECK: Supple, no masses appreciated. No JVD with patient reclined. No carotid bruit  C/V:  Regular rate and rhythm, no murmur, rub or gallop.    RESP: Respirations are unlabored. Clear to auscultation bilaterally without wheezing, rales, or rhonchi.  GI: Abdomen soft, nontender.  EXTREM: no LE edema.  NEURO: Alert and oriented, cooperative.  SKIN: Warm and dry.        Data:     LIPID RESULTS:  Lab Results   Component Value Date    CHOL 184 07/19/2022    CHOL 130 03/26/2021    HDL 46 07/19/2022    HDL 33 (L) 03/26/2021    LDL 96 07/19/2022    LDL 60 03/26/2021    TRIG 209 (H) 07/19/2022    TRIG 186 (H) 03/26/2021    CHOLHDLRATIO 3.2 06/18/2015     LIVER ENZYME RESULTS:  Lab Results   Component Value Date    AST 22 03/22/2022    AST 18 03/14/2017    ALT 31 07/19/2022    ALT 26 03/26/2021     CBC RESULTS:  Lab Results   Component Value Date    WBC 7.7 03/25/2022    WBC 6.6 03/14/2017    RBC 2.05 (L) 03/24/2022    RBC 4.34 (L) 03/14/2017    HGB 7.9 (L) 03/26/2022    HGB 14.0 03/14/2017    HCT 22.2 (L) 03/24/2022    HCT 42.0 03/14/2017     (H) 03/24/2022    MCV 97 03/14/2017    MCH 33.7 (H) 03/24/2022    MCH 32.3 03/14/2017    MCHC 31.1 (L) 03/24/2022    MCHC 33.3 03/14/2017    RDW 17.4 (H) 03/24/2022    RDW 15.0 03/14/2017    "  03/24/2022     03/14/2017     BMP RESULTS:  Lab Results   Component Value Date     03/26/2022     07/14/2017    POTASSIUM 3.8 06/01/2022    POTASSIUM 4.1 07/14/2017    CHLORIDE 114 (H) 03/26/2022    CHLORIDE 105 07/14/2017    CO2 24 03/26/2022    CO2 27 07/14/2017    ANIONGAP 5 03/26/2022    ANIONGAP 13.1 07/14/2017     (H) 03/26/2022     (H) 07/14/2017    BUN 18 03/26/2022    BUN 12 07/14/2017    CR 0.96 03/26/2022    CR 1.21 07/14/2017    GFRESTIMATED 79 03/26/2022    GFRESTIMATED 53 (L) 11/17/2017    GFRESTBLACK 65 11/17/2017    PAIGE 8.5 03/26/2022    PAIGE 9.2 07/14/2017      A1C RESULTS:  Lab Results   Component Value Date    A1C 5.4 03/20/2022     INR RESULTS:  Lab Results   Component Value Date    INR 1.1 05/24/2022    INR 1.21 (H) 03/19/2022    INR 1.03 03/28/2009            Medications     Current Outpatient Medications   Medication Sig Dispense Refill     aspirin 81 MG EC tablet Take 81 mg by mouth daily       fenofibrate (LOFIBRA) 54 MG tablet Take 54 mg by mouth daily 2 tablets daily.       multivitamin w/minerals (THERA-VIT-M) tablet Take 1 tablet by mouth daily        rosuvastatin (CRESTOR) 10 MG tablet Take 1 tablet (10 mg) by mouth daily 90 tablet 3     vitamin D3 (CHOLECALCIFEROL) 50 mcg (2000 units) tablet Take 2,000 Units by mouth daily            Past Medical History     Past Medical History:   Diagnosis Date     Allergic state      BPH (benign prostatic hyperplasia)      CAD (coronary artery disease) 05/22/2014     s/p stent to RCA (2001)     COPD (chronic obstructive pulmonary disease) (H)      GERD (gastroesophageal reflux disease)      Hypercholesterolaemia      Hyperlipidaemia      Other affections of shoulder region, not elsewhere classified     impingement syndrome of right shoulder     Renal disease      Sebaceous cyst     right shoulder area     Tachycardia      Tobacco use disorder 03/05/2008     Past Surgical History:   Procedure Laterality Date      COLONOSCOPY       CYSTOSCOPY, TRANSURETHRAL RESECTION (TUR) PROSTATE, COMBINED N/A 2022    Procedure: CYSTOSCOPY, WITH TRANSURETHRAL RESECTION PROSTATE;  Surgeon: Tyree Helton MD;  Location:  OR     EP PACEMAKER DEVICE & LEAD IMPLANT- RIGHT ATRIAL & RIGHT VENTRICULAR Left 3/23/2022    Procedure: Pacemaker Device & Lead Implant - Right Atrial & Right Ventricular;  Surgeon: Thai Elliott MD;  Location:  HEART CARDIAC CATH LAB     HEART CATH, ANGIOPLASTY  2001    RCA stent     LAPAROTOMY EXPLORATORY N/A 3/19/2022    Procedure: EXPLORATORY LAPAROTOMY, SUPERIOR MESENTERIC ARTERY EXPLORATION;  Surgeon: Smita Garsia MD;  Location:  OR     TONSILLECTOMY & ADENOIDECTOMY       VASCULAR SURGERY       Family History   Problem Relation Age of Onset     Breast Cancer Mother          at age 64     Heart Disease Father         heart attack     Diabetes Father             Allergies   Pcn [penicillins], Chantix [varenicline], Bupropion hcl, Ciprofloxacin, and Lactose        Tali Mchugh NP  [facility]  Pager: [phone]      Thank you for allowing me to participate in the care of your patient.      Sincerely,     Tali Mchugh NP     [facility]  cc:   Ross Landers MD  [address] [address]

## 2022-10-17 NOTE — PATIENT INSTRUCTIONS
Today's Recommendations    I would like you to have your fasting cholesterol checked this week  I would like to have a stress test  Continue all medications without changes.  Please follow up with me after your stress test.    Please send a [facility] message or call [phone] to the RN team with questions or concerns.     Scheduling number [phone]  YARITZA Menon, CNP

## 2022-10-17 NOTE — PROGRESS NOTES
Cardiology Clinic Progress Note  Sunil Lyle MRN# [id number]   YOB: 1940 Age: 82 year old   Primary Cardiologist: Dr. Landers Reason for visit: Follow-up hyperlipidemia            Assessment and Plan:       1.  Hyperlipidemia  Changed to rosuvastatin 10 mg daily in July 2022.  Unfortunately patient did not have his lipid panel checked today. Will plan on having him complete this within the next week when he is fasting.     2.  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 Patient is due for another remote check next month.     3.  Coronary artery disease, status post DEANA to RCA (2001)  Of the last 2 months he is experienced symptoms of epigastric discomfort after eating the evenings relieved with Tums and concurrent exertional dyspnea.  His symptoms do sound suspicious for reflux and he is going to trial a last evening to see if they resolve, however considering his history of coronary disease and the fact it has been 5 years since he has had an ischemic work-up, I will have him undergo a Lexiscan nuclear stress test.  He should continue his baby aspirin, rosuvastatin, and fenofibrate.     4.  Mild to moderate pulmonary hypertension (per echo 3/2022)  We discussed today that his pulmonary hypertension may be contributing to his exertional dyspnea, however we will still have him complete a Lexiscan nuclear stress test to rule out ischemia.    5. Paroxysmal atrial fibrillation  No evidence of recurrence on most recent 8/2022 device check. Continue aspirin and q3 month device checks.     Changes today: No medication changes made, patient undergo Lexiscan nuclear stress test and have fasting lipid panel drawn within the next week    Follow up plan: Patient return to clinic to review the results of his Lexiscan nuclear stress test and lipid panel after they has been completed        History of Presenting Illness:     Sunil Lyle is a very pleasant 82 year old male with a history of coronary artery disease, s/p DEANA to RCA (2001), 2:1 AV block status post Biotronik permanent pacemaker (3/2022), paroxysmal atrial fibrillation (in the setting of illness/surgery April 2022) with no obvious recurrence, moderate pulmonary hypertension, hypertension, hyperlipidemia, COPD, CKD stage III, BPH (turp 6/2022), former tobacco use (quit in 2018).     Patient is well-known to our clinic and most recently seen by Dr. Landers in July 2022.  His lipids were suboptimally controlled at that time with a total cholesterol 94, HDL 46, LDL 96, triglycerides 209.  Rosuvastatin 10 mg daily was added to his regimen of fenofibrate and simvastatin was discontinued.    His most recent pacemaker check done 8/16/2022 showed 99% V pacing, no atrial ventricular arrhythmias, heart rates were adequate per histogram.    Patient is here today for follow-up on his hyperlipidemia. Unfortunately he did not have his lipid panel drawn prior to our visit today.    Patient reports feeling good. Tells me he has replaced smoking, which he quit 5 years ago, with snacking. He gets epigastric pain at night after lying down at night. Resolves with 1-2 tums. If he eats at a restaurant (and does not late night snack) and or naps during the day, it does not occur.  Started within the last 2 months since he was seen last by Dr. Landers.      He is quite sedentary. He has a home with stairs and goes up and down the stairs. He gets winded with exertion. Denies any chest discomfort with exertion.     Denies dizziness, lightheadedness or other presyncopal symptoms. Denies tachycardia or palpitations.     Blood pressure 138/81 and HR 66 in clinic today.        Recent Hospitalizations   June 2022 TURP          Social History    , retired  Social History     Socioeconomic History     Marital status:      Spouse name: Not on file     Number of children: Not on file      Years of education: Not on file     Highest education level: Not on file   Occupational History     Occupation: Sales/Computer Training     Employer: RETIRED   Tobacco Use     Smoking status: Former     Packs/day: 0.50     Years: 33.50     Pack years: 16.75     Types: Cigarettes     Start date:      Quit date: 2017     Years since quittin.9     Smokeless tobacco: Never   Vaping Use     Vaping Use: Never used   Substance and Sexual Activity     Alcohol use: Yes     Alcohol/week: 3.3 standard drinks     Types: 4 Shots of liquor per week     Comment: 5-6 drink per week     Drug use: No     Sexual activity: Not on file   Other Topics Concern      Service Not Asked     Comment: [place] [id number]     Blood Transfusions No     Caffeine Concern Yes     Occupational Exposure No     Hobby Hazards No     Sleep Concern No     Stress Concern No     Weight Concern No     Special Diet No     Back Care No     Exercise No     Bike Helmet Not Asked     Seat Belt Yes     Self-Exams Not Asked     Parent/sibling w/ CABG, MI or angioplasty before 65F 55M? Not Asked   Social History Narrative    ** Merged History Encounter **          Social Determinants of Health     Financial Resource Strain: Not on file   Food Insecurity: Not on file   Transportation Needs: Not on file   Physical Activity: Not on file   Stress: Not on file   Social Connections: Not on file   Intimate Partner Violence: Not on file   Housing Stability: Not on file            Review of Systems:   Skin:  Negative     Eyes:  Positive for glasses  ENT:  Positive for hearing loss  Respiratory:  Positive for dyspnea on exertion  Cardiovascular:  fatigue;dizziness;Negative;chest pain;palpitations;edema Positive for;lightheadedness  Gastroenterology: Positive for heartburn  Genitourinary:  Positive for prostate problem  Musculoskeletal:  not assessed  (finger only on left hand)  Neurologic:  not assessed    Psychiatric:  not assessed    Heme/Lymph/Imm:   "Negative    Endocrine:  Negative thyroid disorder;diabetes         Physical Exam:   Vitals: BP (!) 143/70   Pulse 66   Ht 1.778 m (5' 10\")   Wt 78.5 kg (173 lb)   SpO2 100%   BMI 24.82 kg/m     Wt Readings from Last 4 Encounters:   10/17/22 78.5 kg (173 lb)   07/19/22 76.2 kg (168 lb)   06/01/22 74.8 kg (164 lb 12.8 oz)   04/12/22 73.9 kg (163 lb)     GEN: well nourished, in no acute distress.  HEENT:  Pupils equal, round. Sclerae nonicteric.   NECK: Supple, no masses appreciated. No JVD with patient reclined. No carotid bruit  C/V:  Regular rate and rhythm, no murmur, rub or gallop.    RESP: Respirations are unlabored. Clear to auscultation bilaterally without wheezing, rales, or rhonchi.  GI: Abdomen soft, nontender.  EXTREM: no LE edema.  NEURO: Alert and oriented, cooperative.  SKIN: Warm and dry.        Data:     LIPID RESULTS:  Lab Results   Component Value Date    CHOL 184 07/19/2022    CHOL 130 03/26/2021    HDL 46 07/19/2022    HDL 33 (L) 03/26/2021    LDL 96 07/19/2022    LDL 60 03/26/2021    TRIG 209 (H) 07/19/2022    TRIG 186 (H) 03/26/2021    CHOLHDLRATIO 3.2 06/18/2015     LIVER ENZYME RESULTS:  Lab Results   Component Value Date    AST 22 03/22/2022    AST 18 03/14/2017    ALT 31 07/19/2022    ALT 26 03/26/2021     CBC RESULTS:  Lab Results   Component Value Date    WBC 7.7 03/25/2022    WBC 6.6 03/14/2017    RBC 2.05 (L) 03/24/2022    RBC 4.34 (L) 03/14/2017    HGB 7.9 (L) 03/26/2022    HGB 14.0 03/14/2017    HCT 22.2 (L) 03/24/2022    HCT 42.0 03/14/2017     (H) 03/24/2022    MCV 97 03/14/2017    MCH 33.7 (H) 03/24/2022    MCH 32.3 03/14/2017    MCHC 31.1 (L) 03/24/2022    MCHC 33.3 03/14/2017    RDW 17.4 (H) 03/24/2022    RDW 15.0 03/14/2017     03/24/2022     03/14/2017     BMP RESULTS:  Lab Results   Component Value Date     03/26/2022     07/14/2017    POTASSIUM 3.8 06/01/2022    POTASSIUM 4.1 07/14/2017    CHLORIDE 114 (H) 03/26/2022    CHLORIDE 105 " 07/14/2017    CO2 24 03/26/2022    CO2 27 07/14/2017    ANIONGAP 5 03/26/2022    ANIONGAP 13.1 07/14/2017     (H) 03/26/2022     (H) 07/14/2017    BUN 18 03/26/2022    BUN 12 07/14/2017    CR 0.96 03/26/2022    CR 1.21 07/14/2017    GFRESTIMATED 79 03/26/2022    GFRESTIMATED 53 (L) 11/17/2017    GFRESTBLACK 65 11/17/2017    PAIGE 8.5 03/26/2022    PAIGE 9.2 07/14/2017      A1C RESULTS:  Lab Results   Component Value Date    A1C 5.4 03/20/2022     INR RESULTS:  Lab Results   Component Value Date    INR 1.1 05/24/2022    INR 1.21 (H) 03/19/2022    INR 1.03 03/28/2009            Medications     Current Outpatient Medications   Medication Sig Dispense Refill     aspirin 81 MG EC tablet Take 81 mg by mouth daily       fenofibrate (LOFIBRA) 54 MG tablet Take 54 mg by mouth daily 2 tablets daily.       multivitamin w/minerals (THERA-VIT-M) tablet Take 1 tablet by mouth daily        rosuvastatin (CRESTOR) 10 MG tablet Take 1 tablet (10 mg) by mouth daily 90 tablet 3     vitamin D3 (CHOLECALCIFEROL) 50 mcg (2000 units) tablet Take 2,000 Units by mouth daily            Past Medical History     Past Medical History:   Diagnosis Date     Allergic state      BPH (benign prostatic hyperplasia)      CAD (coronary artery disease) 05/22/2014     s/p stent to RCA (2001)     COPD (chronic obstructive pulmonary disease) (H)      GERD (gastroesophageal reflux disease)      Hypercholesterolaemia      Hyperlipidaemia      Other affections of shoulder region, not elsewhere classified     impingement syndrome of right shoulder     Renal disease      Sebaceous cyst     right shoulder area     Tachycardia      Tobacco use disorder 03/05/2008     Past Surgical History:   Procedure Laterality Date     COLONOSCOPY       CYSTOSCOPY, TRANSURETHRAL RESECTION (TUR) PROSTATE, COMBINED N/A 6/1/2022    Procedure: CYSTOSCOPY, WITH TRANSURETHRAL RESECTION PROSTATE;  Surgeon: Tyree Helton MD;  Location: [facility] OR     EP PACEMAKER DEVICE &  LEAD IMPLANT- RIGHT ATRIAL & RIGHT VENTRICULAR Left 3/23/2022    Procedure: Pacemaker Device & Lead Implant - Right Atrial & Right Ventricular;  Surgeon: Thai Elliott MD;  Location:  HEART CARDIAC CATH LAB     HEART CATH, ANGIOPLASTY  2001    RCA stent     LAPAROTOMY EXPLORATORY N/A 3/19/2022    Procedure: EXPLORATORY LAPAROTOMY, SUPERIOR MESENTERIC ARTERY EXPLORATION;  Surgeon: Smita Garsia MD;  Location:  OR     TONSILLECTOMY & ADENOIDECTOMY       VASCULAR SURGERY       Family History   Problem Relation Age of Onset     Breast Cancer Mother          at age 64     Heart Disease Father         heart attack     Diabetes Father             Allergies   Pcn [penicillins], Chantix [varenicline], Bupropion hcl, Ciprofloxacin, and Lactose        Tali Mchugh NP  [facility]ART [facility]ARE  Pager: [phone]

## 2022-10-23 ENCOUNTER — HEALTH MAINTENANCE LETTER (OUTPATIENT)
Age: 82
End: 2022-10-23

## 2022-10-27 ENCOUNTER — LAB (OUTPATIENT)
Dept: LAB | Facility: CLINIC | Age: 82
End: 2022-10-27
Payer: COMMERCIAL

## 2022-10-27 DIAGNOSIS — I25.10 CORONARY ARTERY DISEASE INVOLVING NATIVE CORONARY ARTERY OF NATIVE HEART WITHOUT ANGINA PECTORIS: ICD-10-CM

## 2022-10-27 LAB
ALT SERPL W P-5'-P-CCNC: 43 U/L (ref 0–70)
CHOLEST SERPL-MCNC: 128 MG/DL
FASTING STATUS PATIENT QL REPORTED: YES
HDLC SERPL-MCNC: 47 MG/DL
LDLC SERPL CALC-MCNC: 53 MG/DL
NONHDLC SERPL-MCNC: 81 MG/DL
TRIGL SERPL-MCNC: 139 MG/DL

## 2022-10-27 PROCEDURE — 36415 COLL VENOUS BLD VENIPUNCTURE: CPT

## 2022-10-27 PROCEDURE — 80061 LIPID PANEL: CPT

## 2022-10-27 PROCEDURE — 84460 ALANINE AMINO (ALT) (SGPT): CPT

## 2022-11-15 ENCOUNTER — ANCILLARY PROCEDURE (OUTPATIENT)
Dept: CARDIOLOGY | Facility: CLINIC | Age: 82
End: 2022-11-15
Attending: INTERNAL MEDICINE
Payer: COMMERCIAL

## 2022-11-15 DIAGNOSIS — Z95.0 CARDIAC PACEMAKER IN SITU: ICD-10-CM

## 2022-11-15 PROCEDURE — 93294 REM INTERROG EVL PM/LDLS PM: CPT | Performed by: INTERNAL MEDICINE

## 2022-11-15 PROCEDURE — 93296 REM INTERROG EVL PM/IDS: CPT | Performed by: INTERNAL MEDICINE

## 2022-11-23 LAB
MDC_IDC_LEAD_IMPLANT_DT: NORMAL
MDC_IDC_LEAD_IMPLANT_DT: NORMAL
MDC_IDC_LEAD_LOCATION: NORMAL
MDC_IDC_LEAD_LOCATION: NORMAL
MDC_IDC_LEAD_LOCATION_DETAIL_1: NORMAL
MDC_IDC_LEAD_MFG: NORMAL
MDC_IDC_LEAD_MFG: NORMAL
MDC_IDC_LEAD_MODEL: NORMAL
MDC_IDC_LEAD_MODEL: NORMAL
MDC_IDC_LEAD_POLARITY_TYPE: NORMAL
MDC_IDC_LEAD_POLARITY_TYPE: NORMAL
MDC_IDC_LEAD_SERIAL: NORMAL
MDC_IDC_LEAD_SERIAL: NORMAL
MDC_IDC_MSMT_BATTERY_REMAINING_PERCENTAGE: 95 %
MDC_IDC_MSMT_BATTERY_STATUS: NORMAL
MDC_IDC_MSMT_LEADCHNL_RA_IMPEDANCE_VALUE: 549 OHM
MDC_IDC_MSMT_LEADCHNL_RA_LEAD_CHANNEL_STATUS: NORMAL
MDC_IDC_MSMT_LEADCHNL_RA_PACING_THRESHOLD_AMPLITUDE: 0.6 V
MDC_IDC_MSMT_LEADCHNL_RA_PACING_THRESHOLD_PULSEWIDTH: 0.4 MS
MDC_IDC_MSMT_LEADCHNL_RV_IMPEDANCE_VALUE: 710 OHM
MDC_IDC_MSMT_LEADCHNL_RV_LEAD_CHANNEL_STATUS: NORMAL
MDC_IDC_MSMT_LEADCHNL_RV_PACING_THRESHOLD_AMPLITUDE: 0.8 V
MDC_IDC_MSMT_LEADCHNL_RV_PACING_THRESHOLD_PULSEWIDTH: 0.4 MS
MDC_IDC_PG_IMPLANT_DTM: NORMAL
MDC_IDC_PG_MFG: NORMAL
MDC_IDC_PG_MODEL: NORMAL
MDC_IDC_PG_SERIAL: NORMAL
MDC_IDC_PG_TYPE: NORMAL
MDC_IDC_SESS_CLINIC_NAME: NORMAL
MDC_IDC_SESS_DTM: NORMAL
MDC_IDC_SESS_REPROGRAMMED: NO
MDC_IDC_SESS_TYPE: NORMAL
MDC_IDC_SET_BRADY_AT_MODE_SWITCH_MODE: NORMAL
MDC_IDC_SET_BRADY_AT_MODE_SWITCH_RATE: 180 {BEATS}/MIN
MDC_IDC_SET_BRADY_LOWRATE: 60 {BEATS}/MIN
MDC_IDC_SET_BRADY_MAX_SENSOR_RATE: 120 {BEATS}/MIN
MDC_IDC_SET_BRADY_MAX_TRACKING_RATE: 130 {BEATS}/MIN
MDC_IDC_SET_BRADY_MODE: NORMAL
MDC_IDC_SET_BRADY_PAV_DELAY_HIGH: 160 MS
MDC_IDC_SET_BRADY_PAV_DELAY_LOW: 200 MS
MDC_IDC_SET_BRADY_SAV_DELAY_HIGH: 130 MS
MDC_IDC_SET_BRADY_SAV_DELAY_LOW: 170 MS
MDC_IDC_SET_LEADCHNL_RA_PACING_AMPLITUDE: 1.8 V
MDC_IDC_SET_LEADCHNL_RA_PACING_POLARITY: NORMAL
MDC_IDC_SET_LEADCHNL_RA_PACING_PULSEWIDTH: 0.4 MS
MDC_IDC_SET_LEADCHNL_RA_SENSING_ADAPTATION_MODE: NORMAL
MDC_IDC_SET_LEADCHNL_RA_SENSING_POLARITY: NORMAL
MDC_IDC_SET_LEADCHNL_RV_PACING_AMPLITUDE: 1.3 V
MDC_IDC_SET_LEADCHNL_RV_PACING_POLARITY: NORMAL
MDC_IDC_SET_LEADCHNL_RV_PACING_PULSEWIDTH: 0.4 MS
MDC_IDC_SET_LEADCHNL_RV_SENSING_ADAPTATION_MODE: NORMAL
MDC_IDC_SET_LEADCHNL_RV_SENSING_POLARITY: NORMAL
MDC_IDC_STAT_AT_BURDEN_PERCENT: 0 %
MDC_IDC_STAT_AT_DTM_END: NORMAL
MDC_IDC_STAT_AT_DTM_START: NORMAL
MDC_IDC_STAT_AT_MODE_SW_COUNT_PER_DAY: 0
MDC_IDC_STAT_AT_MODE_SW_PERCENT_TIME_PER_DAY: 0 %
MDC_IDC_STAT_BRADY_AP_VP_PERCENT: 30 %
MDC_IDC_STAT_BRADY_AP_VS_PERCENT: 0 %
MDC_IDC_STAT_BRADY_AS_VP_PERCENT: 69 %
MDC_IDC_STAT_BRADY_AS_VS_PERCENT: 0 %
MDC_IDC_STAT_BRADY_DTM_END: NORMAL
MDC_IDC_STAT_BRADY_DTM_START: NORMAL
MDC_IDC_STAT_BRADY_RA_PERCENT_PACED: 30 %
MDC_IDC_STAT_BRADY_RV_PERCENT_PACED: 99 %
MDC_IDC_STAT_CRT_DTM_END: NORMAL
MDC_IDC_STAT_CRT_DTM_START: NORMAL

## 2022-11-30 ENCOUNTER — HOSPITAL ENCOUNTER (OUTPATIENT)
Dept: CARDIOLOGY | Facility: CLINIC | Age: 82
Discharge: HOME | End: 2022-11-30
Attending: NURSE PRACTITIONER
Payer: COMMERCIAL

## 2022-11-30 VITALS
HEART RATE: 81 BPM | SYSTOLIC BLOOD PRESSURE: 142 MMHG | OXYGEN SATURATION: 95 % | HEIGHT: 70 IN | BODY MASS INDEX: 24.65 KG/M2 | DIASTOLIC BLOOD PRESSURE: 72 MMHG | WEIGHT: 172.2 LBS

## 2022-11-30 DIAGNOSIS — I25.10 CORONARY ARTERY DISEASE INVOLVING NATIVE CORONARY ARTERY OF NATIVE HEART WITHOUT ANGINA PECTORIS: ICD-10-CM

## 2022-11-30 LAB
CV BLOOD PRESSURE: 59 MMHG
CV STRESS MAX HR HE: 97
NUC STRESS EJECTION FRACTION: 46 %
RATE PRESSURE PRODUCT: NORMAL
STRESS ECHO BASELINE DIASTOLIC HE: 72
STRESS ECHO BASELINE HR: 81 BPM
STRESS ECHO BASELINE SYSTOLIC BP: 142
STRESS ECHO CALCULATED PERCENT HR: 71 %
STRESS ECHO LAST STRESS DIASTOLIC BP: 78
STRESS ECHO LAST STRESS SYSTOLIC BP: 142
STRESS ECHO TARGET HR: 137

## 2022-11-30 PROCEDURE — 343N000001 HC RX 343: Performed by: NURSE PRACTITIONER

## 2022-11-30 PROCEDURE — 250N000011 HC RX IP 250 OP 636: Performed by: NURSE PRACTITIONER

## 2022-11-30 PROCEDURE — 93018 CV STRESS TEST I&R ONLY: CPT | Performed by: INTERNAL MEDICINE

## 2022-11-30 PROCEDURE — 93017 CV STRESS TEST TRACING ONLY: CPT

## 2022-11-30 PROCEDURE — 78452 HT MUSCLE IMAGE SPECT MULT: CPT | Mod: 26 | Performed by: INTERNAL MEDICINE

## 2022-11-30 PROCEDURE — 93016 CV STRESS TEST SUPVJ ONLY: CPT | Performed by: NURSE PRACTITIONER

## 2022-11-30 PROCEDURE — A9502 TC99M TETROFOSMIN: HCPCS | Performed by: NURSE PRACTITIONER

## 2022-11-30 RX ORDER — REGADENOSON 0.08 MG/ML
0.4 INJECTION, SOLUTION INTRAVENOUS ONCE
Status: COMPLETED | OUTPATIENT
Start: 2022-11-30 | End: 2022-11-30

## 2022-11-30 RX ORDER — ALBUTEROL SULFATE 90 UG/1
2 AEROSOL, METERED RESPIRATORY (INHALATION) EVERY 5 MIN PRN
Status: DISCONTINUED | OUTPATIENT
Start: 2022-11-30 | End: 2022-12-01 | Stop reason: HOSPADM

## 2022-11-30 RX ORDER — CAFFEINE CITRATE 20 MG/ML
60 SOLUTION INTRAVENOUS
Status: DISCONTINUED | OUTPATIENT
Start: 2022-11-30 | End: 2022-12-01 | Stop reason: HOSPADM

## 2022-11-30 RX ORDER — ACYCLOVIR 200 MG/1
0-1 CAPSULE ORAL
Status: DISCONTINUED | OUTPATIENT
Start: 2022-11-30 | End: 2022-12-01 | Stop reason: HOSPADM

## 2022-11-30 RX ORDER — AMINOPHYLLINE 25 MG/ML
50-100 INJECTION, SOLUTION INTRAVENOUS
Status: DISCONTINUED | OUTPATIENT
Start: 2022-11-30 | End: 2022-12-01 | Stop reason: HOSPADM

## 2022-11-30 RX ADMIN — REGADENOSON 0.4 MG: 0.08 INJECTION, SOLUTION INTRAVENOUS at 11:09

## 2022-11-30 RX ADMIN — TETROFOSMIN 3.6 MCI.: 1.38 INJECTION, POWDER, LYOPHILIZED, FOR SOLUTION INTRAVENOUS at 09:52

## 2022-11-30 RX ADMIN — TETROFOSMIN 9.7 MCI.: 1.38 INJECTION, POWDER, LYOPHILIZED, FOR SOLUTION INTRAVENOUS at 11:13

## 2022-12-19 ENCOUNTER — OFFICE VISIT (OUTPATIENT)
Dept: CARDIOLOGY | Facility: CLINIC | Age: 82
End: 2022-12-19
Attending: NURSE PRACTITIONER
Payer: COMMERCIAL

## 2022-12-19 VITALS
WEIGHT: 173.4 LBS | SYSTOLIC BLOOD PRESSURE: 128 MMHG | DIASTOLIC BLOOD PRESSURE: 68 MMHG | OXYGEN SATURATION: 96 % | HEART RATE: 63 BPM | BODY MASS INDEX: 24.82 KG/M2 | HEIGHT: 70 IN

## 2022-12-19 DIAGNOSIS — E78.5 HYPERLIPIDEMIA LDL GOAL <70: ICD-10-CM

## 2022-12-19 DIAGNOSIS — I25.10 CORONARY ARTERY DISEASE INVOLVING NATIVE CORONARY ARTERY OF NATIVE HEART WITHOUT ANGINA PECTORIS: ICD-10-CM

## 2022-12-19 PROCEDURE — 99214 OFFICE O/P EST MOD 30 MIN: CPT | Performed by: NURSE PRACTITIONER

## 2022-12-19 NOTE — PROGRESS NOTES
Cardiology Clinic Progress Note  Sunil Lyle MRN# [id number]   YOB: 1940 Age: 82 year old   Primary Cardiologist: Dr. Landers Reason for visit: Follow-up testing            Assessment and Plan:       1. Coronary artery disease, status post DEANA to RCA (2001), abnormal Lexiscan nuclear stress test  -He is no longer experiencing symptoms of indigestion/GERD, however continues to have exertional shortness of breath  -Results of lexiscan nuclear stress abnormal with small area of mild infarct in the mid to basal inferior and inferoseptal segments with no ischemia, small area of moderate infarct in the apical segments with mild discrete alex-infarct ischemia, notable for reduced EF with stress from 59 to 46%  -Results reviewed with Dr. Landers via phone who recommended coronary angiography  -Discussed risks and benefits of coronary angiography today and patient is agreement with proceeding  -Discussed if he should develop anginal symptoms before his angiogram he needs to be evaluated in the emergency room    1.  Hyperlipidemia  -Changed to rosuvastatin 10 mg daily in July 2022.   -Fasting lipid panel done 10/27/2022 showed excellent lipid control total cholesterol 128, HDL 47, LDL 52, triglycerides 139    2.  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  24% A pacing and 99% V pacing.    4.  Mild to moderate pulmonary hypertension (per echo 3/2022)  May be a concerning factor with his exertional shortness of breath, continue to monitor on TTE.    5. Paroxysmal atrial fibrillation  No evidence of recurrence on most recent 8/2022 device check. Continue aspirin and q3 month device checks.     Changes today: Coronary angiogram ordered    Follow up plan: Patient to follow up after his coronary angiogram to review results        History of  Presenting Illness:    Sunil Lyle is a very pleasant 82 year old male with a history of coronary artery disease, s/p DEANA to RCA (2001), 2:1 AV block status post Biotronik permanent pacemaker (3/2022), paroxysmal atrial fibrillation (in the setting of illness/surgery April 2022) with no obvious recurrence, moderate pulmonary hypertension, hypertension, hyperlipidemia, COPD, CKD stage III, BPH (turp 6/2022), former tobacco use (quit in 2018).     Patient is well-known to our clinic and most recently seen by Dr. Landers in July 2022.  His lipids were suboptimally controlled at that time with a total cholesterol 94, HDL 46, LDL 96, triglycerides 209.  Rosuvastatin 10 mg daily was added to his regimen of fenofibrate and simvastatin was discontinued.    When we met in Oct 2022 he was reporting a new onset of epigastric pain at night after lying down at night. Resolves with 1-2 tums. If he eats at a restaurant (and does not late night snack) and or naps during the day, it does not occur.       11/30/2022 Lexiscan nuclear stress test showed a small area of mild degree of infarction in the mid to basal inferior and inferoseptal segments, no reversible ischemia, small area of moderate degree of infarction in the apical segments with a mild degree of alex-infarct ischemia.  His prior stress test was an exercise stress echocardiogram in 2017 which was negative for ischemia.    Patient is here today for follow-up on his stress test. Patient reports feeling good.    He continues to be primarily sedentary. He has a home with stairs and goes up and down the stairs. He gets winded with exertion, this is a new issue over the last 2 years, no progressive. He also noticed he gets winded with walking 1/2 mile. Denies any chest discomfort with exertion or at rest.     Denies dizziness, lightheadedness or other presyncopal symptoms. Denies tachycardia or palpitations.     Blood pressure 126/68 and HR 63 in clinic today.    Risks and  benefits of coronary angiogram discussed today including, bleeding, bruising, infection, allergic reaction, kidney damage (including need for dialysis), stroke, heart attack, vascular damage, emergency open heart surgery, up to and including death.  Patient indicates understanding and is agreeable to proceed.        Contrast allergy: None  Anticoagulation: None   Metformin: None  Oral DM meds: None  Insulin: None  Diuretic: None   Use of phosphodiesterase type 5 inhibitor: None  Aspirin: 81mg daily  Pt informed to be NPO at midnight  Renal issues: none   Pt has transportation and 24 hours post procedure monitoring set up.   Pt aware of no driving for 24 hours post procedure.           Recent Hospitalizations   2022 TURP          Social History    , retired  Social History     Socioeconomic History     Marital status:      Spouse name: Not on file     Number of children: Not on file     Years of education: Not on file     Highest education level: Not on file   Occupational History     Occupation: Sales/Computer Training     Employer: RETIRED   Tobacco Use     Smoking status: Former     Packs/day: 0.50     Years: 33.50     Pack years: 16.75     Types: Cigarettes     Start date:      Quit date: 2017     Years since quittin.1     Smokeless tobacco: Never   Vaping Use     Vaping Use: Never used   Substance and Sexual Activity     Alcohol use: Yes     Alcohol/week: 3.3 standard drinks     Types: 4 Shots of liquor per week     Comment: 5-6 drink per week     Drug use: No     Sexual activity: Not on file   Other Topics Concern      Service Not Asked     Comment: [place] [id number]     Blood Transfusions No     Caffeine Concern Yes     Occupational Exposure No     Hobby Hazards No     Sleep Concern No     Stress Concern No     Weight Concern No     Special Diet No     Back Care No     Exercise No     Bike Helmet Not Asked     Seat Belt Yes     Self-Exams Not Asked     Parent/sibling  "w/ CABG, MI or angioplasty before 65F 55M? Not Asked   Social History Narrative    ** Merged History Encounter **          Social Determinants of Health     Financial Resource Strain: Not on file   Food Insecurity: Not on file   Transportation Needs: Not on file   Physical Activity: Not on file   Stress: Not on file   Social Connections: Not on file   Intimate Partner Violence: Not on file   Housing Stability: Not on file            Review of Systems:   Skin:  not assessed     Eyes:  not assessed    ENT:  not assessed    Respiratory:  Positive for dyspnea on exertion  Cardiovascular:    Positive for;palpitations  Gastroenterology: not assessed    Genitourinary:  not assessed    Musculoskeletal:  not assessed    Neurologic:  not assessed    Psychiatric:  not assessed    Heme/Lymph/Imm:  not assessed    Endocrine:  not assessed           Physical Exam:   Vitals: /68   Pulse 63   Ht 1.778 m (5' 10\")   Wt 78.7 kg (173 lb 6.4 oz)   SpO2 96%   BMI 24.88 kg/m     Wt Readings from Last 4 Encounters:   12/19/22 78.7 kg (173 lb 6.4 oz)   11/30/22 78.1 kg (172 lb 3.2 oz)   10/17/22 78.5 kg (173 lb)   07/19/22 76.2 kg (168 lb)     GEN: well nourished, in no acute distress.  HEENT:  Pupils equal, round. Sclerae nonicteric.   NECK: Supple, no masses appreciated. No JVD with patient reclined.   C/V:  Regular rate and rhythm, no murmur, rub or gallop.    RESP: Respirations are unlabored. Clear to auscultation bilaterally without wheezing, rales, or rhonchi.  GI: Abdomen soft, nontender.  EXTREM: no LE edema.  NEURO: Alert and oriented, cooperative.  SKIN: Warm and dry.        Data:     LIPID RESULTS:  Lab Results   Component Value Date    CHOL 128 10/27/2022    CHOL 130 03/26/2021    HDL 47 10/27/2022    HDL 33 (L) 03/26/2021    LDL 53 10/27/2022    LDL 60 03/26/2021    TRIG 139 10/27/2022    TRIG 186 (H) 03/26/2021    CHOLHDLRATIO 3.2 06/18/2015     LIVER ENZYME RESULTS:  Lab Results   Component Value Date    AST 22 " 03/22/2022    AST 18 03/14/2017    ALT 43 10/27/2022    ALT 26 03/26/2021     CBC RESULTS:  Lab Results   Component Value Date    WBC 7.7 03/25/2022    WBC 6.6 03/14/2017    RBC 2.05 (L) 03/24/2022    RBC 4.34 (L) 03/14/2017    HGB 7.9 (L) 03/26/2022    HGB 14.0 03/14/2017    HCT 22.2 (L) 03/24/2022    HCT 42.0 03/14/2017     (H) 03/24/2022    MCV 97 03/14/2017    MCH 33.7 (H) 03/24/2022    MCH 32.3 03/14/2017    MCHC 31.1 (L) 03/24/2022    MCHC 33.3 03/14/2017    RDW 17.4 (H) 03/24/2022    RDW 15.0 03/14/2017     03/24/2022     03/14/2017     BMP RESULTS:  Lab Results   Component Value Date     03/26/2022     07/14/2017    POTASSIUM 3.8 06/01/2022    POTASSIUM 4.1 07/14/2017    CHLORIDE 114 (H) 03/26/2022    CHLORIDE 105 07/14/2017    CO2 24 03/26/2022    CO2 27 07/14/2017    ANIONGAP 5 03/26/2022    ANIONGAP 13.1 07/14/2017     (H) 03/26/2022     (H) 07/14/2017    BUN 18 03/26/2022    BUN 12 07/14/2017    CR 0.96 03/26/2022    CR 1.21 07/14/2017    GFRESTIMATED 79 03/26/2022    GFRESTIMATED 53 (L) 11/17/2017    GFRESTBLACK 65 11/17/2017    PAIGE 8.5 03/26/2022    PAIGE 9.2 07/14/2017      A1C RESULTS:  Lab Results   Component Value Date    A1C 5.4 03/20/2022     INR RESULTS:  Lab Results   Component Value Date    INR 1.1 05/24/2022    INR 1.21 (H) 03/19/2022    INR 1.03 03/28/2009            Medications     Current Outpatient Medications   Medication Sig Dispense Refill     aspirin 81 MG EC tablet Take 81 mg by mouth daily       fenofibrate (LOFIBRA) 54 MG tablet Take 54 mg by mouth daily 2 tablets daily.       multivitamin w/minerals (THERA-VIT-M) tablet Take 1 tablet by mouth daily        rosuvastatin (CRESTOR) 10 MG tablet Take 1 tablet (10 mg) by mouth daily 90 tablet 3     vitamin D3 (CHOLECALCIFEROL) 50 mcg (2000 units) tablet Take 2,000 Units by mouth daily            Past Medical History     Past Medical History:   Diagnosis Date     Allergic state      BPH  (benign prostatic hyperplasia)      CAD (coronary artery disease) 2014     s/p stent to RCA ()     COPD (chronic obstructive pulmonary disease) (H)      GERD (gastroesophageal reflux disease)      Hypercholesterolaemia      Hyperlipidaemia      Other affections of shoulder region, not elsewhere classified     impingement syndrome of right shoulder     Renal disease      Sebaceous cyst     right shoulder area     Tachycardia      Tobacco use disorder 2008     Past Surgical History:   Procedure Laterality Date     COLONOSCOPY       CYSTOSCOPY, TRANSURETHRAL RESECTION (TUR) PROSTATE, COMBINED N/A 2022    Procedure: CYSTOSCOPY, WITH TRANSURETHRAL RESECTION PROSTATE;  Surgeon: Tyree Helton MD;  Location:  OR     EP PACEMAKER DEVICE & LEAD IMPLANT- RIGHT ATRIAL & RIGHT VENTRICULAR Left 3/23/2022    Procedure: Pacemaker Device & Lead Implant - Right Atrial & Right Ventricular;  Surgeon: Thai Elliott MD;  Location:  HEART CARDIAC CATH LAB     HEART CATH, ANGIOPLASTY  2001    RCA stent     LAPAROTOMY EXPLORATORY N/A 3/19/2022    Procedure: EXPLORATORY LAPAROTOMY, SUPERIOR MESENTERIC ARTERY EXPLORATION;  Surgeon: Smita Garsia MD;  Location:  OR     TONSILLECTOMY & ADENOIDECTOMY       VASCULAR SURGERY       Family History   Problem Relation Age of Onset     Breast Cancer Mother          at age 64     Heart Disease Father         heart attack     Diabetes Father             Allergies   Pcn [penicillins], Chantix [varenicline], Bupropion hcl, Ciprofloxacin, and Lactose        Tali Mchugh NP  [facility] CARE  Pager: [phone]

## 2022-12-19 NOTE — LETTER
12/19/2022    Poli Alberto MD  [address]   [facility] [address]    RE: Sunil Lyle       Dear Colleague,     I had the pleasure of seeing Sunil Lyle in the [facility].  Cardiology Clinic Progress Note  Sunil Lyle MRN# [id number]   YOB: 1940 Age: 82 year old   Primary Cardiologist: Dr. Landers Reason for visit: Follow-up testing            Assessment and Plan:       1. Coronary artery disease, status post DEANA to RCA (2001), abnormal Lexiscan nuclear stress test  -He is no longer experiencing symptoms of indigestion/GERD, however continues to have exertional shortness of breath  -Results of lexiscan nuclear stress abnormal with small area of mild infarct in the mid to basal inferior and inferoseptal segments with no ischemia, small area of moderate infarct in the apical segments with mild discrete alex-infarct ischemia, notable for reduced EF with stress from 59 to 46%  -Results reviewed with Dr. Landers via phone who recommended coronary angiography  -Discussed risks and benefits of coronary angiography today and patient is agreement with proceeding  -Discussed if he should develop anginal symptoms before his angiogram he needs to be evaluated in the emergency room    1.  Hyperlipidemia  -Changed to rosuvastatin 10 mg daily in July 2022.   -Fasting lipid panel done 10/27/2022 showed excellent lipid control total cholesterol 128, HDL 47, LDL 52, triglycerides 139    2.  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  24% A pacing and 99% V pacing.    4.  Mild to moderate pulmonary hypertension (per echo 3/2022)  May be a concerning factor with his exertional shortness of breath, continue to monitor on TTE.    5. Paroxysmal atrial fibrillation  No evidence of recurrence on  most recent 8/2022 device check. Continue aspirin and q3 month device checks.     Changes today: Coronary angiogram ordered    Follow up plan: Patient to follow up after his coronary angiogram to review results        History of Presenting Illness:    Sunil Lyle is a very pleasant 82 year old male with a history of coronary artery disease, s/p DEANA to RCA (2001), 2:1 AV block status post Biotronik permanent pacemaker (3/2022), paroxysmal atrial fibrillation (in the setting of illness/surgery April 2022) with no obvious recurrence, moderate pulmonary hypertension, hypertension, hyperlipidemia, COPD, CKD stage III, BPH (turp 6/2022), former tobacco use (quit in 2018).     Patient is well-known to our clinic and most recently seen by Dr. Landers in July 2022.  His lipids were suboptimally controlled at that time with a total cholesterol 94, HDL 46, LDL 96, triglycerides 209.  Rosuvastatin 10 mg daily was added to his regimen of fenofibrate and simvastatin was discontinued.    When we met in Oct 2022 he was reporting a new onset of epigastric pain at night after lying down at night. Resolves with 1-2 tums. If he eats at a restaurant (and does not late night snack) and or naps during the day, it does not occur.       11/30/2022 Lexiscan nuclear stress test showed a small area of mild degree of infarction in the mid to basal inferior and inferoseptal segments, no reversible ischemia, small area of moderate degree of infarction in the apical segments with a mild degree of alex-infarct ischemia.  His prior stress test was an exercise stress echocardiogram in 2017 which was negative for ischemia.    Patient is here today for follow-up on his stress test. Patient reports feeling good.    He continues to be primarily sedentary. He has a home with stairs and goes up and down the stairs. He gets winded with exertion, this is a new issue over the last 2 years, no progressive. He also noticed he gets winded with walking 1/2  mile. Denies any chest discomfort with exertion or at rest.     Denies dizziness, lightheadedness or other presyncopal symptoms. Denies tachycardia or palpitations.     Blood pressure 126/68 and HR 63 in clinic today.    Risks and benefits of coronary angiogram discussed today including, bleeding, bruising, infection, allergic reaction, kidney damage (including need for dialysis), stroke, heart attack, vascular damage, emergency open heart surgery, up to and including death.  Patient indicates understanding and is agreeable to proceed.        Contrast allergy: None  Anticoagulation: None   Metformin: None  Oral DM meds: None  Insulin: None  Diuretic: None   Use of phosphodiesterase type 5 inhibitor: None  Aspirin: 81mg daily  Pt informed to be NPO at midnight  Renal issues: none   Pt has transportation and 24 hours post procedure monitoring set up.   Pt aware of no driving for 24 hours post procedure.           Recent Hospitalizations   2022 TURP          Social History    , retired  Social History     Socioeconomic History     Marital status:      Spouse name: Not on file     Number of children: Not on file     Years of education: Not on file     Highest education level: Not on file   Occupational History     Occupation: Sales/Computer Training     Employer: RETIRED   Tobacco Use     Smoking status: Former     Packs/day: 0.50     Years: 33.50     Pack years: 16.75     Types: Cigarettes     Start date:      Quit date: 2017     Years since quittin.1     Smokeless tobacco: Never   Vaping Use     Vaping Use: Never used   Substance and Sexual Activity     Alcohol use: Yes     Alcohol/week: 3.3 standard drinks     Types: 4 Shots of liquor per week     Comment: 5-6 drink per week     Drug use: No     Sexual activity: Not on file   Other Topics Concern      Service Not Asked     Comment: [place] [id number]     Blood Transfusions No     Caffeine Concern Yes     Occupational Exposure  "No     Hobby Hazards No     Sleep Concern No     Stress Concern No     Weight Concern No     Special Diet No     Back Care No     Exercise No     Bike Helmet Not Asked     Seat Belt Yes     Self-Exams Not Asked     Parent/sibling w/ CABG, MI or angioplasty before 65F 55M? Not Asked   Social History Narrative    ** Merged History Encounter **          Social Determinants of Health     Financial Resource Strain: Not on file   Food Insecurity: Not on file   Transportation Needs: Not on file   Physical Activity: Not on file   Stress: Not on file   Social Connections: Not on file   Intimate Partner Violence: Not on file   Housing Stability: Not on file            Review of Systems:   Skin:  not assessed     Eyes:  not assessed    ENT:  not assessed    Respiratory:  Positive for dyspnea on exertion  Cardiovascular:    Positive for;palpitations  Gastroenterology: not assessed    Genitourinary:  not assessed    Musculoskeletal:  not assessed    Neurologic:  not assessed    Psychiatric:  not assessed    Heme/Lymph/Imm:  not assessed    Endocrine:  not assessed           Physical Exam:   Vitals: /68   Pulse 63   Ht 1.778 m (5' 10\")   Wt 78.7 kg (173 lb 6.4 oz)   SpO2 96%   BMI 24.88 kg/m     Wt Readings from Last 4 Encounters:   12/19/22 78.7 kg (173 lb 6.4 oz)   11/30/22 78.1 kg (172 lb 3.2 oz)   10/17/22 78.5 kg (173 lb)   07/19/22 76.2 kg (168 lb)     GEN: well nourished, in no acute distress.  HEENT:  Pupils equal, round. Sclerae nonicteric.   NECK: Supple, no masses appreciated. No JVD with patient reclined.   C/V:  Regular rate and rhythm, no murmur, rub or gallop.    RESP: Respirations are unlabored. Clear to auscultation bilaterally without wheezing, rales, or rhonchi.  GI: Abdomen soft, nontender.  EXTREM: no LE edema.  NEURO: Alert and oriented, cooperative.  SKIN: Warm and dry.        Data:     LIPID RESULTS:  Lab Results   Component Value Date    CHOL 128 10/27/2022    CHOL 130 03/26/2021    HDL 47 " 10/27/2022    HDL 33 (L) 03/26/2021    LDL 53 10/27/2022    LDL 60 03/26/2021    TRIG 139 10/27/2022    TRIG 186 (H) 03/26/2021    CHOLHDLRATIO 3.2 06/18/2015     LIVER ENZYME RESULTS:  Lab Results   Component Value Date    AST 22 03/22/2022    AST 18 03/14/2017    ALT 43 10/27/2022    ALT 26 03/26/2021     CBC RESULTS:  Lab Results   Component Value Date    WBC 7.7 03/25/2022    WBC 6.6 03/14/2017    RBC 2.05 (L) 03/24/2022    RBC 4.34 (L) 03/14/2017    HGB 7.9 (L) 03/26/2022    HGB 14.0 03/14/2017    HCT 22.2 (L) 03/24/2022    HCT 42.0 03/14/2017     (H) 03/24/2022    MCV 97 03/14/2017    MCH 33.7 (H) 03/24/2022    MCH 32.3 03/14/2017    MCHC 31.1 (L) 03/24/2022    MCHC 33.3 03/14/2017    RDW 17.4 (H) 03/24/2022    RDW 15.0 03/14/2017     03/24/2022     03/14/2017     BMP RESULTS:  Lab Results   Component Value Date     03/26/2022     07/14/2017    POTASSIUM 3.8 06/01/2022    POTASSIUM 4.1 07/14/2017    CHLORIDE 114 (H) 03/26/2022    CHLORIDE 105 07/14/2017    CO2 24 03/26/2022    CO2 27 07/14/2017    ANIONGAP 5 03/26/2022    ANIONGAP 13.1 07/14/2017     (H) 03/26/2022     (H) 07/14/2017    BUN 18 03/26/2022    BUN 12 07/14/2017    CR 0.96 03/26/2022    CR 1.21 07/14/2017    GFRESTIMATED 79 03/26/2022    GFRESTIMATED 53 (L) 11/17/2017    GFRESTBLACK 65 11/17/2017    PAIGE 8.5 03/26/2022    PAIGE 9.2 07/14/2017      A1C RESULTS:  Lab Results   Component Value Date    A1C 5.4 03/20/2022     INR RESULTS:  Lab Results   Component Value Date    INR 1.1 05/24/2022    INR 1.21 (H) 03/19/2022    INR 1.03 03/28/2009            Medications     Current Outpatient Medications   Medication Sig Dispense Refill     aspirin 81 MG EC tablet Take 81 mg by mouth daily       fenofibrate (LOFIBRA) 54 MG tablet Take 54 mg by mouth daily 2 tablets daily.       multivitamin w/minerals (THERA-VIT-M) tablet Take 1 tablet by mouth daily        rosuvastatin (CRESTOR) 10 MG tablet Take 1 tablet (10  mg) by mouth daily 90 tablet 3     vitamin D3 (CHOLECALCIFEROL) 50 mcg (2000 units) tablet Take 2,000 Units by mouth daily            Past Medical History     Past Medical History:   Diagnosis Date     Allergic state      BPH (benign prostatic hyperplasia)      CAD (coronary artery disease) 2014     s/p stent to RCA ()     COPD (chronic obstructive pulmonary disease) (H)      GERD (gastroesophageal reflux disease)      Hypercholesterolaemia      Hyperlipidaemia      Other affections of shoulder region, not elsewhere classified     impingement syndrome of right shoulder     Renal disease      Sebaceous cyst     right shoulder area     Tachycardia      Tobacco use disorder 2008     Past Surgical History:   Procedure Laterality Date     COLONOSCOPY       CYSTOSCOPY, TRANSURETHRAL RESECTION (TUR) PROSTATE, COMBINED N/A 2022    Procedure: CYSTOSCOPY, WITH TRANSURETHRAL RESECTION PROSTATE;  Surgeon: Tyree Helton MD;  Location:  OR     EP PACEMAKER DEVICE & LEAD IMPLANT- RIGHT ATRIAL & RIGHT VENTRICULAR Left 3/23/2022    Procedure: Pacemaker Device & Lead Implant - Right Atrial & Right Ventricular;  Surgeon: Thai Elliott MD;  Location:  HEART CARDIAC CATH LAB     HEART CATH, ANGIOPLASTY  2001    RCA stent     LAPAROTOMY EXPLORATORY N/A 3/19/2022    Procedure: EXPLORATORY LAPAROTOMY, SUPERIOR MESENTERIC ARTERY EXPLORATION;  Surgeon: Smita Garsia MD;  Location:  OR     TONSILLECTOMY & ADENOIDECTOMY       VASCULAR SURGERY       Family History   Problem Relation Age of Onset     Breast Cancer Mother          at age 64     Heart Disease Father         heart attack     Diabetes Father             Allergies   Pcn [penicillins], Chantix [varenicline], Bupropion hcl, Ciprofloxacin, and Lactose        Tali Mchugh NP  [facility]  Pager: [phone]    Thank you for allowing me to participate in the care of your  patient.      Sincerely,     Tali Mchugh NP     [facility]  cc:   Tali Mchugh NP  [address] [address]

## 2022-12-19 NOTE — PATIENT INSTRUCTIONS
Today's Recommendations      Your stress test was abnormal and I would like you to have an angiogram to look at your heart arteries to check for a blockage  Nothing to eat/drink after midnight the night before your test  Continue all medications without changes.  Please follow up with me after your angiogram.    Please send a "[employer] message or call [phone] to the RN team with questions or concerns.     Scheduling number [phone]  YARITZA Menon, CNP

## 2023-01-05 DIAGNOSIS — I25.10 CORONARY ARTERY DISEASE INVOLVING NATIVE CORONARY ARTERY OF NATIVE HEART WITHOUT ANGINA PECTORIS: Primary | ICD-10-CM

## 2023-01-05 RX ORDER — SODIUM CHLORIDE 9 MG/ML
INJECTION, SOLUTION INTRAVENOUS CONTINUOUS
Status: CANCELLED | OUTPATIENT
Start: 2023-01-05

## 2023-01-05 RX ORDER — ASPIRIN 81 MG/1
243 TABLET, CHEWABLE ORAL ONCE
Status: CANCELLED | OUTPATIENT
Start: 2023-01-05

## 2023-01-05 RX ORDER — POTASSIUM CHLORIDE 1500 MG/1
20 TABLET, EXTENDED RELEASE ORAL
Status: CANCELLED | OUTPATIENT
Start: 2023-01-05

## 2023-01-05 RX ORDER — ASPIRIN 325 MG
325 TABLET ORAL ONCE
Status: CANCELLED | OUTPATIENT
Start: 2023-01-05 | End: 2023-01-05

## 2023-01-05 RX ORDER — LIDOCAINE 40 MG/G
CREAM TOPICAL
Status: CANCELLED | OUTPATIENT
Start: 2023-01-05

## 2023-01-05 NOTE — PROGRESS NOTES
Coronary angiogram prep instructions.     Patient is scheduled for a Coronary Angiogram at [facility] - [address], [place] [address] - Main Entrance of the Hospital, on 1/6/23.  Check in time is at 0830 and procedure to follow.    Patient instructed to remain NPO for solid foods 8 hours prior to arrival and may have clear liquids up to 2 hours prior to arrival.    Patient does not require extra fluids prior to procedure.    Patient is not diabetic.    Patient is not on anticoagulation.    Patient is taking ASA 81mg daily and will take 4 tabs (324mg) the morning of the procedure.    Pt is not on a SGLT2 inhibitor.    Patient advised to take their other daily medications the morning of the procedure with small sips of water.     Verified patient does not have a contrast allergy.    Verified patient has someone available to drive them home from the hospital and can stay with them for 24 hours after the procedure.     Patient advised to notify care team with any new COVID like symptoms prior to procedure.    Patient will check their temperature the morning of procedure and call [facility] at [phone] if temp is >100.0.    Patient is aware of visitor policy.    Patient expresses understanding of above instructions and denies further questions at this time.

## 2023-01-06 ENCOUNTER — HOSPITAL ENCOUNTER (OUTPATIENT)
Facility: CLINIC | Age: 83
Discharge: HOME | End: 2023-01-06
Admitting: INTERNAL MEDICINE
Payer: COMMERCIAL

## 2023-01-06 VITALS
HEART RATE: 96 BPM | OXYGEN SATURATION: 92 % | TEMPERATURE: 99.4 F | BODY MASS INDEX: 24.67 KG/M2 | WEIGHT: 172.3 LBS | HEIGHT: 70 IN | SYSTOLIC BLOOD PRESSURE: 137 MMHG | DIASTOLIC BLOOD PRESSURE: 70 MMHG | RESPIRATION RATE: 16 BRPM

## 2023-01-06 DIAGNOSIS — I25.10 CORONARY ARTERY DISEASE INVOLVING NATIVE CORONARY ARTERY OF NATIVE HEART WITHOUT ANGINA PECTORIS: ICD-10-CM

## 2023-01-06 PROBLEM — Z98.890 STATUS POST CORONARY ANGIOGRAM: Status: ACTIVE | Noted: 2023-01-06

## 2023-01-06 LAB
ANION GAP SERPL CALCULATED.3IONS-SCNC: 7 MMOL/L (ref 3–14)
APTT PPP: 25 SECONDS (ref 22–38)
BUN SERPL-MCNC: 16 MG/DL (ref 7–30)
CALCIUM SERPL-MCNC: 9.5 MG/DL (ref 8.5–10.1)
CHLORIDE BLD-SCNC: 108 MMOL/L (ref 94–109)
CO2 SERPL-SCNC: 24 MMOL/L (ref 20–32)
CREAT SERPL-MCNC: 1.07 MG/DL (ref 0.66–1.25)
ERYTHROCYTE [DISTWIDTH] IN BLOOD BY AUTOMATED COUNT: 16.8 % (ref 10–15)
GFR SERPL CREATININE-BSD FRML MDRD: 69 ML/MIN/1.73M2
GLUCOSE BLD-MCNC: 135 MG/DL (ref 70–99)
HCT VFR BLD AUTO: 37.1 % (ref 40–53)
HGB BLD-MCNC: 12.5 G/DL (ref 13.3–17.7)
INR PPP: 1.1 (ref 0.85–1.15)
LACTATE SERPL-SCNC: 1.1 MMOL/L (ref 0.7–2)
MCH RBC QN AUTO: 34.9 PG (ref 26.5–33)
MCHC RBC AUTO-ENTMCNC: 33.7 G/DL (ref 31.5–36.5)
MCV RBC AUTO: 104 FL (ref 78–100)
PLATELET # BLD AUTO: 169 10E3/UL (ref 150–450)
POTASSIUM BLD-SCNC: 3.9 MMOL/L (ref 3.4–5.3)
RBC # BLD AUTO: 3.58 10E6/UL (ref 4.4–5.9)
SODIUM SERPL-SCNC: 139 MMOL/L (ref 133–144)
WBC # BLD AUTO: 13.5 10E3/UL (ref 4–11)

## 2023-01-06 PROCEDURE — 83605 ASSAY OF LACTIC ACID: CPT

## 2023-01-06 PROCEDURE — 93005 ELECTROCARDIOGRAM TRACING: CPT

## 2023-01-06 PROCEDURE — C1769 GUIDE WIRE: HCPCS | Performed by: INTERNAL MEDICINE

## 2023-01-06 PROCEDURE — 999N000184 HC STATISTIC TELEMETRY

## 2023-01-06 PROCEDURE — 272N000001 HC OR GENERAL SUPPLY STERILE: Performed by: INTERNAL MEDICINE

## 2023-01-06 PROCEDURE — 99152 MOD SED SAME PHYS/QHP 5/>YRS: CPT | Mod: GC | Performed by: INTERNAL MEDICINE

## 2023-01-06 PROCEDURE — 999N000054 HC STATISTIC EKG NON-CHARGEABLE

## 2023-01-06 PROCEDURE — 85730 THROMBOPLASTIN TIME PARTIAL: CPT | Performed by: INTERNAL MEDICINE

## 2023-01-06 PROCEDURE — 250N000011 HC RX IP 250 OP 636: Performed by: INTERNAL MEDICINE

## 2023-01-06 PROCEDURE — 99152 MOD SED SAME PHYS/QHP 5/>YRS: CPT | Performed by: INTERNAL MEDICINE

## 2023-01-06 PROCEDURE — 99153 MOD SED SAME PHYS/QHP EA: CPT | Performed by: INTERNAL MEDICINE

## 2023-01-06 PROCEDURE — 36415 COLL VENOUS BLD VENIPUNCTURE: CPT | Performed by: INTERNAL MEDICINE

## 2023-01-06 PROCEDURE — 93454 CORONARY ARTERY ANGIO S&I: CPT | Mod: 26 | Performed by: INTERNAL MEDICINE

## 2023-01-06 PROCEDURE — 85610 PROTHROMBIN TIME: CPT | Performed by: INTERNAL MEDICINE

## 2023-01-06 PROCEDURE — 999N000071 HC STATISTIC HEART CATH LAB OR EP LAB

## 2023-01-06 PROCEDURE — 36415 COLL VENOUS BLD VENIPUNCTURE: CPT

## 2023-01-06 PROCEDURE — 93010 ELECTROCARDIOGRAM REPORT: CPT | Mod: 59 | Performed by: INTERNAL MEDICINE

## 2023-01-06 PROCEDURE — 258N000003 HC RX IP 258 OP 636: Performed by: INTERNAL MEDICINE

## 2023-01-06 PROCEDURE — 93454 CORONARY ARTERY ANGIO S&I: CPT | Performed by: INTERNAL MEDICINE

## 2023-01-06 PROCEDURE — 250N000009 HC RX 250: Performed by: INTERNAL MEDICINE

## 2023-01-06 PROCEDURE — 36591 DRAW BLOOD OFF VENOUS DEVICE: CPT

## 2023-01-06 PROCEDURE — C1894 INTRO/SHEATH, NON-LASER: HCPCS | Performed by: INTERNAL MEDICINE

## 2023-01-06 PROCEDURE — 82947 ASSAY GLUCOSE BLOOD QUANT: CPT | Performed by: INTERNAL MEDICINE

## 2023-01-06 PROCEDURE — 85027 COMPLETE CBC AUTOMATED: CPT | Performed by: INTERNAL MEDICINE

## 2023-01-06 RX ORDER — NALOXONE HYDROCHLORIDE 0.4 MG/ML
0.2 INJECTION, SOLUTION INTRAMUSCULAR; INTRAVENOUS; SUBCUTANEOUS
Status: DISCONTINUED | OUTPATIENT
Start: 2023-01-06 | End: 2023-01-06 | Stop reason: HOSPADM

## 2023-01-06 RX ORDER — SODIUM CHLORIDE 9 MG/ML
INJECTION, SOLUTION INTRAVENOUS CONTINUOUS
Status: DISCONTINUED | OUTPATIENT
Start: 2023-01-06 | End: 2023-01-06 | Stop reason: HOSPADM

## 2023-01-06 RX ORDER — FLUMAZENIL 0.1 MG/ML
0.2 INJECTION, SOLUTION INTRAVENOUS
Status: DISCONTINUED | OUTPATIENT
Start: 2023-01-06 | End: 2023-01-06 | Stop reason: HOSPADM

## 2023-01-06 RX ORDER — FENTANYL CITRATE 50 UG/ML
25 INJECTION, SOLUTION INTRAMUSCULAR; INTRAVENOUS
Status: DISCONTINUED | OUTPATIENT
Start: 2023-01-06 | End: 2023-01-06 | Stop reason: HOSPADM

## 2023-01-06 RX ORDER — ACETAMINOPHEN 325 MG/1
650 TABLET ORAL EVERY 4 HOURS PRN
Status: DISCONTINUED | OUTPATIENT
Start: 2023-01-06 | End: 2023-01-06 | Stop reason: HOSPADM

## 2023-01-06 RX ORDER — NALOXONE HYDROCHLORIDE 0.4 MG/ML
0.4 INJECTION, SOLUTION INTRAMUSCULAR; INTRAVENOUS; SUBCUTANEOUS
Status: DISCONTINUED | OUTPATIENT
Start: 2023-01-06 | End: 2023-01-06 | Stop reason: HOSPADM

## 2023-01-06 RX ORDER — LIDOCAINE 40 MG/G
CREAM TOPICAL
Status: DISCONTINUED | OUTPATIENT
Start: 2023-01-06 | End: 2023-01-06 | Stop reason: HOSPADM

## 2023-01-06 RX ORDER — HEPARIN SODIUM 1000 [USP'U]/ML
INJECTION, SOLUTION INTRAVENOUS; SUBCUTANEOUS
Status: DISCONTINUED | OUTPATIENT
Start: 2023-01-06 | End: 2023-01-06 | Stop reason: HOSPADM

## 2023-01-06 RX ORDER — VERAPAMIL HYDROCHLORIDE 2.5 MG/ML
INJECTION, SOLUTION INTRAVENOUS
Status: DISCONTINUED | OUTPATIENT
Start: 2023-01-06 | End: 2023-01-06 | Stop reason: HOSPADM

## 2023-01-06 RX ORDER — OXYCODONE HYDROCHLORIDE 5 MG/1
5 TABLET ORAL EVERY 4 HOURS PRN
Status: DISCONTINUED | OUTPATIENT
Start: 2023-01-06 | End: 2023-01-06 | Stop reason: HOSPADM

## 2023-01-06 RX ORDER — ASPIRIN 325 MG
325 TABLET ORAL ONCE
Status: COMPLETED | OUTPATIENT
Start: 2023-01-06 | End: 2023-01-06

## 2023-01-06 RX ORDER — POTASSIUM CHLORIDE 1500 MG/1
20 TABLET, EXTENDED RELEASE ORAL
Status: DISCONTINUED | OUTPATIENT
Start: 2023-01-06 | End: 2023-01-06 | Stop reason: HOSPADM

## 2023-01-06 RX ORDER — OXYCODONE HYDROCHLORIDE 5 MG/1
10 TABLET ORAL EVERY 4 HOURS PRN
Status: DISCONTINUED | OUTPATIENT
Start: 2023-01-06 | End: 2023-01-06 | Stop reason: HOSPADM

## 2023-01-06 RX ORDER — ASPIRIN 81 MG/1
243 TABLET, CHEWABLE ORAL ONCE
Status: COMPLETED | OUTPATIENT
Start: 2023-01-06 | End: 2023-01-06

## 2023-01-06 RX ORDER — NITROGLYCERIN 5 MG/ML
VIAL (ML) INTRAVENOUS
Status: DISCONTINUED | OUTPATIENT
Start: 2023-01-06 | End: 2023-01-06 | Stop reason: HOSPADM

## 2023-01-06 RX ORDER — ATROPINE SULFATE 0.1 MG/ML
0.5 INJECTION INTRAVENOUS
Status: DISCONTINUED | OUTPATIENT
Start: 2023-01-06 | End: 2023-01-06 | Stop reason: HOSPADM

## 2023-01-06 RX ORDER — FENTANYL CITRATE 50 UG/ML
INJECTION, SOLUTION INTRAMUSCULAR; INTRAVENOUS
Status: DISCONTINUED | OUTPATIENT
Start: 2023-01-06 | End: 2023-01-06 | Stop reason: HOSPADM

## 2023-01-06 RX ORDER — IOPAMIDOL 755 MG/ML
INJECTION, SOLUTION INTRAVASCULAR
Status: DISCONTINUED | OUTPATIENT
Start: 2023-01-06 | End: 2023-01-06 | Stop reason: HOSPADM

## 2023-01-06 RX ADMIN — SODIUM CHLORIDE: 9 INJECTION, SOLUTION INTRAVENOUS at 09:15

## 2023-01-06 ASSESSMENT — ACTIVITIES OF DAILY LIVING (ADL)
ADLS_ACUITY_SCORE: 35

## 2023-01-06 NOTE — PROGRESS NOTES
Care Suites Post Procedure Note    Patient Information  Name: Sunil Lyle  Age: 82 year old    Post Procedure  Time patient returned to Care Suites: 1330  Concerns/abnormal assessment: none  If abnormal assessment, provider notified: N/A  Plan/Other: TR band in place, discharge around 430pm    Krysta Marsh RN

## 2023-01-06 NOTE — PRE-PROCEDURE
GENERAL PRE-PROCEDURE:   Procedure:  Cor angio possible PCI  Date/Time:  1/6/2023 1:00 PM    Verbal consent obtained?: Yes    Written consent obtained?: Yes    Risks and benefits: Risks, benefits and alternatives were discussed    Consent given by:  Patient  Patient states understanding of procedure being performed: Yes    Patient's understanding of procedure matches consent: Yes    Procedure consent matches procedure scheduled: Yes    Expected level of sedation:  Moderate  Appropriately NPO:  Yes  ASA Class:  4  Mallampati  :  Grade 2- soft palate, base of uvula, tonsillar pillars, and portion of posterior pharyngeal wall visible  Lungs:  Lungs clear with good breath sounds bilaterally  Heart:  RRR  History & Physical reviewed:  History and physical reviewed and no updates needed  Statement of review:  I have reviewed the lab findings, diagnostic data, medications, and the plan for sedation

## 2023-01-06 NOTE — PROGRESS NOTES
Care Suites Admission Nursing Note    Patient Information  Name: Sunil Lyle  Age: 82 year old   Reason for admission: heart cath  Care Suites arrival time: 0830    Visitor Information  Name: Lili  Informed of visitor restrictions: Yes  1 visitor allowed per patient   Visitor must screen negative for COVID symptoms   Visitor must wear a mask  Waiting rooms closed to visitors    Patient Admission/Assessment   Pre-procedure assessment complete: Yes  If abnormal assessment/labs, provider notified: Yes  NPO: Yes  Medications held per instructions/orders: Yes  Consent: obtained  If applicable, pregnancy test status: deferred  Patient oriented to room: Yes  Education/questions answered: Yes  Plan/other: scheduled for 1030 but delayed    Discharge Planning  Discharge name/phone number: Lili/[phone]   Overnight post sedation caregiver: wife Lili  Discharge location: [place]    Krysta Marsh RN

## 2023-01-06 NOTE — DISCHARGE INSTRUCTIONS

## 2023-01-07 NOTE — PROGRESS NOTES
"1520 Report received from Krysta Marsh RN.  1530 Pt drowsy but responds appropriately to verbal stimuli. TR band intact to right wrist. No oozing or hematoma noted. Area soft & flat. Armboard intact. Right arm elevated on pillows. Pt denies pain. Pt instructed on activity restrictions with right wrist/arm. Verbal understanding received from pt. Pt's wife at bedside. Detailed update given.   1545 Air released from TR band per protocol.  1600 Pt taking sips fluids - refuses food - states \"no appetite\".  1645 TR band removed. Bandaid applied to site. Area soft & flat.  1715 Discharge teaching & instructions reinforced with both pt & wife w/ verbal understanding received. All questions & concerns addressed.  1725 OOB - steady on feet. Ambulated in halls to bathroom with good marva. No change in puncture site assessment with activity.  1800 Pt discharged per w/c to private vehicle. All personal belongings taken w/ pt.      "

## 2023-01-16 LAB
ATRIAL RATE - MUSE: 80 BPM
DIASTOLIC BLOOD PRESSURE - MUSE: NORMAL MMHG
INTERPRETATION ECG - MUSE: NORMAL
P AXIS - MUSE: 69 DEGREES
PR INTERVAL - MUSE: 202 MS
QRS DURATION - MUSE: 156 MS
QT - MUSE: 432 MS
QTC - MUSE: 498 MS
R AXIS - MUSE: -89 DEGREES
SYSTOLIC BLOOD PRESSURE - MUSE: NORMAL MMHG
T AXIS - MUSE: 95 DEGREES
VENTRICULAR RATE- MUSE: 80 BPM

## 2023-01-26 ENCOUNTER — OFFICE VISIT (OUTPATIENT)
Dept: CARDIOLOGY | Facility: CLINIC | Age: 83
End: 2023-01-26
Attending: NURSE PRACTITIONER
Payer: COMMERCIAL

## 2023-01-26 VITALS
DIASTOLIC BLOOD PRESSURE: 70 MMHG | OXYGEN SATURATION: 97 % | SYSTOLIC BLOOD PRESSURE: 132 MMHG | HEIGHT: 70 IN | HEART RATE: 75 BPM | BODY MASS INDEX: 24.74 KG/M2 | WEIGHT: 172.8 LBS

## 2023-01-26 DIAGNOSIS — I25.10 CORONARY ARTERY DISEASE INVOLVING NATIVE CORONARY ARTERY OF NATIVE HEART WITHOUT ANGINA PECTORIS: ICD-10-CM

## 2023-01-26 PROCEDURE — 99214 OFFICE O/P EST MOD 30 MIN: CPT | Performed by: NURSE PRACTITIONER

## 2023-01-26 RX ORDER — NITROGLYCERIN 0.4 MG/1
TABLET SUBLINGUAL
Qty: 30 TABLET | Refills: 0 | Status: SHIPPED | OUTPATIENT
Start: 2023-01-26 | End: 2024-09-16

## 2023-01-26 NOTE — PATIENT INSTRUCTIONS
Today's Recommendations    Your angiogram did not show any significant narrowings, keep taking your aspirin, crestor, and fenofibrate  I ordered a new nitroglycerin to take with you   Continue all medications without changes.  Please follow up with Dr. Landers in 6 months.    Please send a [employer] message or call [phone] to the RN team with questions or concerns.     Scheduling number [phone]  YARITZA Menon, CNP

## 2023-01-26 NOTE — LETTER
1/26/2023    Poli Alberto MD  [address]   [facility] [address]    RE: Sunil Lyle       Dear Colleague,     I had the pleasure of seeing Sunil Lyle in the [facility].  Cardiology Clinic Progress Note  Sunil Lyle MRN# [id number]   YOB: 1940 Age: 82 year old   Primary Cardiologist: Dr. Landers Reason for visit: Follow-up coronary angiogram            Assessment and Plan:       1. Mild,non-obstructive coronary artery disease (1/2023), status post DEANA to RCA (2001)  -Results of lexiscan nuclear stress abnormal with small area of mild infarct in the mid to basal inferior and inferoseptal segments with no ischemia, small area of moderate infarct in the apical segments with mild discrete alex-infarct ischemia, notable for reduced EF with stress from 59 to 46%  -1/2023 coronary angiogram showed mild nonobstructive coronary disease, prior RCA stent with mild in-stent restenosis  -Continue aspirin and statin    2.  Hyperlipidemia  -Fasting lipid panel done 10/27/2022 showed excellent lipid control total cholesterol 128, HDL 47, LDL 52, triglycerides 139  -Continue rosuvastatin    3.  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  24% A pacing and 99% V pacing.  -Rescheduled patient's next device check for early March when he returns from [facility]    4.  Mild to moderate pulmonary hypertension, stable since 2017 (TTE 3/2022)    5. Paroxysmal atrial fibrillation  -No evidence of recurrence on most recent 11/2022 device check.   -Continue q3 month device checks.     6. COPD, hx of tobacco abuse  -Does not follow routinely with pulmonology but would recommend discussing PFTs with PCP    Changes today: No medications changed today, refill of nitroglycerin provided today    Follow up  plan: Follow up with Dr. Landers in 6 months        History of Presenting Illness:    Sunil Lyle is a very pleasant 82 year old male with a history of coronary artery disease, s/p DEANA to RCA (2001), 2:1 AV block status post Biotronik permanent pacemaker (3/2022), paroxysmal atrial fibrillation (in the setting of illness/surgery April 2022) with no obvious recurrence, moderate pulmonary hypertension, hypertension, hyperlipidemia, COPD, CKD stage III, BPH (turp 6/2022), former tobacco use (quit in 2018).     Patient is well-known to our clinic and most recently seen by Dr. Landers in July 2022.  His lipids were suboptimally controlled at that time with a total cholesterol 94, HDL 46, LDL 96, triglycerides 209.  Rosuvastatin 10 mg daily was added to his regimen of fenofibrate and simvastatin was discontinued.    When we met in Oct 2022 he was reporting a new onset of epigastric pain at night after lying down at night. Resolves with 1-2 tums. If he eats at a restaurant (and does not late night snack) and or naps during the day, it does not occur.       11/30/2022 Lexiscan nuclear stress test showed a small area of mild degree of infarction in the mid to basal inferior and inferoseptal segments, no reversible ischemia, small area of moderate degree of infarction in the apical segments with a mild degree of alex-infarct ischemia.  His prior stress test was an exercise stress echocardiogram in 2017 which was negative for ischemia.    At his last visit we discussed pursuing coronary angiography due to the findings on his nuclear stress test.  1/6/2023 this was completed which showed mild nonobstructive coronary disease; RPV 30% stenosis, proximal RCA 30% stenosis, second diagonal 40% stenosis, proximal to mid LAD lesion 30% stenosis, and prior right coronary artery stent with mild in-stent restenosis.    Patient is here today for follow-up after his coronary angiogram. Patient unfortunately developed a cough the  following day and over the course of the following week his symptoms worsened. He was diagnosed with pneumonia and treated outpatient with antibiotics. He feels 95% better today.     He continues to be primarily sedentary. He has a home with 2 flights of stairs and goes up and down the stairs several times a day. He gets winded with exertion, this is a new issue over the last 2 years, not progressive. Denies any chest discomfort with exertion or at rest. He is leaving for a month to spend time in [place].     Denies dizziness, lightheadedness or other presyncopal symptoms. Denies tachycardia or palpitations.     Blood pressure 132/70 and HR 75 in clinic today.        Recent Hospitalizations   2022 TURP          Social History    , retired  Social History     Socioeconomic History     Marital status:      Spouse name: Not on file     Number of children: Not on file     Years of education: Not on file     Highest education level: Not on file   Occupational History     Occupation: Sales/Computer Training     Employer: RETIRED   Tobacco Use     Smoking status: Former     Packs/day: 0.50     Years: 33.50     Pack years: 16.75     Types: Cigarettes     Start date:      Quit date: 2017     Years since quittin.2     Smokeless tobacco: Never   Vaping Use     Vaping Use: Never used   Substance and Sexual Activity     Alcohol use: Yes     Alcohol/week: 3.3 standard drinks     Types: 4 Shots of liquor per week     Comment: 5-6 drink per week     Drug use: No     Sexual activity: Not on file   Other Topics Concern      Service Not Asked     Comment: [place] [phone]     Blood Transfusions No     Caffeine Concern Yes     Occupational Exposure No     Hobby Hazards No     Sleep Concern No     Stress Concern No     Weight Concern No     Special Diet No     Back Care No     Exercise No     Bike Helmet Not Asked     Seat Belt Yes     Self-Exams Not Asked     Parent/sibling w/ CABG, MI or  "angioplasty before 65F 55M? Not Asked   Social History Narrative    ** Merged History Encounter **          Social Determinants of Health     Financial Resource Strain: Not on file   Food Insecurity: Not on file   Transportation Needs: Not on file   Physical Activity: Not on file   Stress: Not on file   Social Connections: Not on file   Intimate Partner Violence: Not on file   Housing Stability: Not on file            Review of Systems:   Skin:        Eyes:       ENT:       Respiratory:  Positive for dyspnea on exertion  Cardiovascular:  Negative;chest pain;palpitations;lightheadedness;dizziness;edema;fatigue    Gastroenterology:      Genitourinary:       Musculoskeletal:       Neurologic:       Psychiatric:       Heme/Lymph/Imm:       Endocrine:  Negative           Physical Exam:   Vitals: /70 (BP Location: Left arm, Patient Position: Sitting)   Pulse 75   Ht 1.778 m (5' 10\")   Wt 78.4 kg (172 lb 12.8 oz)   SpO2 97%   BMI 24.79 kg/m     Wt Readings from Last 4 Encounters:   01/26/23 78.4 kg (172 lb 12.8 oz)   01/06/23 78.2 kg (172 lb 4.8 oz)   12/19/22 78.7 kg (173 lb 6.4 oz)   11/30/22 78.1 kg (172 lb 3.2 oz)     GEN: well nourished, in no acute distress.  HEENT:  Pupils equal, round. Sclerae nonicteric.   NECK: Supple, no masses appreciated.   C/V:  Regular rate and rhythm, no murmur, rub or gallop.    RESP: Respirations are unlabored. Clear to auscultation bilaterally without wheezing, rales, or rhonchi.  GI: Abdomen soft, nontender.  EXTREM: no LE edema.  NEURO: Alert and oriented, cooperative.  SKIN: Warm and dry. Right wrist puncture site healed with no bruising, tenderness, or erythema, no bruit       Data:     LIPID RESULTS:  Lab Results   Component Value Date    CHOL 128 10/27/2022    CHOL 130 03/26/2021    HDL 47 10/27/2022    HDL 33 (L) 03/26/2021    LDL 53 10/27/2022    LDL 60 03/26/2021    TRIG 139 10/27/2022    TRIG 186 (H) 03/26/2021    CHOLHDLRATIO 3.2 06/18/2015     LIVER ENZYME " RESULTS:  Lab Results   Component Value Date    AST 22 03/22/2022    AST 18 03/14/2017    ALT 43 10/27/2022    ALT 26 03/26/2021     CBC RESULTS:  Lab Results   Component Value Date    WBC 13.5 (H) 01/06/2023    WBC 6.6 03/14/2017    RBC 3.58 (L) 01/06/2023    RBC 4.34 (L) 03/14/2017    HGB 12.5 (L) 01/06/2023    HGB 14.0 03/14/2017    HCT 37.1 (L) 01/06/2023    HCT 42.0 03/14/2017     (H) 01/06/2023    MCV 97 03/14/2017    MCH 34.9 (H) 01/06/2023    MCH 32.3 03/14/2017    MCHC 33.7 01/06/2023    MCHC 33.3 03/14/2017    RDW 16.8 (H) 01/06/2023    RDW 15.0 03/14/2017     01/06/2023     03/14/2017     BMP RESULTS:  Lab Results   Component Value Date     01/06/2023     07/14/2017    POTASSIUM 3.9 01/06/2023    POTASSIUM 4.1 07/14/2017    CHLORIDE 108 01/06/2023    CHLORIDE 105 07/14/2017    CO2 24 01/06/2023    CO2 27 07/14/2017    ANIONGAP 7 01/06/2023    ANIONGAP 13.1 07/14/2017     (H) 01/06/2023     (H) 07/14/2017    BUN 16 01/06/2023    BUN 12 07/14/2017    CR 1.07 01/06/2023    CR 1.21 07/14/2017    GFRESTIMATED 69 01/06/2023    GFRESTIMATED 53 (L) 11/17/2017    GFRESTBLACK 65 11/17/2017    PAIGE 9.5 01/06/2023    PAIGE 9.2 07/14/2017      A1C RESULTS:  Lab Results   Component Value Date    A1C 5.4 03/20/2022     INR RESULTS:  Lab Results   Component Value Date    INR 1.10 01/06/2023    INR 1.1 05/24/2022    INR 1.21 (H) 03/19/2022    INR 1.03 03/28/2009            Medications     Current Outpatient Medications   Medication Sig Dispense Refill     aspirin 81 MG EC tablet Take 81 mg by mouth daily       fenofibrate (LOFIBRA) 54 MG tablet Take 54 mg by mouth daily 2 tablets daily.       multivitamin w/minerals (THERA-VIT-M) tablet Take 1 tablet by mouth daily        nitroGLYcerin (NITROSTAT) 0.4 MG sublingual tablet For chest pain place 1 tablet under the tongue every 5 minutes for 3 doses. If symptoms persist 5 minutes after 1st dose call 911. 30 tablet 0     rosuvastatin  (CRESTOR) 10 MG tablet Take 1 tablet (10 mg) by mouth daily 90 tablet 3     vitamin D3 (CHOLECALCIFEROL) 50 mcg (2000 units) tablet Take 2,000 Units by mouth daily            Past Medical History     Past Medical History:   Diagnosis Date     Allergic state      BPH (benign prostatic hyperplasia)      CAD (coronary artery disease) 2014     s/p stent to RCA ()     COPD (chronic obstructive pulmonary disease) (H)      GERD (gastroesophageal reflux disease)      Hypercholesterolaemia      Hyperlipidaemia      Other affections of shoulder region, not elsewhere classified     impingement syndrome of right shoulder     Renal disease      Sebaceous cyst     right shoulder area     Tachycardia      Tobacco use disorder 2008     Past Surgical History:   Procedure Laterality Date     COLONOSCOPY       CV CORONARY ANGIOGRAM N/A 2023    Procedure: Coronary Angiogram;  Surgeon: Claudia aMhan MD;  Location:  HEART CARDIAC CATH LAB     CYSTOSCOPY, TRANSURETHRAL RESECTION (TUR) PROSTATE, COMBINED N/A 2022    Procedure: CYSTOSCOPY, WITH TRANSURETHRAL RESECTION PROSTATE;  Surgeon: Tyree Helton MD;  Location:  OR     EP PACEMAKER DEVICE & LEAD IMPLANT- RIGHT ATRIAL & RIGHT VENTRICULAR Left 3/23/2022    Procedure: Pacemaker Device & Lead Implant - Right Atrial & Right Ventricular;  Surgeon: Thai Elliott MD;  Location:  HEART CARDIAC CATH LAB     HEART CATH, ANGIOPLASTY  2001    RCA stent     LAPAROTOMY EXPLORATORY N/A 3/19/2022    Procedure: EXPLORATORY LAPAROTOMY, SUPERIOR MESENTERIC ARTERY EXPLORATION;  Surgeon: Smita Garsia MD;  Location:  OR     TONSILLECTOMY & ADENOIDECTOMY       VASCULAR SURGERY       Family History   Problem Relation Age of Onset     Breast Cancer Mother          at age 64     Heart Disease Father         heart attack     Diabetes Father             Allergies   Pcn [penicillins], Chantix [varenicline], Bupropion hcl, Ciprofloxacin, and  Lactose        Tali Mchugh NP  [facility]  Pager: [phone]      Thank you for allowing me to participate in the care of your patient.      Sincerely,     Tali Mchugh NP     [facility]  cc:   Tali Mchugh NP  [address]  [place] [address]

## 2023-01-26 NOTE — PROGRESS NOTES
Cardiology Clinic Progress Note  Sunil Lyle MRN# [id number]   YOB: 1940 Age: 82 year old   Primary Cardiologist: Dr. Landers Reason for visit: Follow-up coronary angiogram            Assessment and Plan:       1. Mild,non-obstructive coronary artery disease (1/2023), status post DEANA to RCA (2001)  -Results of lexiscan nuclear stress abnormal with small area of mild infarct in the mid to basal inferior and inferoseptal segments with no ischemia, small area of moderate infarct in the apical segments with mild discrete alex-infarct ischemia, notable for reduced EF with stress from 59 to 46%  -1/2023 coronary angiogram showed mild nonobstructive coronary disease, prior RCA stent with mild in-stent restenosis  -Continue aspirin and statin    2.  Hyperlipidemia  -Fasting lipid panel done 10/27/2022 showed excellent lipid control total cholesterol 128, HDL 47, LDL 52, triglycerides 139  -Continue rosuvastatin    3.  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  24% A pacing and 99% V pacing.  -Rescheduled patient's next device check for early March when he returns from [facility]    4.  Mild to moderate pulmonary hypertension, stable since 2017 (TTE 3/2022)    5. Paroxysmal atrial fibrillation  -No evidence of recurrence on most recent 11/2022 device check.   -Continue q3 month device checks.     6. COPD, hx of tobacco abuse  -Does not follow routinely with pulmonology but would recommend discussing PFTs with PCP    Changes today: No medications changed today, refill of nitroglycerin provided today    Follow up plan: Follow up with Dr. Landers in 6 months        History of Presenting Illness:    Sunil Lyle is a very pleasant 82 year old male with a history of coronary artery disease, s/p DEANA to RCA (2001), 2:1 AV block status post  Biotronik permanent pacemaker (3/2022), paroxysmal atrial fibrillation (in the setting of illness/surgery April 2022) with no obvious recurrence, moderate pulmonary hypertension, hypertension, hyperlipidemia, COPD, CKD stage III, BPH (turp 6/2022), former tobacco use (quit in 2018).     Patient is well-known to our clinic and most recently seen by Dr. Landers in July 2022.  His lipids were suboptimally controlled at that time with a total cholesterol 94, HDL 46, LDL 96, triglycerides 209.  Rosuvastatin 10 mg daily was added to his regimen of fenofibrate and simvastatin was discontinued.    When we met in Oct 2022 he was reporting a new onset of epigastric pain at night after lying down at night. Resolves with 1-2 tums. If he eats at a restaurant (and does not late night snack) and or naps during the day, it does not occur.       11/30/2022 Lexiscan nuclear stress test showed a small area of mild degree of infarction in the mid to basal inferior and inferoseptal segments, no reversible ischemia, small area of moderate degree of infarction in the apical segments with a mild degree of alex-infarct ischemia.  His prior stress test was an exercise stress echocardiogram in 2017 which was negative for ischemia.    At his last visit we discussed pursuing coronary angiography due to the findings on his nuclear stress test.  1/6/2023 this was completed which showed mild nonobstructive coronary disease; RPV 30% stenosis, proximal RCA 30% stenosis, second diagonal 40% stenosis, proximal to mid LAD lesion 30% stenosis, and prior right coronary artery stent with mild in-stent restenosis.    Patient is here today for follow-up after his coronary angiogram. Patient unfortunately developed a cough the following day and over the course of the following week his symptoms worsened. He was diagnosed with pneumonia and treated outpatient with antibiotics. He feels 95% better today.     He continues to be primarily sedentary. He has a  home with 2 flights of stairs and goes up and down the stairs several times a day. He gets winded with exertion, this is a new issue over the last 2 years, not progressive. Denies any chest discomfort with exertion or at rest. He is leaving for a month to spend time in [place].     Denies dizziness, lightheadedness or other presyncopal symptoms. Denies tachycardia or palpitations.     Blood pressure 132/70 and HR 75 in clinic today.        Recent Hospitalizations   2022 TURP          Social History    , retired  Social History     Socioeconomic History     Marital status:      Spouse name: Not on file     Number of children: Not on file     Years of education: Not on file     Highest education level: Not on file   Occupational History     Occupation: Sales/Computer Training     Employer: RETIRED   Tobacco Use     Smoking status: Former     Packs/day: 0.50     Years: 33.50     Pack years: 16.75     Types: Cigarettes     Start date:      Quit date: 2017     Years since quittin.2     Smokeless tobacco: Never   Vaping Use     Vaping Use: Never used   Substance and Sexual Activity     Alcohol use: Yes     Alcohol/week: 3.3 standard drinks     Types: 4 Shots of liquor per week     Comment: 5-6 drink per week     Drug use: No     Sexual activity: Not on file   Other Topics Concern      Service Not Asked     Comment: [place] [phone]     Blood Transfusions No     Caffeine Concern Yes     Occupational Exposure No     Hobby Hazards No     Sleep Concern No     Stress Concern No     Weight Concern No     Special Diet No     Back Care No     Exercise No     Bike Helmet Not Asked     Seat Belt Yes     Self-Exams Not Asked     Parent/sibling w/ CABG, MI or angioplasty before 65F 55M? Not Asked   Social History Narrative    ** Merged History Encounter **          Social Determinants of Health     Financial Resource Strain: Not on file   Food Insecurity: Not on file   Transportation  "Needs: Not on file   Physical Activity: Not on file   Stress: Not on file   Social Connections: Not on file   Intimate Partner Violence: Not on file   Housing Stability: Not on file            Review of Systems:   Skin:        Eyes:       ENT:       Respiratory:  Positive for dyspnea on exertion  Cardiovascular:  Negative;chest pain;palpitations;lightheadedness;dizziness;edema;fatigue    Gastroenterology:      Genitourinary:       Musculoskeletal:       Neurologic:       Psychiatric:       Heme/Lymph/Imm:       Endocrine:  Negative           Physical Exam:   Vitals: /70 (BP Location: Left arm, Patient Position: Sitting)   Pulse 75   Ht 1.778 m (5' 10\")   Wt 78.4 kg (172 lb 12.8 oz)   SpO2 97%   BMI 24.79 kg/m     Wt Readings from Last 4 Encounters:   01/26/23 78.4 kg (172 lb 12.8 oz)   01/06/23 78.2 kg (172 lb 4.8 oz)   12/19/22 78.7 kg (173 lb 6.4 oz)   11/30/22 78.1 kg (172 lb 3.2 oz)     GEN: well nourished, in no acute distress.  HEENT:  Pupils equal, round. Sclerae nonicteric.   NECK: Supple, no masses appreciated.   C/V:  Regular rate and rhythm, no murmur, rub or gallop.    RESP: Respirations are unlabored. Clear to auscultation bilaterally without wheezing, rales, or rhonchi.  GI: Abdomen soft, nontender.  EXTREM: no LE edema.  NEURO: Alert and oriented, cooperative.  SKIN: Warm and dry. Right wrist puncture site healed with no bruising, tenderness, or erythema, no bruit       Data:     LIPID RESULTS:  Lab Results   Component Value Date    CHOL 128 10/27/2022    CHOL 130 03/26/2021    HDL 47 10/27/2022    HDL 33 (L) 03/26/2021    LDL 53 10/27/2022    LDL 60 03/26/2021    TRIG 139 10/27/2022    TRIG 186 (H) 03/26/2021    CHOLHDLRATIO 3.2 06/18/2015     LIVER ENZYME RESULTS:  Lab Results   Component Value Date    AST 22 03/22/2022    AST 18 03/14/2017    ALT 43 10/27/2022    ALT 26 03/26/2021     CBC RESULTS:  Lab Results   Component Value Date    WBC 13.5 (H) 01/06/2023    WBC 6.6 03/14/2017    RBC " 3.58 (L) 01/06/2023    RBC 4.34 (L) 03/14/2017    HGB 12.5 (L) 01/06/2023    HGB 14.0 03/14/2017    HCT 37.1 (L) 01/06/2023    HCT 42.0 03/14/2017     (H) 01/06/2023    MCV 97 03/14/2017    MCH 34.9 (H) 01/06/2023    MCH 32.3 03/14/2017    MCHC 33.7 01/06/2023    MCHC 33.3 03/14/2017    RDW 16.8 (H) 01/06/2023    RDW 15.0 03/14/2017     01/06/2023     03/14/2017     BMP RESULTS:  Lab Results   Component Value Date     01/06/2023     07/14/2017    POTASSIUM 3.9 01/06/2023    POTASSIUM 4.1 07/14/2017    CHLORIDE 108 01/06/2023    CHLORIDE 105 07/14/2017    CO2 24 01/06/2023    CO2 27 07/14/2017    ANIONGAP 7 01/06/2023    ANIONGAP 13.1 07/14/2017     (H) 01/06/2023     (H) 07/14/2017    BUN 16 01/06/2023    BUN 12 07/14/2017    CR 1.07 01/06/2023    CR 1.21 07/14/2017    GFRESTIMATED 69 01/06/2023    GFRESTIMATED 53 (L) 11/17/2017    GFRESTBLACK 65 11/17/2017    PAIGE 9.5 01/06/2023    PAIGE 9.2 07/14/2017      A1C RESULTS:  Lab Results   Component Value Date    A1C 5.4 03/20/2022     INR RESULTS:  Lab Results   Component Value Date    INR 1.10 01/06/2023    INR 1.1 05/24/2022    INR 1.21 (H) 03/19/2022    INR 1.03 03/28/2009            Medications     Current Outpatient Medications   Medication Sig Dispense Refill     aspirin 81 MG EC tablet Take 81 mg by mouth daily       fenofibrate (LOFIBRA) 54 MG tablet Take 54 mg by mouth daily 2 tablets daily.       multivitamin w/minerals (THERA-VIT-M) tablet Take 1 tablet by mouth daily        nitroGLYcerin (NITROSTAT) 0.4 MG sublingual tablet For chest pain place 1 tablet under the tongue every 5 minutes for 3 doses. If symptoms persist 5 minutes after 1st dose call 911. 30 tablet 0     rosuvastatin (CRESTOR) 10 MG tablet Take 1 tablet (10 mg) by mouth daily 90 tablet 3     vitamin D3 (CHOLECALCIFEROL) 50 mcg (2000 units) tablet Take 2,000 Units by mouth daily            Past Medical History     Past Medical History:   Diagnosis  Date     Allergic state      BPH (benign prostatic hyperplasia)      CAD (coronary artery disease) 2014     s/p stent to RCA ()     COPD (chronic obstructive pulmonary disease) (H)      GERD (gastroesophageal reflux disease)      Hypercholesterolaemia      Hyperlipidaemia      Other affections of shoulder region, not elsewhere classified     impingement syndrome of right shoulder     Renal disease      Sebaceous cyst     right shoulder area     Tachycardia      Tobacco use disorder 2008     Past Surgical History:   Procedure Laterality Date     COLONOSCOPY       CV CORONARY ANGIOGRAM N/A 2023    Procedure: Coronary Angiogram;  Surgeon: Claudia Mahan MD;  Location:  HEART CARDIAC CATH LAB     CYSTOSCOPY, TRANSURETHRAL RESECTION (TUR) PROSTATE, COMBINED N/A 2022    Procedure: CYSTOSCOPY, WITH TRANSURETHRAL RESECTION PROSTATE;  Surgeon: Tyree Helton MD;  Location:  OR     EP PACEMAKER DEVICE & LEAD IMPLANT- RIGHT ATRIAL & RIGHT VENTRICULAR Left 3/23/2022    Procedure: Pacemaker Device & Lead Implant - Right Atrial & Right Ventricular;  Surgeon: Thai Elliott MD;  Location:  HEART CARDIAC CATH LAB     HEART CATH, ANGIOPLASTY  2001    RCA stent     LAPAROTOMY EXPLORATORY N/A 3/19/2022    Procedure: EXPLORATORY LAPAROTOMY, SUPERIOR MESENTERIC ARTERY EXPLORATION;  Surgeon: Smita Garsia MD;  Location:  OR     TONSILLECTOMY & ADENOIDECTOMY       VASCULAR SURGERY       Family History   Problem Relation Age of Onset     Breast Cancer Mother          at age 64     Heart Disease Father         heart attack     Diabetes Father             Allergies   Pcn [penicillins], Chantix [varenicline], Bupropion hcl, Ciprofloxacin, and Lactose        Tali Mchugh NP  [facility]  Pager: [phone]

## 2023-03-02 ENCOUNTER — ANCILLARY PROCEDURE (OUTPATIENT)
Dept: CARDIOLOGY | Facility: CLINIC | Age: 83
End: 2023-03-02
Attending: INTERNAL MEDICINE
Payer: COMMERCIAL

## 2023-03-02 DIAGNOSIS — Z95.0 CARDIAC PACEMAKER IN SITU: ICD-10-CM

## 2023-03-02 DIAGNOSIS — I44.1 MOBITZ II: ICD-10-CM

## 2023-03-02 PROCEDURE — 93296 REM INTERROG EVL PM/IDS: CPT | Performed by: INTERNAL MEDICINE

## 2023-03-02 PROCEDURE — 93294 REM INTERROG EVL PM/LDLS PM: CPT | Performed by: INTERNAL MEDICINE

## 2023-03-03 LAB
MDC_IDC_LEAD_IMPLANT_DT: NORMAL
MDC_IDC_LEAD_IMPLANT_DT: NORMAL
MDC_IDC_LEAD_LOCATION: NORMAL
MDC_IDC_LEAD_LOCATION: NORMAL
MDC_IDC_LEAD_LOCATION_DETAIL_1: NORMAL
MDC_IDC_LEAD_MFG: NORMAL
MDC_IDC_LEAD_MFG: NORMAL
MDC_IDC_LEAD_MODEL: NORMAL
MDC_IDC_LEAD_MODEL: NORMAL
MDC_IDC_LEAD_POLARITY_TYPE: NORMAL
MDC_IDC_LEAD_POLARITY_TYPE: NORMAL
MDC_IDC_LEAD_SERIAL: NORMAL
MDC_IDC_LEAD_SERIAL: NORMAL
MDC_IDC_MSMT_BATTERY_REMAINING_PERCENTAGE: 95 %
MDC_IDC_MSMT_BATTERY_STATUS: NORMAL
MDC_IDC_MSMT_LEADCHNL_RA_IMPEDANCE_VALUE: 558 OHM
MDC_IDC_MSMT_LEADCHNL_RA_LEAD_CHANNEL_STATUS: NORMAL
MDC_IDC_MSMT_LEADCHNL_RA_PACING_THRESHOLD_AMPLITUDE: 0.6 V
MDC_IDC_MSMT_LEADCHNL_RA_PACING_THRESHOLD_PULSEWIDTH: 0.4 MS
MDC_IDC_MSMT_LEADCHNL_RV_IMPEDANCE_VALUE: 683 OHM
MDC_IDC_MSMT_LEADCHNL_RV_LEAD_CHANNEL_STATUS: NORMAL
MDC_IDC_MSMT_LEADCHNL_RV_PACING_THRESHOLD_AMPLITUDE: 0.7 V
MDC_IDC_MSMT_LEADCHNL_RV_PACING_THRESHOLD_PULSEWIDTH: 0.4 MS
MDC_IDC_PG_IMPLANT_DTM: NORMAL
MDC_IDC_PG_MFG: NORMAL
MDC_IDC_PG_MODEL: NORMAL
MDC_IDC_PG_SERIAL: NORMAL
MDC_IDC_PG_TYPE: NORMAL
MDC_IDC_SESS_CLINIC_NAME: NORMAL
MDC_IDC_SESS_DTM: NORMAL
MDC_IDC_SESS_REPROGRAMMED: NO
MDC_IDC_SESS_TYPE: NORMAL
MDC_IDC_SET_BRADY_AT_MODE_SWITCH_MODE: NORMAL
MDC_IDC_SET_BRADY_AT_MODE_SWITCH_RATE: 180 {BEATS}/MIN
MDC_IDC_SET_BRADY_LOWRATE: 60 {BEATS}/MIN
MDC_IDC_SET_BRADY_MAX_SENSOR_RATE: 120 {BEATS}/MIN
MDC_IDC_SET_BRADY_MAX_TRACKING_RATE: 130 {BEATS}/MIN
MDC_IDC_SET_BRADY_MODE: NORMAL
MDC_IDC_SET_BRADY_PAV_DELAY_HIGH: 160 MS
MDC_IDC_SET_BRADY_PAV_DELAY_LOW: 200 MS
MDC_IDC_SET_BRADY_SAV_DELAY_HIGH: 130 MS
MDC_IDC_SET_BRADY_SAV_DELAY_LOW: 170 MS
MDC_IDC_SET_LEADCHNL_RA_PACING_AMPLITUDE: 1.5 V
MDC_IDC_SET_LEADCHNL_RA_PACING_POLARITY: NORMAL
MDC_IDC_SET_LEADCHNL_RA_PACING_PULSEWIDTH: 0.4 MS
MDC_IDC_SET_LEADCHNL_RA_SENSING_ADAPTATION_MODE: NORMAL
MDC_IDC_SET_LEADCHNL_RA_SENSING_POLARITY: NORMAL
MDC_IDC_SET_LEADCHNL_RV_PACING_AMPLITUDE: 1 V
MDC_IDC_SET_LEADCHNL_RV_PACING_POLARITY: NORMAL
MDC_IDC_SET_LEADCHNL_RV_PACING_PULSEWIDTH: 0.4 MS
MDC_IDC_SET_LEADCHNL_RV_SENSING_ADAPTATION_MODE: NORMAL
MDC_IDC_SET_LEADCHNL_RV_SENSING_POLARITY: NORMAL
MDC_IDC_STAT_AT_BURDEN_PERCENT: 0 %
MDC_IDC_STAT_AT_DTM_END: NORMAL
MDC_IDC_STAT_AT_DTM_START: NORMAL
MDC_IDC_STAT_AT_MODE_SW_COUNT_PER_DAY: 0
MDC_IDC_STAT_AT_MODE_SW_PERCENT_TIME_PER_DAY: 0 %
MDC_IDC_STAT_BRADY_AP_VP_PERCENT: 3 %
MDC_IDC_STAT_BRADY_AP_VS_PERCENT: 0 %
MDC_IDC_STAT_BRADY_AS_VP_PERCENT: 93 %
MDC_IDC_STAT_BRADY_AS_VS_PERCENT: 1 %
MDC_IDC_STAT_BRADY_DTM_END: NORMAL
MDC_IDC_STAT_BRADY_DTM_START: NORMAL
MDC_IDC_STAT_BRADY_RA_PERCENT_PACED: 3 %
MDC_IDC_STAT_BRADY_RV_PERCENT_PACED: 96 %
MDC_IDC_STAT_CRT_DTM_END: NORMAL
MDC_IDC_STAT_CRT_DTM_START: NORMAL

## 2023-04-02 ENCOUNTER — HEALTH MAINTENANCE LETTER (OUTPATIENT)
Age: 83
End: 2023-04-02

## 2023-06-07 ENCOUNTER — ANCILLARY PROCEDURE (OUTPATIENT)
Dept: CARDIOLOGY | Facility: CLINIC | Age: 83
End: 2023-06-07
Attending: INTERNAL MEDICINE
Payer: COMMERCIAL

## 2023-06-07 DIAGNOSIS — Z95.0 CARDIAC PACEMAKER IN SITU: ICD-10-CM

## 2023-06-07 DIAGNOSIS — I44.1 MOBITZ II: ICD-10-CM

## 2023-06-07 PROCEDURE — 93279 PRGRMG DEV EVAL PM/LDLS PM: CPT | Performed by: INTERNAL MEDICINE

## 2023-06-09 LAB
MDC_IDC_LEAD_IMPLANT_DT: NORMAL
MDC_IDC_LEAD_IMPLANT_DT: NORMAL
MDC_IDC_LEAD_LOCATION: NORMAL
MDC_IDC_LEAD_LOCATION: NORMAL
MDC_IDC_LEAD_LOCATION_DETAIL_1: NORMAL
MDC_IDC_LEAD_MFG: NORMAL
MDC_IDC_LEAD_MFG: NORMAL
MDC_IDC_LEAD_MODEL: NORMAL
MDC_IDC_LEAD_MODEL: NORMAL
MDC_IDC_LEAD_POLARITY_TYPE: NORMAL
MDC_IDC_LEAD_POLARITY_TYPE: NORMAL
MDC_IDC_LEAD_SERIAL: NORMAL
MDC_IDC_LEAD_SERIAL: NORMAL
MDC_IDC_MSMT_BATTERY_STATUS: NORMAL
MDC_IDC_MSMT_LEADCHNL_RA_IMPEDANCE_VALUE: 546 OHM
MDC_IDC_MSMT_LEADCHNL_RA_PACING_THRESHOLD_AMPLITUDE: 0.5 V
MDC_IDC_MSMT_LEADCHNL_RA_PACING_THRESHOLD_AMPLITUDE: 0.8 V
MDC_IDC_MSMT_LEADCHNL_RA_PACING_THRESHOLD_AMPLITUDE: 0.8 V
MDC_IDC_MSMT_LEADCHNL_RA_PACING_THRESHOLD_PULSEWIDTH: 0.4 MS
MDC_IDC_MSMT_LEADCHNL_RA_SENSING_INTR_AMPL: 10.3 MV
MDC_IDC_MSMT_LEADCHNL_RA_SENSING_INTR_AMPL: 9.9 MV
MDC_IDC_MSMT_LEADCHNL_RV_IMPEDANCE_VALUE: 663 OHM
MDC_IDC_MSMT_LEADCHNL_RV_PACING_THRESHOLD_AMPLITUDE: 0.5 V
MDC_IDC_MSMT_LEADCHNL_RV_PACING_THRESHOLD_PULSEWIDTH: 0.4 MS
MDC_IDC_MSMT_LEADCHNL_RV_SENSING_INTR_AMPL: 11.6 MV
MDC_IDC_MSMT_LEADCHNL_RV_SENSING_INTR_AMPL: 11.9 MV
MDC_IDC_PG_IMPLANT_DTM: NORMAL
MDC_IDC_PG_MFG: NORMAL
MDC_IDC_PG_MODEL: NORMAL
MDC_IDC_PG_SERIAL: NORMAL
MDC_IDC_PG_TYPE: NORMAL
MDC_IDC_SESS_CLINIC_NAME: NORMAL
MDC_IDC_SESS_DTM: NORMAL
MDC_IDC_SESS_REPROGRAMMED: NORMAL
MDC_IDC_SESS_TYPE: NORMAL
MDC_IDC_SET_BRADY_AT_MODE_SWITCH_MODE: NORMAL
MDC_IDC_SET_BRADY_AT_MODE_SWITCH_RATE: 180 {BEATS}/MIN
MDC_IDC_SET_BRADY_HYSTRATE: 60 {BEATS}/MIN
MDC_IDC_SET_BRADY_LOWRATE: 60 {BEATS}/MIN
MDC_IDC_SET_BRADY_MAX_SENSOR_RATE: 120 {BEATS}/MIN
MDC_IDC_SET_BRADY_MAX_TRACKING_RATE: 130 {BEATS}/MIN
MDC_IDC_SET_BRADY_MODE: NORMAL
MDC_IDC_SET_BRADY_NIGHT_RATE: 60 {BEATS}/MIN
MDC_IDC_SET_BRADY_PAV_DELAY_HIGH: 160 MS
MDC_IDC_SET_BRADY_PAV_DELAY_LOW: 200 MS
MDC_IDC_SET_BRADY_SAV_DELAY_HIGH: 130 MS
MDC_IDC_SET_BRADY_SAV_DELAY_LOW: 170 MS
MDC_IDC_SET_CRT_PACED_CHAMBERS: NORMAL
MDC_IDC_SET_LEADCHNL_LV_PACING_CATHODE_ELECTRODE_1: NORMAL
MDC_IDC_SET_LEADCHNL_LV_PACING_CATHODE_LOCATION_1: NORMAL
MDC_IDC_SET_LEADCHNL_LV_PACING_POLARITY: NORMAL
MDC_IDC_SET_LEADCHNL_LV_SENSING_CATHODE_ELECTRODE_1: NORMAL
MDC_IDC_SET_LEADCHNL_LV_SENSING_CATHODE_LOCATION_1: NORMAL
MDC_IDC_SET_LEADCHNL_LV_SENSING_POLARITY: NORMAL
MDC_IDC_SET_LEADCHNL_RA_PACING_AMPLITUDE: 1.5 V
MDC_IDC_SET_LEADCHNL_RA_PACING_POLARITY: NORMAL
MDC_IDC_SET_LEADCHNL_RA_PACING_PULSEWIDTH: 0.4 MS
MDC_IDC_SET_LEADCHNL_RA_SENSING_ADAPTATION_MODE: NORMAL
MDC_IDC_SET_LEADCHNL_RA_SENSING_POLARITY: NORMAL
MDC_IDC_SET_LEADCHNL_RA_SENSING_SENSITIVITY: NORMAL
MDC_IDC_SET_LEADCHNL_RV_PACING_AMPLITUDE: 1 V
MDC_IDC_SET_LEADCHNL_RV_PACING_POLARITY: NORMAL
MDC_IDC_SET_LEADCHNL_RV_PACING_PULSEWIDTH: 0.4 MS
MDC_IDC_SET_LEADCHNL_RV_SENSING_ADAPTATION_MODE: NORMAL
MDC_IDC_SET_LEADCHNL_RV_SENSING_POLARITY: NORMAL
MDC_IDC_SET_LEADCHNL_RV_SENSING_SENSITIVITY: NORMAL

## 2023-07-21 ENCOUNTER — TELEPHONE (OUTPATIENT)
Dept: CARDIOLOGY | Facility: CLINIC | Age: 83
End: 2023-07-21
Payer: COMMERCIAL

## 2023-07-21 DIAGNOSIS — I44.2 CHB (COMPLETE HEART BLOCK) (H): ICD-10-CM

## 2023-07-21 DIAGNOSIS — Z95.0 CARDIAC PACEMAKER IN SITU: Primary | ICD-10-CM

## 2023-07-21 NOTE — TELEPHONE ENCOUNTER
Pt left . He said last time he was in for a PPM check, it was discussed that we could turn on rate response (CL[employer], [employer] device). Pt said he has thought about it and would his shortness of breath feels worse, so he would like to do something about it.     Called pt back, scheduled for courtesy check to turn on rate response on 7/31/2023.

## 2023-07-31 ENCOUNTER — ANCILLARY PROCEDURE (OUTPATIENT)
Dept: CARDIOLOGY | Facility: CLINIC | Age: 83
End: 2023-07-31
Attending: INTERNAL MEDICINE
Payer: COMMERCIAL

## 2023-07-31 DIAGNOSIS — Z95.0 CARDIAC PACEMAKER IN SITU: ICD-10-CM

## 2023-07-31 DIAGNOSIS — I44.2 CHB (COMPLETE HEART BLOCK) (H): ICD-10-CM

## 2023-08-08 DIAGNOSIS — I25.10 CORONARY ARTERY DISEASE INVOLVING NATIVE CORONARY ARTERY OF NATIVE HEART WITHOUT ANGINA PECTORIS: Primary | ICD-10-CM

## 2023-08-08 DIAGNOSIS — E78.5 HYPERLIPIDEMIA LDL GOAL <70: ICD-10-CM

## 2023-08-08 RX ORDER — ROSUVASTATIN CALCIUM 10 MG/1
10 TABLET, COATED ORAL DAILY
Qty: 90 TABLET | Refills: 0 | Status: SHIPPED | OUTPATIENT
Start: 2023-08-08 | End: 2023-09-13

## 2023-08-09 LAB
MDC_IDC_LEAD_IMPLANT_DT: NORMAL
MDC_IDC_LEAD_IMPLANT_DT: NORMAL
MDC_IDC_LEAD_LOCATION: NORMAL
MDC_IDC_LEAD_LOCATION: NORMAL
MDC_IDC_LEAD_LOCATION_DETAIL_1: NORMAL
MDC_IDC_LEAD_MFG: NORMAL
MDC_IDC_LEAD_MFG: NORMAL
MDC_IDC_LEAD_MODEL: NORMAL
MDC_IDC_LEAD_MODEL: NORMAL
MDC_IDC_LEAD_POLARITY_TYPE: NORMAL
MDC_IDC_LEAD_POLARITY_TYPE: NORMAL
MDC_IDC_LEAD_SERIAL: NORMAL
MDC_IDC_LEAD_SERIAL: NORMAL
MDC_IDC_MSMT_BATTERY_STATUS: NORMAL
MDC_IDC_MSMT_LEADCHNL_RA_IMPEDANCE_VALUE: 546 OHM
MDC_IDC_MSMT_LEADCHNL_RA_PACING_THRESHOLD_AMPLITUDE: 0.3 V
MDC_IDC_MSMT_LEADCHNL_RA_PACING_THRESHOLD_AMPLITUDE: 0.3 V
MDC_IDC_MSMT_LEADCHNL_RA_PACING_THRESHOLD_PULSEWIDTH: 0.4 MS
MDC_IDC_MSMT_LEADCHNL_RA_PACING_THRESHOLD_PULSEWIDTH: 0.4 MS
MDC_IDC_MSMT_LEADCHNL_RA_SENSING_INTR_AMPL: 10.2 MV
MDC_IDC_MSMT_LEADCHNL_RA_SENSING_INTR_AMPL: 10.2 MV
MDC_IDC_MSMT_LEADCHNL_RV_IMPEDANCE_VALUE: 643 OHM
MDC_IDC_MSMT_LEADCHNL_RV_PACING_THRESHOLD_AMPLITUDE: 0.6 V
MDC_IDC_MSMT_LEADCHNL_RV_PACING_THRESHOLD_AMPLITUDE: 0.6 V
MDC_IDC_MSMT_LEADCHNL_RV_PACING_THRESHOLD_PULSEWIDTH: 0.4 MS
MDC_IDC_MSMT_LEADCHNL_RV_PACING_THRESHOLD_PULSEWIDTH: 0.4 MS
MDC_IDC_MSMT_LEADCHNL_RV_SENSING_INTR_AMPL: 16.8 MV
MDC_IDC_MSMT_LEADCHNL_RV_SENSING_INTR_AMPL: 16.8 MV
MDC_IDC_PG_IMPLANT_DTM: NORMAL
MDC_IDC_PG_MFG: NORMAL
MDC_IDC_PG_MODEL: NORMAL
MDC_IDC_PG_SERIAL: NORMAL
MDC_IDC_PG_TYPE: NORMAL
MDC_IDC_SESS_CLINIC_NAME: NORMAL
MDC_IDC_SESS_DTM: NORMAL
MDC_IDC_SESS_REPROGRAMMED: NORMAL
MDC_IDC_SESS_TYPE: NORMAL
MDC_IDC_SET_BRADY_AT_MODE_SWITCH_MODE: NORMAL
MDC_IDC_SET_BRADY_AT_MODE_SWITCH_RATE: 180 {BEATS}/MIN
MDC_IDC_SET_BRADY_HYSTRATE: 60 {BEATS}/MIN
MDC_IDC_SET_BRADY_LOWRATE: 60 {BEATS}/MIN
MDC_IDC_SET_BRADY_MAX_SENSOR_RATE: 120 {BEATS}/MIN
MDC_IDC_SET_BRADY_MAX_TRACKING_RATE: 130 {BEATS}/MIN
MDC_IDC_SET_BRADY_MODE: NORMAL
MDC_IDC_SET_BRADY_NIGHT_RATE: 60 {BEATS}/MIN
MDC_IDC_SET_BRADY_PAV_DELAY_HIGH: 160 MS
MDC_IDC_SET_BRADY_PAV_DELAY_LOW: 200 MS
MDC_IDC_SET_BRADY_SAV_DELAY_HIGH: 130 MS
MDC_IDC_SET_BRADY_SAV_DELAY_LOW: 170 MS
MDC_IDC_SET_CRT_PACED_CHAMBERS: NORMAL
MDC_IDC_SET_LEADCHNL_LV_PACING_CATHODE_ELECTRODE_1: NORMAL
MDC_IDC_SET_LEADCHNL_LV_PACING_CATHODE_LOCATION_1: NORMAL
MDC_IDC_SET_LEADCHNL_LV_PACING_POLARITY: NORMAL
MDC_IDC_SET_LEADCHNL_LV_SENSING_CATHODE_ELECTRODE_1: NORMAL
MDC_IDC_SET_LEADCHNL_LV_SENSING_CATHODE_LOCATION_1: NORMAL
MDC_IDC_SET_LEADCHNL_LV_SENSING_POLARITY: NORMAL
MDC_IDC_SET_LEADCHNL_RA_PACING_AMPLITUDE: 1.3 V
MDC_IDC_SET_LEADCHNL_RA_PACING_POLARITY: NORMAL
MDC_IDC_SET_LEADCHNL_RA_PACING_PULSEWIDTH: 0.4 MS
MDC_IDC_SET_LEADCHNL_RA_SENSING_ADAPTATION_MODE: NORMAL
MDC_IDC_SET_LEADCHNL_RA_SENSING_POLARITY: NORMAL
MDC_IDC_SET_LEADCHNL_RA_SENSING_SENSITIVITY: NORMAL
MDC_IDC_SET_LEADCHNL_RV_PACING_AMPLITUDE: 1.1 V
MDC_IDC_SET_LEADCHNL_RV_PACING_POLARITY: NORMAL
MDC_IDC_SET_LEADCHNL_RV_PACING_PULSEWIDTH: 0.4 MS
MDC_IDC_SET_LEADCHNL_RV_SENSING_ADAPTATION_MODE: NORMAL
MDC_IDC_SET_LEADCHNL_RV_SENSING_POLARITY: NORMAL
MDC_IDC_SET_LEADCHNL_RV_SENSING_SENSITIVITY: NORMAL

## 2023-09-08 ENCOUNTER — ANCILLARY PROCEDURE (OUTPATIENT)
Dept: CARDIOLOGY | Facility: CLINIC | Age: 83
End: 2023-09-08
Attending: INTERNAL MEDICINE
Payer: COMMERCIAL

## 2023-09-08 DIAGNOSIS — Z95.0 CARDIAC PACEMAKER IN SITU: ICD-10-CM

## 2023-09-08 DIAGNOSIS — I44.1 MOBITZ II: ICD-10-CM

## 2023-09-08 LAB
MDC_IDC_LEAD_IMPLANT_DT: NORMAL
MDC_IDC_LEAD_IMPLANT_DT: NORMAL
MDC_IDC_LEAD_LOCATION: NORMAL
MDC_IDC_LEAD_LOCATION: NORMAL
MDC_IDC_LEAD_LOCATION_DETAIL_1: NORMAL
MDC_IDC_LEAD_MFG: NORMAL
MDC_IDC_LEAD_MFG: NORMAL
MDC_IDC_LEAD_MODEL: NORMAL
MDC_IDC_LEAD_MODEL: NORMAL
MDC_IDC_LEAD_POLARITY_TYPE: NORMAL
MDC_IDC_LEAD_POLARITY_TYPE: NORMAL
MDC_IDC_LEAD_SERIAL: NORMAL
MDC_IDC_LEAD_SERIAL: NORMAL
MDC_IDC_MSMT_BATTERY_REMAINING_PERCENTAGE: 90 %
MDC_IDC_MSMT_BATTERY_STATUS: NORMAL
MDC_IDC_MSMT_LEADCHNL_RA_IMPEDANCE_VALUE: 485 OHM
MDC_IDC_MSMT_LEADCHNL_RA_LEAD_CHANNEL_STATUS: NORMAL
MDC_IDC_MSMT_LEADCHNL_RA_PACING_THRESHOLD_AMPLITUDE: 0.5 V
MDC_IDC_MSMT_LEADCHNL_RA_PACING_THRESHOLD_PULSEWIDTH: 0.4 MS
MDC_IDC_MSMT_LEADCHNL_RV_IMPEDANCE_VALUE: 633 OHM
MDC_IDC_MSMT_LEADCHNL_RV_LEAD_CHANNEL_STATUS: NORMAL
MDC_IDC_MSMT_LEADCHNL_RV_PACING_THRESHOLD_AMPLITUDE: 0.7 V
MDC_IDC_MSMT_LEADCHNL_RV_PACING_THRESHOLD_PULSEWIDTH: 0.4 MS
MDC_IDC_PG_IMPLANT_DTM: NORMAL
MDC_IDC_PG_MFG: NORMAL
MDC_IDC_PG_MODEL: NORMAL
MDC_IDC_PG_SERIAL: NORMAL
MDC_IDC_PG_TYPE: NORMAL
MDC_IDC_SESS_CLINIC_NAME: NORMAL
MDC_IDC_SESS_DTM: NORMAL
MDC_IDC_SESS_REPROGRAMMED: NO
MDC_IDC_SESS_TYPE: NORMAL
MDC_IDC_SET_BRADY_AT_MODE_SWITCH_MODE: NORMAL
MDC_IDC_SET_BRADY_AT_MODE_SWITCH_RATE: 180 {BEATS}/MIN
MDC_IDC_SET_BRADY_LOWRATE: 60 {BEATS}/MIN
MDC_IDC_SET_BRADY_MAX_SENSOR_RATE: 120 {BEATS}/MIN
MDC_IDC_SET_BRADY_MAX_TRACKING_RATE: 130 {BEATS}/MIN
MDC_IDC_SET_BRADY_MODE: NORMAL
MDC_IDC_SET_BRADY_PAV_DELAY_HIGH: 160 MS
MDC_IDC_SET_BRADY_PAV_DELAY_LOW: 200 MS
MDC_IDC_SET_BRADY_SAV_DELAY_HIGH: 130 MS
MDC_IDC_SET_BRADY_SAV_DELAY_LOW: 170 MS
MDC_IDC_SET_LEADCHNL_RA_PACING_AMPLITUDE: 1.5 V
MDC_IDC_SET_LEADCHNL_RA_PACING_POLARITY: NORMAL
MDC_IDC_SET_LEADCHNL_RA_PACING_PULSEWIDTH: 0.4 MS
MDC_IDC_SET_LEADCHNL_RA_SENSING_ADAPTATION_MODE: NORMAL
MDC_IDC_SET_LEADCHNL_RA_SENSING_POLARITY: NORMAL
MDC_IDC_SET_LEADCHNL_RV_PACING_AMPLITUDE: 1.2 V
MDC_IDC_SET_LEADCHNL_RV_PACING_POLARITY: NORMAL
MDC_IDC_SET_LEADCHNL_RV_PACING_PULSEWIDTH: 0.4 MS
MDC_IDC_SET_LEADCHNL_RV_SENSING_ADAPTATION_MODE: NORMAL
MDC_IDC_SET_LEADCHNL_RV_SENSING_POLARITY: NORMAL
MDC_IDC_STAT_AT_BURDEN_PERCENT: 0 %
MDC_IDC_STAT_AT_DTM_END: NORMAL
MDC_IDC_STAT_AT_DTM_START: NORMAL
MDC_IDC_STAT_AT_MODE_SW_COUNT_PER_DAY: 0
MDC_IDC_STAT_AT_MODE_SW_PERCENT_TIME_PER_DAY: 0 %
MDC_IDC_STAT_BRADY_AP_VP_PERCENT: 61 %
MDC_IDC_STAT_BRADY_AP_VS_PERCENT: 1 %
MDC_IDC_STAT_BRADY_AS_VP_PERCENT: 36 %
MDC_IDC_STAT_BRADY_AS_VS_PERCENT: 2 %
MDC_IDC_STAT_BRADY_DTM_END: NORMAL
MDC_IDC_STAT_BRADY_DTM_START: NORMAL
MDC_IDC_STAT_BRADY_RA_PERCENT_PACED: 62 %
MDC_IDC_STAT_BRADY_RV_PERCENT_PACED: 97 %
MDC_IDC_STAT_CRT_DTM_END: NORMAL
MDC_IDC_STAT_CRT_DTM_START: NORMAL

## 2023-09-08 PROCEDURE — 93296 REM INTERROG EVL PM/IDS: CPT | Performed by: INTERNAL MEDICINE

## 2023-09-08 PROCEDURE — 93294 REM INTERROG EVL PM/LDLS PM: CPT | Performed by: INTERNAL MEDICINE

## 2023-09-11 NOTE — PROGRESS NOTES
Cardiology Clinic Progress Note  Sunil Lyle MRN# [id number]   YOB: 1940 Age: 83 year old   Primary Cardiologist: Dr. Landers Reason for visit: 6 month follow up             Assessment and Plan:       1. Mild,non-obstructive coronary artery disease (1/2023), status post DEANA to RCA (2001)  -Results of lexiscan nuclear stress abnormal with small area of mild infarct in the mid to basal inferior and inferoseptal segments with no ischemia, small area of moderate infarct in the apical segments with mild discrete alex-infarct ischemia, notable for reduced EF with stress from 59 to 46%  -1/2023 coronary angiogram showed mild nonobstructive coronary disease, prior RCA stent with mild in-stent restenosis  -No current anginal symptoms     2.  Hyperlipidemia, sub optimally controlled  -9/2023 LDL 85  -Increase rosuvastatin to 20 mg daily, continue fenofibrate    3.  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  Mild to moderate pulmonary hypertension, stable since 2017 (TTE 3/2022)    5. Paroxysmal atrial fibrillation  -No evidence of recurrence on most recent 9/2023 device check.   -Continue q3 month device checks.     6. COPD, hx of tobacco abuse  -Does not follow routinely with pulmonology but would recommend discussing PFTs with PCP    Gerard is feeling overall well from a cardiac perspective.  His fasting lipid panel done yesterday did show a slight backslide in his LDL control 85.  I would like him to increase his rosuvastatin to 20 mg daily.    His blood pressure is borderline controlled at 138/70, however his previous visits and at his primary care provider yesterday showed normal blood pressure at 122/60.  I think we can continue to monitor it at this point.    I encouraged him to increase his level of physical activity, even just some  brisk walking a few times weekly.     He should continue his quarterly follow-up with the device clinic.    Changes today: Increase Crestor to 20mg daily     Follow up plan: Follow up with Dr. Landers in 1 year with an FLP/ALT prior         History of Presenting Illness:    Sunil Lyle is a very pleasant 82 year old male with a history of coronary artery disease, s/p DEANA to RCA (2001), 2:1 AV block status post Biotron[employer] permanent pacemaker (3/2022), paroxysmal atrial fibrillation (in the setting of illness/surgery April 2022) with no obvious recurrence, moderate pulmonary hypertension, hypertension, hyperlipidemia, COPD, CKD stage III, BPH (turp 6/2022), former tobacco use (quit in 2018).     Patient is well-known to our clinic and most recently seen by Dr. Landers in July 2022.  His lipids were suboptimally controlled at that time with a total cholesterol 94, HDL 46, LDL 96, triglycerides 209.  Rosuvastatin 10 mg daily was added to his regimen of fenofibrate and simvastatin was discontinued.    When we met in Oct 2022 he was reporting a new onset of epigastric pain at night after lying down at night. Resolves with 1-2 tums. If he eats at a restaurant (and does not late night snack) and or naps during the day, it does not occur.       11/30/2022 Lexiscan nuclear stress test showed a small area of mild degree of infarction in the mid to basal inferior and inferoseptal segments, no reversible ischemia, small area of moderate degree of infarction in the apical segments with a mild degree of alex-infarct ischemia.  His prior stress test was an exercise stress echocardiogram in 2017 which was negative for ischemia.    At his last visit we discussed pursuing coronary angiography due to the findings on his nuclear stress test.  1/6/2023 this was completed which showed mild nonobstructive coronary disease; RPV 30% stenosis, proximal RCA 30% stenosis, second diagonal 40% stenosis, proximal to mid LAD lesion 30%  "stenosis, and prior right coronary artery stent with mild in-stent restenosis.    Following his coronary angiogram he was diagnosed with pneumonia and continued to have shortness of breath with exertion. This was unchanged from his previous.    Patient is here today for a 6 month follow up. He feels like \"he's in good shape and feels better than a few years ago\". He remains quite physically sedentary. He spends his mornings at the 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 He naps once daily in the afternoon then visits with friends.      His dyspnea on exertion has improved significantly since the rate response was turned on in 2023.     Denies chest pain or chest tightness. Denies dizziness, lightheadedness or other presyncopal symptoms. Denies tachycardia or palpitations. Denies orthopnea or PND.     Blood pressure 145/87 and HR 60 in clinic today.  Repeat taken with a manual cuff at the end of the visit was 138/70.        Recent Hospitalizations   2022 [facility]          Social History    , retired, he is part of [employer], investment club, and  active at the [facility]   Social History     Socioeconomic History    Marital status:      Spouse name: Not on file    Number of children: Not on file    Years of education: Not on file    Highest education level: Not on file   Occupational History    Occupation: Sales/Computer Training     Employer: RETIRED   Tobacco Use    Smoking status: Former     Packs/day: 0.50     Years: 33.50     Pack years: 16.75     Types: Cigarettes     Start date:      Quit date: 2017     Years since quittin.8    Smokeless tobacco: Never   Vaping Use    Vaping Use: Never used   Substance and Sexual Activity    Alcohol use: Yes     Alcohol/week: 3.3 standard drinks of alcohol     Types: 4 Shots of liquor per week     Comment: 5-6 drink per week    Drug use: No    Sexual activity: Not on file   Other Topics Concern     Service " "Not Asked     Comment: [place] [id number]    Blood Transfusions No    Caffeine Concern Yes    Occupational Exposure No    Hobby Hazards No    Sleep Concern No    Stress Concern No    Weight Concern No    Special Diet No    Back Care No    Exercise No    Bike Helmet Not Asked    Seat Belt Yes    Self-Exams Not Asked    Parent/sibling w/ CABG, MI or angioplasty before 65F 55M? Not Asked   Social History Narrative    ** Merged History Encounter **          Social Determinants of Health     Financial Resource Strain: Not on file   Food Insecurity: Not on file   Transportation Needs: Not on file   Physical Activity: Not on file   Stress: Not on file   Social Connections: Not on file   Intimate Partner Violence: Not on file   Housing Stability: Not on file            Review of Systems:   Skin:        Eyes:       ENT:       Respiratory:  Negative for shortness of breath;dyspnea on exertion;cough;wheezing  Cardiovascular:  Negative;chest pain;palpitations;lightheadedness;dizziness;edema;fatigue;syncope or near-syncope    Gastroenterology:      Genitourinary:       Musculoskeletal:     (finger only on left hand)  Neurologic:       Psychiatric:       Heme/Lymph/Imm:  Positive for allergies  Endocrine:  Negative thyroid disorder;diabetes         Physical Exam:   Vitals: BP (!) 145/87   Pulse 60   Ht 1.778 m (5' 10\")   Wt 78.7 kg (173 lb 9.6 oz)   SpO2 97%   BMI 24.91 kg/m     Wt Readings from Last 4 Encounters:   09/13/23 78.7 kg (173 lb 9.6 oz)   01/26/23 78.4 kg (172 lb 12.8 oz)   01/06/23 78.2 kg (172 lb 4.8 oz)   12/19/22 78.7 kg (173 lb 6.4 oz)     GEN: well nourished, in no acute distress.  HEENT:  Pupils equal, round. Sclerae nonicteric.   NECK: Supple, no masses appreciated.   C/V:  Regular rate and rhythm, no murmur, rub or gallop.    RESP: Respirations are unlabored. Clear to auscultation bilaterally without wheezing, rales, or rhonchi.  GI: Abdomen soft, nontender.  EXTREM: no LE edema.  NEURO: Alert and " oriented, cooperative.  SKIN: Warm and dry.      Data:     LIPID RESULTS:  Lab Results   Component Value Date    CHOL 154 09/12/2023    CHOL 130 03/26/2021    HDL 38 (L) 09/12/2023    HDL 33 (L) 03/26/2021    LDL 85 09/12/2023    LDL 60 03/26/2021    TRIG 155 (H) 09/12/2023    TRIG 186 (H) 03/26/2021    CHOLHDLRATIO 3.2 06/18/2015     LIVER ENZYME RESULTS:  Lab Results   Component Value Date    AST 22 03/22/2022    AST 18 03/14/2017    ALT 25 09/12/2023    ALT 26 03/26/2021     CBC RESULTS:  Lab Results   Component Value Date    WBC 13.5 (H) 01/06/2023    WBC 6.6 03/14/2017    RBC 3.58 (L) 01/06/2023    RBC 4.34 (L) 03/14/2017    HGB 12.5 (L) 01/06/2023    HGB 14.0 03/14/2017    HCT 37.1 (L) 01/06/2023    HCT 42.0 03/14/2017     (H) 01/06/2023    MCV 97 03/14/2017    MCH 34.9 (H) 01/06/2023    MCH 32.3 03/14/2017    MCHC 33.7 01/06/2023    MCHC 33.3 03/14/2017    RDW 16.8 (H) 01/06/2023    RDW 15.0 03/14/2017     01/06/2023     03/14/2017     BMP RESULTS:  Lab Results   Component Value Date     01/06/2023     07/14/2017    POTASSIUM 3.9 01/06/2023    POTASSIUM 4.1 07/14/2017    CHLORIDE 108 01/06/2023    CHLORIDE 105 07/14/2017    CO2 24 01/06/2023    CO2 27 07/14/2017    ANIONGAP 7 01/06/2023    ANIONGAP 13.1 07/14/2017     (H) 01/06/2023     (H) 07/14/2017    BUN 16 01/06/2023    BUN 12 07/14/2017    CR 1.07 01/06/2023    CR 1.21 07/14/2017    GFRESTIMATED 69 01/06/2023    GFRESTIMATED 53 (L) 11/17/2017    GFRESTBLACK 65 11/17/2017    PAIGE 9.5 01/06/2023    PAIGE 9.2 07/14/2017      A1C RESULTS:  Lab Results   Component Value Date    A1C 5.4 03/20/2022     INR RESULTS:  Lab Results   Component Value Date    INR 1.10 01/06/2023    INR 1.1 05/24/2022    INR 1.21 (H) 03/19/2022    INR 1.03 03/28/2009            Medications     Current Outpatient Medications   Medication Sig Dispense Refill    aspirin 81 MG EC tablet Take 81 mg by mouth daily      fenofibrate (LOFIBRA) 54 MG  tablet Take 54 mg by mouth daily 2 tablets daily.      multivitamin w/minerals (THERA-VIT-M) tablet Take 1 tablet by mouth daily       nitroGLYcerin (NITROSTAT) 0.4 MG sublingual tablet For chest pain place 1 tablet under the tongue every 5 minutes for 3 doses. If symptoms persist 5 minutes after 1st dose call 911. 30 tablet 0    rosuvastatin (CRESTOR) 10 MG tablet Take 1 tablet (10 mg) by mouth daily 90 tablet 0    vitamin D3 (CHOLECALCIFEROL) 50 mcg (2000 units) tablet Take 2,000 Units by mouth daily            Past Medical History     Past Medical History:   Diagnosis Date    Allergic state     BPH (benign prostatic hyperplasia)     CAD (coronary artery disease) 05/22/2014     s/p stent to RCA (2001)    COPD (chronic obstructive pulmonary disease) (H)     GERD (gastroesophageal reflux disease)     Hypercholesterolaemia     Hyperlipidaemia     Other affections of shoulder region, not elsewhere classified     impingement syndrome of right shoulder    Renal disease     Sebaceous cyst     right shoulder area    Tachycardia     Tobacco use disorder 03/05/2008     Past Surgical History:   Procedure Laterality Date    COLONOSCOPY      CV CORONARY ANGIOGRAM N/A 1/6/2023    Procedure: Coronary Angiogram;  Surgeon: Claudia Mahan MD;  Location:  [facility]    CYSTOSCOPY, TRANSURETHRAL RESECTION (TUR) PROSTATE, COMBINED N/A 6/1/2022    Procedure: CYSTOSCOPY, WITH TRANSURETHRAL RESECTION PROSTATE;  Surgeon: Tyree Helton MD;  Location:  OR     PACEMAKER DEVICE & LEAD IMPLANT- RIGHT ATRIAL & RIGHT VENTRICULAR Left 3/23/2022    Procedure: Pacemaker Device & Lead Implant - Right Atrial & Right Ventricular;  Surgeon: Thai Elliott MD;  Location:  [facility] C[facility] LAB    HEART CATH, ANGIOPLASTY  october 2001    RCA stent    LAPAROTOMY EXPLORATORY N/A 3/19/2022    Procedure: EXPLORATORY LAPAROTOMY, SUPERIOR MESENTERIC ARTERY EXPLORATION;  Surgeon: Smita Garsia MD;  Location:  OR     TONSILLECTOMY & ADENOIDECTOMY      VASCULAR SURGERY       Family History   Problem Relation Age of Onset    Breast Cancer Mother          at age 64    Heart Disease Father         heart attack    Diabetes Father             Allergies   Pcn [penicillins], Chantix [varenicline], Bupropion hcl, Ciprofloxacin, and Lactose        Tali Mchugh NP  [facility]  Pager: [phone]

## 2023-09-12 ENCOUNTER — LAB (OUTPATIENT)
Dept: LAB | Facility: CLINIC | Age: 83
End: 2023-09-12
Payer: COMMERCIAL

## 2023-09-12 DIAGNOSIS — E78.5 HYPERLIPIDEMIA LDL GOAL <70: ICD-10-CM

## 2023-09-12 DIAGNOSIS — I25.10 CORONARY ARTERY DISEASE INVOLVING NATIVE CORONARY ARTERY OF NATIVE HEART WITHOUT ANGINA PECTORIS: ICD-10-CM

## 2023-09-12 LAB
ALT SERPL W P-5'-P-CCNC: 25 U/L (ref 0–70)
CHOLEST SERPL-MCNC: 154 MG/DL
HDLC SERPL-MCNC: 38 MG/DL
LDLC SERPL CALC-MCNC: 85 MG/DL
NONHDLC SERPL-MCNC: 116 MG/DL
TRIGL SERPL-MCNC: 155 MG/DL

## 2023-09-12 PROCEDURE — 36415 COLL VENOUS BLD VENIPUNCTURE: CPT

## 2023-09-12 PROCEDURE — 84460 ALANINE AMINO (ALT) (SGPT): CPT

## 2023-09-12 PROCEDURE — 80061 LIPID PANEL: CPT

## 2023-09-13 ENCOUNTER — OFFICE VISIT (OUTPATIENT)
Dept: CARDIOLOGY | Facility: CLINIC | Age: 83
End: 2023-09-13
Attending: NURSE PRACTITIONER
Payer: COMMERCIAL

## 2023-09-13 VITALS
SYSTOLIC BLOOD PRESSURE: 138 MMHG | BODY MASS INDEX: 24.85 KG/M2 | HEIGHT: 70 IN | OXYGEN SATURATION: 97 % | WEIGHT: 173.6 LBS | HEART RATE: 60 BPM | DIASTOLIC BLOOD PRESSURE: 70 MMHG

## 2023-09-13 DIAGNOSIS — I25.10 CORONARY ARTERY DISEASE INVOLVING NATIVE CORONARY ARTERY OF NATIVE HEART WITHOUT ANGINA PECTORIS: ICD-10-CM

## 2023-09-13 PROCEDURE — 99214 OFFICE O/P EST MOD 30 MIN: CPT | Performed by: NURSE PRACTITIONER

## 2023-09-13 RX ORDER — ROSUVASTATIN CALCIUM 20 MG/1
20 TABLET, COATED ORAL DAILY
Qty: 90 TABLET | Refills: 3 | Status: SHIPPED | OUTPATIENT
Start: 2023-09-13 | End: 2024-09-16

## 2023-09-13 NOTE — LETTER
9/13/2023    Poli Alberto MD  [address]  [facility] [address]    RE: Sunil Lyle       Dear Colleague,     I had the pleasure of seeing Sunil Lyle in the [facility].  Cardiology Clinic Progress Note  Sunil Lyle MRN# [id number]   YOB: 1940 Age: 83 year old   Primary Cardiologist: Dr. Landers Reason for visit: 6 month follow up             Assessment and Plan:       1. Mild,non-obstructive coronary artery disease (1/2023), status post DEANA to RCA (2001)  -Results of lexiscan nuclear stress abnormal with small area of mild infarct in the mid to basal inferior and inferoseptal segments with no ischemia, small area of moderate infarct in the apical segments with mild discrete alex-infarct ischemia, notable for reduced EF with stress from 59 to 46%  -1/2023 coronary angiogram showed mild nonobstructive coronary disease, prior RCA stent with mild in-stent restenosis  -No current anginal symptoms     2.  Hyperlipidemia, sub optimally controlled  -9/2023 LDL 85  -Increase rosuvastatin to 20 mg daily, continue fenofibrate    3.  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  Mild to moderate pulmonary hypertension, stable since 2017 (TTE 3/2022)    5. Paroxysmal atrial fibrillation  -No evidence of recurrence on most recent 9/2023 device check.   -Continue q3 month device checks.     6. COPD, hx of tobacco abuse  -Does not follow routinely with pulmonology but would recommend discussing PFTs with PCP    Gerard is feeling overall well from a cardiac perspective.  His fasting lipid panel done yesterday did show a slight backslide in his LDL control 85.  I would like him to increase his rosuvastatin to 20 mg daily.    His blood pressure is borderline controlled at 138/70, however his previous visits and  at his primary care provider yesterday showed normal blood pressure at 122/60.  I think we can continue to monitor it at this point.    I encouraged him to increase his level of physical activity, even just some brisk walking a few times weekly.     He should continue his quarterly follow-up with the device clinic.    Changes today: Increase Crestor to 20mg daily     Follow up plan: Follow up with Dr. Landers in 1 year with an FLP/ALT prior         History of Presenting Illness:    Sunil Lyle is a very pleasant 82 year old male with a history of coronary artery disease, s/p DEANA to RCA (2001), 2:1 AV block status post Biotronik permanent pacemaker (3/2022), paroxysmal atrial fibrillation (in the setting of illness/surgery April 2022) with no obvious recurrence, moderate pulmonary hypertension, hypertension, hyperlipidemia, COPD, CKD stage III, BPH (turp 6/2022), former tobacco use (quit in 2018).     Patient is well-known to our clinic and most recently seen by Dr. Landers in July 2022.  His lipids were suboptimally controlled at that time with a total cholesterol 94, HDL 46, LDL 96, triglycerides 209.  Rosuvastatin 10 mg daily was added to his regimen of fenofibrate and simvastatin was discontinued.    When we met in Oct 2022 he was reporting a new onset of epigastric pain at night after lying down at night. Resolves with 1-2 tums. If he eats at a restaurant (and does not late night snack) and or naps during the day, it does not occur.       11/30/2022 Lexiscan nuclear stress test showed a small area of mild degree of infarction in the mid to basal inferior and inferoseptal segments, no reversible ischemia, small area of moderate degree of infarction in the apical segments with a mild degree of alex-infarct ischemia.  His prior stress test was an exercise stress echocardiogram in 2017 which was negative for ischemia.    At his last visit we discussed pursuing coronary angiography due to the findings on his  "nuclear stress test.  2023 this was completed which showed mild nonobstructive coronary disease; RPV 30% stenosis, proximal RCA 30% stenosis, second diagonal 40% stenosis, proximal to mid LAD lesion 30% stenosis, and prior right coronary artery stent with mild in-stent restenosis.    Following his coronary angiogram he was diagnosed with pneumonia and continued to have shortness of breath with exertion. This was unchanged from his previous.    Patient is here today for a 6 month follow up. He feels like \"he's in good shape and feels better than a few years ago\". He remains quite physically sedentary. He spends his mornings at the 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 He naps once daily in the afternoon then visits with friends.      His dyspnea on exertion has improved significantly since the rate response was turned on in 2023.     Denies chest pain or chest tightness. Denies dizziness, lightheadedness or other presyncopal symptoms. Denies tachycardia or palpitations. Denies orthopnea or PND.     Blood pressure 145/87 and HR 60 in clinic today.  Repeat taken with a manual cuff at the end of the visit was 138/70.        Recent Hospitalizations   2022 [facility]          Social History    , retired, he is part of [employer], investment club, and  active at the [facility]   Social History     Socioeconomic History    Marital status:      Spouse name: Not on file    Number of children: Not on file    Years of education: Not on file    Highest education level: Not on file   Occupational History    Occupation: Sales/Computer Training     Employer: RETIRED   Tobacco Use    Smoking status: Former     Packs/day: 0.50     Years: 33.50     Pack years: 16.75     Types: Cigarettes     Start date:      Quit date: 2017     Years since quittin.8    Smokeless tobacco: Never   Vaping Use    Vaping Use: Never used   Substance and Sexual Activity    Alcohol use: Yes     " "Alcohol/week: 3.3 standard drinks of alcohol     Types: 4 Shots of liquor per week     Comment: 5-6 drink per week    Drug use: No    Sexual activity: Not on file   Other Topics Concern     Service Not Asked     Comment: [place] [id number]    Blood Transfusions No    Caffeine Concern Yes    Occupational Exposure No    Hobby Hazards No    Sleep Concern No    Stress Concern No    Weight Concern No    Special Diet No    Back Care No    Exercise No    Bike Helmet Not Asked    Seat Belt Yes    Self-Exams Not Asked    Parent/sibling w/ CABG, MI or angioplasty before 65F 55M? Not Asked   Social History Narrative    ** Merged History Encounter **          Social Determinants of Health     Financial Resource Strain: Not on file   Food Insecurity: Not on file   Transportation Needs: Not on file   Physical Activity: Not on file   Stress: Not on file   Social Connections: Not on file   Intimate Partner Violence: Not on file   Housing Stability: Not on file            Review of Systems:   Skin:        Eyes:       ENT:       Respiratory:  Negative for shortness of breath;dyspnea on exertion;cough;wheezing  Cardiovascular:  Negative;chest pain;palpitations;lightheadedness;dizziness;edema;fatigue;syncope or near-syncope    Gastroenterology:      Genitourinary:       Musculoskeletal:     (finger only on left hand)  Neurologic:       Psychiatric:       Heme/Lymph/Imm:  Positive for allergies  Endocrine:  Negative thyroid disorder;diabetes         Physical Exam:   Vitals: BP (!) 145/87   Pulse 60   Ht 1.778 m (5' 10\")   Wt 78.7 kg (173 lb 9.6 oz)   SpO2 97%   BMI 24.91 kg/m     Wt Readings from Last 4 Encounters:   09/13/23 78.7 kg (173 lb 9.6 oz)   01/26/23 78.4 kg (172 lb 12.8 oz)   01/06/23 78.2 kg (172 lb 4.8 oz)   12/19/22 78.7 kg (173 lb 6.4 oz)     GEN: well nourished, in no acute distress.  HEENT:  Pupils equal, round. Sclerae nonicteric.   NECK: Supple, no masses appreciated.   C/V:  Regular rate and rhythm, " no murmur, rub or gallop.    RESP: Respirations are unlabored. Clear to auscultation bilaterally without wheezing, rales, or rhonchi.  GI: Abdomen soft, nontender.  EXTREM: no LE edema.  NEURO: Alert and oriented, cooperative.  SKIN: Warm and dry.      Data:     LIPID RESULTS:  Lab Results   Component Value Date    CHOL 154 09/12/2023    CHOL 130 03/26/2021    HDL 38 (L) 09/12/2023    HDL 33 (L) 03/26/2021    LDL 85 09/12/2023    LDL 60 03/26/2021    TRIG 155 (H) 09/12/2023    TRIG 186 (H) 03/26/2021    CHOLHDLRATIO 3.2 06/18/2015     LIVER ENZYME RESULTS:  Lab Results   Component Value Date    AST 22 03/22/2022    AST 18 03/14/2017    ALT 25 09/12/2023    ALT 26 03/26/2021     CBC RESULTS:  Lab Results   Component Value Date    WBC 13.5 (H) 01/06/2023    WBC 6.6 03/14/2017    RBC 3.58 (L) 01/06/2023    RBC 4.34 (L) 03/14/2017    HGB 12.5 (L) 01/06/2023    HGB 14.0 03/14/2017    HCT 37.1 (L) 01/06/2023    HCT 42.0 03/14/2017     (H) 01/06/2023    MCV 97 03/14/2017    MCH 34.9 (H) 01/06/2023    MCH 32.3 03/14/2017    MCHC 33.7 01/06/2023    MCHC 33.3 03/14/2017    RDW 16.8 (H) 01/06/2023    RDW 15.0 03/14/2017     01/06/2023     03/14/2017     BMP RESULTS:  Lab Results   Component Value Date     01/06/2023     07/14/2017    POTASSIUM 3.9 01/06/2023    POTASSIUM 4.1 07/14/2017    CHLORIDE 108 01/06/2023    CHLORIDE 105 07/14/2017    CO2 24 01/06/2023    CO2 27 07/14/2017    ANIONGAP 7 01/06/2023    ANIONGAP 13.1 07/14/2017     (H) 01/06/2023     (H) 07/14/2017    BUN 16 01/06/2023    BUN 12 07/14/2017    CR 1.07 01/06/2023    CR 1.21 07/14/2017    GFRESTIMATED 69 01/06/2023    GFRESTIMATED 53 (L) 11/17/2017    GFRESTBLACK 65 11/17/2017    PAIGE 9.5 01/06/2023    PAIGE 9.2 07/14/2017      A1C RESULTS:  Lab Results   Component Value Date    A1C 5.4 03/20/2022     INR RESULTS:  Lab Results   Component Value Date    INR 1.10 01/06/2023    INR 1.1 05/24/2022    INR 1.21 (H)  03/19/2022    INR 1.03 03/28/2009            Medications     Current Outpatient Medications   Medication Sig Dispense Refill    aspirin 81 MG EC tablet Take 81 mg by mouth daily      fenofibrate (LOFIBRA) 54 MG tablet Take 54 mg by mouth daily 2 tablets daily.      multivitamin w/minerals (THERA-VIT-M) tablet Take 1 tablet by mouth daily       nitroGLYcerin (NITROSTAT) 0.4 MG sublingual tablet For chest pain place 1 tablet under the tongue every 5 minutes for 3 doses. If symptoms persist 5 minutes after 1st dose call 911. 30 tablet 0    rosuvastatin (CRESTOR) 10 MG tablet Take 1 tablet (10 mg) by mouth daily 90 tablet 0    vitamin D3 (CHOLECALCIFEROL) 50 mcg (2000 units) tablet Take 2,000 Units by mouth daily            Past Medical History     Past Medical History:   Diagnosis Date    Allergic state     BPH (benign prostatic hyperplasia)     CAD (coronary artery disease) 05/22/2014     s/p stent to RCA (2001)    COPD (chronic obstructive pulmonary disease) (H)     GERD (gastroesophageal reflux disease)     Hypercholesterolaemia     Hyperlipidaemia     Other affections of shoulder region, not elsewhere classified     impingement syndrome of right shoulder    Renal disease     Sebaceous cyst     right shoulder area    Tachycardia     Tobacco use disorder 03/05/2008     Past Surgical History:   Procedure Laterality Date    COLONOSCOPY      CV CORONARY ANGIOGRAM N/A 1/6/2023    Procedure: Coronary Angiogram;  Surgeon: Claudia Mahan MD;  Location:  HEART CARDIAC CATH LAB    CYSTOSCOPY, TRANSURETHRAL RESECTION (TUR) PROSTATE, COMBINED N/A 6/1/2022    Procedure: CYSTOSCOPY, WITH TRANSURETHRAL RESECTION PROSTATE;  Surgeon: Tyree Helton MD;  Location:  OR    EP PACEMAKER DEVICE & LEAD IMPLANT- RIGHT ATRIAL & RIGHT VENTRICULAR Left 3/23/2022    Procedure: Pacemaker Device & Lead Implant - Right Atrial & Right Ventricular;  Surgeon: Thai Elliott MD;  Location:  HEART CARDIAC CATH LAB    HEART CATH,  ANGIOPLASTY  2001    RCA stent    LAPAROTOMY EXPLORATORY N/A 3/19/2022    Procedure: EXPLORATORY LAPAROTOMY, SUPERIOR MESENTERIC ARTERY EXPLORATION;  Surgeon: Smita Garsia MD;  Location:  OR    TONSILLECTOMY & ADENOIDECTOMY      VASCULAR SURGERY       Family History   Problem Relation Age of Onset    Breast Cancer Mother          at age 64    Heart Disease Father         heart attack    Diabetes Father             Allergies   Pcn [penicillins], Chantix [varenicline], Bupropion hcl, Ciprofloxacin, and Lactose        Tali Mchugh NP  [facility]  Pager: [phone]      Thank you for allowing me to participate in the care of your patient.      Sincerely,     Tali Mchugh NP     [facility]  cc:   Tali Mchugh NP  [address] [address]

## 2023-09-13 NOTE — PATIENT INSTRUCTIONS
Today's Recommendations    I am glad to see you feeling so well after your device changes   Increase your rosuvastatin to 20mg daily, your LDL was slightly above the goal at 85  Continue all other medications  Please follow up with Dr. Landers in 1 year with labs before, which are fasting.    Please send a ALOHA message or call [phone] to the RN team with questions or concerns.     Scheduling number [phone]  YARITZA Menon, CNP

## 2024-01-03 ENCOUNTER — ANCILLARY PROCEDURE (OUTPATIENT)
Dept: CARDIOLOGY | Facility: CLINIC | Age: 84
End: 2024-01-03
Attending: INTERNAL MEDICINE
Payer: COMMERCIAL

## 2024-01-03 DIAGNOSIS — Z95.0 CARDIAC PACEMAKER IN SITU: ICD-10-CM

## 2024-01-03 DIAGNOSIS — I44.1 MOBITZ II: ICD-10-CM

## 2024-01-03 PROCEDURE — 93294 REM INTERROG EVL PM/LDLS PM: CPT | Performed by: INTERNAL MEDICINE

## 2024-01-03 PROCEDURE — 93296 REM INTERROG EVL PM/IDS: CPT | Performed by: INTERNAL MEDICINE

## 2024-01-08 LAB
MDC_IDC_LEAD_CONNECTION_STATUS: NORMAL
MDC_IDC_LEAD_CONNECTION_STATUS: NORMAL
MDC_IDC_LEAD_IMPLANT_DT: NORMAL
MDC_IDC_LEAD_IMPLANT_DT: NORMAL
MDC_IDC_LEAD_LOCATION: NORMAL
MDC_IDC_LEAD_LOCATION: NORMAL
MDC_IDC_LEAD_LOCATION_DETAIL_1: NORMAL
MDC_IDC_LEAD_MFG: NORMAL
MDC_IDC_LEAD_MFG: NORMAL
MDC_IDC_LEAD_MODEL: NORMAL
MDC_IDC_LEAD_MODEL: NORMAL
MDC_IDC_LEAD_POLARITY_TYPE: NORMAL
MDC_IDC_LEAD_POLARITY_TYPE: NORMAL
MDC_IDC_LEAD_SERIAL: NORMAL
MDC_IDC_LEAD_SERIAL: NORMAL
MDC_IDC_MSMT_BATTERY_DTM: NORMAL
MDC_IDC_MSMT_BATTERY_REMAINING_PERCENTAGE: 85 %
MDC_IDC_MSMT_BATTERY_STATUS: NORMAL
MDC_IDC_MSMT_LEADCHNL_RA_IMPEDANCE_VALUE: 488 OHM
MDC_IDC_MSMT_LEADCHNL_RA_LEAD_CHANNEL_STATUS: NORMAL
MDC_IDC_MSMT_LEADCHNL_RA_PACING_THRESHOLD_AMPLITUDE: 0.5 V
MDC_IDC_MSMT_LEADCHNL_RA_PACING_THRESHOLD_PULSEWIDTH: 0.4 MS
MDC_IDC_MSMT_LEADCHNL_RV_IMPEDANCE_VALUE: 546 OHM
MDC_IDC_MSMT_LEADCHNL_RV_LEAD_CHANNEL_STATUS: NORMAL
MDC_IDC_MSMT_LEADCHNL_RV_PACING_THRESHOLD_AMPLITUDE: 0.7 V
MDC_IDC_MSMT_LEADCHNL_RV_PACING_THRESHOLD_PULSEWIDTH: 0.4 MS
MDC_IDC_PG_IMPLANT_DTM: NORMAL
MDC_IDC_PG_MFG: NORMAL
MDC_IDC_PG_MODEL: NORMAL
MDC_IDC_PG_SERIAL: NORMAL
MDC_IDC_PG_TYPE: NORMAL
MDC_IDC_SESS_CLINIC_NAME: NORMAL
MDC_IDC_SESS_DTM: NORMAL
MDC_IDC_SESS_REPROGRAMMED: NO
MDC_IDC_SESS_TYPE: NORMAL
MDC_IDC_SET_BRADY_AT_MODE_SWITCH_MODE: NORMAL
MDC_IDC_SET_BRADY_AT_MODE_SWITCH_RATE: 180 {BEATS}/MIN
MDC_IDC_SET_BRADY_LOWRATE: 60 {BEATS}/MIN
MDC_IDC_SET_BRADY_MAX_SENSOR_RATE: 120 {BEATS}/MIN
MDC_IDC_SET_BRADY_MAX_TRACKING_RATE: 130 {BEATS}/MIN
MDC_IDC_SET_BRADY_MODE: NORMAL
MDC_IDC_SET_BRADY_PAV_DELAY_HIGH: 160 MS
MDC_IDC_SET_BRADY_PAV_DELAY_LOW: 200 MS
MDC_IDC_SET_BRADY_SAV_DELAY_HIGH: 130 MS
MDC_IDC_SET_BRADY_SAV_DELAY_LOW: 170 MS
MDC_IDC_SET_LEADCHNL_RA_PACING_AMPLITUDE: 1.5 V
MDC_IDC_SET_LEADCHNL_RA_PACING_ANODE_ELECTRODE_1: NORMAL
MDC_IDC_SET_LEADCHNL_RA_PACING_ANODE_LOCATION_1: NORMAL
MDC_IDC_SET_LEADCHNL_RA_PACING_CAPTURE_MODE: NORMAL
MDC_IDC_SET_LEADCHNL_RA_PACING_CATHODE_ELECTRODE_1: NORMAL
MDC_IDC_SET_LEADCHNL_RA_PACING_CATHODE_LOCATION_1: NORMAL
MDC_IDC_SET_LEADCHNL_RA_PACING_POLARITY: NORMAL
MDC_IDC_SET_LEADCHNL_RA_PACING_PULSEWIDTH: 0.4 MS
MDC_IDC_SET_LEADCHNL_RA_SENSING_ADAPTATION_MODE: NORMAL
MDC_IDC_SET_LEADCHNL_RA_SENSING_ANODE_ELECTRODE_1: NORMAL
MDC_IDC_SET_LEADCHNL_RA_SENSING_ANODE_LOCATION_1: NORMAL
MDC_IDC_SET_LEADCHNL_RA_SENSING_CATHODE_ELECTRODE_1: NORMAL
MDC_IDC_SET_LEADCHNL_RA_SENSING_CATHODE_LOCATION_1: NORMAL
MDC_IDC_SET_LEADCHNL_RA_SENSING_POLARITY: NORMAL
MDC_IDC_SET_LEADCHNL_RV_PACING_AMPLITUDE: 1.2 V
MDC_IDC_SET_LEADCHNL_RV_PACING_ANODE_ELECTRODE_1: NORMAL
MDC_IDC_SET_LEADCHNL_RV_PACING_ANODE_LOCATION_1: NORMAL
MDC_IDC_SET_LEADCHNL_RV_PACING_CAPTURE_MODE: NORMAL
MDC_IDC_SET_LEADCHNL_RV_PACING_CATHODE_ELECTRODE_1: NORMAL
MDC_IDC_SET_LEADCHNL_RV_PACING_CATHODE_LOCATION_1: NORMAL
MDC_IDC_SET_LEADCHNL_RV_PACING_POLARITY: NORMAL
MDC_IDC_SET_LEADCHNL_RV_PACING_PULSEWIDTH: 0.4 MS
MDC_IDC_SET_LEADCHNL_RV_SENSING_ADAPTATION_MODE: NORMAL
MDC_IDC_SET_LEADCHNL_RV_SENSING_ANODE_ELECTRODE_1: NORMAL
MDC_IDC_SET_LEADCHNL_RV_SENSING_ANODE_LOCATION_1: NORMAL
MDC_IDC_SET_LEADCHNL_RV_SENSING_CATHODE_ELECTRODE_1: NORMAL
MDC_IDC_SET_LEADCHNL_RV_SENSING_CATHODE_LOCATION_1: NORMAL
MDC_IDC_SET_LEADCHNL_RV_SENSING_POLARITY: NORMAL
MDC_IDC_STAT_AT_BURDEN_PERCENT: 0 %
MDC_IDC_STAT_AT_DTM_END: NORMAL
MDC_IDC_STAT_AT_DTM_START: NORMAL
MDC_IDC_STAT_AT_MODE_SW_COUNT_PER_DAY: 0
MDC_IDC_STAT_AT_MODE_SW_PERCENT_TIME_PER_DAY: 0 %
MDC_IDC_STAT_BRADY_AP_VP_PERCENT: 60 %
MDC_IDC_STAT_BRADY_AP_VS_PERCENT: 2 %
MDC_IDC_STAT_BRADY_AS_VP_PERCENT: 33 %
MDC_IDC_STAT_BRADY_AS_VS_PERCENT: 5 %
MDC_IDC_STAT_BRADY_DTM_END: NORMAL
MDC_IDC_STAT_BRADY_DTM_START: NORMAL
MDC_IDC_STAT_BRADY_RA_PERCENT_PACED: 62 %
MDC_IDC_STAT_BRADY_RV_PERCENT_PACED: 93 %
MDC_IDC_STAT_HEART_RATE_ATRIAL_MEAN: 73 {BEATS}/MIN
MDC_IDC_STAT_HEART_RATE_DTM_END: NORMAL
MDC_IDC_STAT_HEART_RATE_DTM_START: NORMAL
MDC_IDC_STAT_HEART_RATE_VENTRICULAR_MEAN: 73 {BEATS}/MIN

## 2024-02-19 ENCOUNTER — TELEPHONE (OUTPATIENT)
Dept: CARDIOLOGY | Facility: CLINIC | Age: 84
End: 2024-02-19
Payer: COMMERCIAL

## 2024-02-19 NOTE — TELEPHONE ENCOUNTER
Patient called back.  He has been on vacation and left his monitor at home.  He will be back beginning of March.

## 2024-02-19 NOTE — TELEPHONE ENCOUNTER
"Alert received for \"no message received\" in 21 days.    Message left for pt to please check the status on his remote monitor device to ensure connection enabled.  Device clinic phone number left in message.    NABILA Marquis  "

## 2024-03-21 NOTE — TELEPHONE ENCOUNTER
Pt left VM. He said he received a VM from [employer] saying his pacemaker remote monitor was not transmitting.     Per previous notes, pt was on vacation without his monitor and was returning home in early March. Reviewed [employer] website and pt's PPM connected this morning.         Called pt back. Told him that his monitor connected this morning so nothing else needs to be done. Pt states understanding. No further questions.

## 2024-04-03 ENCOUNTER — ANCILLARY PROCEDURE (OUTPATIENT)
Dept: CARDIOLOGY | Facility: CLINIC | Age: 84
End: 2024-04-03
Attending: INTERNAL MEDICINE
Payer: COMMERCIAL

## 2024-04-03 DIAGNOSIS — I44.1 MOBITZ II: ICD-10-CM

## 2024-04-03 DIAGNOSIS — Z95.0 CARDIAC PACEMAKER IN SITU: ICD-10-CM

## 2024-04-03 PROCEDURE — 93296 REM INTERROG EVL PM/IDS: CPT | Performed by: INTERNAL MEDICINE

## 2024-04-03 PROCEDURE — 93294 REM INTERROG EVL PM/LDLS PM: CPT | Performed by: INTERNAL MEDICINE

## 2024-04-04 LAB
MDC_IDC_EPISODE_DTM: NORMAL
MDC_IDC_EPISODE_ID: 8
MDC_IDC_EPISODE_TYPE: NORMAL
MDC_IDC_EPISODE_TYPE_INDUCED: NO
MDC_IDC_EPISODE_VENDOR_TYPE: NORMAL
MDC_IDC_LEAD_CONNECTION_STATUS: NORMAL
MDC_IDC_LEAD_CONNECTION_STATUS: NORMAL
MDC_IDC_LEAD_IMPLANT_DT: NORMAL
MDC_IDC_LEAD_IMPLANT_DT: NORMAL
MDC_IDC_LEAD_LOCATION: NORMAL
MDC_IDC_LEAD_LOCATION: NORMAL
MDC_IDC_LEAD_LOCATION_DETAIL_1: NORMAL
MDC_IDC_LEAD_MFG: NORMAL
MDC_IDC_LEAD_MFG: NORMAL
MDC_IDC_LEAD_MODEL: NORMAL
MDC_IDC_LEAD_MODEL: NORMAL
MDC_IDC_LEAD_POLARITY_TYPE: NORMAL
MDC_IDC_LEAD_POLARITY_TYPE: NORMAL
MDC_IDC_LEAD_SERIAL: NORMAL
MDC_IDC_LEAD_SERIAL: NORMAL
MDC_IDC_MSMT_BATTERY_DTM: NORMAL
MDC_IDC_MSMT_BATTERY_REMAINING_PERCENTAGE: 85 %
MDC_IDC_MSMT_BATTERY_STATUS: NORMAL
MDC_IDC_MSMT_LEADCHNL_RA_IMPEDANCE_VALUE: 468 OHM
MDC_IDC_MSMT_LEADCHNL_RA_LEAD_CHANNEL_STATUS: NORMAL
MDC_IDC_MSMT_LEADCHNL_RA_PACING_THRESHOLD_AMPLITUDE: 0.5 V
MDC_IDC_MSMT_LEADCHNL_RA_PACING_THRESHOLD_PULSEWIDTH: 0.4 MS
MDC_IDC_MSMT_LEADCHNL_RV_IMPEDANCE_VALUE: 566 OHM
MDC_IDC_MSMT_LEADCHNL_RV_LEAD_CHANNEL_STATUS: NORMAL
MDC_IDC_MSMT_LEADCHNL_RV_PACING_THRESHOLD_AMPLITUDE: 0.6 V
MDC_IDC_MSMT_LEADCHNL_RV_PACING_THRESHOLD_PULSEWIDTH: 0.4 MS
MDC_IDC_PG_IMPLANT_DTM: NORMAL
MDC_IDC_PG_MFG: NORMAL
MDC_IDC_PG_MODEL: NORMAL
MDC_IDC_PG_SERIAL: NORMAL
MDC_IDC_PG_TYPE: NORMAL
MDC_IDC_SESS_CLINIC_NAME: NORMAL
MDC_IDC_SESS_DTM: NORMAL
MDC_IDC_SESS_REPROGRAMMED: NO
MDC_IDC_SESS_TYPE: NORMAL
MDC_IDC_SET_BRADY_AT_MODE_SWITCH_MODE: NORMAL
MDC_IDC_SET_BRADY_AT_MODE_SWITCH_RATE: 180 {BEATS}/MIN
MDC_IDC_SET_BRADY_LOWRATE: 60 {BEATS}/MIN
MDC_IDC_SET_BRADY_MAX_SENSOR_RATE: 120 {BEATS}/MIN
MDC_IDC_SET_BRADY_MAX_TRACKING_RATE: 130 {BEATS}/MIN
MDC_IDC_SET_BRADY_MODE: NORMAL
MDC_IDC_SET_BRADY_PAV_DELAY_HIGH: 160 MS
MDC_IDC_SET_BRADY_PAV_DELAY_LOW: 200 MS
MDC_IDC_SET_BRADY_SAV_DELAY_HIGH: 130 MS
MDC_IDC_SET_BRADY_SAV_DELAY_LOW: 170 MS
MDC_IDC_SET_LEADCHNL_RA_PACING_AMPLITUDE: 1.5 V
MDC_IDC_SET_LEADCHNL_RA_PACING_ANODE_ELECTRODE_1: NORMAL
MDC_IDC_SET_LEADCHNL_RA_PACING_ANODE_LOCATION_1: NORMAL
MDC_IDC_SET_LEADCHNL_RA_PACING_CAPTURE_MODE: NORMAL
MDC_IDC_SET_LEADCHNL_RA_PACING_CATHODE_ELECTRODE_1: NORMAL
MDC_IDC_SET_LEADCHNL_RA_PACING_CATHODE_LOCATION_1: NORMAL
MDC_IDC_SET_LEADCHNL_RA_PACING_POLARITY: NORMAL
MDC_IDC_SET_LEADCHNL_RA_PACING_PULSEWIDTH: 0.4 MS
MDC_IDC_SET_LEADCHNL_RA_SENSING_ADAPTATION_MODE: NORMAL
MDC_IDC_SET_LEADCHNL_RA_SENSING_ANODE_ELECTRODE_1: NORMAL
MDC_IDC_SET_LEADCHNL_RA_SENSING_ANODE_LOCATION_1: NORMAL
MDC_IDC_SET_LEADCHNL_RA_SENSING_CATHODE_ELECTRODE_1: NORMAL
MDC_IDC_SET_LEADCHNL_RA_SENSING_CATHODE_LOCATION_1: NORMAL
MDC_IDC_SET_LEADCHNL_RA_SENSING_POLARITY: NORMAL
MDC_IDC_SET_LEADCHNL_RV_PACING_AMPLITUDE: 1.1 V
MDC_IDC_SET_LEADCHNL_RV_PACING_ANODE_ELECTRODE_1: NORMAL
MDC_IDC_SET_LEADCHNL_RV_PACING_ANODE_LOCATION_1: NORMAL
MDC_IDC_SET_LEADCHNL_RV_PACING_CAPTURE_MODE: NORMAL
MDC_IDC_SET_LEADCHNL_RV_PACING_CATHODE_ELECTRODE_1: NORMAL
MDC_IDC_SET_LEADCHNL_RV_PACING_CATHODE_LOCATION_1: NORMAL
MDC_IDC_SET_LEADCHNL_RV_PACING_POLARITY: NORMAL
MDC_IDC_SET_LEADCHNL_RV_PACING_PULSEWIDTH: 0.4 MS
MDC_IDC_SET_LEADCHNL_RV_SENSING_ADAPTATION_MODE: NORMAL
MDC_IDC_SET_LEADCHNL_RV_SENSING_ANODE_ELECTRODE_1: NORMAL
MDC_IDC_SET_LEADCHNL_RV_SENSING_ANODE_LOCATION_1: NORMAL
MDC_IDC_SET_LEADCHNL_RV_SENSING_CATHODE_ELECTRODE_1: NORMAL
MDC_IDC_SET_LEADCHNL_RV_SENSING_CATHODE_LOCATION_1: NORMAL
MDC_IDC_SET_LEADCHNL_RV_SENSING_POLARITY: NORMAL
MDC_IDC_STAT_AT_BURDEN_PERCENT: 0 %
MDC_IDC_STAT_AT_DTM_END: NORMAL
MDC_IDC_STAT_AT_DTM_START: NORMAL
MDC_IDC_STAT_AT_MODE_SW_COUNT_PER_DAY: 0
MDC_IDC_STAT_AT_MODE_SW_PERCENT_TIME_PER_DAY: 0 %
MDC_IDC_STAT_BRADY_AP_VP_PERCENT: 61 %
MDC_IDC_STAT_BRADY_AP_VS_PERCENT: 1 %
MDC_IDC_STAT_BRADY_AS_VP_PERCENT: 34 %
MDC_IDC_STAT_BRADY_AS_VS_PERCENT: 4 %
MDC_IDC_STAT_BRADY_DTM_END: NORMAL
MDC_IDC_STAT_BRADY_DTM_START: NORMAL
MDC_IDC_STAT_BRADY_RA_PERCENT_PACED: 62 %
MDC_IDC_STAT_BRADY_RV_PERCENT_PACED: 94 %
MDC_IDC_STAT_HEART_RATE_ATRIAL_MEAN: 73 {BEATS}/MIN
MDC_IDC_STAT_HEART_RATE_DTM_END: NORMAL
MDC_IDC_STAT_HEART_RATE_DTM_START: NORMAL
MDC_IDC_STAT_HEART_RATE_VENTRICULAR_MEAN: 73 {BEATS}/MIN

## 2024-05-23 ENCOUNTER — TELEPHONE (OUTPATIENT)
Dept: CARDIOLOGY | Facility: CLINIC | Age: 84
End: 2024-05-23
Payer: COMMERCIAL

## 2024-05-23 NOTE — TELEPHONE ENCOUNTER
Received message from patient stating that he had received a message from Alyss[employer] saying his home monitor was not sending any signals.  He was calling to verify that everything was okay.     Per chart review, Alyssa left a message for patient on 2/19/24 regarding this.  Messaged Alyssa who cannot recall sending another message.      Reviewed [employer]'s website and it appears monitor is connecting normally with last connection on 5/22/24 at 2351:           Called and notified patient that his device is connecting normally.  Asked if it was possible the message was old, from February.  Patient stated it was the last message on his screen and he just assumed it was new, but will check to make sure it wasn't an old message.  He was appreciative of the call and follow-up and is glad to hear his monitor is connecting normally.     NABILA Khan

## 2024-07-03 ENCOUNTER — ANCILLARY PROCEDURE (OUTPATIENT)
Dept: CARDIOLOGY | Facility: CLINIC | Age: 84
End: 2024-07-03
Attending: INTERNAL MEDICINE
Payer: COMMERCIAL

## 2024-07-03 DIAGNOSIS — E78.5 HYPERLIPIDEMIA LDL GOAL <70: ICD-10-CM

## 2024-07-03 DIAGNOSIS — I10 ESSENTIAL HYPERTENSION, BENIGN: ICD-10-CM

## 2024-07-03 DIAGNOSIS — N18.31 STAGE 3A CHRONIC KIDNEY DISEASE (H): ICD-10-CM

## 2024-07-03 DIAGNOSIS — I44.2 CHB (COMPLETE HEART BLOCK) (H): ICD-10-CM

## 2024-07-03 DIAGNOSIS — Z95.0 CARDIAC PACEMAKER IN SITU: ICD-10-CM

## 2024-07-03 DIAGNOSIS — I25.10 CORONARY ARTERY DISEASE INVOLVING NATIVE CORONARY ARTERY OF NATIVE HEART WITHOUT ANGINA PECTORIS: Primary | ICD-10-CM

## 2024-07-03 PROCEDURE — 93280 PM DEVICE PROGR EVAL DUAL: CPT | Performed by: INTERNAL MEDICINE

## 2024-09-12 LAB
MDC_IDC_EPISODE_DTM: NORMAL
MDC_IDC_EPISODE_ID: 10
MDC_IDC_EPISODE_TYPE: NORMAL
MDC_IDC_EPISODE_TYPE_INDUCED: NO
MDC_IDC_EPISODE_VENDOR_TYPE: NORMAL
MDC_IDC_LEAD_CONNECTION_STATUS: NORMAL
MDC_IDC_LEAD_CONNECTION_STATUS: NORMAL
MDC_IDC_LEAD_IMPLANT_DT: NORMAL
MDC_IDC_LEAD_IMPLANT_DT: NORMAL
MDC_IDC_LEAD_LOCATION: NORMAL
MDC_IDC_LEAD_LOCATION: NORMAL
MDC_IDC_LEAD_LOCATION_DETAIL_1: NORMAL
MDC_IDC_LEAD_MFG: NORMAL
MDC_IDC_LEAD_MFG: NORMAL
MDC_IDC_LEAD_MODEL: NORMAL
MDC_IDC_LEAD_MODEL: NORMAL
MDC_IDC_LEAD_POLARITY_TYPE: NORMAL
MDC_IDC_LEAD_POLARITY_TYPE: NORMAL
MDC_IDC_LEAD_SERIAL: NORMAL
MDC_IDC_LEAD_SERIAL: NORMAL
MDC_IDC_MSMT_BATTERY_DTM: NORMAL
MDC_IDC_MSMT_BATTERY_REMAINING_PERCENTAGE: 80 %
MDC_IDC_MSMT_BATTERY_STATUS: NORMAL
MDC_IDC_MSMT_LEADCHNL_RA_IMPEDANCE_VALUE: 526 OHM
MDC_IDC_MSMT_LEADCHNL_RA_LEAD_CHANNEL_STATUS: NORMAL
MDC_IDC_MSMT_LEADCHNL_RA_PACING_THRESHOLD_AMPLITUDE: 0.3 V
MDC_IDC_MSMT_LEADCHNL_RA_PACING_THRESHOLD_PULSEWIDTH: 0.4 MS
MDC_IDC_MSMT_LEADCHNL_RV_IMPEDANCE_VALUE: 526 OHM
MDC_IDC_MSMT_LEADCHNL_RV_LEAD_CHANNEL_STATUS: NORMAL
MDC_IDC_MSMT_LEADCHNL_RV_PACING_THRESHOLD_AMPLITUDE: 0.6 V
MDC_IDC_MSMT_LEADCHNL_RV_PACING_THRESHOLD_PULSEWIDTH: 0.4 MS
MDC_IDC_PG_IMPLANT_DTM: NORMAL
MDC_IDC_PG_MFG: NORMAL
MDC_IDC_PG_MODEL: NORMAL
MDC_IDC_PG_SERIAL: NORMAL
MDC_IDC_PG_TYPE: NORMAL
MDC_IDC_SESS_CLINIC_NAME: NORMAL
MDC_IDC_SESS_DTM: NORMAL
MDC_IDC_SESS_TYPE: NORMAL
MDC_IDC_SET_BRADY_AT_MODE_SWITCH_MODE: NORMAL
MDC_IDC_SET_BRADY_AT_MODE_SWITCH_RATE: 180 {BEATS}/MIN
MDC_IDC_SET_BRADY_LOWRATE: 60 {BEATS}/MIN
MDC_IDC_SET_BRADY_MAX_SENSOR_RATE: 120 {BEATS}/MIN
MDC_IDC_SET_BRADY_MAX_TRACKING_RATE: 130 {BEATS}/MIN
MDC_IDC_SET_BRADY_MODE: NORMAL
MDC_IDC_SET_BRADY_PAV_DELAY_HIGH: 160 MS
MDC_IDC_SET_BRADY_PAV_DELAY_LOW: 200 MS
MDC_IDC_SET_BRADY_SAV_DELAY_HIGH: 130 MS
MDC_IDC_SET_BRADY_SAV_DELAY_LOW: 170 MS
MDC_IDC_SET_LEADCHNL_RA_PACING_AMPLITUDE: 1.5 V
MDC_IDC_SET_LEADCHNL_RA_PACING_CAPTURE_MODE: NORMAL
MDC_IDC_SET_LEADCHNL_RA_PACING_POLARITY: NORMAL
MDC_IDC_SET_LEADCHNL_RA_PACING_PULSEWIDTH: 0.4 MS
MDC_IDC_SET_LEADCHNL_RA_SENSING_ADAPTATION_MODE: NORMAL
MDC_IDC_SET_LEADCHNL_RA_SENSING_POLARITY: NORMAL
MDC_IDC_SET_LEADCHNL_RV_PACING_AMPLITUDE: 1.1 V
MDC_IDC_SET_LEADCHNL_RV_PACING_CAPTURE_MODE: NORMAL
MDC_IDC_SET_LEADCHNL_RV_PACING_POLARITY: NORMAL
MDC_IDC_SET_LEADCHNL_RV_PACING_PULSEWIDTH: 0.4 MS
MDC_IDC_SET_LEADCHNL_RV_SENSING_ADAPTATION_MODE: NORMAL
MDC_IDC_SET_LEADCHNL_RV_SENSING_POLARITY: NORMAL
MDC_IDC_STAT_AT_BURDEN_PERCENT: 0 %
MDC_IDC_STAT_AT_DTM_END: NORMAL
MDC_IDC_STAT_AT_DTM_START: NORMAL
MDC_IDC_STAT_AT_MODE_SW_COUNT: 1
MDC_IDC_STAT_BRADY_DTM_END: NORMAL
MDC_IDC_STAT_BRADY_DTM_START: NORMAL
MDC_IDC_STAT_BRADY_RA_PERCENT_PACED: 64 %
MDC_IDC_STAT_BRADY_RV_PERCENT_PACED: 96 %
MDC_IDC_STAT_EPISODE_RECENT_COUNT: 0
MDC_IDC_STAT_EPISODE_RECENT_COUNT: 0
MDC_IDC_STAT_EPISODE_RECENT_COUNT_DTM_END: NORMAL
MDC_IDC_STAT_EPISODE_RECENT_COUNT_DTM_END: NORMAL
MDC_IDC_STAT_EPISODE_RECENT_COUNT_DTM_START: NORMAL
MDC_IDC_STAT_EPISODE_RECENT_COUNT_DTM_START: NORMAL
MDC_IDC_STAT_EPISODE_TYPE: NORMAL
MDC_IDC_STAT_EPISODE_TYPE: NORMAL
MDC_IDC_STAT_EPISODE_VENDOR_TYPE: NORMAL

## 2024-09-16 ENCOUNTER — OFFICE VISIT (OUTPATIENT)
Dept: CARDIOLOGY | Facility: CLINIC | Age: 84
End: 2024-09-16
Attending: INTERNAL MEDICINE
Payer: COMMERCIAL

## 2024-09-16 VITALS
SYSTOLIC BLOOD PRESSURE: 138 MMHG | OXYGEN SATURATION: 97 % | HEART RATE: 66 BPM | WEIGHT: 171.4 LBS | HEIGHT: 70 IN | BODY MASS INDEX: 24.54 KG/M2 | DIASTOLIC BLOOD PRESSURE: 72 MMHG

## 2024-09-16 DIAGNOSIS — Z95.0 CARDIAC PACEMAKER IN SITU: ICD-10-CM

## 2024-09-16 DIAGNOSIS — I10 ESSENTIAL HYPERTENSION, BENIGN: ICD-10-CM

## 2024-09-16 DIAGNOSIS — I25.10 CORONARY ARTERY DISEASE INVOLVING NATIVE CORONARY ARTERY OF NATIVE HEART WITHOUT ANGINA PECTORIS: ICD-10-CM

## 2024-09-16 DIAGNOSIS — N18.31 STAGE 3A CHRONIC KIDNEY DISEASE (H): ICD-10-CM

## 2024-09-16 DIAGNOSIS — E78.5 HYPERLIPIDEMIA LDL GOAL <70: ICD-10-CM

## 2024-09-16 DIAGNOSIS — I44.2 CHB (COMPLETE HEART BLOCK) (H): ICD-10-CM

## 2024-09-16 PROCEDURE — G2211 COMPLEX E/M VISIT ADD ON: HCPCS | Performed by: INTERNAL MEDICINE

## 2024-09-16 PROCEDURE — 99214 OFFICE O/P EST MOD 30 MIN: CPT | Performed by: INTERNAL MEDICINE

## 2024-09-16 RX ORDER — NITROGLYCERIN 0.4 MG/1
TABLET SUBLINGUAL
Qty: 30 TABLET | Refills: 2 | Status: SHIPPED | OUTPATIENT
Start: 2024-09-16

## 2024-09-16 RX ORDER — ROSUVASTATIN CALCIUM 20 MG/1
20 TABLET, COATED ORAL DAILY
Qty: 90 TABLET | Refills: 3 | Status: SHIPPED | OUTPATIENT
Start: 2024-09-16

## 2024-09-16 NOTE — LETTER
9/16/2024    Poli Alberto MD  [address]  [facility] [address]    RE: Sunil Lyle       Dear Colleague,     I had the pleasure of seeing Sunil Lyle in the [facility].  CARDIOLOGY CLINIC FOLLOW-UP NOTE      REASON FOR VISIT:   Follow-up CAD    PRIMARY CARE PHYSICIAN:  Poli Alberto        History of Present Illness  Sunil Lyle is an extremely pleasant 84 year old male, previously a patient of Dr. Medina until his senior care, here for routine follow-up.  He has a history of CAD for which he underwent stenting of the right coronary artery in 2001, with a patent RCA stent and mild nonobstructive CAD on 1/2023 angiogram.  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      Since his last visit with me in July 2022, he has overall done well.  His biggest issue has been some exertional shortness of breath, though he attributes this primarily to not being physically active.  He did see Nataliya Mchugh NP, for several visits to evaluate this over the past year, and ultimately had an angiogram showing a widely patent RCA stent with mild nonobstructive CAD elsewhere.  He tells me that he has no issues with his medications and has no obvious cardiac complaints.  He feels that the biggest limitation to him exerting himself more is a lack of motivation.  He also used to enjoy using the [employer] sports package, but his wife ended up selling this a few years ago and he has not been as active since.    His most recent labs are from 3/2024 in Care Everywhere, with normal hemoglobin, essentially normal renal function, and LDL down to 55 from 85 in September 2023.   His most recent  Department of Anesthesiology  Postprocedure Note    Patient: Adan Lainez  MRN: [id number]  YOB: 1986  Date of evaluation: 11/9/2022      Procedure Summary     Date: 11/09/22 Room / Location: [address]    Anesthesia Start: [facility] Anesthesia Stop: 0933    Procedures:       RETROPUBIC SLING (Vagina )      CYSTOSCOPY (Bladder) Diagnosis:       GSI (genuine stress incontinence), female      ([address])    Surgeons: Lauren Richardson MD Responsible Provider: Magda Cartagena MD    Anesthesia Type: general ASA Status: 2          Anesthesia Type: No value filed.     Freeman Phase I: Freeman Score: 10    Freeman Phase II: Freeman Score: 10      Anesthesia Post Evaluation    Patient location during evaluation: PACU  Patient participation: complete - patient participated  Level of consciousness: awake and alert  Pain score: 0  Airway patency: patent  Nausea & Vomiting: no nausea and no vomiting  Complications: no  Cardiovascular status: blood pressure returned to baseline  Respiratory status: acceptable  Hydration status: euvolemic echocardiogram was from 3/19/2022, and showed mild-moderate concentric LVH, normal LV EF, normal RV size and function, moderate pulmonary hypertension by echo, no significant valve disease.  His most recent ischemic evaluation was the coronary angiogram from 1/2023 showing a patent proximal RCA stent with mild nonobstructive CAD elsewhere.  Finally, his most recent device interrogation was from 7/3/2024, and showed stable parameters, good battery life, and no atrial or ventricular dysrhythmias.      Assessment & Plan    CAD s/p RCA PCI in 2001, currently CCS 0 angina  Patent RCA stent with mild nonobstructive CAD elsewhere on 1/2023 angiogram  2:1 AV block s/p Biotronik Edora dual-chamber PPM placement in 3/2022  Paroxysmal AF in the setting of severe systemic illness/recent surgery in March/April 2022 without obvious recurrence  Moderate pulmonary hypertension by echo  Hypertension, reasonably well-controlled  Mixed hyperlipidemia, with well-controlled LDL at present  COPD  CKD 3  BPH with TURP in 6/2022  Former tobacco abuse (quit in 2018)      It was a pleasure to meet with Gerard again in clinic today.  I am glad to hear that he is doing well from a cardiac standpoint.  His blood pressure is reasonably controlled at present, and his LDL is much improved since his Crestor was increased.  I would continue his current medications unchanged.  We did discuss that I want him to really focus on trying to get more active physically.  We actually looked into options for new or motion activated videogames given how much he enjoyed the [employer] sports, and he is going to look into the [employer] which has similar games.      -Continue Crestor 20 mg daily  -Continue fenofibrate 108 mg daily  -Continue baby aspirin  -PPM management through device clinic      Follow-up: 1 year, or sooner as needed (labs, including lipids, monitored through PCP)        Ross Landers MD  Interventional Cardiology  September 16, 2024      The  longitudinal plan of care for the diagnosis(es)/condition(s) as documented were addressed during this visit. Due to the added complexity in care, I will continue to support Gerard in the subsequent management and with ongoing continuity of care.          Medications  Current Outpatient Medications   Medication Sig Dispense Refill     aspirin 81 MG EC tablet Take 81 mg by mouth daily       fenofibrate (LOFIBRA) 54 MG tablet Take 54 mg by mouth daily 2 tablets daily.       multivitamin w/minerals (THERA-VIT-M) tablet Take 1 tablet by mouth daily        nitroGLYcerin (NITROSTAT) 0.4 MG sublingual tablet For chest pain place 1 tablet under the tongue every 5 minutes for 3 doses. If symptoms persist 5 minutes after 1st dose call 911. 30 tablet 0     rosuvastatin (CRESTOR) 20 MG tablet Take 1 tablet (20 mg) by mouth daily 90 tablet 3     vitamin D3 (CHOLECALCIFEROL) 50 mcg (2000 units) tablet Take 2,000 Units by mouth daily       No current facility-administered medications for this visit.     Allergies  Allergies   Allergen Reactions     Pcn [Penicillins] Anaphylaxis     Chantix [Varenicline] Other (See Comments)     CNS side effects     Bupropion Hcl      vivid dreams     Ciprofloxacin Rash     Lactose Diarrhea         Physical Exam      BP: 138/72 Pulse: 66     SpO2: 97 %      Vital Signs with Ranges  Pulse:  [66] 66  BP: (138)/(72) 138/72  SpO2:  [97 %] 97 %  171 lbs 6.4 oz    Constitutional: Well-appearing, no acute distress  Respiratory: Normal respiratory effort, CTAB  Cardiovascular: RRR, no m/r/g.  JVP < 7 cm H2O.  There is no LE edema.  Normal carotid upstrokes, no carotid bruits.      Thank you for allowing me to participate in the care of your patient.      Sincerely,     Ross Landers MD     [facility]  cc:   Thai Rodríguez MD  [address]  [place] [address]

## 2024-09-16 NOTE — PROGRESS NOTES
CARDIOLOGY CLINIC FOLLOW-UP NOTE      REASON FOR VISIT:   Follow-up CAD    PRIMARY CARE PHYSICIAN:  Poli Alberto        History of Present Illness   Sunil Lyle is an extremely pleasant 84 year old male, previously a patient of Dr. Medina until his care home, here for routine follow-up.  He has a history of CAD for which he underwent stenting of the right coronary artery in 2001, with a patent RCA stent and mild nonobstructive CAD on 1/2023 angiogram.  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      Since his last visit with me in July 2022, he has overall done well.  His biggest issue has been some exertional shortness of breath, though he attributes this primarily to not being physically active.  He did see Nataliya Mchugh NP, for several visits to evaluate this over the past year, and ultimately had an angiogram showing a widely patent RCA stent with mild nonobstructive CAD elsewhere.  He tells me that he has no issues with his medications and has no obvious cardiac complaints.  He feels that the biggest limitation to him exerting himself more is a lack of motivation.  He also used to enjoy using the [employer] sports package, but his wife ended up selling this a few years ago and he has not been as active since.    His most recent labs are from 3/2024 in [facility], with normal hemoglobin, essentially normal renal function, and LDL down to 55 from 85 in September 2023.   His most recent echocardiogram was from 3/19/2022, and showed mild-moderate concentric LVH, normal LV EF, normal RV size and function, moderate pulmonary hypertension by echo, no significant valve disease.  His most recent  ischemic evaluation was the coronary angiogram from 1/2023 showing a patent proximal RCA stent with mild nonobstructive CAD elsewhere.  Finally, his most recent device interrogation was from 7/3/2024, and showed stable parameters, good battery life, and no atrial or ventricular dysrhythmias.      Assessment & Plan     CAD s/p RCA PCI in 2001, currently CCS 0 angina  Patent RCA stent with mild nonobstructive CAD elsewhere on 1/2023 angiogram  2:1 AV block s/p Biotronik Edora dual-chamber PPM placement in 3/2022  Paroxysmal AF in the setting of severe systemic illness/recent surgery in March/April 2022 without obvious recurrence  Moderate pulmonary hypertension by echo  Hypertension, reasonably well-controlled  Mixed hyperlipidemia, with well-controlled LDL at present  COPD  CKD 3  BPH with TURP in 6/2022  Former tobacco abuse (quit in 2018)      It was a pleasure to meet with Gerard again in clinic today.  I am glad to hear that he is doing well from a cardiac standpoint.  His blood pressure is reasonably controlled at present, and his LDL is much improved since his Crestor was increased.  I would continue his current medications unchanged.  We did discuss that I want him to really focus on trying to get more active physically.  We actually looked into options for new or motion activated videogames given how much he enjoyed the [employer] sports, and he is going to look into the [employer] which has similar games.      -Continue Crestor 20 mg daily  -Continue fenofibrate 108 mg daily  -Continue baby aspirin  -PPM management through device clinic      Follow-up: 1 year, or sooner as needed (labs, including lipids, monitored through PCP)        Ross Landers MD  Interventional Cardiology  September 16, 2024      The longitudinal plan of care for the diagnosis(es)/condition(s) as documented were addressed during this visit. Due to the added complexity in care, I will continue to support Gerard in the subsequent management  and with ongoing continuity of care.          Medications   Current Outpatient Medications   Medication Sig Dispense Refill    aspirin 81 MG EC tablet Take 81 mg by mouth daily      fenofibrate (LOFIBRA) 54 MG tablet Take 54 mg by mouth daily 2 tablets daily.      multivitamin w/minerals (THERA-VIT-M) tablet Take 1 tablet by mouth daily       nitroGLYcerin (NITROSTAT) 0.4 MG sublingual tablet For chest pain place 1 tablet under the tongue every 5 minutes for 3 doses. If symptoms persist 5 minutes after 1st dose call 911. 30 tablet 0    rosuvastatin (CRESTOR) 20 MG tablet Take 1 tablet (20 mg) by mouth daily 90 tablet 3    vitamin D3 (CHOLECALCIFEROL) 50 mcg (2000 units) tablet Take 2,000 Units by mouth daily       No current facility-administered medications for this visit.     Allergies   Allergies   Allergen Reactions    Pcn [Penicillins] Anaphylaxis    Chantix [Varenicline] Other (See Comments)     CNS side effects    Bupropion Hcl      vivid dreams    Ciprofloxacin Rash    Lactose Diarrhea         Physical Exam       BP: 138/72 Pulse: 66     SpO2: 97 %      Vital Signs with Ranges  Pulse:  [66] 66  BP: (138)/(72) 138/72  SpO2:  [97 %] 97 %  171 lbs 6.4 oz    Constitutional: Well-appearing, no acute distress  Respiratory: Normal respiratory effort, CTAB  Cardiovascular: RRR, no m/r/g.  JVP < 7 cm H2O.  There is no LE edema.  Normal carotid upstrokes, no carotid bruits.

## 2024-10-11 ENCOUNTER — ANCILLARY PROCEDURE (OUTPATIENT)
Dept: CARDIOLOGY | Facility: CLINIC | Age: 84
End: 2024-10-11
Attending: INTERNAL MEDICINE
Payer: COMMERCIAL

## 2024-10-11 DIAGNOSIS — Z95.0 CARDIAC PACEMAKER IN SITU: ICD-10-CM

## 2024-10-11 DIAGNOSIS — I44.1 MOBITZ II: ICD-10-CM

## 2024-10-11 PROCEDURE — 93296 REM INTERROG EVL PM/IDS: CPT | Performed by: INTERNAL MEDICINE

## 2024-10-11 PROCEDURE — 93294 REM INTERROG EVL PM/LDLS PM: CPT | Performed by: INTERNAL MEDICINE

## 2024-10-12 LAB
MDC_IDC_EPISODE_DTM: NORMAL
MDC_IDC_EPISODE_ID: 11
MDC_IDC_EPISODE_TYPE: NORMAL
MDC_IDC_EPISODE_TYPE_INDUCED: NO
MDC_IDC_EPISODE_VENDOR_TYPE: NORMAL
MDC_IDC_LEAD_CONNECTION_STATUS: NORMAL
MDC_IDC_LEAD_CONNECTION_STATUS: NORMAL
MDC_IDC_LEAD_IMPLANT_DT: NORMAL
MDC_IDC_LEAD_IMPLANT_DT: NORMAL
MDC_IDC_LEAD_LOCATION: NORMAL
MDC_IDC_LEAD_LOCATION: NORMAL
MDC_IDC_LEAD_LOCATION_DETAIL_1: NORMAL
MDC_IDC_LEAD_MFG: NORMAL
MDC_IDC_LEAD_MFG: NORMAL
MDC_IDC_LEAD_MODEL: NORMAL
MDC_IDC_LEAD_MODEL: NORMAL
MDC_IDC_LEAD_POLARITY_TYPE: NORMAL
MDC_IDC_LEAD_POLARITY_TYPE: NORMAL
MDC_IDC_LEAD_SERIAL: NORMAL
MDC_IDC_LEAD_SERIAL: NORMAL
MDC_IDC_MSMT_BATTERY_DTM: NORMAL
MDC_IDC_MSMT_BATTERY_REMAINING_PERCENTAGE: 80 %
MDC_IDC_MSMT_BATTERY_STATUS: NORMAL
MDC_IDC_MSMT_LEADCHNL_RA_IMPEDANCE_VALUE: 488 OHM
MDC_IDC_MSMT_LEADCHNL_RA_LEAD_CHANNEL_STATUS: NORMAL
MDC_IDC_MSMT_LEADCHNL_RA_PACING_THRESHOLD_AMPLITUDE: 0.5 V
MDC_IDC_MSMT_LEADCHNL_RA_PACING_THRESHOLD_PULSEWIDTH: 0.4 MS
MDC_IDC_MSMT_LEADCHNL_RV_IMPEDANCE_VALUE: 507 OHM
MDC_IDC_MSMT_LEADCHNL_RV_LEAD_CHANNEL_STATUS: NORMAL
MDC_IDC_MSMT_LEADCHNL_RV_PACING_THRESHOLD_AMPLITUDE: 0.6 V
MDC_IDC_MSMT_LEADCHNL_RV_PACING_THRESHOLD_PULSEWIDTH: 0.4 MS
MDC_IDC_PG_IMPLANT_DTM: NORMAL
MDC_IDC_PG_MFG: NORMAL
MDC_IDC_PG_MODEL: NORMAL
MDC_IDC_PG_SERIAL: NORMAL
MDC_IDC_PG_TYPE: NORMAL
MDC_IDC_SESS_CLINIC_NAME: NORMAL
MDC_IDC_SESS_DTM: NORMAL
MDC_IDC_SESS_REPROGRAMMED: NO
MDC_IDC_SESS_TYPE: NORMAL
MDC_IDC_SET_BRADY_AT_MODE_SWITCH_MODE: NORMAL
MDC_IDC_SET_BRADY_AT_MODE_SWITCH_RATE: 180 {BEATS}/MIN
MDC_IDC_SET_BRADY_LOWRATE: 60 {BEATS}/MIN
MDC_IDC_SET_BRADY_MAX_SENSOR_RATE: 120 {BEATS}/MIN
MDC_IDC_SET_BRADY_MAX_TRACKING_RATE: 130 {BEATS}/MIN
MDC_IDC_SET_BRADY_MODE: NORMAL
MDC_IDC_SET_BRADY_PAV_DELAY_HIGH: 160 MS
MDC_IDC_SET_BRADY_PAV_DELAY_LOW: 200 MS
MDC_IDC_SET_BRADY_SAV_DELAY_HIGH: 130 MS
MDC_IDC_SET_BRADY_SAV_DELAY_LOW: 170 MS
MDC_IDC_SET_LEADCHNL_RA_PACING_AMPLITUDE: 1.5 V
MDC_IDC_SET_LEADCHNL_RA_PACING_ANODE_ELECTRODE_1: NORMAL
MDC_IDC_SET_LEADCHNL_RA_PACING_ANODE_LOCATION_1: NORMAL
MDC_IDC_SET_LEADCHNL_RA_PACING_CAPTURE_MODE: NORMAL
MDC_IDC_SET_LEADCHNL_RA_PACING_CATHODE_ELECTRODE_1: NORMAL
MDC_IDC_SET_LEADCHNL_RA_PACING_CATHODE_LOCATION_1: NORMAL
MDC_IDC_SET_LEADCHNL_RA_PACING_POLARITY: NORMAL
MDC_IDC_SET_LEADCHNL_RA_PACING_PULSEWIDTH: 0.4 MS
MDC_IDC_SET_LEADCHNL_RA_SENSING_ADAPTATION_MODE: NORMAL
MDC_IDC_SET_LEADCHNL_RA_SENSING_ANODE_ELECTRODE_1: NORMAL
MDC_IDC_SET_LEADCHNL_RA_SENSING_ANODE_LOCATION_1: NORMAL
MDC_IDC_SET_LEADCHNL_RA_SENSING_CATHODE_ELECTRODE_1: NORMAL
MDC_IDC_SET_LEADCHNL_RA_SENSING_CATHODE_LOCATION_1: NORMAL
MDC_IDC_SET_LEADCHNL_RA_SENSING_POLARITY: NORMAL
MDC_IDC_SET_LEADCHNL_RV_PACING_AMPLITUDE: 1.1 V
MDC_IDC_SET_LEADCHNL_RV_PACING_ANODE_ELECTRODE_1: NORMAL
MDC_IDC_SET_LEADCHNL_RV_PACING_ANODE_LOCATION_1: NORMAL
MDC_IDC_SET_LEADCHNL_RV_PACING_CAPTURE_MODE: NORMAL
MDC_IDC_SET_LEADCHNL_RV_PACING_CATHODE_ELECTRODE_1: NORMAL
MDC_IDC_SET_LEADCHNL_RV_PACING_CATHODE_LOCATION_1: NORMAL
MDC_IDC_SET_LEADCHNL_RV_PACING_POLARITY: NORMAL
MDC_IDC_SET_LEADCHNL_RV_PACING_PULSEWIDTH: 0.4 MS
MDC_IDC_SET_LEADCHNL_RV_SENSING_ADAPTATION_MODE: NORMAL
MDC_IDC_SET_LEADCHNL_RV_SENSING_ANODE_ELECTRODE_1: NORMAL
MDC_IDC_SET_LEADCHNL_RV_SENSING_ANODE_LOCATION_1: NORMAL
MDC_IDC_SET_LEADCHNL_RV_SENSING_CATHODE_ELECTRODE_1: NORMAL
MDC_IDC_SET_LEADCHNL_RV_SENSING_CATHODE_LOCATION_1: NORMAL
MDC_IDC_SET_LEADCHNL_RV_SENSING_POLARITY: NORMAL
MDC_IDC_STAT_AT_BURDEN_PERCENT: 0 %
MDC_IDC_STAT_AT_DTM_END: NORMAL
MDC_IDC_STAT_AT_DTM_START: NORMAL
MDC_IDC_STAT_AT_MODE_SW_COUNT_PER_DAY: 0
MDC_IDC_STAT_AT_MODE_SW_PERCENT_TIME_PER_DAY: 0 %
MDC_IDC_STAT_BRADY_AP_VP_PERCENT: 70 %
MDC_IDC_STAT_BRADY_AP_VS_PERCENT: 0 %
MDC_IDC_STAT_BRADY_AS_VP_PERCENT: 29 %
MDC_IDC_STAT_BRADY_AS_VS_PERCENT: 0 %
MDC_IDC_STAT_BRADY_DTM_END: NORMAL
MDC_IDC_STAT_BRADY_DTM_START: NORMAL
MDC_IDC_STAT_BRADY_RA_PERCENT_PACED: 70 %
MDC_IDC_STAT_BRADY_RV_PERCENT_PACED: 100 %
MDC_IDC_STAT_HEART_RATE_ATRIAL_MEAN: 72 {BEATS}/MIN
MDC_IDC_STAT_HEART_RATE_DTM_END: NORMAL
MDC_IDC_STAT_HEART_RATE_DTM_START: NORMAL
MDC_IDC_STAT_HEART_RATE_VENTRICULAR_MEAN: 72 {BEATS}/MIN

## 2025-01-17 ENCOUNTER — ANCILLARY PROCEDURE (OUTPATIENT)
Dept: CARDIOLOGY | Facility: CLINIC | Age: 85
End: 2025-01-17
Attending: INTERNAL MEDICINE
Payer: COMMERCIAL

## 2025-01-17 DIAGNOSIS — I44.1 MOBITZ II: ICD-10-CM

## 2025-01-17 DIAGNOSIS — Z95.0 CARDIAC PACEMAKER IN SITU: ICD-10-CM

## 2025-01-17 PROCEDURE — 93294 REM INTERROG EVL PM/LDLS PM: CPT | Performed by: INTERNAL MEDICINE

## 2025-01-17 PROCEDURE — 93296 REM INTERROG EVL PM/IDS: CPT | Performed by: INTERNAL MEDICINE

## 2025-01-20 LAB
MDC_IDC_LEAD_CONNECTION_STATUS: NORMAL
MDC_IDC_LEAD_CONNECTION_STATUS: NORMAL
MDC_IDC_LEAD_IMPLANT_DT: NORMAL
MDC_IDC_LEAD_IMPLANT_DT: NORMAL
MDC_IDC_LEAD_LOCATION: NORMAL
MDC_IDC_LEAD_LOCATION: NORMAL
MDC_IDC_LEAD_LOCATION_DETAIL_1: NORMAL
MDC_IDC_LEAD_MFG: NORMAL
MDC_IDC_LEAD_MFG: NORMAL
MDC_IDC_LEAD_MODEL: NORMAL
MDC_IDC_LEAD_MODEL: NORMAL
MDC_IDC_LEAD_POLARITY_TYPE: NORMAL
MDC_IDC_LEAD_POLARITY_TYPE: NORMAL
MDC_IDC_LEAD_SERIAL: NORMAL
MDC_IDC_LEAD_SERIAL: NORMAL
MDC_IDC_MSMT_BATTERY_DTM: NORMAL
MDC_IDC_MSMT_BATTERY_REMAINING_PERCENTAGE: 80 %
MDC_IDC_MSMT_BATTERY_STATUS: NORMAL
MDC_IDC_MSMT_LEADCHNL_RA_IMPEDANCE_VALUE: 488 OHM
MDC_IDC_MSMT_LEADCHNL_RA_LEAD_CHANNEL_STATUS: NORMAL
MDC_IDC_MSMT_LEADCHNL_RV_IMPEDANCE_VALUE: 507 OHM
MDC_IDC_MSMT_LEADCHNL_RV_LEAD_CHANNEL_STATUS: NORMAL
MDC_IDC_MSMT_LEADCHNL_RV_PACING_THRESHOLD_AMPLITUDE: 0.6 V
MDC_IDC_MSMT_LEADCHNL_RV_PACING_THRESHOLD_PULSEWIDTH: 0.4 MS
MDC_IDC_PG_IMPLANT_DTM: NORMAL
MDC_IDC_PG_MFG: NORMAL
MDC_IDC_PG_MODEL: NORMAL
MDC_IDC_PG_SERIAL: NORMAL
MDC_IDC_PG_TYPE: NORMAL
MDC_IDC_SESS_CLINIC_NAME: NORMAL
MDC_IDC_SESS_DTM: NORMAL
MDC_IDC_SESS_REPROGRAMMED: NO
MDC_IDC_SESS_TYPE: NORMAL
MDC_IDC_SET_BRADY_AT_MODE_SWITCH_MODE: NORMAL
MDC_IDC_SET_BRADY_AT_MODE_SWITCH_RATE: 180 {BEATS}/MIN
MDC_IDC_SET_BRADY_LOWRATE: 60 {BEATS}/MIN
MDC_IDC_SET_BRADY_MAX_SENSOR_RATE: 120 {BEATS}/MIN
MDC_IDC_SET_BRADY_MAX_TRACKING_RATE: 130 {BEATS}/MIN
MDC_IDC_SET_BRADY_MODE: NORMAL
MDC_IDC_SET_BRADY_PAV_DELAY_HIGH: 160 MS
MDC_IDC_SET_BRADY_PAV_DELAY_LOW: 200 MS
MDC_IDC_SET_BRADY_SAV_DELAY_HIGH: 130 MS
MDC_IDC_SET_BRADY_SAV_DELAY_LOW: 170 MS
MDC_IDC_SET_LEADCHNL_RA_PACING_AMPLITUDE: 1.5 V
MDC_IDC_SET_LEADCHNL_RA_PACING_ANODE_ELECTRODE_1: NORMAL
MDC_IDC_SET_LEADCHNL_RA_PACING_ANODE_LOCATION_1: NORMAL
MDC_IDC_SET_LEADCHNL_RA_PACING_CAPTURE_MODE: NORMAL
MDC_IDC_SET_LEADCHNL_RA_PACING_CATHODE_ELECTRODE_1: NORMAL
MDC_IDC_SET_LEADCHNL_RA_PACING_CATHODE_LOCATION_1: NORMAL
MDC_IDC_SET_LEADCHNL_RA_PACING_POLARITY: NORMAL
MDC_IDC_SET_LEADCHNL_RA_PACING_PULSEWIDTH: 0.4 MS
MDC_IDC_SET_LEADCHNL_RA_SENSING_ADAPTATION_MODE: NORMAL
MDC_IDC_SET_LEADCHNL_RA_SENSING_ANODE_ELECTRODE_1: NORMAL
MDC_IDC_SET_LEADCHNL_RA_SENSING_ANODE_LOCATION_1: NORMAL
MDC_IDC_SET_LEADCHNL_RA_SENSING_CATHODE_ELECTRODE_1: NORMAL
MDC_IDC_SET_LEADCHNL_RA_SENSING_CATHODE_LOCATION_1: NORMAL
MDC_IDC_SET_LEADCHNL_RA_SENSING_POLARITY: NORMAL
MDC_IDC_SET_LEADCHNL_RV_PACING_AMPLITUDE: 1.1 V
MDC_IDC_SET_LEADCHNL_RV_PACING_ANODE_ELECTRODE_1: NORMAL
MDC_IDC_SET_LEADCHNL_RV_PACING_ANODE_LOCATION_1: NORMAL
MDC_IDC_SET_LEADCHNL_RV_PACING_CAPTURE_MODE: NORMAL
MDC_IDC_SET_LEADCHNL_RV_PACING_CATHODE_ELECTRODE_1: NORMAL
MDC_IDC_SET_LEADCHNL_RV_PACING_CATHODE_LOCATION_1: NORMAL
MDC_IDC_SET_LEADCHNL_RV_PACING_POLARITY: NORMAL
MDC_IDC_SET_LEADCHNL_RV_PACING_PULSEWIDTH: 0.4 MS
MDC_IDC_SET_LEADCHNL_RV_SENSING_ADAPTATION_MODE: NORMAL
MDC_IDC_SET_LEADCHNL_RV_SENSING_ANODE_ELECTRODE_1: NORMAL
MDC_IDC_SET_LEADCHNL_RV_SENSING_ANODE_LOCATION_1: NORMAL
MDC_IDC_SET_LEADCHNL_RV_SENSING_CATHODE_ELECTRODE_1: NORMAL
MDC_IDC_SET_LEADCHNL_RV_SENSING_CATHODE_LOCATION_1: NORMAL
MDC_IDC_SET_LEADCHNL_RV_SENSING_POLARITY: NORMAL
MDC_IDC_STAT_AT_BURDEN_PERCENT: 0 %
MDC_IDC_STAT_AT_DTM_END: NORMAL
MDC_IDC_STAT_AT_DTM_START: NORMAL
MDC_IDC_STAT_AT_MODE_SW_COUNT_PER_DAY: 0
MDC_IDC_STAT_AT_MODE_SW_PERCENT_TIME_PER_DAY: 0 %
MDC_IDC_STAT_BRADY_AP_VP_PERCENT: 72 %
MDC_IDC_STAT_BRADY_AP_VS_PERCENT: 0 %
MDC_IDC_STAT_BRADY_AS_VP_PERCENT: 28 %
MDC_IDC_STAT_BRADY_AS_VS_PERCENT: 0 %
MDC_IDC_STAT_BRADY_DTM_END: NORMAL
MDC_IDC_STAT_BRADY_DTM_START: NORMAL
MDC_IDC_STAT_BRADY_RA_PERCENT_PACED: 72 %
MDC_IDC_STAT_BRADY_RV_PERCENT_PACED: 100 %
MDC_IDC_STAT_HEART_RATE_ATRIAL_MEAN: 72 {BEATS}/MIN
MDC_IDC_STAT_HEART_RATE_DTM_END: NORMAL
MDC_IDC_STAT_HEART_RATE_DTM_START: NORMAL
MDC_IDC_STAT_HEART_RATE_VENTRICULAR_MEAN: 72 {BEATS}/MIN

## 2025-04-18 ENCOUNTER — ANCILLARY PROCEDURE (OUTPATIENT)
Dept: CARDIOLOGY | Facility: CLINIC | Age: 85
End: 2025-04-18
Attending: INTERNAL MEDICINE
Payer: COMMERCIAL

## 2025-04-18 DIAGNOSIS — Z95.0 CARDIAC PACEMAKER IN SITU: ICD-10-CM

## 2025-04-18 DIAGNOSIS — I44.1 MOBITZ II: ICD-10-CM

## 2025-04-18 LAB
MDC_IDC_EPISODE_DTM: NORMAL
MDC_IDC_EPISODE_ID: 12
MDC_IDC_EPISODE_TYPE: NORMAL
MDC_IDC_EPISODE_TYPE_INDUCED: NO
MDC_IDC_EPISODE_VENDOR_TYPE: NORMAL
MDC_IDC_LEAD_CONNECTION_STATUS: NORMAL
MDC_IDC_LEAD_CONNECTION_STATUS: NORMAL
MDC_IDC_LEAD_IMPLANT_DT: NORMAL
MDC_IDC_LEAD_IMPLANT_DT: NORMAL
MDC_IDC_LEAD_LOCATION: NORMAL
MDC_IDC_LEAD_LOCATION: NORMAL
MDC_IDC_LEAD_LOCATION_DETAIL_1: NORMAL
MDC_IDC_LEAD_MFG: NORMAL
MDC_IDC_LEAD_MFG: NORMAL
MDC_IDC_LEAD_MODEL: NORMAL
MDC_IDC_LEAD_MODEL: NORMAL
MDC_IDC_LEAD_POLARITY_TYPE: NORMAL
MDC_IDC_LEAD_POLARITY_TYPE: NORMAL
MDC_IDC_LEAD_SERIAL: NORMAL
MDC_IDC_LEAD_SERIAL: NORMAL
MDC_IDC_MSMT_BATTERY_DTM: NORMAL
MDC_IDC_MSMT_BATTERY_REMAINING_PERCENTAGE: 75 %
MDC_IDC_MSMT_BATTERY_STATUS: NORMAL
MDC_IDC_MSMT_LEADCHNL_RA_IMPEDANCE_VALUE: 468 OHM
MDC_IDC_MSMT_LEADCHNL_RA_LEAD_CHANNEL_STATUS: NORMAL
MDC_IDC_MSMT_LEADCHNL_RA_PACING_THRESHOLD_AMPLITUDE: 0.4 V
MDC_IDC_MSMT_LEADCHNL_RA_PACING_THRESHOLD_PULSEWIDTH: 0.4 MS
MDC_IDC_MSMT_LEADCHNL_RV_IMPEDANCE_VALUE: 488 OHM
MDC_IDC_MSMT_LEADCHNL_RV_LEAD_CHANNEL_STATUS: NORMAL
MDC_IDC_MSMT_LEADCHNL_RV_PACING_THRESHOLD_AMPLITUDE: 0.6 V
MDC_IDC_MSMT_LEADCHNL_RV_PACING_THRESHOLD_PULSEWIDTH: 0.4 MS
MDC_IDC_PG_IMPLANT_DTM: NORMAL
MDC_IDC_PG_MFG: NORMAL
MDC_IDC_PG_MODEL: NORMAL
MDC_IDC_PG_SERIAL: NORMAL
MDC_IDC_PG_TYPE: NORMAL
MDC_IDC_SESS_CLINIC_NAME: NORMAL
MDC_IDC_SESS_DTM: NORMAL
MDC_IDC_SESS_REPROGRAMMED: NO
MDC_IDC_SESS_TYPE: NORMAL
MDC_IDC_SET_BRADY_AT_MODE_SWITCH_MODE: NORMAL
MDC_IDC_SET_BRADY_AT_MODE_SWITCH_RATE: 180 {BEATS}/MIN
MDC_IDC_SET_BRADY_LOWRATE: 60 {BEATS}/MIN
MDC_IDC_SET_BRADY_MAX_SENSOR_RATE: 120 {BEATS}/MIN
MDC_IDC_SET_BRADY_MAX_TRACKING_RATE: 130 {BEATS}/MIN
MDC_IDC_SET_BRADY_MODE: NORMAL
MDC_IDC_SET_BRADY_PAV_DELAY_HIGH: 160 MS
MDC_IDC_SET_BRADY_PAV_DELAY_LOW: 200 MS
MDC_IDC_SET_BRADY_SAV_DELAY_HIGH: 130 MS
MDC_IDC_SET_BRADY_SAV_DELAY_LOW: 170 MS
MDC_IDC_SET_LEADCHNL_RA_PACING_AMPLITUDE: 1.4 V
MDC_IDC_SET_LEADCHNL_RA_PACING_ANODE_ELECTRODE_1: NORMAL
MDC_IDC_SET_LEADCHNL_RA_PACING_ANODE_LOCATION_1: NORMAL
MDC_IDC_SET_LEADCHNL_RA_PACING_CAPTURE_MODE: NORMAL
MDC_IDC_SET_LEADCHNL_RA_PACING_CATHODE_ELECTRODE_1: NORMAL
MDC_IDC_SET_LEADCHNL_RA_PACING_CATHODE_LOCATION_1: NORMAL
MDC_IDC_SET_LEADCHNL_RA_PACING_POLARITY: NORMAL
MDC_IDC_SET_LEADCHNL_RA_PACING_PULSEWIDTH: 0.4 MS
MDC_IDC_SET_LEADCHNL_RA_SENSING_ADAPTATION_MODE: NORMAL
MDC_IDC_SET_LEADCHNL_RA_SENSING_ANODE_ELECTRODE_1: NORMAL
MDC_IDC_SET_LEADCHNL_RA_SENSING_ANODE_LOCATION_1: NORMAL
MDC_IDC_SET_LEADCHNL_RA_SENSING_CATHODE_ELECTRODE_1: NORMAL
MDC_IDC_SET_LEADCHNL_RA_SENSING_CATHODE_LOCATION_1: NORMAL
MDC_IDC_SET_LEADCHNL_RA_SENSING_POLARITY: NORMAL
MDC_IDC_SET_LEADCHNL_RV_PACING_AMPLITUDE: 3 V
MDC_IDC_SET_LEADCHNL_RV_PACING_ANODE_ELECTRODE_1: NORMAL
MDC_IDC_SET_LEADCHNL_RV_PACING_ANODE_LOCATION_1: NORMAL
MDC_IDC_SET_LEADCHNL_RV_PACING_CAPTURE_MODE: NORMAL
MDC_IDC_SET_LEADCHNL_RV_PACING_CATHODE_ELECTRODE_1: NORMAL
MDC_IDC_SET_LEADCHNL_RV_PACING_CATHODE_LOCATION_1: NORMAL
MDC_IDC_SET_LEADCHNL_RV_PACING_POLARITY: NORMAL
MDC_IDC_SET_LEADCHNL_RV_PACING_PULSEWIDTH: 0.4 MS
MDC_IDC_SET_LEADCHNL_RV_SENSING_ADAPTATION_MODE: NORMAL
MDC_IDC_SET_LEADCHNL_RV_SENSING_ANODE_ELECTRODE_1: NORMAL
MDC_IDC_SET_LEADCHNL_RV_SENSING_ANODE_LOCATION_1: NORMAL
MDC_IDC_SET_LEADCHNL_RV_SENSING_CATHODE_ELECTRODE_1: NORMAL
MDC_IDC_SET_LEADCHNL_RV_SENSING_CATHODE_LOCATION_1: NORMAL
MDC_IDC_SET_LEADCHNL_RV_SENSING_POLARITY: NORMAL
MDC_IDC_STAT_AT_BURDEN_PERCENT: 0 %
MDC_IDC_STAT_AT_DTM_END: NORMAL
MDC_IDC_STAT_AT_DTM_START: NORMAL
MDC_IDC_STAT_AT_MODE_SW_COUNT_PER_DAY: 0
MDC_IDC_STAT_AT_MODE_SW_PERCENT_TIME_PER_DAY: 0 %
MDC_IDC_STAT_BRADY_AP_VP_PERCENT: 71 %
MDC_IDC_STAT_BRADY_AP_VS_PERCENT: 0 %
MDC_IDC_STAT_BRADY_AS_VP_PERCENT: 28 %
MDC_IDC_STAT_BRADY_AS_VS_PERCENT: 0 %
MDC_IDC_STAT_BRADY_DTM_END: NORMAL
MDC_IDC_STAT_BRADY_DTM_START: NORMAL
MDC_IDC_STAT_BRADY_RA_PERCENT_PACED: 72 %
MDC_IDC_STAT_BRADY_RV_PERCENT_PACED: 99 %
MDC_IDC_STAT_HEART_RATE_ATRIAL_MEAN: 71 {BEATS}/MIN
MDC_IDC_STAT_HEART_RATE_DTM_END: NORMAL
MDC_IDC_STAT_HEART_RATE_DTM_START: NORMAL
MDC_IDC_STAT_HEART_RATE_VENTRICULAR_MEAN: 72 {BEATS}/MIN

## 2025-04-18 PROCEDURE — 93294 REM INTERROG EVL PM/LDLS PM: CPT | Performed by: INTERNAL MEDICINE

## 2025-04-18 PROCEDURE — 93296 REM INTERROG EVL PM/IDS: CPT | Performed by: INTERNAL MEDICINE

## 2025-07-18 ENCOUNTER — ANCILLARY PROCEDURE (OUTPATIENT)
Dept: CARDIOLOGY | Facility: CLINIC | Age: 85
End: 2025-07-18
Attending: INTERNAL MEDICINE
Payer: COMMERCIAL

## 2025-07-18 DIAGNOSIS — Z95.0 CARDIAC PACEMAKER IN SITU: ICD-10-CM

## 2025-07-18 DIAGNOSIS — I44.1 MOBITZ II: ICD-10-CM

## 2025-07-18 LAB
MDC_IDC_EPISODE_DTM: NORMAL
MDC_IDC_EPISODE_ID: 13
MDC_IDC_EPISODE_TYPE: NORMAL
MDC_IDC_EPISODE_TYPE_INDUCED: NO
MDC_IDC_EPISODE_VENDOR_TYPE: NORMAL
MDC_IDC_LEAD_CONNECTION_STATUS: NORMAL
MDC_IDC_LEAD_CONNECTION_STATUS: NORMAL
MDC_IDC_LEAD_IMPLANT_DT: NORMAL
MDC_IDC_LEAD_IMPLANT_DT: NORMAL
MDC_IDC_LEAD_LOCATION: NORMAL
MDC_IDC_LEAD_LOCATION: NORMAL
MDC_IDC_LEAD_LOCATION_DETAIL_1: NORMAL
MDC_IDC_LEAD_MFG: NORMAL
MDC_IDC_LEAD_MFG: NORMAL
MDC_IDC_LEAD_MODEL: NORMAL
MDC_IDC_LEAD_MODEL: NORMAL
MDC_IDC_LEAD_POLARITY_TYPE: NORMAL
MDC_IDC_LEAD_POLARITY_TYPE: NORMAL
MDC_IDC_LEAD_SERIAL: NORMAL
MDC_IDC_LEAD_SERIAL: NORMAL
MDC_IDC_MSMT_BATTERY_DTM: NORMAL
MDC_IDC_MSMT_BATTERY_REMAINING_PERCENTAGE: 75 %
MDC_IDC_MSMT_BATTERY_STATUS: NORMAL
MDC_IDC_MSMT_LEADCHNL_RA_IMPEDANCE_VALUE: 468 OHM
MDC_IDC_MSMT_LEADCHNL_RA_LEAD_CHANNEL_STATUS: NORMAL
MDC_IDC_MSMT_LEADCHNL_RA_PACING_THRESHOLD_AMPLITUDE: 0.5 V
MDC_IDC_MSMT_LEADCHNL_RA_PACING_THRESHOLD_PULSEWIDTH: 0.4 MS
MDC_IDC_MSMT_LEADCHNL_RV_IMPEDANCE_VALUE: 488 OHM
MDC_IDC_MSMT_LEADCHNL_RV_LEAD_CHANNEL_STATUS: NORMAL
MDC_IDC_MSMT_LEADCHNL_RV_PACING_THRESHOLD_AMPLITUDE: 0.6 V
MDC_IDC_MSMT_LEADCHNL_RV_PACING_THRESHOLD_PULSEWIDTH: 0.4 MS
MDC_IDC_PG_IMPLANT_DTM: NORMAL
MDC_IDC_PG_MFG: NORMAL
MDC_IDC_PG_MODEL: NORMAL
MDC_IDC_PG_SERIAL: NORMAL
MDC_IDC_PG_TYPE: NORMAL
MDC_IDC_SESS_CLINIC_NAME: NORMAL
MDC_IDC_SESS_DTM: NORMAL
MDC_IDC_SESS_REPROGRAMMED: NO
MDC_IDC_SESS_TYPE: NORMAL
MDC_IDC_SET_BRADY_AT_MODE_SWITCH_MODE: NORMAL
MDC_IDC_SET_BRADY_AT_MODE_SWITCH_RATE: 180 {BEATS}/MIN
MDC_IDC_SET_BRADY_LOWRATE: 60 {BEATS}/MIN
MDC_IDC_SET_BRADY_MAX_SENSOR_RATE: 120 {BEATS}/MIN
MDC_IDC_SET_BRADY_MAX_TRACKING_RATE: 130 {BEATS}/MIN
MDC_IDC_SET_BRADY_MODE: NORMAL
MDC_IDC_SET_BRADY_PAV_DELAY_HIGH: 160 MS
MDC_IDC_SET_BRADY_PAV_DELAY_LOW: 200 MS
MDC_IDC_SET_BRADY_SAV_DELAY_HIGH: 130 MS
MDC_IDC_SET_BRADY_SAV_DELAY_LOW: 170 MS
MDC_IDC_SET_LEADCHNL_RA_PACING_AMPLITUDE: 1.5 V
MDC_IDC_SET_LEADCHNL_RA_PACING_ANODE_ELECTRODE_1: NORMAL
MDC_IDC_SET_LEADCHNL_RA_PACING_ANODE_LOCATION_1: NORMAL
MDC_IDC_SET_LEADCHNL_RA_PACING_CAPTURE_MODE: NORMAL
MDC_IDC_SET_LEADCHNL_RA_PACING_CATHODE_ELECTRODE_1: NORMAL
MDC_IDC_SET_LEADCHNL_RA_PACING_CATHODE_LOCATION_1: NORMAL
MDC_IDC_SET_LEADCHNL_RA_PACING_POLARITY: NORMAL
MDC_IDC_SET_LEADCHNL_RA_PACING_PULSEWIDTH: 0.4 MS
MDC_IDC_SET_LEADCHNL_RA_SENSING_ADAPTATION_MODE: NORMAL
MDC_IDC_SET_LEADCHNL_RA_SENSING_ANODE_ELECTRODE_1: NORMAL
MDC_IDC_SET_LEADCHNL_RA_SENSING_ANODE_LOCATION_1: NORMAL
MDC_IDC_SET_LEADCHNL_RA_SENSING_CATHODE_ELECTRODE_1: NORMAL
MDC_IDC_SET_LEADCHNL_RA_SENSING_CATHODE_LOCATION_1: NORMAL
MDC_IDC_SET_LEADCHNL_RA_SENSING_POLARITY: NORMAL
MDC_IDC_SET_LEADCHNL_RV_PACING_AMPLITUDE: 1.1 V
MDC_IDC_SET_LEADCHNL_RV_PACING_ANODE_ELECTRODE_1: NORMAL
MDC_IDC_SET_LEADCHNL_RV_PACING_ANODE_LOCATION_1: NORMAL
MDC_IDC_SET_LEADCHNL_RV_PACING_CAPTURE_MODE: NORMAL
MDC_IDC_SET_LEADCHNL_RV_PACING_CATHODE_ELECTRODE_1: NORMAL
MDC_IDC_SET_LEADCHNL_RV_PACING_CATHODE_LOCATION_1: NORMAL
MDC_IDC_SET_LEADCHNL_RV_PACING_POLARITY: NORMAL
MDC_IDC_SET_LEADCHNL_RV_PACING_PULSEWIDTH: 0.4 MS
MDC_IDC_SET_LEADCHNL_RV_SENSING_ADAPTATION_MODE: NORMAL
MDC_IDC_SET_LEADCHNL_RV_SENSING_ANODE_ELECTRODE_1: NORMAL
MDC_IDC_SET_LEADCHNL_RV_SENSING_ANODE_LOCATION_1: NORMAL
MDC_IDC_SET_LEADCHNL_RV_SENSING_CATHODE_ELECTRODE_1: NORMAL
MDC_IDC_SET_LEADCHNL_RV_SENSING_CATHODE_LOCATION_1: NORMAL
MDC_IDC_SET_LEADCHNL_RV_SENSING_POLARITY: NORMAL
MDC_IDC_STAT_AT_BURDEN_PERCENT: 0 %
MDC_IDC_STAT_AT_DTM_END: NORMAL
MDC_IDC_STAT_AT_DTM_START: NORMAL
MDC_IDC_STAT_AT_MODE_SW_COUNT_PER_DAY: 0
MDC_IDC_STAT_AT_MODE_SW_PERCENT_TIME_PER_DAY: 0 %
MDC_IDC_STAT_BRADY_AP_VP_PERCENT: 72 %
MDC_IDC_STAT_BRADY_AP_VS_PERCENT: 0 %
MDC_IDC_STAT_BRADY_AS_VP_PERCENT: 27 %
MDC_IDC_STAT_BRADY_AS_VS_PERCENT: 0 %
MDC_IDC_STAT_BRADY_DTM_END: NORMAL
MDC_IDC_STAT_BRADY_DTM_START: NORMAL
MDC_IDC_STAT_BRADY_RA_PERCENT_PACED: 72 %
MDC_IDC_STAT_BRADY_RV_PERCENT_PACED: 99 %
MDC_IDC_STAT_HEART_RATE_ATRIAL_MEAN: 71 {BEATS}/MIN
MDC_IDC_STAT_HEART_RATE_DTM_END: NORMAL
MDC_IDC_STAT_HEART_RATE_DTM_START: NORMAL
MDC_IDC_STAT_HEART_RATE_VENTRICULAR_MEAN: 72 {BEATS}/MIN

## 2025-07-18 PROCEDURE — 93296 REM INTERROG EVL PM/IDS: CPT | Performed by: INTERNAL MEDICINE

## 2025-07-18 PROCEDURE — 93294 REM INTERROG EVL PM/LDLS PM: CPT | Performed by: INTERNAL MEDICINE

## (undated) DEVICE — GLOVE PROTEXIS W/NEU-THERA 6.5  2D73TE65

## (undated) DEVICE — PACK PCMKR PERM SRG PROC LF SAN32PC573

## (undated) DEVICE — BAG DRAIN URO FOR SIEMENS 8MM ADAPTER NS CC164NS-A

## (undated) DEVICE — PACK AAA SBA15AAFS3

## (undated) DEVICE — DECANTER BAG 2002S

## (undated) DEVICE — CABLE HIGH FREQEUCY STERILE 8MM PLUG 300CM 277KB-D/10

## (undated) DEVICE — RAD INTRODUCER KIT MICRO 5FRX10CM .018 NITINOL G/W

## (undated) DEVICE — DEFIB PRO-PADZ LVP LQD GEL ADULT 8900-2105-01

## (undated) DEVICE — CATH ANGIO JUDKINS JL4 6FRX100CM INFINITI 534620T

## (undated) DEVICE — SU PROLENE 4-0 V-7DA 36" 8975H

## (undated) DEVICE — SU SILK 3-0 TIE 24" SA74H

## (undated) DEVICE — PAD CHUX UNDERPAD 23X24" 7136

## (undated) DEVICE — ESU GROUND PAD UNIVERSAL W/O CORD

## (undated) DEVICE — 5FR X 100CM INFINITI TL DIAGNOSTIC CATHETER, JUDKINS RIGHT CORONARY, JR 4, FEMORAL SELECTIVE THRULUMEN, STANDARD, 0.038IN MAX GUIDEWIRE (EA/1)

## (undated) DEVICE — RAD G/W INQWIRE .035X260CM J-TIP EXCHANGE IQ35F260J1O5RS

## (undated) DEVICE — SOL NACL 0.9% IRRIG 1000ML BOTTLE 2F7124

## (undated) DEVICE — GLOVE PROTEXIS W/NEU-THERA 8.0  2D73TE80

## (undated) DEVICE — CLIP APPLIER 11" MED LIGACLIP MCM20

## (undated) DEVICE — SU SILK 0 24" TIE SA76G

## (undated) DEVICE — Device

## (undated) DEVICE — VESSEL LOOPS RED MAXI

## (undated) DEVICE — ESU ELEC LOOP 27FR 27050F

## (undated) DEVICE — DEVICE CATH STABILIZATION STATLOCK FOLEY 3-WAY FOL0105

## (undated) DEVICE — SU PROLENE 7-0 BV-1DA 4X24" M8702

## (undated) DEVICE — CATH ANGIO INFINITI JR4 4FRX100CM 538421

## (undated) DEVICE — LINEN TOWEL PACK X5 5464

## (undated) DEVICE — PACK TUR CUSTOM SBA15RUFSE

## (undated) DEVICE — SOL WATER IRRIG 1000ML BOTTLE 2F7114

## (undated) DEVICE — TOTE ANGIO CORP PC15AT SAN32CC83O

## (undated) DEVICE — CATH FOLEY 3WAY 24FR 30ML LATEX 0167SI24

## (undated) DEVICE — SHEATH PRELUDE SNAP 13CM 9FR

## (undated) DEVICE — DRSG TELFA ISLAND 4X10"

## (undated) DEVICE — SU PROLENE 4-0 V-5 36" 8935H

## (undated) DEVICE — PREP CHLORAPREP 26ML TINTED ORANGE  260815

## (undated) DEVICE — SYR 20ML SLIP TIP W/O NDL 302831

## (undated) DEVICE — NDL BLUNT 15GA 1.5"

## (undated) DEVICE — KIT HAND CONTROL ANGIOTOUCH ACIST 65CM AT-P65

## (undated) DEVICE — SU SILK 2-0 TIE 24" SA75H

## (undated) DEVICE — COVER TABLE POLY 65X90" 8186

## (undated) DEVICE — DRAPE IOBAN INCISE 36X23" 6651EZ

## (undated) DEVICE — SU PROLENE 3-0 V-7DA 36" 8976H

## (undated) DEVICE — NDL 19GA 1.5"

## (undated) DEVICE — INTRO GLIDESHEATH SLENDER 6FR 10X45CM 60-1060

## (undated) DEVICE — SURGICEL HEMOSTAT 2X14" 1951

## (undated) DEVICE — SU PROLENE 5-0 C-1DA 36" 8720H

## (undated) DEVICE — SLEEVE TR BAND RADIAL COMPRESSION DEVICE 24CM TRB24-REG

## (undated) DEVICE — DRAPE ISOLATION BAG 1003

## (undated) DEVICE — MANIFOLD KIT ANGIO AUTOMATED 014613

## (undated) DEVICE — SU SILK 1 TIE 60" SA7H

## (undated) DEVICE — SOL NACL 0.9% INJ 1000ML BAG 07983-09

## (undated) DEVICE — 5FR X 100CM INFINITI TL DIAGNOSTIC CATHETER, JUDKINS LEFT CORONARY, JL 3.5, FEMORAL SELECTIVE THRULUMEN, SMALL, 0.038IN MAX GUIDEWIRE (EA/1)

## (undated) DEVICE — CATH FOGARTY EMBOLECTOMY 3FR 80CM LATEX 120803FP

## (undated) DEVICE — STPL SKIN 35W ROTATING HEAD PRW35

## (undated) DEVICE — CABLE PACING ALLIGATOR CLIP 301-CG

## (undated) DEVICE — SU SILK 2-0 SH 30" K833H

## (undated) DEVICE — BAG URINARY DRAIN 4000ML LF 153509

## (undated) DEVICE — SOL GLYCINE 1.5% 3000ML BAG 2B7317

## (undated) DEVICE — GLOVE PROTEXIS BLUE W/NEU-THERA 7.0  2D73EB70

## (undated) DEVICE — SU VICRYL 2-0 SH 27" J317H

## (undated) DEVICE — SHEATH PRELUDE SNAP 13CM 6FR

## (undated) RX ORDER — HYDROMORPHONE HCL IN WATER/PF 6 MG/30 ML
PATIENT CONTROLLED ANALGESIA SYRINGE INTRAVENOUS
Status: DISPENSED
Start: 2022-06-01

## (undated) RX ORDER — PAPAVERINE HYDROCHLORIDE 30 MG/ML
INJECTION INTRAMUSCULAR; INTRAVENOUS
Status: DISPENSED
Start: 2022-03-19

## (undated) RX ORDER — HEPARIN SODIUM 200 [USP'U]/100ML
INJECTION, SOLUTION INTRAVENOUS
Status: DISPENSED
Start: 2023-01-06

## (undated) RX ORDER — FENTANYL CITRATE 50 UG/ML
INJECTION, SOLUTION INTRAMUSCULAR; INTRAVENOUS
Status: DISPENSED
Start: 2022-03-19

## (undated) RX ORDER — LIDOCAINE HYDROCHLORIDE 20 MG/ML
INJECTION, SOLUTION EPIDURAL; INFILTRATION; INTRACAUDAL; PERINEURAL
Status: DISPENSED
Start: 2022-06-01

## (undated) RX ORDER — FENTANYL CITRATE 50 UG/ML
INJECTION, SOLUTION INTRAMUSCULAR; INTRAVENOUS
Status: DISPENSED
Start: 2022-06-01

## (undated) RX ORDER — HEPARIN SODIUM 1000 [USP'U]/ML
INJECTION, SOLUTION INTRAVENOUS; SUBCUTANEOUS
Status: DISPENSED
Start: 2023-01-06

## (undated) RX ORDER — PROPOFOL 10 MG/ML
INJECTION, EMULSION INTRAVENOUS
Status: DISPENSED
Start: 2022-06-01

## (undated) RX ORDER — HEPARIN SODIUM 1000 [USP'U]/ML
INJECTION, SOLUTION INTRAVENOUS; SUBCUTANEOUS
Status: DISPENSED
Start: 2022-03-19

## (undated) RX ORDER — NITROGLYCERIN 5 MG/ML
VIAL (ML) INTRAVENOUS
Status: DISPENSED
Start: 2023-01-06

## (undated) RX ORDER — DIPHENHYDRAMINE HYDROCHLORIDE 50 MG/ML
INJECTION INTRAMUSCULAR; INTRAVENOUS
Status: DISPENSED
Start: 2022-06-01

## (undated) RX ORDER — FENTANYL CITRATE 50 UG/ML
INJECTION, SOLUTION INTRAMUSCULAR; INTRAVENOUS
Status: DISPENSED
Start: 2023-01-06

## (undated) RX ORDER — LIDOCAINE HYDROCHLORIDE 20 MG/ML
JELLY TOPICAL
Status: DISPENSED
Start: 2022-06-01

## (undated) RX ORDER — BUPIVACAINE HYDROCHLORIDE 2.5 MG/ML
INJECTION, SOLUTION EPIDURAL; INFILTRATION; INTRACAUDAL
Status: DISPENSED
Start: 2022-03-23

## (undated) RX ORDER — FENTANYL CITRATE 0.05 MG/ML
INJECTION, SOLUTION INTRAMUSCULAR; INTRAVENOUS
Status: DISPENSED
Start: 2022-06-01

## (undated) RX ORDER — CIPROFLOXACIN 2 MG/ML
INJECTION, SOLUTION INTRAVENOUS
Status: DISPENSED
Start: 2022-06-01

## (undated) RX ORDER — OXYCODONE HYDROCHLORIDE 5 MG/1
TABLET ORAL
Status: DISPENSED
Start: 2022-06-01

## (undated) RX ORDER — VERAPAMIL HYDROCHLORIDE 2.5 MG/ML
INJECTION, SOLUTION INTRAVENOUS
Status: DISPENSED
Start: 2023-01-06

## (undated) RX ORDER — LIDOCAINE HYDROCHLORIDE 10 MG/ML
INJECTION, SOLUTION EPIDURAL; INFILTRATION; INTRACAUDAL; PERINEURAL
Status: DISPENSED
Start: 2023-01-06

## (undated) RX ORDER — FENTANYL CITRATE 50 UG/ML
INJECTION, SOLUTION INTRAMUSCULAR; INTRAVENOUS
Status: DISPENSED
Start: 2022-03-23

## (undated) RX ORDER — REGADENOSON 0.08 MG/ML
INJECTION, SOLUTION INTRAVENOUS
Status: DISPENSED
Start: 2022-11-30

## (undated) RX ORDER — ATROPA BELLADONNA AND OPIUM 16.2; 3 MG/1; MG/1
SUPPOSITORY RECTAL
Status: DISPENSED
Start: 2022-06-01

## (undated) RX ORDER — LIDOCAINE HYDROCHLORIDE 10 MG/ML
INJECTION, SOLUTION EPIDURAL; INFILTRATION; INTRACAUDAL; PERINEURAL
Status: DISPENSED
Start: 2022-03-23

## (undated) RX ORDER — ONDANSETRON 2 MG/ML
INJECTION INTRAMUSCULAR; INTRAVENOUS
Status: DISPENSED
Start: 2022-06-01